# Patient Record
Sex: FEMALE | Race: WHITE | Employment: OTHER | URBAN - METROPOLITAN AREA
[De-identification: names, ages, dates, MRNs, and addresses within clinical notes are randomized per-mention and may not be internally consistent; named-entity substitution may affect disease eponyms.]

---

## 2017-01-04 ENCOUNTER — ASSISTED LIVING VISIT (OUTPATIENT)
Dept: GERIATRICS | Facility: CLINIC | Age: 82
End: 2017-01-04
Payer: COMMERCIAL

## 2017-01-04 VITALS
DIASTOLIC BLOOD PRESSURE: 76 MMHG | WEIGHT: 125.8 LBS | BODY MASS INDEX: 22.29 KG/M2 | HEART RATE: 84 BPM | TEMPERATURE: 97 F | SYSTOLIC BLOOD PRESSURE: 164 MMHG | OXYGEN SATURATION: 97 % | RESPIRATION RATE: 16 BRPM

## 2017-01-04 DIAGNOSIS — S01.01XD LACERATION OF SCALP, SUBSEQUENT ENCOUNTER: Primary | ICD-10-CM

## 2017-01-04 DIAGNOSIS — Z48.02 VISIT FOR SUTURE REMOVAL: ICD-10-CM

## 2017-01-04 DIAGNOSIS — F90.9 HYPERKINETIC: ICD-10-CM

## 2017-01-04 DIAGNOSIS — I10 ESSENTIAL HYPERTENSION WITH GOAL BLOOD PRESSURE LESS THAN 140/90: ICD-10-CM
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
## 2017-01-04 PROCEDURE — 99207 ZZC CDG-CORRECTLY CODED, REVIEWED AND AGREE: CPT | Performed by: NURSE PRACTITIONER

## 2017-01-04 NOTE — PROGRESS NOTES
[facility]    Chief Complaint   Patient presents with     Nursing Home Acute       HPI:    Anya Gaines is a 89 year old  (7/4/1920), who is being seen today for an episodic care visit at [facility]. Today's concern is:  Hyperkinetic  Fall  Visit for suture removal  Anya fell on 12/25/16 in her apartment, suffering a laceration to the scalp. Needs sutures removed today. She complains of weakness and fear of more falls. Will be starting PT this week, and wonders if anything else could be done.        ALLERGIES: Review of patient's allergies indicates no known allergies.  Past Medical, Surgical, Family and Social History reviewed and updated in [facility].    Current Outpatient Prescriptions   Medication Sig Dispense Refill     Acetaminophen (TYLENOL PO) Take 1,000 mg by mouth 3 times daily       AMLODIPINE BESYLATE PO Take 2.5 mg by mouth daily       lisinopril (PRINIVIL,ZESTRIL) 40 MG tablet Take 1 tablet (40 mg) by mouth daily 30 tablet 5     calcium carbonate (OS- MG [place]. CA) 600 MG tablet Take 1 tablet (600 mg) by mouth daily 60 tablet 5     atorvastatin (LIPITOR) 10 MG tablet Take 1 tablet (10 mg) by mouth daily 30 tablet 1     gabapentin (NEURONTIN) 100 MG capsule Take 4 capsules (400 mg) by mouth 3 times daily 90 capsule 5     clopidogrel (PLAVIX) 75 MG tablet Take 1 tablet (75 mg) by mouth every evening 30 tablet 5     multivitamin  with lutein (OCUVITE WITH LTEIN) CAPS Take 1 capsule by mouth daily 30 capsule 5     loperamide (IMODIUM) 2 MG capsule Take 2 mg by mouth every other day       Menthol, Topical Analgesic, (BIOFREEZE) 4 % GEL Externally apply topically 3 times daily as needed        hypromellose-dextran 0.3-0.1% (ARTIFICIAL TEARS) opthalmic solution Place 1 drop into both eyes 3 times daily       loperamide (IMODIUM A-D) 2 MG tablet Take 2 mg by mouth 4 times daily as needed for diarrhea        hydrochlorothiazide (HYDRODIURIL) 25 MG tablet Take 1 tablet  (25 mg) by mouth daily 90 tablet 3     metFORMIN (GLUCOPHAGE-XR) 500 MG 24 hr tablet Take 2,000 mg by mouth daily (with dinner)         Medications reconciled to facility chart and changes were made to reflect current medications as identified as above med list. Below are the changes that were made:   Medications stopped since last EPIC medication reconciliation:   Medications Discontinued During This Encounter   Medication Reason     acetaminophen (TYLENOL) 500 MG tablet        Medications started since last [facility] medication reconciliation:  Orders Placed This Encounter   Medications     Acetaminophen (TYLENOL PO)     Sig: Take 1,000 mg by mouth 3 times daily            REVIEW OF SYSTEMS:  4 point ROS including Respiratory, CV, GI and , other than that noted in the HPI,  is negative    PHYSICAL EXAM:  /76 mmHg  Pulse 84  Temp(Src) 97  F (36.1  C)  Resp 16  Wt 125 lb 12.8 oz (57.063 kg)  SpO2 97%  GENERAL APPEARANCE:  Alert, in no distress  HEAD:  Normal, normocephalic, 3 cm laceration with intact sutures (6) on L scalp  EYE EXAM: normal external eye, conjunctiva, lids, JUAN  NECK EXAM: supple, no JVD  CHEST/RESP:  respiratory effort normal , no respiratory distress  M/S:   extremities normal, gait normal-with rolling walker , normal muscle tone, and range of motion normal  SKIN EXAM: L scalp wound is CDI with well approximated edges, dried scabs, no surrounding erythema  NEUROLOGIC EXAM: Normal gross motor movement, tone and coordination. Hyperkinesis at baseline  Cranial nerves 2-12 are normal tested and grossly at patient's baseline  PSYCH:  Alert and oriented to person-place-time , affect pleasant, judgement appropriate     RECENT LABS:  All labs reviewed, none recent    ASSESSMENT/PLAN:  Laceration of scalp, subsequent encounter  Fall  Visit for suture removal  Scalp lac is healed, 6 sutures removed without incident.     Hyperkinetic  Ongoing. She is worried about repeat falls. She will be starting  therapy to improve strength. Also discussed that she may want to return to neurology to see if there is more that can be done to control hyperkinetic movements as this may prevent further falls. She will discuss with her daughter and may choose to see Dr. Eubanks in [place].     Essential hypertension with goal blood pressure less than 140/90  Noted to have elevated BP while in ED (expected)  BP Readings from Last 6 Encounters:   01/04/17 164/76   12/25/16 123/75   12/12/16 158/72   10/17/16 152/98   09/20/16 132/72   07/07/16 140/68    will continue to monitor.      ORDERS:  1. No new orders    Total time spent with patient visit was 25 min including patient visit   Greater than 50% of total time spent with counseling and coordinating care    Electronically signed by  Carlene Serrano CNP   [facility]  [phone] cell

## 2017-01-11 ENCOUNTER — CARE COORDINATION (OUTPATIENT)
Dept: GERIATRIC MEDICINE | Facility: CLINIC | Age: 82
End: 2017-01-11

## 2017-01-13 ENCOUNTER — CARE COORDINATION (OUTPATIENT)
Dept: GERIATRIC MEDICINE | Facility: CLINIC | Age: 82
End: 2017-01-13

## 2017-01-18 DIAGNOSIS — E78.2 MIXED HYPERLIPIDEMIA: Primary | ICD-10-CM

## 2017-01-18 RX ORDER — ATORVASTATIN CALCIUM 10 MG/1
10 TABLET, FILM COATED ORAL DAILY
Qty: 30 TABLET | Refills: 1 | Status: ON HOLD
Start: 2017-01-18 | End: 2017-02-07

## 2017-01-31 NOTE — PROGRESS NOTES
"1-11-17   CC went to see client due to her change from [employer] to [employer] as of 1-1-17.  This change occurred for client due to the concern about the contract with [employer] and [employer] at the end of last year and daughter made the choice to change [employer] products.      Client was doing well at this time and recovering from her fall and ED visit in December.  Client is very shaky in her movements and stated that she \"just falls\"  She has had two falls in the last 6 months.  CC encouraged her to continue to use AL staff to assist with cares to try and help her prevent the falls she had been having.      Beside falls client has been doing well at this time with no significant changes at this time. CC will reach out to RN at AL to review as well.    Arely Duckworth MA [facility] Care Coordinator   [phone]      1-5-17  CC reached out to client daughter Kimi to discuss the change in product from [employer] to [employer] on 1-1-17. Daughter said that client was doing well at this time with no changes.  CC asked about client recent fall and daughter said she was fine and did not hear any concerns from AL in regards to this.  CC stated that she was going to stop in and see client when she was at the AL and wanted to know if daughter wanted to be present.  Daughter said she did not feel she needed to be there and asked that CC f/u with her as needed.      CC then reached out to RN at AL and said that she would be stopping out to see client on 1-11-17 when she was at the AL and she will f/u with her as needed.   Arely Duckworth MA [facility] Care Coordinator   [phone]          "

## 2017-01-31 NOTE — PROGRESS NOTES
1-13-17  CC then f/u with RN at AL and there was not significant change for client at this time.  RN state that client has had two significant falls in the last 6 months so they will continue to provide support and assistance to client as she will allow.  RN will f/u with CC as needed.      Transitional HRA was completed and CC will updated RS tool to reflect [employer], update county in [facility] with [employer] information and update [facility] and CP as needed.     Arely Duckworth MA [facility] Care Coordinator   [phone]

## 2017-02-03 ENCOUNTER — APPOINTMENT (OUTPATIENT)
Dept: CT IMAGING | Facility: CLINIC | Age: 82
End: 2017-02-03
Attending: EMERGENCY MEDICINE
Payer: COMMERCIAL

## 2017-02-03 ENCOUNTER — APPOINTMENT (OUTPATIENT)
Dept: GENERAL RADIOLOGY | Facility: CLINIC | Age: 82
End: 2017-02-03
Attending: EMERGENCY MEDICINE
Payer: COMMERCIAL

## 2017-02-03 ENCOUNTER — HOSPITAL ENCOUNTER (EMERGENCY)
Facility: CLINIC | Age: 82
Discharge: ANOTHER HOSPITAL | End: 2017-02-03
Attending: EMERGENCY MEDICINE | Admitting: EMERGENCY MEDICINE
Payer: COMMERCIAL

## 2017-02-03 ENCOUNTER — HOSPITAL ENCOUNTER (INPATIENT)
Facility: CLINIC | Age: 82
LOS: 3 days | Discharge: NURSING, REHAB OR LONG-TERM CARE | DRG: 084 | End: 2017-02-07
Attending: EMERGENCY MEDICINE | Admitting: SURGERY
Payer: COMMERCIAL

## 2017-02-03 ENCOUNTER — MEDICAL CORRESPONDENCE (OUTPATIENT)
Dept: EMERGENCY MEDICINE | Facility: CLINIC | Age: 82
End: 2017-02-03

## 2017-02-03 VITALS
RESPIRATION RATE: 9 BRPM | WEIGHT: 125 LBS | OXYGEN SATURATION: 97 % | HEART RATE: 91 BPM | BODY MASS INDEX: 20.83 KG/M2 | HEIGHT: 65 IN | DIASTOLIC BLOOD PRESSURE: 85 MMHG | SYSTOLIC BLOOD PRESSURE: 150 MMHG | TEMPERATURE: 98.1 F

## 2017-02-03 DIAGNOSIS — R25.9 ABNORMAL INVOLUNTARY MOVEMENT: ICD-10-CM
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
## 2017-02-03 DIAGNOSIS — E11.9 TYPE 2 DIABETES MELLITUS WITHOUT COMPLICATION, WITHOUT LONG-TERM CURRENT USE OF INSULIN (H): ICD-10-CM

## 2017-02-03 DIAGNOSIS — H40.9 GLAUCOMA OF BOTH EYES, UNSPECIFIED GLAUCOMA: ICD-10-CM

## 2017-02-03 DIAGNOSIS — I10 ESSENTIAL HYPERTENSION WITH GOAL BLOOD PRESSURE LESS THAN 140/90: ICD-10-CM

## 2017-02-03 DIAGNOSIS — I10 BENIGN ESSENTIAL HYPERTENSION: ICD-10-CM

## 2017-02-03 DIAGNOSIS — M81.0 OSTEOPOROSIS: ICD-10-CM
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
## 2017-02-03 DIAGNOSIS — I62.00 SUBDURAL HEMORRHAGE (H): ICD-10-CM

## 2017-02-03 DIAGNOSIS — S00.03XA CONTUSION OF FACE, SCALP AND NECK, INITIAL ENCOUNTER: ICD-10-CM

## 2017-02-03 DIAGNOSIS — S10.93XA CONTUSION OF FACE, SCALP AND NECK, INITIAL ENCOUNTER: ICD-10-CM

## 2017-02-03 DIAGNOSIS — S00.83XA CONTUSION OF FACE, SCALP AND NECK, INITIAL ENCOUNTER: ICD-10-CM

## 2017-02-03 DIAGNOSIS — E78.2 MIXED HYPERLIPIDEMIA: ICD-10-CM

## 2017-02-03 LAB
ABO + RH BLD: NORMAL
ABO + RH BLD: NORMAL
ANION GAP SERPL CALCULATED.3IONS-SCNC: 12 MMOL/L (ref 3–14)
ANION GAP SERPL CALCULATED.3IONS-SCNC: 8 MMOL/L (ref 3–14)
BASOPHILS # BLD AUTO: 0 10E9/L (ref 0–0.2)
BASOPHILS # BLD AUTO: 0 10E9/L (ref 0–0.2)
BASOPHILS NFR BLD AUTO: 0.2 %
BASOPHILS NFR BLD AUTO: 0.6 %
BLD GP AB SCN SERPL QL: NORMAL
BLD PROD TYP BPU: NORMAL
BLD PROD TYP BPU: NORMAL
BLD UNIT ID BPU: 0
BLOOD BANK CMNT PATIENT-IMP: NORMAL
BLOOD PRODUCT CODE: NORMAL
BPU ID: NORMAL
BUN SERPL-MCNC: 16 MG/DL (ref 7–30)
BUN SERPL-MCNC: 19 MG/DL (ref 7–30)
CALCIUM SERPL-MCNC: 9.1 MG/DL (ref 8.5–10.1)
CALCIUM SERPL-MCNC: 9.6 MG/DL (ref 8.5–10.1)
CHLORIDE SERPL-SCNC: 100 MMOL/L (ref 94–109)
CHLORIDE SERPL-SCNC: 98 MMOL/L (ref 94–109)
CO2 SERPL-SCNC: 25 MMOL/L (ref 20–32)
CO2 SERPL-SCNC: 29 MMOL/L (ref 20–32)
CREAT SERPL-MCNC: 0.43 MG/DL (ref 0.52–1.04)
CREAT SERPL-MCNC: 0.46 MG/DL (ref 0.52–1.04)
DIFFERENTIAL METHOD BLD: ABNORMAL
DIFFERENTIAL METHOD BLD: NORMAL
EOSINOPHIL # BLD AUTO: 0 10E9/L (ref 0–0.7)
EOSINOPHIL # BLD AUTO: 0.2 10E9/L (ref 0–0.7)
EOSINOPHIL NFR BLD AUTO: 0.3 %
EOSINOPHIL NFR BLD AUTO: 2.9 %
ERYTHROCYTE [DISTWIDTH] IN BLOOD BY AUTOMATED COUNT: 12.7 % (ref 10–15)
ERYTHROCYTE [DISTWIDTH] IN BLOOD BY AUTOMATED COUNT: 13.2 % (ref 10–15)
GFR SERPL CREATININE-BSD FRML MDRD: ABNORMAL ML/MIN/1.7M2
GFR SERPL CREATININE-BSD FRML MDRD: ABNORMAL ML/MIN/1.7M2
GLUCOSE SERPL-MCNC: 134 MG/DL (ref 70–99)
GLUCOSE SERPL-MCNC: 220 MG/DL (ref 70–99)
HCT VFR BLD AUTO: 37.3 % (ref 35–47)
HCT VFR BLD AUTO: 39.3 % (ref 35–47)
HGB BLD-MCNC: 12.2 G/DL (ref 11.7–15.7)
HGB BLD-MCNC: 12.7 G/DL (ref 11.7–15.7)
IMM GRANULOCYTES # BLD: 0 10E9/L (ref 0–0.4)
IMM GRANULOCYTES # BLD: 0 10E9/L (ref 0–0.4)
IMM GRANULOCYTES NFR BLD: 0.2 %
IMM GRANULOCYTES NFR BLD: 0.2 %
INR PPP: 0.98 (ref 0.86–1.14)
LYMPHOCYTES # BLD AUTO: 1 10E9/L (ref 0.8–5.3)
LYMPHOCYTES # BLD AUTO: 1.3 10E9/L (ref 0.8–5.3)
LYMPHOCYTES NFR BLD AUTO: 24.4 %
LYMPHOCYTES NFR BLD AUTO: 7.6 %
MCH RBC QN AUTO: 28.5 PG (ref 26.5–33)
MCH RBC QN AUTO: 29 PG (ref 26.5–33)
MCHC RBC AUTO-ENTMCNC: 32.3 G/DL (ref 31.5–36.5)
MCHC RBC AUTO-ENTMCNC: 32.7 G/DL (ref 31.5–36.5)
MCV RBC AUTO: 88 FL (ref 78–100)
MCV RBC AUTO: 89 FL (ref 78–100)
MONOCYTES # BLD AUTO: 0.4 10E9/L (ref 0–1.3)
MONOCYTES # BLD AUTO: 0.5 10E9/L (ref 0–1.3)
MONOCYTES NFR BLD AUTO: 3.7 %
MONOCYTES NFR BLD AUTO: 6.9 %
NEUTROPHILS # BLD AUTO: 12 10E9/L (ref 1.6–8.3)
NEUTROPHILS # BLD AUTO: 3.4 10E9/L (ref 1.6–8.3)
NEUTROPHILS NFR BLD AUTO: 65 %
NEUTROPHILS NFR BLD AUTO: 88 %
NRBC # BLD AUTO: 0 10*3/UL
NRBC BLD AUTO-RTO: 0 /100
NUM BPU REQUESTED: 2
PLATELET # BLD AUTO: 168 10E9/L (ref 150–450)
PLATELET # BLD AUTO: 198 10E9/L (ref 150–450)
POTASSIUM SERPL-SCNC: 2.9 MMOL/L (ref 3.4–5.3)
POTASSIUM SERPL-SCNC: 3 MMOL/L (ref 3.4–5.3)
RADIOLOGIST FLAGS: ABNORMAL
RBC # BLD AUTO: 4.2 10E12/L (ref 3.8–5.2)
RBC # BLD AUTO: 4.45 10E12/L (ref 3.8–5.2)
SODIUM SERPL-SCNC: 135 MMOL/L (ref 133–144)
SODIUM SERPL-SCNC: 137 MMOL/L (ref 133–144)
SPECIMEN EXP DATE BLD: NORMAL
TRANSFUSION STATUS PATIENT QL: NORMAL
TRANSFUSION STATUS PATIENT QL: NORMAL
TROPONIN I SERPL-MCNC: NORMAL UG/L (ref 0–0.04)
WBC # BLD AUTO: 13.7 10E9/L (ref 4–11)
WBC # BLD AUTO: 5.2 10E9/L (ref 4–11)

## 2017-02-03 PROCEDURE — 85610 PROTHROMBIN TIME: CPT | Performed by: EMERGENCY MEDICINE

## 2017-02-03 PROCEDURE — 70450 CT HEAD/BRAIN W/O DYE: CPT

## 2017-02-03 PROCEDURE — 86901 BLOOD TYPING SEROLOGIC RH(D): CPT | Performed by: EMERGENCY MEDICINE

## 2017-02-03 PROCEDURE — 96366 THER/PROPH/DIAG IV INF ADDON: CPT

## 2017-02-03 PROCEDURE — P9037 PLATE PHERES LEUKOREDU IRRAD: HCPCS | Performed by: EMERGENCY MEDICINE

## 2017-02-03 PROCEDURE — 96375 TX/PRO/DX INJ NEW DRUG ADDON: CPT

## 2017-02-03 PROCEDURE — 99291 CRITICAL CARE FIRST HOUR: CPT | Mod: 25 | Performed by: EMERGENCY MEDICINE

## 2017-02-03 PROCEDURE — 99285 EMERGENCY DEPT VISIT HI MDM: CPT | Mod: 25

## 2017-02-03 PROCEDURE — 80048 BASIC METABOLIC PNL TOTAL CA: CPT | Performed by: EMERGENCY MEDICINE

## 2017-02-03 PROCEDURE — 86900 BLOOD TYPING SEROLOGIC ABO: CPT | Performed by: EMERGENCY MEDICINE

## 2017-02-03 PROCEDURE — 25000128 H RX IP 250 OP 636: Performed by: EMERGENCY MEDICINE

## 2017-02-03 PROCEDURE — 99207 ZZC APP CREDIT; MD BILLING SHARED VISIT: CPT | Mod: Z6 | Performed by: EMERGENCY MEDICINE

## 2017-02-03 PROCEDURE — 71020 XR CHEST 2 VW: CPT

## 2017-02-03 PROCEDURE — 25000125 ZZHC RX 250: Performed by: EMERGENCY MEDICINE

## 2017-02-03 PROCEDURE — 51798 US URINE CAPACITY MEASURE: CPT

## 2017-02-03 PROCEDURE — 85025 COMPLETE CBC W/AUTO DIFF WBC: CPT | Performed by: EMERGENCY MEDICINE

## 2017-02-03 PROCEDURE — 96374 THER/PROPH/DIAG INJ IV PUSH: CPT

## 2017-02-03 PROCEDURE — 81001 URINALYSIS AUTO W/SCOPE: CPT | Performed by: EMERGENCY MEDICINE

## 2017-02-03 PROCEDURE — 84484 ASSAY OF TROPONIN QUANT: CPT | Performed by: EMERGENCY MEDICINE

## 2017-02-03 PROCEDURE — 93010 ELECTROCARDIOGRAM REPORT: CPT | Performed by: EMERGENCY MEDICINE

## 2017-02-03 PROCEDURE — 86850 RBC ANTIBODY SCREEN: CPT | Performed by: EMERGENCY MEDICINE

## 2017-02-03 PROCEDURE — 72125 CT NECK SPINE W/O DYE: CPT

## 2017-02-03 PROCEDURE — 96365 THER/PROPH/DIAG IV INF INIT: CPT

## 2017-02-03 PROCEDURE — 96372 THER/PROPH/DIAG INJ SC/IM: CPT

## 2017-02-03 PROCEDURE — 93005 ELECTROCARDIOGRAM TRACING: CPT

## 2017-02-03 RX ORDER — ONDANSETRON 2 MG/ML
4 INJECTION INTRAMUSCULAR; INTRAVENOUS ONCE
Status: COMPLETED | OUTPATIENT
Start: 2017-02-03 | End: 2017-02-03

## 2017-02-03 RX ORDER — HYDROMORPHONE HCL/0.9% NACL/PF 0.2MG/0.2
0.2 SYRINGE (ML) INTRAVENOUS
Status: DISCONTINUED | OUTPATIENT
Start: 2017-02-03 | End: 2017-02-03 | Stop reason: HOSPADM

## 2017-02-03 RX ORDER — LIDOCAINE 40 MG/G
CREAM TOPICAL
Status: DISCONTINUED | OUTPATIENT
Start: 2017-02-03 | End: 2017-02-03 | Stop reason: HOSPADM

## 2017-02-03 RX ADMIN — NICARDIPINE HYDROCHLORIDE 2.5 MG/HR: 25 INJECTION INTRAVENOUS at 20:56

## 2017-02-03 RX ADMIN — ONDANSETRON 4 MG: 2 INJECTION INTRAMUSCULAR; INTRAVENOUS at 21:43

## 2017-02-03 RX ADMIN — HYDROMORPHONE HYDROCHLORIDE 0.2 MG: 1 INJECTION, SOLUTION INTRAMUSCULAR; INTRAVENOUS; SUBCUTANEOUS at 19:10

## 2017-02-03 RX ADMIN — DEXTROSE MONOHYDRATE 0.25 MCG/KG/MIN: 50 INJECTION, SOLUTION INTRAVENOUS at 19:09

## 2017-02-03 ASSESSMENT — ENCOUNTER SYMPTOMS
WEAKNESS: 0
HEADACHES: 1
SHORTNESS OF BREATH: 0

## 2017-02-03 NOTE — IP AVS SNAPSHOT
` `     [address] [facility] BANK: [phone]                 INTERAGENCY TRANSFER FORM - NOTES (H&P, Discharge Summary, Consults, Procedures, Therapies)   2/3/2017                    Hospital Admission Date: 2/3/2017  TAMAR ELLINGTON   : 1920  Sex: Female        Patient PCP Information     Provider PCP Type    TONYA Tran CNP General         History & Physicals      H&P by Tiburcio Espinosa MD at 2/3/2017  9:38 PM     Author:  Tiburcio Espinosa MD Service:  Trauma Author Type:  Resident    Filed:  2/3/2017  9:51 PM Note Time:  2/3/2017  9:38 PM Status:  Attested    :  Tiburcio Espinosa MD (Resident) Cosigner:  Leonel Odell MD at 2017 12:27 PM    Attestation signed by Leonel Odell MD at 2017 12:27 PM        Trauma Staff:    I agree with the assessment and the plan that I formulated for the trauma care.  I agree with the documentation above.    Leonel Odell MD, PhD                        [facility], [place]    History and Physical / Consult note: Trauma Service     Date of Admission:  2/3/2017  Date of Service (when I saw the patient): 2017    Assessment and Plan  Trauma mechanism:   Time/date of injury: Mechanical Fall  Known Injuries:  1. Subdural hematoma  Other diagnoses:   1. Parkinsons Disease  2. TIA on Plavix  3. DM2  4. HTN  5. HLD      Plan:  1. Admit to Trauma, SICU overnight  2. Neurosurg Consult-->Q1hr neuro checks, HCT at 0030, SBPO<140, Plts above 100K, Normonatremia Plts transfused in ED  3. NPO, mIVF, SSI, Replace Lytes  4. Will hold home anti-HTNs given use of Nicardipine drip  5. UA/UC, will repeat labs in AM    Code status: Full    General Cares:  GI Prophylaxis: Bowel Regimen PRN  DVT Prophylaxis: Contraindicated due to SDH  Pulmonary toilet:IS while awake    Primary Care Physician  *Carlene Serrano    Chief Complaint  Fall, head pain    Unable to obtain a history from the patient due to  mental status    History of Present Illness  Anya Gaines is a 89 year old female with history of TIA on plavix, DM2, Parkinsons disease, HTN, and HLD who presents from assisted living faciltiy after mechanical fall. Per report she tripped and fell, striking her head. +LOC. Head CT showed subdural. On arrival patient is alert, but disorientated. Her baseline mental status at this time is unclear. Patient complains only of headache. EKG normal, INR <1, and Na 137 on arrival.     Past Medical History   DM2  UTI  TIA  HTN  HLD    Past Surgical History  Patient denies previous surgery     Prior to Admission Medications  Prior to Admission Medications   Prescriptions Last Dose Informant Patient Reported? Taking?   AMLODIPINE BESYLATE PO  Nursing Home Yes No   Sig: Take 2.5 mg by mouth daily   Acetaminophen (TYLENOL PO)  Nursing Home Yes No   Sig: Take 1,000 mg by mouth 3 times daily   Menthol, Topical Analgesic, (BIOFREEZE) 4 % GEL  Nursing Home Yes No   Sig: Externally apply topically 3 times daily as needed    atorvastatin (LIPITOR) 10 MG tablet  Nursing Home No No   Sig: Take 1 tablet (10 mg) by mouth daily   calcium carbonate (OS- MG [place]. CA) 600 MG tablet  Nursing Home No No   Sig: Take 1 tablet (600 mg) by mouth daily   clopidogrel (PLAVIX) 75 MG tablet  Nursing Home No No   Sig: Take 1 tablet (75 mg) by mouth every evening   gabapentin (NEURONTIN) 100 MG capsule  Nursing Home No No   Sig: Take 4 capsules (400 mg) by mouth 3 times daily   hydrochlorothiazide (HYDRODIURIL) 25 MG tablet  Nursing Home No No   Sig: Take 1 tablet (25 mg) by mouth daily   hypromellose-dextran 0.3-0.1% (ARTIFICIAL TEARS) opthalmic solution  Nursing Home Yes No   Sig: Place 1 drop into both eyes 3 times daily   lisinopril (PRINIVIL,ZESTRIL) 40 MG tablet  Nursing Home No No   Sig: Take 1 tablet (40 mg) by mouth daily   loperamide (IMODIUM A-D) 2 MG tablet  Nursing Home Yes No   Sig: Take 2 mg by mouth 4 times daily as needed for  diarrhea    loperamide (IMODIUM) 2 MG capsule  Nursing Home Yes No   Sig: Take 2 mg by mouth every other day   metFORMIN (GLUCOPHAGE-XR) 500 MG 24 hr tablet  Nursing Home Yes No   Sig: Take 2,000 mg by mouth daily (with dinner)   multivitamin  with lutein (OCUVITE WITH LTEIN) CAPS  Nursing Home No No   Sig: Take 1 capsule by mouth daily      Facility-Administered Medications: None     Allergies  No Known Allergies    Social History  Social History     Social History     Marital Status:      Spouse Name: N/A     Number of Children: N/A     Years of Education: N/A     Occupational History     Not on file.     Social History Main Topics     Smoking status: Never Smoker      Smokeless tobacco: Never Used     Alcohol Use: No     Drug Use: No     Sexual Activity: Not on file     Other Topics Concern     Not on file     Social History Narrative     Family History  Not pertinent to this consult    Review of Systems  CONSTITUTIONAL: No fever, chills, sweats, fatigue   EYES: no visual blurring, no double vision or visual loss  ENT: no decrease in hearing, no tinnitus, no vertigo  RESPIRATORY: no shortness of breath, no cough, no sputum   CARDIOVASCULAR: no palpitations, no chest  pain, no exertional chest pain or pressure  GASTROINTESTINAL: no nausea or vomiting, or abd pain  GENITOURINARY: no dysuria, no frequency or hesitancy, no hematuria  MUSCULOSKELETAL: no weakness, no redness, no swelling, no joint pain,   SKIN: no rashes, +ecchymoses, +abrasions to scalp  NEUROLOGIC: no numbness or tingling of hands, no numbness or tingling  of feet, +LOC, + weakness  PSYCHIATRIC: no sleep disturbances, no anxiety or depression    Physical Exam  Temp: 97.1  F (36.2  C) Temp src: Axillary BP: 160/79 mmHg Pulse: 98 Heart Rate: 93 Resp: 17 SpO2: 96 % O2 Device: None (Room air)    Vital Signs with Ranges  Temp:  [97.1  F (36.2  C)-98.1  F (36.7  C)] 97.1  F (36.2  C)  Pulse:  [91-98] 98  Heart Rate:  [] 93  Resp:  [9-29]  17  BP: (107-210)/() 160/79 mmHg  SpO2:  [83 %-97 %] 96 % 125 lbs 10.6 oz    Primary Survey:  Airway: patient talking  Breathing: symmetric respiratory effort bilaterally  Circulation: central pulses present and peripheral pulses present  Disability: Pupils - 2mm and equal bilaterally  Jason Coma Scale - Total 15/15  Eye Response (E): 4= spontaneous,  3= to verbal/voice, 2=  to pain, 1= No response   Verbal Response (V):  5= Orientated, converses,  4= Confused, converses, 3= Inappropriate words,  2= Incomprehensible sounds,  1=No response   Motor Response (M)  6= Obeys commands, 5= Localizes to pain, 4= Withdrawal to pain, 3=Fexion to pain, 2= Extension to pain, 1= No response    Secondary Survey:  General: alert, not orientated to person or place  Head: Abrasion and hematoma to right occipital scalp  Eyes: pupils 2mm, equal, minimally reactive, EOMI, corneas and conjunctivae clear  Ears: pearly grey bilateral TMs and non-inflamed external ear canals  Nose: nares patent, no drainage, nasal septum non-tender  Mouth/Throat: no exudates or erythema,  no dental tenderness or malocclusions, no tongue lacerations  Neck:  No midline posterior tenderness, full AROM without pain or tenderness   Chest/Pulmonary: normal respiratory rate and rhythm,  bilateral clear breath sounds, no wheezes  Cardiovascular: Regular, no murmur  Abdomen: soft, non-tender  : pelvis stable to lateral compression  Back/Spine: no deformity, no midline tenderness, no sacral tenderness,  no step-offs and no abrasions or contusions  Musculoskel/Extremities: normal extremities, full AROM of major joints without tenderness, edema, erythema, ecchymosis, or abrasions.  Hand: no gross deformities of hands or fingers. Full AROM of hand and fingers in flexion and extension.  strength equal and symmetric.   Neuro:  CN II-XII grossly intact. No focal deficits. Strength 5/5 x 4 extremities.  Sensation intact. Possible pronator drift of left  arm  Psychiatric: affect/mood normal, cooperative, normal judgement/insight and memory intact    Data  Results for orders placed or performed during the hospital encounter of 02/03/17 (from the past 24 hour(s))   CBC with platelets differential   Result Value Ref Range    WBC 13.7 (H) 4.0 - 11.0 10e9/L    RBC Count 4.20 3.8 - 5.2 10e12/L    Hemoglobin 12.2 11.7 - 15.7 g/dL    Hematocrit 37.3 35.0 - 47.0 %    MCV 89 78 - 100 fl    MCH 29.0 26.5 - 33.0 pg    MCHC 32.7 31.5 - 36.5 g/dL    RDW 13.2 10.0 - 15.0 %    Platelet Count 198 150 - 450 10e9/L    Diff Method Automated Method     % Neutrophils 88.0 %    % Lymphocytes 7.6 %    % Monocytes 3.7 %    % Eosinophils 0.3 %    % Basophils 0.2 %    % Immature Granulocytes 0.2 %    Nucleated RBCs 0 0 /100    Absolute Neutrophil 12.0 (H) 1.6 - 8.3 10e9/L    Absolute Lymphocytes 1.0 0.8 - 5.3 10e9/L    Absolute Monocytes 0.5 0.0 - 1.3 10e9/L    Absolute Eosinophils 0.0 0.0 - 0.7 10e9/L    Absolute Basophils 0.0 0.0 - 0.2 10e9/L    Abs Immature Granulocytes 0.0 0 - 0.4 10e9/L    Absolute Nucleated RBC 0.0    Basic metabolic panel   Result Value Ref Range    Sodium 135 133 - 144 mmol/L    Potassium 2.9 (L) 3.4 - 5.3 mmol/L    Chloride 98 94 - 109 mmol/L    Carbon Dioxide 25 20 - 32 mmol/L    Anion Gap 12 3 - 14 mmol/L    Glucose 220 (H) 70 - 99 mg/dL    Urea Nitrogen 16 7 - 30 mg/dL    Creatinine 0.46 (L) 0.52 - 1.04 mg/dL    GFR Estimate >90  Non  GFR Calc   >60 mL/min/1.7m2    GFR Estimate If Black >90   GFR Calc   >60 mL/min/1.7m2    Calcium 9.1 8.5 - 10.1 mg/dL   ABO/Rh type and screen   Result Value Ref Range    ABO O     RH(D)  Pos     Antibody Screen Neg     Test Valid Only At       [facility]    Specimen Expires 02/06/2017    Platelets prepare order unit   Result Value Ref Range    Ordered Component Type PLT Pheresis     Units Ordered 2        Tiburcio Espinosa               Discharge  Summaries     No notes of this type exist for this encounter.         Consult Notes      Consults by Usman Rae APRN CNP at 2/6/2017  3:10 PM     Author:  Usman Rae APRN CNP Service:  Palliative Author Type:  Nurse Practitioner    Filed:  2/7/2017  9:47 AM Note Time:  2/6/2017  3:10 PM Status:  Signed    :  Usman Rae APRN CNP (Nurse Practitioner)       Consult Orders:    1. Palliative Care ADULT: Patient to be seen: Routine within 24 hrs; Call back #: [phone]; trauma in elderly patient; Consultant may enter orders: Yes [[id number]] ordered by Lizeth Layton APRN CNP at 02/04/17 0733                [facility]  Palliative Care Consultation         Anya Gaines MRN# [id number]   Age: 89 year old YOB: 1920   Date of Admission: 2/3/2017    Reason for consult: Goals of care    Patient and family support       Requesting physician/service: Trauma service       Recommendations      Pt was seen and examined. Family present included son and two of her 27 grandchildren. Family goals are appropriately cautious with TCU for functional recovery but accepting that this fall may be the sentinel event that leads to SNF placement rather than A/L environment. Recurrent falls and increased incontinence with subtle cognitive change noted. Family pleased that aphasia from yesterday is improved. She is oriented to self and some family members. Unable to recall where she lives, where she is now or what happened to her. Denies pain. Able to participate in interview and exam.   REC: - continue with dnr/dni status  - SNF placement may be indicated dependent on functional/cognitive recovery.   -  to see  - Family seemed surprized at PD diagnosis- would benefit from further explanation.         POLST : Not completed. Has been designated as dnr/dni     Disease Process/es & Symptoms   1. Subdural hematoma- mechanical fall in A/L facility- hx of recurrent  "falls  Other diagnoses:    1. Parkinsons Disease  2. TIA on Plavix  3. DM2  4. HTN  5. HLD       Support/Coping   (We typically ask each of our patients the following questions:)    How do you make sense of this? N/A  Are you Christian? Spiritual? Not so much? - Jew bill is important to her  What are your concerns, medical and non-medical, that are not being addressed? Help with decision about post TCU placement care needs and safety  Who are your \"go-to\" people when you need support? Large extended family    Plan for psychosocial/spiritual support/follow-up from palliative team: if remans in patient.      Decision-Making & Goals of Care     Discussion/counseling today about prognosis/goals of care/decisions:   With family and trauma service  Patient has a completed health care directive available in the chart (Y)  Health care agent (only if patient has an available, complete HCD):  Physician orders for life-sustaining treatment (POLST) form is not completed  Code Status: Do not resusitate / Do not intubate   Patient has decision-making capacity (N)- may be temporary post CHT    Coordination of Care     Findings & plan of care discussed with: trauma service and family present  Follow-up plan from palliative team: as needed  Thank you for involving us in the patient's care.     Usman CASTANEDA NP  Nurse Practitioner- Lead Advanced Practice Provider  [facility] Palliative Medicine Consult Service   [phone]          Chief Complaint     Wanting EEG leads removed         History of Present Illness     History obtained from: EPIC and pt family report  89 year old with a history of DM II, Parkinson's disease, Hypertension, Hyperlipidemia and history of TIA [on plavix] who presented after a mechanical fall- her walker becoming caught up. She has no recall of the fall. CT head showed a 0.5cm thickness subdural hematoma along the left frontal-temporal region and along the left tentorium. CT C spine shows no " "acute fracture. She was started on a nicardipine ggt for SBP >200. Platelets are 168 and 2 units were transfused in the ER, Na 137, INR is 0.98.  Palliative care consulted as part of trauma evaluation.            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 Unable to share previous hobbies- crocheting, TV and reading due to visual changes   History of substance use/abuse: None            Family History:   No family history on file.  Family history reviewed and updated in EPIC           Allergies:   No Known Allergies           Medications:   I have reviewed this patient's medication profile and medications during this hospitalization             Review of Systems:   The comprehensive review of systems is negative other than noted here and in the HPI.    Palliative Symptom Review (0=no symptom/no concern, 1=mild, 2=moderate, 3=severe):      Pain: 0-1      Fatigue: 0-1      Complete palliative ROS unreliable secondary to altered mental status and baseline confusion with new SDH.      Physical exam: Vitals: /69 mmHg  Pulse 92  Temp(Src) 96.8  F (36  C) (Oral)  Resp 16  Ht 1.499 m (4' 11\")  Wt 55.7 kg (122 lb 12.7 oz)  BMI 24.79 kg/m2  SpO2 93%  BMI= Body mass index is 24.79 kg/(m^2).  Constitutional: Sitting up in bedside chair. EEG leads on scalp Awake, alert, cooperative, no apparent " distress.    HEENT:  EOMI. Normocephalic, occipital ecchymosis. No new drainage  Respiratory: No increased work of breathing, good air exchange, clear to auscultation bilaterally, no crackles or wheezing.   Cardiovascular:  RRR, tachycardic ~100 bpm, S1/S2, Grade 1-2/6 Systolic murmur noted.  GI: Normal bowel sounds, soft, non-distended, non-tender with brief exam  Genitourinary:  Hernandes was draining clear yellow urine. Was removed.    Skin:  Normal skin color, no redness, warmth, no abrasions, and no jaundice. bruising right occipital scalp   Musculoskeletal: There is no redness, warmth, no edema or swelling of the joints.  Pedal pulse palpated  Neurologic: Awake, alert as above.  Strength and sensory is intact. R handed. No focal deficits  Neuropsychiatric: Calm, normal eye contact, alert.            Data Reviewed:     ROUTINE LABS (Last four results)  BMP  Recent Labs  Lab 02/04/17 0442 02/03/17 2051 02/03/17  1730    135 137   POTASSIUM 3.4 2.9* 3.0*   CHLORIDE 100 98 100   KEN 8.6 9.1 9.6   CO2 24 25 29   BUN 18 16 19   CR 0.44* 0.46* 0.43*   * 220* 134*     CBC  Recent Labs  Lab 02/05/17  0925 02/04/17  0442 02/03/17 2051 02/03/17  1730   WBC 10.8 9.1 13.7* 5.2   RBC 4.00 3.90 4.20 4.45   HGB 11.4* 11.2* 12.2 12.7   HCT 36.2 34.4* 37.3 39.3   MCV 91 88 89 88   MCH 28.5 28.7 29.0 28.5   MCHC 31.5 32.6 32.7 32.3   RDW 13.4 13.3 13.2 12.7    200 198 168     INR  Recent Labs  Lab 02/03/17  1730   INR 0.98                Consults by Leschke, John M, MD at 2/3/2017  9:01 PM     Author:  Leschke, John M, MD Service:  Neurosurgery Author Type:  Resident    Filed:  2/4/2017  7:28 PM Note Time:  2/3/2017  9:01 PM Status:  Attested    :  Leschke, John M, MD (Resident)      Related Notes: Original Note by Leschke, John M, MD (Resident) filed at 2/4/2017  9:02 AM    Cosigner:  Jarocho Isidro MD at 2/6/2017 12:36 PM        Attestation signed by Jarocho Isidro MD at 2/6/2017  12:36 PM        Attestation:  I, Jarohco Isidro MD, saw and evaluated Anya Gaines as part of a shared visit.  I have reviewed and discussed with the resident their history, physical and plan.    I personally reviewed the vital signs, medications, labs and imaging .    My key history or physical exam findings: small SDH with little to no mass effect, stable on serial CTs    Key management decisions made by me: continue observation, discussed with family, holding anti platelet agents    Jarocho Isidro MD  2/6/2017                            Neurosurgery Consult Note     CC: headache, subdural hematoma     HPI: The patient is a 89 year old with a history of Parkinson's disease, Hypertension, Hyperlipidemia and history of TIA on plavix who presents after a mechanical fall. CT head shows a 0.5cm thickness subdural hematoma along the left frontal-temporal region and along the left tentorium. CT C spine shows no acute fracture. She was started on a nicardipine ggt for SBP >200. Platelets are 168 and 2 units were transfused in the ER, Na 137, INR is 0.98.     Past Medical/Surgical History  Parkinson's disease     Hypertension  Hyperlipidemia   TIA   Past Surgical History   Procedure Laterality Date     Surgical history of -        Cataract, left eye     Family History  No known family history of neurologic disease.     Social History  Social History   Substance Use Topics     Smoking status: Never Smoker      Smokeless tobacco: Never Used     Alcohol Use: No     Medications  Current Outpatient Prescriptions   Medication Sig Dispense Refill     atorvastatin (LIPITOR) 10 MG tablet Take 1 tablet (10 mg) by mouth daily 30 tablet 1     Acetaminophen (TYLENOL PO) Take 1,000 mg by mouth 3 times daily       AMLODIPINE BESYLATE PO Take 2.5 mg by mouth daily       lisinopril (PRINIVIL,ZESTRIL) 40 MG tablet Take 1 tablet (40 mg) by mouth daily 30 tablet 5     calcium carbonate (OS- MG [place]. [place]) 600 MG  tablet Take 1 tablet (600 mg) by mouth daily 60 tablet 5     gabapentin (NEURONTIN) 100 MG capsule Take 4 capsules (400 mg) by mouth 3 times daily 90 capsule 5     clopidogrel (PLAVIX) 75 MG tablet Take 1 tablet (75 mg) by mouth every evening 30 tablet 5     multivitamin  with lutein (OCUVITE WITH LTEIN) CAPS Take 1 capsule by mouth daily 30 capsule 5     loperamide (IMODIUM) 2 MG capsule Take 2 mg by mouth every other day       Menthol, Topical Analgesic, (BIOFREEZE) 4 % GEL Externally apply topically 3 times daily as needed        hypromellose-dextran 0.3-0.1% (ARTIFICIAL TEARS) opthalmic solution Place 1 drop into both eyes 3 times daily       loperamide (IMODIUM A-D) 2 MG tablet Take 2 mg by mouth 4 times daily as needed for diarrhea        hydrochlorothiazide (HYDRODIURIL) 25 MG tablet Take 1 tablet (25 mg) by mouth daily 90 tablet 3     metFORMIN (GLUCOPHAGE-XR) 500 MG 24 hr tablet Take 2,000 mg by mouth daily (with dinner)       Allergies  No Known Allergies    ROS: 10 point ROS of systems including Constitutional, Eyes, Respiratory, Cardiovascular, Gastroenterology, Genitourinary, Integumentary, Muscularskeletal, Psychiatric were all negative except for pertinent positives noted in my HPI.     Physical Exam:     General:   Comfortable respiratory effort; normal cardiac rhythm.     Mental Status:   awake, alert and oriented to self only.  fluent, communicative, intact comprehension.    Cranial Nerves:  equal and reactive pupils   extraocular movements preserved   no dysarthria.     Motor/Sensory:  5/5 strength throughout upper and lower extremities.  No deficits to pain, soft touch.     Reflexes: symmetric     GCS: 15      The labs and imaging for this patient have been reviewed    Assessment/Plan:  The patient is a 89 year old with a history of Parkinson's disease, Hypertension, Hyperlipidemia and history of TIA on plavix who presents with a 0.5cm thickness subdural hematoma along the left frontal-temporal  "region and along the left tentorium. She is neurologically intact.      Admit to SICU    Repeat HCT at 6 hours    q1 hour neuro checks until repeat HCT, then q2 hours    NPO    Blood pressure <140 - continue nicardipine infusion    Goal normonatremia     Platelets > 100,000    INR < 1.5    Hemoglobin > 8    The patient was seen at 9:20 PM    The following conditions are also present, complicating the patient's current management, as described in the Assessment and Plan:   intracerebral hematoma; brain compression    John (Jack) M. Leschke, M.D.     Our patient was discussed with my chief resident.                   Progress Notes - Physician (Notes from 02/04/17 through 02/07/17)      Progress Notes by Robert Steiner at 2/7/2017  2:36 PM     Author:  Robert Steiner Service:  Palliative Author Type:      Filed:  2/7/2017  2:44 PM Note Time:  2/7/2017  2:36 PM Status:  Signed    :  Robert Steiner ()           PALLIATIVE CONSULT SERVICE  SPIRITUAL ASSESSMENT Progress Note  [facility]     PRIMARY FOCUS:     Goals of care    Support for coping    ILLNESS CIRCUMSTANCES:   Reviewed documentation. Reflective conversation shared with Anya integrating elements of her illness and family narratives.     Context of Serious Illness/Symptom(s) - Discussed the \"fall\" she experienced leading to this admission and that it is not a source of distress as said, \"I don't remember it.\"    Resources for Support - Eight children. Several grand and great grandchildren.    DISTRESS:     Emotional/Spiritual/Existential Distress -   o She wondered about her inabilility to live alone.    Advent Distress - Not discussed.    Social/Cultural/Economic Distress - She talked about her living situation and wondered how it might evolve for her.    SPIRITUAL/Zoroastrian COPING:     Yarsani/Eugenie - Anglican (Lutheran) eugenie is important for coping. She is connected with a local [employer] in her area.    Spiritual " "Practice(s) - [place] shared.    Emotional/Relational/Existential Connections - Life review shared about growing up on a farm as well as raising her own family.    GOALS OF CARE:    Goals of Care - POLST discussed and completed. DNR/DNI selected. POLST signed by Usman Rae NP with the inpatient palliative consult service. Copies submitted to bedside chart as well as HIMS.     Meaning/Sense-Making - Anya talked about being \"at peace\" with her situation.    PLAN: I have no plan for follow-up per the plan for her discharge.    Robert Steiner M.Div., [facility]  Palliative Consult Service   Pager [phone]          Progress Notes by Usman Rae APRN CNP at 2/7/2017  1:37 PM     Author:  Usman Rae APRN CNP Service:  Palliative Author Type:  Nurse Practitioner    Filed:  2/7/2017  1:52 PM Note Time:  2/7/2017  1:37 PM Status:  Signed    :  Usman Rae APRN CNP (Nurse Practitioner)           [facility]   Palliative Care Daily Progress Note          Recommendations, Patient/Family Counseling & Coordination   Primary problem: SDH from mechanical fall in her A/L facility (doi 2/3/17)  Recurrent falls and increased incontinence with subtle cognitive change noted at the time of admission. Family pleased that aphasia from earlier in stay is improved. She is oriented to self and some family members. Unable to recall where she lives, where she is now or what happened to her. Again she denies pain. Able to participate in interview and exam.    REC: - continues with dnr/dni status  - SNF placement appears indicated dependent on functional/cognitive recovery.    -  following here  - 2/6/17 -Family seemed surprized at PD diagnosis- would benefit from further explanation.         POLST : Hope to complete prior to hospital discharge     Thank you for the opportunity to continue to participate in the care of this patient and family.  Please feel free to contact " "on-call palliative provider with any emergent needs.  We can be reached via team pager [phone] (answered 8-4:30 Monday-Friday); after-hours answering service ([phone]); Main Palliative Clinic - PWB 1C: [phone].       Usman CASTANEDA NP  Nurse Practitioner- Lead Advanced Practice Provider  [facility] Palliative Medicine Consult Service   [phone]        Assessment      1) Diagnoses & symptoms:        1. Subdural hematoma- mechanical fall in A/L facility- hx of recurrent falls  Other diagnoses:    1. Parkinsons Disease  2. TIA on Plavix  3. DM2  4. HTN  5. HLD     2)  Psychosocial/Spiritual Needs:   Ongoing: Will follow as needed  New:No        Is new assessment/intervention required by palliative team?:  No         Interval History:   Family not present at the time of my visit. She tells me she has mild intermittent headache not associated with any dizziness, lightheadedness or N&V. No sense of vision change. Chronically [place]. No other pain. Slept well from her recall. Is consistent in not remembering anything about her fall- oriented to self, most of her adult children. Many falls in A/L facility            Review of Systems:   Palliative Symptom Review (0=no symptom/no concern, 1=mild, 2=moderate, 3=severe):      Pain: 0-1-- complete review of sx not reliable secondary to altered mental status from chronic and acute cognitive decline (SDH)             Medications:   I have reviewed this patient's medication profile and medications given in past 24 hours.         Physical exam: Vitals: /65 mmHg  Pulse 91  Temp(Src) 96.2  F (35.7  C) (Oral)  Resp 16  Ht 1.499 m (4' 11\")  Wt 55.7 kg (122 lb 12.7 oz)  BMI 24.79 kg/m2  SpO2 96%  BMI= Body mass index is 24.79 kg/(m^2).   Constitutional: Again she is sitting up in bedside chair. Appears disheveled. Awake, alert, cooperative, no apparent distress.    HEENT:  EOMI. Normocephalic, R occipital ecchymosis. No drainage  Respiratory: No " increased work of breathing, good air exchange, clear to auscultation bilaterally, no crackles or wheezing.   Cardiovascular:  RRR, tachycardic ~90 bpm, S1/S2, Grade 1-2/6 Systolic murmur noted.  GI: Normal bowel sounds, soft, non-distended, non-tender with repeat brief exam  Genitourinary:  Hernandes removed- spontaneous void   Skin:  Normal skin color, no redness, warmth, no abrasions, and no jaundice. bruising right occipital scalp   Musculoskeletal: There is no redness, warmth, no edema or swelling of the joints.  Pedal pulses 2+  Neurologic: Awake, alert as above.  Strength and sensory is intact. R handed. No focal deficits  Neuropsychiatric: Calm, normal eye contact, alert.            Data Reviewed:   ROUTINE LABS (Last four results)  BMP  Recent Labs  Lab 17  1730    135 137   POTASSIUM 3.4 2.9* 3.0*   CHLORIDE 100 98 100   KEN 8.6 9.1 9.6   CO2 24 25 29   BUN 18 16 19   CR 0.44* 0.46* 0.43*   * 220* 134*     CBC  Recent Labs  Lab 17  0925 17  1730   WBC 10.8 9.1 13.7* 5.2   RBC 4.00 3.90 4.20 4.45   HGB 11.4* 11.2* 12.2 12.7   HCT 36.2 34.4* 37.3 39.3   MCV 91 88 89 88   MCH 28.5 28.7 29.0 28.5   MCHC 31.5 32.6 32.7 32.3   RDW 13.4 13.3 13.2 12.7    200 198 168     INR  Recent Labs  Lab 17  1730   INR 0.98                  Progress Notes by Angelika Prieto BSW at 2017 10:53 AM     Author:  Angelika Prieto BSW Service:  (none) Author Type:      Filed:  2017 12:11 PM Note Time:  2017 10:53 AM Status:  Signed    :  Angelika Prieto BSW ()           Social Work: Assessment with Discharge Plan    Patient Name:  Anya Gaines  :  1920  Age:  89 year old  MRN:  [id number]  Risk/Complexity Score:  Filed Complexity Screen Score: 9  Completed assessment with:  Son (Get)    Presenting Information   Reason for Referral:  Assessment and Discharge Planning  Date  "of Intake:  February 7, 2017  Referral Source:  Case Finding  Decision Maker:  Pt  Alternate Decision Maker:  Daughter, Kimi  Health Care Directive:  Son, Get, is unsure of whether or not pt has a HCD.  Living Situation: Pt lives alone in an assistive living apt at \"[facility] in [place].  Pt's apt is on the 3rd floor. The building is accessible by elevator. There are no steps on the outside of the building to enter.  Pt's bathroom is equipped with a walk in shower.   Previous Functional Status:   [facility] provides pt with a lifelines like necklace, 2 meals per day (pt chooses to make her own breakfast), shower assistance, medication administration and weekly assistance with housekeeping and laundry.  Prior to hospitalization, pt was indep with all mobility (pt used a ww, pt has had an estimated 4 falls in the past year), adl's (with the exception of bathing) and with breakfast meal preparation. Pt's children complete her shopping tasks.  Pt's children: Berny Kimi and Get assist pt with financial mgnt.     Patient and family understanding of hospitalization: fall with hematoma  Cultural/Language/Spiritual Considerations: [place]  Adjustment to Illness:  Son is hopeful that pt will return to previous level of independence.    Physical Health  Reason for Admission:    1. Subdural hemorrhage (H)      Services Needed/Recommended:  TCU    Mental Health/Chemical Dependency  Diagnosis:  Not applicable  Support/Services in Place:  N/A  Services Needed/Recommended:  N/A    Support System  Significant relationship at present time:  Adult children:  1.  Get ([place])  2.  Kimi ([place])  3.  Berny ([place])  4.  Gino ([place])  5.  Devon ([place])  6.  Jennifer ([place])  7.  Moira ([place])  8.  Gm ([place])  Family of origin is available for support:  Adult children are available for support  Other support available:  [facility] and [facility]  Gaps in support system:  None " identified  Patient is caregiver to:  None     Provider Information   Primary Care Physician:  Carlene Serrano   [phone]   Clinic:   [facility] [address] / [place] *      :  Arely Duckworth ([phone]), [facility] Care Coordinator.  TAYLOR spoke with Arely and informed her of the discharge plan.    Financial   Income Source:  Social Security  Financial Concerns:  Limited income.  Per Get, he as well as siblings Kimi and Will, provide money to pt.  Insurance:    Payor/Plan Subscriber Name Rel Member # Group #   MEDICA - MEDICA DUAL * TAMAR ELLINGTON  [id number]      [address] [address]       Discharge Plan   Patient and family discharge goal:  Rehab placement at [facility] (pt has been at [facility] in the past) followed by return to home.  Provided education on discharge plan:  YES  Patient agreeable to discharge plan:  YES  A list of Medicare Certified Facilities was provided to the patient and/or family to encourage patient choice. Patient's choices for facility are:  No as [facility] was requested.  TAYLOR phoned [facility] ([phone]) and spoke with Arely in Admissions.  Per Arely, they have openings.  Referred pt and faxed assessment materials.  Arely assessed and approved pt for admit.    Will NH provide Skilled rehabilitation or complex medical:  YES  General information regarding anticipated insurance coverage and possible out of pocket cost was discussed. Patient and patient's family are aware patient may incur the cost of transportation to the facility, pending insurance payment: YES  Barriers to discharge:  None identified    Discharge Recommendations   Anticipated Disposition:  Pt will discharge to [facility] ([phone]) on 2/7/17 at 4pm.  [facility] ([facility] [phone]) will provide w/c transport at 4pm.  Pt has Medical Assistance, number [id number].   Pt, son, 6A nursing and MD have been informed of the discharge arrangements.    TAYLOR completed a PAS referral, reference number [id number].  TAYLOR will fax orders and summary to the receiving facility when they become available.    FATOUMATA Monge  Social Work, 6A  Phone:  [phone]  Pager:  [phone]  2/7/2017             ED Provider Notes by Hermelindo Rivero MD at 2/3/2017  8:44 PM     Author:  Hermelindo Rivero MD Service:  Emergency Medicine Author Type:  Physician    Filed:  2/7/2017  3:02 AM Note Time:  2/3/2017  8:44 PM Status:  Signed    :  Hermelindo Rivero MD (Physician)             History     Chief Complaint   Patient presents with     Fall     HPI  Anya Gaines is a 89 year old female, with history of Parkinsonism, frequent falls, hypertension, and currently on Plavix who presents after a fall. This afternoon the patient suffered a mechanical fall and was subsequently brought to [facility] via EMS. The patient did not remember falling, but complained of a mild headache and left rib pain. However, patient denied chest pain, SOB, weakness, or sensory changes. At the time of initial evaluation at [facility], the patient was afebrile with stable vital signs. Blood pressure was initially elevated at 210/102, which was significantly increased from baseline. Patient was started on a Nipride drip secondary to fall, CNS bleed, and elevated BP. On initial exam at [facility], the patient was noted to be somewhat amnestic, but otherwise oriented. Patient was noted to have a posterior occipital laceration, which did not require suturing. EKG showed no acute abnormality and was unchanged from previous. Blood work was unremarkable with pending Troponins. The patient was subsequently transferred here to [facility] for evaluation by the trauma team. Of note, the patient typically takes Amlodipine, Lisinopril, and hydrochlorothiazide for blood pressure control..       No past medical history on file.    Past Surgical History   Procedure Laterality Date      Surgical history of -        Cataract, left eye       No family history on file.    Social History   Substance Use Topics     Smoking status: Never Smoker      Smokeless tobacco: Never Used     Alcohol Use: No       Current Facility-Administered Medications   Medication     insulin aspart (NovoLOG) inj (RAPID ACTING)     gabapentin (NEURONTIN) solution 400 mg     lisinopril (PRINIVIL/ZESTRIL) tablet 20 mg     senna-docusate (SENOKOT-S;PERICOLACE) 8.6-50 MG per tablet 1-2 tablet     insulin aspart (NovoLOG) inj (RAPID ACTING)     acetaminophen (TYLENOL) tablet 1,000 mg     atorvastatin (LIPITOR) tablet 10 mg     hypromellose-dextran (ARTIFICAL TEARS) ophthalmic solution 1 drop     metFORMIN (GLUCOPHAGE-XR) 24 hr tablet 2,000 mg     naloxone (NARCAN) injection 0.1-0.4 mg     lidocaine 1 % 1 mL     lidocaine (LMX4) kit     sodium chloride (PF) 0.9% PF flush 3 mL     sodium chloride (PF) 0.9% PF flush 3 mL     potassium chloride SA (K-DUR/KLOR-CON M) CR tablet 20-40 mEq     potassium chloride (KLOR-CON) Packet 20-40 mEq     potassium chloride 10 mEq in 100 mL intermittent infusion     potassium chloride 10 mEq in 100 mL intermittent infusion with 10 mg lidocaine     potassium chloride 20 mEq in 50 mL intermittent infusion     magnesium sulfate 2 g in NS intermittent infusion (PharMEDium or FV Cmpd)     magnesium sulfate 4 g in 100 mL sterile water (premade)     ondansetron (ZOFRAN-ODT) ODT tab 4 mg    Or     ondansetron (ZOFRAN) injection 4 mg     polyethylene glycol (MIRALAX/GLYCOLAX) Packet 17 g     glucose 40 % gel 15-30 g    Or     dextrose 50 % injection 25-50 mL    Or     glucagon injection 1 mg     amLODIPine (NORVASC) tablet 2.5 mg     hydrochlorothiazide (HYDRODIURIL) tablet 25 mg     hydrALAZINE (APRESOLINE) injection 10-20 mg     labetalol (NORMODYNE/TRANDATE) injection 10-20 mg      No Known Allergies       I have reviewed the Medications, Allergies, Past Medical and Surgical History, and Social History in  "the Epic system.    Review of Systems   Respiratory: Negative for shortness of breath.    Cardiovascular: Negative for chest pain.   Musculoskeletal:        Positive for left rib pain.   Neurological: Positive for headaches. Negative for syncope and weakness.   All other systems reviewed and are negative.      Physical Exam   BP: (!) 168/105 mmHg  Pulse: 98  Height: 149.9 cm (4' 11\")  Weight: 57 kg (125 lb 10.6 oz)  SpO2: 92 %  Physical Exam   GEN: No acute distress. She is alert and able to discuss. She is oriented only to self.   HEENT: Posterior occipital laceration. Pupils are equal round and reactive to light. Extraocular motions are intact. There is no scleral icterus. There is no facial swelling. The neck nontender and supple .   CV: Regular rate and rhythm without murmurs rubs or gallops. 2+ radial pulses bilaterally.  PULM: Clear to auscultation bilaterally.  ABD: Soft, nontender, nondistended. Normal bowel sounds.   EXT: Full range of motion.  No edema.  NEURO: Cranial nerves II through XII are intact and symmetric. Bilateral upper and lower extremities grossly show full range of motion without any focal deficits. Romberg drift in the LUE.   SKIN: No rashes, ecchymosis, or lacerations  PSYCH: Calm and cooperative, interactive.    ED Course     Procedures        Imaging reviewed from outside hospital          Labs Ordered and Resulted from Time of ED Arrival Up to the Time of Departure from the ED   CBC WITH PLATELETS DIFFERENTIAL - Abnormal; Notable for the following:     WBC 13.7 (*)     Absolute Neutrophil 12.0 (*)     All other components within normal limits   BASIC METABOLIC PANEL - Abnormal; Notable for the following:     Potassium 2.9 (*)     Glucose 220 (*)     Creatinine 0.46 (*)     All other components within normal limits   ROUTINE UA WITH MICROSCOPIC - Abnormal; Notable for the following:     Ketones Urine 10 (*)     Mucous Urine Present (*)     All other components within normal limits "   GLUCOSE BY METER - Abnormal; Notable for the following:     Glucose 261 (*)     All other components within normal limits   GLUCOSE MONITOR NURSING POCT   GLUCOSE MONITOR NURSING POCT   GLUCOSE MONITOR NURSING POCT   GLUCOSE MONITOR NURSING POCT   GLUCOSE MONITOR NURSING POCT   CONTINUE INDWELLING URINARY CATHETER (BUTT)   VITAL SIGNS   JIMMIE COMA SCALE (GCS) ASSESSMENT   ACTIVITY   NONE OF THE ABOVE CORE MEASURE DIAGNOSES   PULSE OXIMETRY NURSING   CMS   INTAKE AND OUTPUT   INCENTIVE SPIROMETRY NURSING   PERIPHERAL IV CATHETER   BLADDER SCAN   APPLY PNEUMATIC COMPRESSION DEVICE (PCD)   PATIENT EDUCATION   ASSESS FOR HYPOGLYCEMIA SYMPTOMS   ABO/RH TYPE AND SCREEN   PLATELETS PREPARE ORDER UNIT   BLOOD COMPONENT       Assessments & Plan (with Medical Decision Making)   Anya Gaines is a 89 year old female status post head injury. She describes sustaining subdural hematoma transferred from [facility].    In the emergency department she is fairly well-appearing  But confused- Baseline? She is alert and conversant and answers questions appropriately. She has a mild left Romberg.    Trauma surgery and general surgery aware of transfer.    Regarding hemodynamics:  -Will DC nipride, start nicardipine and GTT at 2.5, titrate to SVP less than 140 greater than 120.    -Platelet transfusion- patient is on Plavix.    Labs and type and screen repeat sent.    We ll continue to carefully monitor.    Last CT had approximately 6:30 PM, repeated at approx 12am,  There is some mild expansion of the SDH which was discussed with trauma and Ns services.  No immediate indication for intervention but will need to be carefully followed.         This part of the medical record was transcribed by Catrachito Valerio, Medical Scribe, from a dictation done by Hermelindo Rivero MD.       I have reviewed the nursing notes.    I have reviewed the findings, diagnosis, plan and need for follow up with the patient.    Current  Discharge Medication List          Final diagnoses:   Subdural hemorrhage (H)       2/3/2017   [facility], [place], EMERGENCY DEPARTMENT    Catrachito VALLE am serving as a trained medical scribe to document services personally performed by Hermelindo Rivero MD, based on the provider's statements to me.   Josefa VALLE Joseph Igor, MD, was physically present and have reviewed and verified the accuracy of this note documented by Catrachito Valerio.     Hermelindo Rivero MD  02/07/17 0303       Progress Notes by Camilo Velazquez MD at 2/6/2017  7:00 AM     Author:  Camilo Velazquez MD Service:  Neuro ICU Author Type:  Resident    Filed:  2/6/2017  6:44 PM Note Time:  2/6/2017  7:00 AM Status:  Attested    :  Camilo Velazquez MD (Resident) Cosigner:  Ren Buckley MD at 2/6/2017  7:41 PM    Attestation signed by Ren Buckley MD at 2/6/2017  7:41 PM        Attending Attestation:                                                   Date Patient seen: 2/6/2017    Elderly woman who lives in a NH has bifrontal contusion hemorrhages off of plavix at this point.   Will resume plavix in a month.     Plan d/c to TCU in asap.     Total time spent in direct patient care at the bedside was 35. Over 50% of the time was spend in coordination of the care.     Patient was discussed during our bedside rounds.   I reviewed patients labs, Xrays, Scans.   I examined the patient with the team and plan of care, as documented above, was developed on our Bedside rounds.  Patients family was updated.   Collaborating teams were updated.    Precious Buckley MD  Neuro Critical Care  [phone]/6/2017                            [facility], [place]   Neurology Daily Note  Anya Gaines  [id number]  02/06/2017    Subjective:  No acute events o/n. Pt is only mildly aphasic this AM. Anticipate d/c to TCU in near future.     Objective   /86 mmHg  Pulse 85  Temp(Src) 95.2  F (35.1  C) (Axillary)   "Resp 16  Ht 1.499 m (4' 11\")  Wt 55.7 kg (122 lb 12.7 oz)  BMI 24.79 kg/m2  SpO2 98%  General: pt laying comfortably in bed, breathing easily on ra, in NAD   HEENT: no oral ulcers   Chest: cta   Heart: rrr  Abdomen: soft, nt, nd, +BS  Ext: no edema   Skin: no rashes  Neuro:   -MS: alert, oriented to person only, speech fluent though rare paraphasic errors noted as well as mild difficulty with repetition. Able to follow simple but not complex commands.  -CN: vff, perrla, eomi, facial sensation and movement intact b/l, hearing intact to conversation, palate raises symmetrically, shoulder shrug and scm intact, tongue midline  -Motor:   --LUE: 5/5 shoulder abd, arm flex/ext, wrist flex/ext, finger flex/ext/abd/add  --RUE: 5/5 shoulder abd, arm flex/ext, wrist flex/ext, finger flex/ext/abd/add  --LLE: 5/5 hip flexor, leg flex/ext, plantar/dorsiflexion  --RLE: 5/5 hip flexor, leg flex/ext, plantar/dorsiflexion  -Reflexes: normoactive and symmetric at achilles, patella, brachioradialis, and biceps. Toes downgoing b/l   -Coordination: intact to FNF, H2S b/l  -Gait: deferred    Investigations    A1c 7.3    Head CT 2/4/2017  Impression:    1.  Worsening of left frontotemporal lobe and left cerebellum  tentorium subdural hematoma with increased mass effect resulting in  partial effacement of left lateral ventricle and increased  left-to-right subfalcine midline shift.  2. Slight interval worsening of anterior-inferior bifrontal contusion  hemorrhages.    Assessment and Plan   Anya Gaines is a 89 year old year old female h/o recurrent falls, HTN, T2DM, and TIA who presented 2/3/2017 after fall, discovered to have L subdural hemorrhage and bifrontal contusion hemorrhages.    #Traumatic Subdural Hemorrhage:   # bifrontal contusion hemorrhages  -stopped pta plavix (started for TIA while on asa)  -resume plavix in 1 month after Head CT  -goal BP<140  -neurochecks    #HTN   -cont pta amlodipine 2.5, hctz 25, lisinopril " 20    #HLD   -cont pta atorva 10    #T2DM   -pta metformin 2g  -SSI  -gabapentin 400 TID    FEN: replace prn  PPx: mechanical only  Code: DNR/DNI    Dispo: TCU    Patient was seen and discussed with Dr. Marcio Velazquez MD  Neurology G2  [phone]             Progress Notes by Lizeth Layton APRN CNP at 2/6/2017 12:26 PM     Author:  Lizeth Layton APRN CNP Service:  Trauma Author Type:  Nurse Practitioner    Filed:  2/6/2017 12:30 PM Note Time:  2/6/2017 12:26 PM Status:  Attested    :  Lizeth Layton APRN CNP (Nurse Practitioner) Cosigner:  Anyi Munoz MD at 2/6/2017  4:16 PM    Attestation signed by Anyi Munoz MD at 2/6/2017  4:16 PM          Physician Attestation  I, Anyi Munoz, have reviewed and discussed with the advanced practice provider their history, physical and plan for Anya E Eder. I did not participate in a shared visit by interviewing or examining the patient and this should be billed as an advanced practice provider only visit.    Anyi Munoz  Date of Service (when I saw the patient): I did not personally see this patient today.                        Brief Trauma Update:  Patient with isolated intracranial injury and occipital scalp laceration following a fall on 2/3.  No additional traumatic injuries found.   Patient was transferred to the excellent care of the Neurological Team for management of subdural hematoma. Appreciate transfer of care and excellent service of ACC/Stroke team.    Trauma team will sign off at this time.    Please do not hesitate to contact if any further questions or concerns arise. Job code [id number]       Progress Notes by Paige Stallworth RN at 2/6/2017  2:11 PM     Author:  Paige Stallworth RN Service:  Neurology Author Type:  Registered Nurse    Filed:  2/6/2017  2:40 PM Note Time:  2/6/2017  2:11 PM Status:  Addendum    :  Paige Stallworth RN (Registered Nurse)      Related Notes: Original Note by  Paige Stallworth, RN (Registered Nurse) filed at 2/6/2017  2:17 PM         AVSS, except has HTN, within parameters.  Denies pain.  Alert, more confused with 08 neuros than with 1200 neuros.  Currently oriented to self and place only.  Speech is slurred.  [facility], wears device.  Follows most commands.  TORRES.  PIV s/l.  Jarrett regular diet, fair PO.  Help patient into chair, tray must be set-up, feeds self.  Pills crushed in applesauce.  Voiding spont into commode.  Up with SBA1, and gait belt, transfer to commode.  VEEG leads removed this afternoon.  Skin: buttocks redness is blanchable, [facility] nursing saw her, recommended sween creme and freq repositions.  Posterior head area has ecchymosis d/t fall on 2/2.  Pleasant cooperative.  Family visiting most of shift.  Dinner has been ordered for 1800.  Continue with current POC.         Progress Notes by Yvonne Fernando RN at 2/6/2017 11:00 AM     Author:  Yvonne Fernando RN Service:  Trauma Author Type:  Registered Nurse    Filed:  2/6/2017  1:23 PM Note Time:  2/6/2017 11:00 AM Status:  Signed    :  Yvonne Fernando RN (Registered Nurse)           Rounded on pt. No family with her. Visited and did review head injury discharge instructions, but pt not able to remember a few minutes later.       Progress Notes by Leschke, John M, MD at 2/4/2017  8:57 AM     Author:  Leschke, John M, MD Service:  Neurosurgery Author Type:  Resident    Filed:  2/4/2017  7:27 PM Note Time:  2/4/2017  8:57 AM Status:  Attested    :  Leschke, John M, MD (Resident)      Related Notes: Original Note by Leschke, John M, MD (Resident) filed at 2/4/2017  9:05 AM    Cosigner:  Jarocho Isidro MD at 2/6/2017 12:37 PM        Attestation signed by Jarocho Isidro MD at 2/6/2017 12:37 PM        Attestation:  I saw and evaluated the patient and reviewed the imaging findings.  I agree with the assessment and plan as documented in the resident's note.                        [place]  [facility], [place]   Neurosurgery Daily Note          Assessment and Plan:   The patient is a 89 year old with a history of Parkinson's disease, Hypertension, Hyperlipidemia and history of TIA on plavix who presents with a 0.5cm thickness subdural hematoma along the left frontal-temporal region and along the left tentorium. She is neurologically intact.        Clinically stable, repeat HCT is stable    q1 hour neuro checks until repeat HCT, then q2 hours    Continue blood pressure goal <140    No further neurosurgical intervention required; no further imaging necessary unless clinical decline is demonstrated     In this patient demographic traumatic brain injury of this degree can be associated with a significant degree of morbidity and mortality; no surgery would be offered for this patient due to the high risk    Please call for questions/concerns           John (Jack) M. Leschke        Interval History:   No events overnight. More awake this morning.              Review of Systems:   The Review of Systems is otherwise negative beyond that which has been previously stated.          Medications:   I have reviewed this patient's current medications.    Current Facility-Administered Medications   Medication Dose Route Frequency     acetaminophen (TYLENOL) tablet 1,000 mg  1,000 mg Oral TID     atorvastatin  10 mg Oral Daily     gabapentin  400 mg Oral TID     hypromellose-dextran  1 drop Both Eyes TID     metFORMIN  2,000 mg Oral Daily with supper     sodium chloride (PF)  3 mL Intracatheter Q8H     amLODIPine (NORVASC) tablet 2.5 mg  2.5 mg Oral Daily     hydrochlorothiazide  25 mg Oral Daily     insulin aspart  1-10 Units Subcutaneous Q4H     NaCl            Physical Exam:  General:   Comfortable respiratory effort; normal cardiac rhythm.     Mental Status:   awake, alert most of her speech is unintelligible, conversant at times. Oriented to self.   fluent, communicative, intact  comprehension.    Cranial Nerves:  equal and reactive pupils   extraocular movements preserved   no dysarthria.     Motor/Sensory:  5/5 strength throughout upper and lower extremities.  No deficits to pain, soft touch.     Reflexes: symmetric          Data:   The labs and imaging for this patient have been reviewed directly.               Progress Notes by Kera Maldonado at 2/6/2017 12:13 PM     Author:  Kera Maldonado Service:  (none) Author Type:  Technician    Filed:  2/6/2017 12:13 PM Note Time:  2/6/2017 12:13 PM Status:  Signed    :  Kera Maldonado (Technician)           [facility] [address] [place] CPT 68714       Progress Notes by Marifer Contreras, RN at 2/6/2017  9:32 AM     Author:  Marifer Contreras RN Service:  [facility] Nurse Author Type:  Registered Nurse    Filed:  2/6/2017  9:39 AM Note Time:  2/6/2017  9:32 AM Status:  Signed    :  Marifer Contreras RN (Registered Nurse)           Focus: Buttocks  History per MD notes: Anya Gaines is a 89 year old female with HTN, HLD, TIA (on plavix), PD (not on medications) admitted 2/3 s/p fall and found to have a subdural hematoma. NSG consulted and not a surgical candidate. She had new onset aphasia today and HCT showed progression of bleed, therefore transferred to NCC/Stroke team 2/5/2017 for further management.     A: Pt sitting in chair.  Upon standing has bright red erythema on bilateral buttocks.  Blanches easily after approximately 30-40 seconds of standing.  Left buttock reabsorbing serous filled blister measures 0.6 x 1.4 x 0 cm.  Etiology likely friction versus shear/pressure.      I/P: Friction erythema.  Plan of care: Geomatt cushion for pressure reduction while sitting in chair.  Encourage lift and repostition while in chair.  Encourage turn and reposition while in bed.  Sween cream to buttocks as needed.      Will sign off please reconsult if needed.      Face to face time: 15 minutes.           Progress Notes by Antonio Meyer MD at  2017 11:10 PM     Author:  Antonio Meyer MD Service:  Neurology Author Type:  Physician    Filed:  2017  3:58 AM Note Time:  2017 11:10 PM Status:  Addendum    :  Antonio Meyer MD (Physician)      Related Notes: Original Note by Antonio Meyer MD (Physician) filed at 2017  3:53 AM             INITIAL REPORT of Video-EEG Monitoring    DATE OF RECORDIN2017         I reviewed the first 1 hour of video-EEG monitoring for Anya Gaines       The EEG was abnormal during waking due to generalized theta-delta slowing with left hemispheric persistent polymorphic delta slowing of left frontotemporal amplitude predominance, and occasional right frontotemporal delta slowing.    No electrographic seizures and no interictal epileptiform abnormalities occurred.       These findings indicate moderate generalized cerebral dysfunction and much greater dysfunction of the left hemisphere, and exclude a diagnosis of nonconvulsive status epilepticus as the cause of encephalopathy.  Antonio Meyer M.D., [facility] [phone]    Addendum: No significant change in severe left hemispheric slowing during sleep, with no electrographic seizures, as of 3:30 AM on 2017.  Antonio Meyer M.D.            Progress Notes by Shadia Espinosa MD at 2017  4:53 PM     Author:  Shadia Espinosa MD Service:  Neurology Author Type:  Resident    Filed:  2017  6:21 PM Note Time:  2017  4:53 PM Status:  Attested    :  Shadia Espinosa MD (Resident) Cosigner:  Kwadwo Bangura MD at 2017 10:27 PM    Attestation signed by Kwadwo Bangura MD at 2017 10:27 PM        Attestation:  Physician Attestation  I did not see the patient on this date.    Kwadwo Bangura                        Transfer Acceptance Note - Stroke/NCC    Summary:    Anya Gaines is a 89 year old female with HTN, HLD, TIA (on plavix), PD (not on medications) admitted 2/3 s/p fall and found to have  "a subdural hematoma. NSG consulted and not a surgical candidate. She had new onset aphasia today and HCT showed progression of bleed, therefore transferred to NCC/Stroke team 2/5/2017 for further management.     Objective:  /68 mmHg  Pulse 97  Temp(Src) 97.8  F (36.6  C) (Axillary)  Resp 22  Ht 1.499 m (4' 11\")  Wt 55.7 kg (122 lb 12.7 oz)  BMI 24.79 kg/m2  SpO2 93%     Neurologic:  Mental Status: Fully awake, alert, not following commands. Not able to name, repeat, keeps repeating \"I don't know\" to questions.    Cranial Nerves: Visual fields intact to blink. PERRL. EOMI with normal smooth pursuit. Hearing not formally tested but intact to conversation.   Motor: No abnormal movements. Normal tone throughout. Moving all extremities, but formal testing difficult.     HCT: 2/5/2017   1.  Worsening of left frontotemporal lobe and left cerebellum  tentorium subdural hematoma with increased mass effect resulting in  partial effacement of left lateral ventricle and increased  left-to-right subfalcine midline shift.  2. Slight interval worsening of anterior-inferior bifrontal contusion  hemorrhages.    Assessment:  89 year old female with subdural hematoma s/p fall in the setting of plavix use. Worsening aphasia - could be 2/2 bleed and edema or seizure activity. Will get EEG - this was discussed with her daughter Kimi.    Plan:  - transfer to NCC/Stroke service  - hold plavix  - neuro checks  - BP <140, platelets >200, hb>8, INR <1.5, normonatremia  - DC metformin and put patient on ISS  -vEEG    Discussed with stroke fellow, Dr. Tanmay Espinosa   PGY-4           Progress Notes by Hailee Bolanos at 2/5/2017  9:41 PM     Author:  Hailee Bolanos Service:  (none) Author Type:  Technician    Filed:  2/5/2017  9:42 PM Note Time:  2/5/2017  9:41 PM Status:  Signed    :  Hailee Bolanos (Technician)           CPT; 48278 mod. 52  University/IP/Video EEG   [place] MD [place]       Progress Notes by " Lizeth Layton APRN CNP at 2/5/2017 11:45 AM     Author:  Lizeth Layton APRN CNP Service:  Trauma Author Type:  Nurse Practitioner    Filed:  2/5/2017 12:07 PM Note Time:  2/5/2017 11:45 AM Status:  Attested    :  Lizeth Layton APRN CNP (Nurse Practitioner) Cosigner:  Leonel Odell MD at 2/5/2017  3:33 PM    Attestation signed by Leonel Odell MD at 2/5/2017  3:33 PM (Updated)        Trauma Staff:    I agree with the assessment and the plan that I formulated for the trauma care.  I agree with the documentation above.  The patient was seen at 12:45 pm today.  The patient now has expressive aphasia. DNR/DNI.  Will obtain a Neurology consult today.  Palliative Care consultation has also been ordered.       Leonel Odell MD, PhD                        [facility], [place]  Trauma Service Progress Note    Date of Service (when I saw the patient): 02/05/2017     Assessment and Plan    Trauma mechanism:  Fall from standing   Time/date of injury: 2/3/2017 afternoon   Known Injuries:  1. Subdural hematomas  2. Occipital laceration     Other diagnoses:    1. Parkinsons Disease with hyperkinesis    2. Altered mental status   3. TIA on Plavix  4. DM2  5. HTN  6. HLD  7. Frequent falls    8. Macular degeneration     Plan:  1. Tertiary exam attempted 2/4.  Unable to perform due to altered mental status.   2. Neurosurg Consult: subdural hematoma management per neurosurgery.  Repeat head CT 2/4 shows increased hematoma in the left frontal lobe, increased hematoma in left cerebellar tentorium, increased mass effect with partial effacement of left lateral ventricle and increased left to right midline shift.  Patient is confused, exhibiting expressive aphagia. Per family and chart review, patient is oriented at baseline.  Continue to monitor for neuro decline.  Q4hr neuro checks, SBP<140, Plts above 100K, Normonatremia.   Patient on plavix for hx of TIAs> 2 units platelets  "transfused in ED      3. Parkinson's disease: hyperkinesis noted. Not on any anti-Parkinson's medications. Per chart review, patient was to start therapy for strengthening exercises for multiple recent falls>possibly follow up with neurology. Resume PTA gabapentin  4. HTN: Keep SBP<140.  Had been on Nicardipine drip at admission due to BPs 200s/100s; now normotensive off drip.   Resumed PTA amlodipine, HCTZ.  Resume PTA lisinopril dose due to hypertension. Will resume at half PTA dose and titrate up if needed   5. DMII: monitor BG. BG 130s-160s past 24 hours.  SS insulin if consistently elevated >180  6. Occipital laceration: treat locally. Recommend bacitracin BID.     7. Coagulopathy: stop Plavix. Continue to hold in setting of SDH/multiple recent falls.  Received 2u platelets in ED.  Platelets 200 this AM.    8. Hypomagnesemia: replace per protocol   9. PT/OT  10. SW for d/c planning  11. Palliative care consult in setting of advanced age and trauma     General Cares:  GI Prophylaxis: Regular diet   Bowel Regimen: senna; BM 2/5  DVT Prophylaxis: Contraindicated due to SDH  Pulmonary toilet:IS while awake    Code status: Per advanced care planning document scanned in chart (4/4/2016), patient wishes to be DNR/DNI; confirmed with daughter   Disposition: Inpatient pending progress.     Interval History  Course reviewed. Patient transferred to  yesterday afternoon.  Family at bedside on exam. Patient is awake, alert, and exhibiting expressive aphagia. Is able to answer most \"yes/no\" questions. Unclear if patient is experiencing receptive aphagia or confusion as patient follow only some commands/responds appropriately to some yes/no questions.  Moving all extremities, equal strength bilaterally.  Hyperkinesis noted, mostly on left side. No focal deficits.  Per family, patients language/confusion seems to be waxing/waning.  Patient denies pain/headache.  Does put hand up to face to rest head in hand and shade eyes " frequently.  Continue ongoing neuro monitoring.   ROS x 8 negative with exception of those things listed in interval hx    Physical Exam  Temp: 97.9  F (36.6  C) Temp src: Oral BP: 150/65 mmHg   Heart Rate: 102 Resp: 18 SpO2: 91 % O2 Device: None (Room air) Oxygen Delivery: 2 LPM  Filed Vitals:    02/03/17 2041 02/04/17 0300   Weight: 57 kg (125 lb 10.6 oz) 55.7 kg (122 lb 12.7 oz)     Vital Signs with Ranges  Temp:  [97.2  F (36.2  C)-100.4  F (38  C)] 97.9  F (36.6  C)  Heart Rate:  [] 102  Resp:  [14-51] 18  BP: (111-153)/(46-90) 150/65 mmHg  SpO2:  [91 %-96 %] 91 %  I/O last 3 completed shifts:  In: 2206.25 [P.O.:520; I.V.:1686.25]  Out: 1540 [Urine:1540]      Jason Coma Scale - Total 13/15 (V3)    Constitutional: Awake, alert, cooperative, no apparent distress.   Eyes: Lids and lashes normal, pupils equal 1+, round and reactive to light, extra ocular muscles intact, sclera clear, conjunctiva normal.  HENT: Normocephalic, occipital laceration with associated hematoma. No new drainage  Respiratory: No increased work of breathing, good air exchange, clear to auscultation bilaterally, no crackles or wheezing.  Cardiovascular:  regular rate and rhythm, normal S1 and S2, no S3 or S4. Systolic murmur noted.  GI: Normal bowel sounds, soft, non-distended, non-tender, no guarding  Genitourinary:  [place]andes draining clear yellow urine. To be removed today.   Skin:  Normal skin color, no redness, warmth, no abrasions, and no jaundice. Hematoma and laceration on right occipital scalp   Musculoskeletal: There is no redness, warmth, or swelling of the joints.  Pedal pulse palpated  Neurologic: Awake, alert, expressive aphagia noted.  Inappropriate words/somewhat slurred speach.  Cranial nerves II-XII are grossly intact.  Strength and sensory is intact.  No focal deficits  Neuropsychiatric: Calm, normal eye contact, alert.  Appears frustrated by expressive aphagia. Unclear if patient is experiencing receptive aphagia.      Medications       insulin aspart  1-10 Units Subcutaneous TID w/meals     gabapentin  400 mg Oral TID     acetaminophen (TYLENOL) tablet 1,000 mg  1,000 mg Oral TID     atorvastatin  10 mg Oral Daily     hypromellose-dextran  1 drop Both Eyes TID     metFORMIN  2,000 mg Oral Daily with supper     sodium chloride (PF)  3 mL Intracatheter Q8H     amLODIPine (NORVASC) tablet 2.5 mg  2.5 mg Oral Daily     hydrochlorothiazide  25 mg Oral Daily       Data  No results found for this or any previous visit (from the past 24 hour(s)).    TONYA Doss CNP  To contact the trauma service use job code pager [phone],   Numeric texts or alpha text through [facility]       Progress Notes by Lizeth Layton APRN CNP at 2/4/2017 10:53 AM     Author:  Lizeth Layton APRN CNP Service:  Trauma Author Type:  Nurse Practitioner    Filed:  2/4/2017 12:08 PM Note Time:  2/4/2017 10:53 AM Status:  Attested    :  Lizeth Layton APRN CNP (Nurse Practitioner) Cosigner:  Leonel Odell MD at 2/4/2017  2:58 PM    Attestation signed by Leonel Odell MD at 2/4/2017  2:58 PM (Updated)        Trauma Staff:    I agree with the assessment and the plan that I formulated for the trauma care.  I agree with the documentation above.  The patient was seen at 12:30 pm today.  The patient follows commands and is up to chair and surrounded by her family members today.  The family was updated at the bedside today.  DNR/DNI status was confirmed with the family at the bedside today.       Leonel Odell MD, PhD                        [facility], [place]    Trauma Service Tertiary Survey     Date of Service: 02/04/2017    Trauma mechanism:  Fall from standing   Time/date of injury: 2/3/2017 afternoon   Known Injuries:  1. Subdural hematomas  2. Occipital laceration     Other diagnoses:    1. Parkinsons Disease with hyperkinesis   2. Altered mental status   3. TIA on  Plavix  4. DM2  5. HTN  6. HLD  7. Frequent falls   8. Macular degeneration     Plan:  1. Tertiary exam attempted 2/4.  Unable to perform due to altered mental status.   2. Neurosurg Consult: subdural hematoma management per neurosurgery.  Repeat head CT at 0030 shows increased hematoma in the left frontal lobe, increased hematoma in left cerebellar tentorium, increased mass effect with partial effacement of left lateral ventricle and increased left to right midline shift.  Patient is markedly confused. Per family and chart review, patient is oriented at baseline.  -->Q1hr neuro checks, SBP<140, Plts above 100K, Normonatremia.   Patient on plavix for hx of TIAs> 2 units platelets transfused in ED    3. Parkinson's disease: hyperkinesis noted. Not on any anti-Parkinson's medications. Per chart review, patient was to start therapy for strengthening exercises for multiple recent falls>possibly follow up with neurology. Resume PTA gabapentin   4. HTN: Keep SBP<140.  Had been on Nicardipine drip at admission due to BPs 200s/100s; now normotensive off drip.   Resumed PTA amlodipine, HCTZ.  PTA lisinopril not resumed at admission.  Will continue to hold due to normotension.   5. DMII: monitor BG.  Recommend hold PTA metformin   6. Occipital laceration: treat locally. Recommend bacitracin BID.    7. Coagulopathy: stop Plavix. Continue to hold in setting of SDH.  Received 2u platelets in ED.  Platelets 200 this AM.   8. PT/OT  9. SW for d/c planning  10. Palliative care consult in setting of advanced age and trauma     Code status: Full    General Cares:  GI Prophylaxis: Bowel Regimen PRN  DVT Prophylaxis: Contraindicated due to SDH  Pulmonary toilet:IS while awake    Code status: Per advanced care planning document scanned in chart (4/4/2016), patient wishes to be DNR/DNI       Disposition: pending progress. Ok to transfer to floor from trauma standpoint.  6A.     ETOH: Unable to assess due to altered mental status/dementia  "    SUBJECTIVE: Unable to perform due to altered mental status   Review of Systems   Unable to perform ROS: Dementia       OBJECTIVE:  Blood pressure 93/61, pulse 97, temperature 98.1  F (36.7  C), temperature source Oral, resp. rate 20, height 1.499 m (4' 11\"), weight 55.7 kg (122 lb 12.7 oz), SpO2 94 %.  Physical Exam   Constitutional: She appears well-developed and well-nourished. No distress.   HENT:   Head: Normocephalic.   Right Ear: External ear normal.   Left Ear: External ear normal.   Nose: Nose normal.   Mouth/Throat: Oropharynx is clear and moist. No oropharyngeal exudate.   Large occipital hematoma with associated laceration   Eyes: Conjunctivae and EOM are normal. Pupils are equal, round, and reactive to light. Right eye exhibits no discharge. Left eye exhibits no discharge. No scleral icterus.   Neck: Normal range of motion. Neck supple. No JVD present. No tracheal deviation present. No thyromegaly present.   Cardiovascular: Normal rate, regular rhythm and intact distal pulses.  Exam reveals no gallop and no friction rub.    Murmur heard.  Pulmonary/Chest: Effort normal. No respiratory distress. She has no wheezes. She has no rales. She exhibits no tenderness.   Diminished throughout    Abdominal: Soft. Bowel sounds are normal. She exhibits no distension. There is no tenderness. There is no rebound and no guarding.   Musculoskeletal: Normal range of motion. She exhibits no edema or tenderness.   Moving all extremities.  No disability or pain noted    Lymphadenopathy:     She has no cervical adenopathy.   Neurological: She is alert. No cranial nerve deficit. She exhibits normal muscle tone. Coordination abnormal.   Oriented to self only    Skin: Skin is warm and dry. No rash noted. She is not diaphoretic. There is erythema. No pallor.   Psychiatric: She has a normal mood and affect. Her behavior is normal.   Significantly confused.  Oriented to self only        ROUTINE LABS: (Last four " results)  CMP  Recent Labs  Lab 02/04/17  0442 02/03/17 2051 02/03/17  1730    135 137   POTASSIUM 3.4 2.9* 3.0*   CHLORIDE 100 98 100   CO2 24 25 29   ANIONGAP 13 12 8   * 220* 134*   BUN 18 16 19   CR 0.44* 0.46* 0.43*   GFRESTIMATED >90Non  GFR Calc >90Non  GFR Calc >90Non  GFR Calc   GFRESTBLACK >90African American GFR Calc >90African American GFR Calc >90African American GFR Calc   KEN 8.6 9.1 9.6   MAG 1.6  --   --    PHOS 2.9  --   --      CBC  Recent Labs  Lab 02/04/17 0442 02/03/17 2051 02/03/17  1730   WBC 9.1 13.7* 5.2   RBC 3.90 4.20 4.45   HGB 11.2* 12.2 12.7   HCT 34.4* 37.3 39.3   MCV 88 89 88   MCH 28.7 29.0 28.5   MCHC 32.6 32.7 32.3   RDW 13.3 13.2 12.7    198 168     INR  Recent Labs  Lab 02/03/17  1730   INR 0.98     Arterial Blood GasNo lab results found in last 7 days.    RADIOLOGY:  Recent Results (from the past 24 hour(s))   CT Cervical Spine w/o Contrast    Narrative    CT CERVICAL SPINE W/O CONTRAST 2/3/2017 6:05 PM     HISTORY:  fall, neck pain     TECHNIQUE:  Axial images of the cervical spine were obtained without  intravenous contrast. Coronal and sagittal reformations were  performed.  Radiation dose for this scan was reduced using automated  exposure control, adjustment of the mA and/or kV according to patient  size, or iterative reconstruction technique.    COMPARISON: None.    FINDINGS: There are 7  cervical type vertebrae used for the purpose of  this dictation. The alignment of the cervical spine is normal.   No  fractures are identified.    C1-C2:  Calcified pannus is seen posterior to the odontoid. This can  be due to calcium pyrophosphate deposition disease.    C2-C3:  Normal disc, facet joints, spinal canal and neural foramina.     C3-C4:  Loss of disc space height. Diffuse bulge with associated  posterior osteophytes. Central canal normal. Degenerative change in  the facet joints.    C4-C5::  Degenerative  change in the facet joints.      C5-C6:  Loss of disc space height. Degenerative change in the facet  joints. Diffuse bulge with associated posterior osteophytes. Central  canal still normal.     C6-C7:  Loss of disc space height. Mild annular bulge. Central canal  and neural foramen are normal.    C7-T1:  Normal disc, facet joints, spinal canal and neural foramina.      Impression    IMPRESSION:   1. No fractures are identified.  2. Multilevel degenerative change.    ISABELLA MARIA MD   CT Head w/o Contrast   Result Value    Radiologist flags Left subdural (AA)    Narrative    CT HEAD W/O CONTRAST   2/3/2017 6:05 PM     HISTORY: Right occipital contusion/patient on Plavix/ LOC.    TECHNIQUE: Axial images of the head without IV contrast material.  Radiation dose for this scan was reduced using automated exposure  control, adjustment of the mA and/or kV according to patient size, or  iterative reconstruction technique.    COMPARISON: None.    FINDINGS:  There is generalized atrophy of the brain.  Areas of low  attenuation are present in the white matter of the cerebral  hemispheres that are consistent with small vessel ischemic disease in  this age patient. There is no evidence of intracranial hemorrhage,  mass, acute infarct or anomaly. The visualized portions of the sinuses  and mastoids appear normal. There is a small acute appearing subdural  in the left temporal region extending into the left frontal region  measuring about 0.6 cm in transverse dimension. There is no shift of  midline structures. There is also a small subdural along the left  tentorium measuring about 0.5 cm in thickness. Soft tissue swelling is  seen over the right parietal region. No skull fractures identified..      Impression    IMPRESSION:   1. Left temporal-frontal subdural hematoma. There is also a subdural  hematoma along the left tentorium. These measure about 5-6 mm in  thickness. No significant mass effect or shift associated with  the  small subdurals.  2. Atrophy of the brain.  White matter changes consistent with small  vessel ischemic disease.     [Critical Result: Left subdural]    Finding was identified on 2/3/2017 6:07 PM.     Dr. Vegas was contacted by me on 2/3/2017 6:09 PM and verbalized  understanding of the critical result.     ISABELLA MARIA MD   XR Chest 2 Views    Narrative    CHEST TWO VIEWS 2/3/2017 6:13 PM     HISTORY: Fall with left rib pain    COMPARISON: 3/25/2006    FINDINGS: Stable dense nodule of the left base. No pneumothorax. No  definite displaced rib fracture. Paratracheal density again noted at  the right apex, was present in 2006, presumably this is a vascular  shadow. There are no acute infiltrates. The cardiac silhouette is not  enlarged. Pulmonary vasculature is unremarkable.      Impression    IMPRESSION: No acute disease.    CONNER DOTSON MD   CT Head w/o Contrast   Result Value    Radiologist flags Worsening of intracranial hemorrhage (AA)    Narrative    CT HEAD W/O CONTRAST 2/4/2017 12:54 AM    History:  Interval SDH, fall on Plavix      Comparison: 2/20/2017    Technique: Axial thin section CT images of the head were obtained from  the base of the skull to the vertex without intravenous contrast and  reviewed in brain, subdural, and bone windows.     Findings:   There has been interval increased subdural hematoma overlying the left  frontotemporal lobe as well as left cerebellum tentorium. It measures  9 mm in maximum thickness over the left frontal lobe, previously  measured 4 mm. Also measures 13 mm along the left cerebellar  tentorium, previously measured 5 mm. There has been interval increased  mass effect with partial effacement of left lateral ventricle as well  as left-to-right subfalcine midline shift of 7 mm, 4 mm previously. No  downward transtentorial herniation. Also slight interval worsening of  anterior-inferior bifrontal contusion and hemorrhages. Gray to white  matter differentiation is  preserved. Stable mildly prominent  ventricular system configuration.    Posterior right parietal scalp hematoma. The bony calvaria and the  bones of the skull base appear normal. The visualized portions of  paranasal sinuses and mastoid air cells are clear.      Impression    Impression:   1.  Worsening of left frontotemporal lobe and left cerebellum  tentorium subdural hematoma with increased mass effect resulting in  partial effacement of left lateral ventricle and increased  left-to-right subfalcine midline shift.  2. Slight interval worsening of anterior-inferior bifrontal contusion  hemorrhages.    [Critical Result: Worsening of intracranial hemorrhage    Finding was identified on 2/4/2017 12:58 AM.     Dr Espinosa was contacted by Dr. Jones at 2/4/2017 1:07 AM and  verbalized understanding of the critical finding.     I have personally reviewed the examination and initial interpretation  and I agree with the findings.    MD Lizeth SILVER, APRN CNP         Progress Notes by Jackelyn Flores PT at 2/4/2017 11:45 AM     Author:  Jackelyn Flores PT Service:  (none) Author Type:  Physical Therapist    Filed:  2/4/2017 11:49 AM Note Time:  2/4/2017 11:45 AM Status:  Addendum    :  Jackelyn Flores PT (Physical Therapist)      Related Notes: Original Note by Jackelyn Flores PT (Physical Therapist) filed at 2/4/2017 11:48 AM          02/04/17 1024   Quick Adds   Type of Visit Initial PT Evaluation   Living Environment   Lives With alone;other (see comments)  ([facility])   Living Arrangements apartment   Home Accessibility no concerns   Self-Care   Dominant Hand right   Current Activity Tolerance fair   Functional Level Prior   Ambulation 1-->assistive equipment  (ambulates with 4WW)   Transferring 1-->assistive equipment   Toileting 0-->independent   Bathing 2-->assistive person  (SBA for bathing per daughter Kimi)   Dressing 0-->independent   Eating 0-->independent  "  Communication 0-->understands/communicates without difficulty   Swallowing 0-->swallows foods/liquids without difficulty   Cognition 0 - no cognition issues reported  (at baseline mild STM deficits, but no major deficits )   Fall history within last six months yes  (RN at [facility] indicated pt had two significant falls in 6 mos)   Number of times patient has fallen within last six months 2  (per H&P, with most recent fall, pt \"tripped over feet\")   Which of the above functional risks had a recent onset or change? ambulation;fall history   Prior Functional Level Comment No family present. RN notes indicate pt had significant shakiness as a likely factor contributing to her falls. Per daughter uses 4WW at baseline, has involuntary movement on L UE and L LE from her prior TIA. In [facility] pt dressed, ambulated independent. Pt bathed with supervision. Pt was independent with toileting. Pt likely used grab bars in bathroom as needed per daughter Kimi pt can normally walk to the dining room and take the elevator down a hallway using her 4WW.    General Information   Onset of Illness/Injury or Date of Surgery - Date 02/03/17   Referring Physician Tiburcio Espinosa MDNone   Patient/Family Goals Statement To feel better.    Pertinent History of Current Problem (include personal factors and/or comorbidities that impact the POC) The patient is a 89 year old with a history of Parkinson's disease, Hypertension, Hyperlipidemia and history of TIA on plavix who presents after a mechanical fall. CT head shows a 0.5cm thickness subdural hematoma along the left frontal-temporal region and along the left tentorium. CT C spine shows no acute fracture. Medical notes indicate concerns of pt being hyperkinetic. Daughter indicates pt has not in fact been formally diagnosed with Parkinson's to daughter's awareness.    Precautions/Limitations fall precautions  (HOB > 30 deg)   General Observations pt unable to follow commands to track " my finger movement with her eyes (smooth pursuit) but this appeared to be more a deficit in command following   General Info Comments activity: up with assist   Cognitive Status Examination   Orientation (able to state name, with cues provides birthdate)   Level of Consciousness alert   Follows Commands and Answers Questions 50% of the time  (to 75%)   Personal Safety and Judgment impaired;at risk behaviors demonstrated;impulsive   Memory impaired   Cognitive Comment reduced memory at this time, not oriented to situation or place   Pain Assessment   Patient Currently in Pain No  (but c/o dizziness)   Posture    Posture Comments reduced upright posture in sitting and standing   Range of Motion (ROM)   ROM Comment no deficits noted for UE or LE AROM   Strength   Strength Comments B independent SLRs, full quad strength in standing. No formal MMT per pt's reduced command following, but moves extremities through full ROM   Bed Mobility   Bed Mobility Comments Pt impulsive. Pt required MYKE supine>sit, Pt dizzy with positional changes (supine>sit, sit>stand), BP was not orthostatic.    Transfer Skills   Transfer Comments MYKE x 2 required for safe transfer sit<>stand of pt and line management. Pt took ~3-4 steps additionally with MYKE.   Gait   Gait Comments Able to step from bedside to chair x 3-4 steps with MYKE. Dizziness with positional changes did not permit further gait advancement   Balance   Balance Comments CGA mainly in sitting once positioned with feet flat; reduced stability noted when attempting to step/weight shift. In static standing only requires CGA, but requires MYKE once moving into dynamic balance   Coordination   Coordination Comments reduced command following, challenged to perform finger/nose testing task but able to touch my hand with both R and L hands when positioned in front, at her sides   General Therapy Interventions   Planned Therapy Interventions bed mobility training;gait  "training;neuromuscular re-education;ROM;strengthening;stretching;transfer training;progressive activity/exercise;home program guidelines   Clinical Impression   Criteria for Skilled Therapeutic Intervention yes, treatment indicated   PT Diagnosis reduced safety and coordination   Influenced by the following impairments reduced activity tolerance, dizziness with positional changes, reduced safety, reduced balance   Functional limitations due to impairments below baseline bed mobility, transfers, gait   Clinical Presentation Evolving/Changing   Clinical Presentation Rationale history, exam, person factor criteria   Clinical Decision Making (Complexity) Moderate complexity   Therapy Frequency` other (see comments)  (6x/wk)   Predicted Duration of Therapy Intervention (days/wks) 1 week   Anticipated Discharge Disposition Transitional Care Facility   Risk & Benefits of therapy have been explained Yes   Patient, Family & other staff in agreement with plan of care Yes   [facility] [employer] [facility] TM \"6 Clicks\"   2016, Trustees of [facility], under license to [employer].  All rights reserved.   6 Clicks Short Forms Basic Mobility Inpatient Short Form   [facility] AM-PAC  \"6 Clicks\" V.2 Basic Mobility Inpatient Short Form   1. Turning from your back to your side while in a flat bed without using bedrails? 3 - A Little   2. Moving from lying on your back to sitting on the side of a flat bed without using bedrails? 3 - A Little   3. Moving to and from a bed to a chair (including a wheelchair)? 3 - A Little   4. Standing up from a chair using your arms (e.g., wheelchair, or bedside chair)? 3 - A Little   5. To walk in hospital room? 3 - A Little   6. Climbing 3-5 steps with a railing? 2 - A Lot   Basic Mobility Raw Score (Score out of 24.Lower scores equate to lower levels of function) 17   Total Evaluation Time   Total Evaluation Time (Minutes) 12          Progress Notes by Leschke, John M, MD at 2/4/2017  1:19 " AM     Author:  Leschke, John M, MD Service:  Neurosurgery Author Type:  Resident    Filed:  2017  1:21 AM Note Time:  2017  1:19 AM Status:  Signed    :  Leschke, John M, MD (Resident)           Repeat HCT reviewed. There is not some effacement of the left lateral ventricle with interval progression of the tentorial subdural. Fortunately she is able to accommodate this volume change and her basal cisterns remain patent. No change in plan from a neurosurgical standpoint.                 Procedure Notes      Procedures signed by Kellee Peralta MD at 2017 11:55 AM      Author:  Sarbjit Blanton MD Service:  (none) Author Type:  Resident    Filed:  2017 11:55 AM Note Time:  2017 11:34 AM Status:  Addendum    :  Kellee Peralta MD (Physician)      Related Notes: Original Note by Kellee Peralta MD (Physician) filed at 2017 11:50 AM     Procedure Orders:    1. EEG [[id number]] ordered by Lucrecia Grider MD at 17 1334                EEG#:  -1 (Day 1 of Video EEG Monitoring)     DATE OF RECORDIN2017      DURATION OF RECORDIN hours and 11 minutes.      DAY #1 OF VIDEO-EEG MONITORING.        PATIENT SUMMARY:  Anya Gaines is a 89-year-old female who is being evaluated for seizures after a fall with subsequent subdural hematoma.  On this day of recording, patient was reported to be taking gabapentin.      TECHNICAL SUMMARY:  This continuous EEG monitoring procedure was performed with 23 scalp electrodes in the 10-20 system of placement, and additional scalp, precordial and other surface electrodes were used for electrical referencing and artifact detection. The technologist performed additional digital data analyses after recording was completed, and the interpreting physician reviewed these additional analyses and the raw data to localize and measure epileptiform and nonepileptiform cerebral potentials, as reported below. Video  monitoring was utilized and periodically reviewed by EEG technologist and physician for electroclinical correlation.       BACKGROUND EEG ACTIVITIES:  During maximal wakefulness, there was 9 Hz posterior dominant rhythm that attenuates with eye opening, seen clearly over the right hemisphere, but somewhat attenuated on the left.  The interictal EEG shows a diffuse intermittent theta-delta slowing with more persistent polymorphic delta slowing in the left hemisphere, maximal over the left frontotemporal region.  There is also an asymmetry with higher amplitude over the left hemisphere, maximal over the left frontotemporal region.  During stage II sleep, well-formed symmetric sleep spindles and vertex sharp transients are observed.       There were rare spike and sharp waves seen over the left posterior temporal region, with phase reversal at T5.      ICTAL ACTIVITIES:  There were no electrographic seizures and no paroxysmal behavioral events recorded on this day.      SUMMARY OF DAY #1 OF VIDEO EEG MONITORING:  The interictal recording is abnormal due to presence of diffuse, intermittent theta-delta slowing during waking with more persistent polymorphic delta slowing over the left hemisphere with left frontotemporal maximum.  There were rare interictal sharp discharges in the left frontotemporal region.  No electrographic seizures or paroxysmal behavioral events were recorded on this day of recording.  Findings are consistent with moderate generalized electrographic encephalopathy, which is etiologically nonspecific.  It also shows focal cerebral dysfunction in the left hemisphere, and an area of irritability over the left temporal region.  No electrographic or clinical seizures were recorded.  Clinical correlation is recommended.        I personally reviewed the EEG and agree with the reported findings.   NITESH HAN MD       As dictated by YASIR WADE MD            D: 02/06/2017 11:34   T:  "2017 13:11   MT: DEIDRA      Name:     TAMAR ELLINGTON   MRN:      [id number]        Account:        [id number]   :      1920           Procedure Date: 2017      Document: [id number]                  Progress Notes - Therapies (Notes from 17 through 17)      Progress Notes by Jackelyn Flores PT at 2017 11:45 AM     Author:  Jackelyn Flores PT Service:  (none) Author Type:  Physical Therapist    Filed:  2017 11:49 AM Note Time:  2017 11:45 AM Status:  Addendum    :  Jackelyn Flores PT (Physical Therapist)      Related Notes: Original Note by Jackelyn Flores PT (Physical Therapist) filed at 2017 11:48 AM          17 1024   Quick Adds   Type of Visit Initial PT Evaluation   Living Environment   Lives With alone;other (see comments)  ([facility])   Living Arrangements apartment   Home Accessibility no concerns   Self-Care   Dominant Hand right   Current Activity Tolerance fair   Functional Level Prior   Ambulation 1-->assistive equipment  (ambulates with 4WW)   Transferring 1-->assistive equipment   Toileting 0-->independent   Bathing 2-->assistive person  (SBA for bathing per daughter Kimi)   Dressing 0-->independent   Eating 0-->independent   Communication 0-->understands/communicates without difficulty   Swallowing 0-->swallows foods/liquids without difficulty   Cognition 0 - no cognition issues reported  (at baseline mild STM deficits, but no major deficits )   Fall history within last six months yes  (RN at [facility] indicated pt had two significant falls in 6 mos)   Number of times patient has fallen within last six months 2  (per H&P, with most recent fall, pt \"tripped over feet\")   Which of the above functional risks had a recent onset or change? ambulation;fall history   Prior Functional Level Comment No family present. RN notes indicate pt had significant shakiness as a likely factor contributing to her falls. Per daughter uses 4WW at " baseline, has involuntary movement on L UE and L LE from her prior TIA. [facility] dressed, ambulated independent. Pt bathed with supervision. Pt was independent with toileting. Pt likely used grab bars in bathroom as needed per daughter Kimi pt can normally walk to the dining room and take the elevator down a hallway using her 4WW.    General Information   Onset of Illness/Injury or Date of Surgery - Date 02/03/17   Referring Physician Tiburcio Espinosa MDNone   Patient/Family Goals Statement To feel better.    Pertinent History of Current Problem (include personal factors and/or comorbidities that impact the POC) The patient is a 89 year old with a history of Parkinson's disease, Hypertension, Hyperlipidemia and history of TIA on plavix who presents after a mechanical fall. CT head shows a 0.5cm thickness subdural hematoma along the left frontal-temporal region and along the left tentorium. CT C spine shows no acute fracture. Medical notes indicate concerns of pt being hyperkinetic. Daughter indicates pt has not in fact been formally diagnosed with Parkinson's to daughter's awareness.    Precautions/Limitations fall precautions  (HOB > 30 deg)   General Observations pt unable to follow commands to track my finger movement with her eyes (smooth pursuit) but this appeared to be more a deficit in command following   General Info Comments activity: up with assist   Cognitive Status Examination   Orientation (able to state name, with cues provides birthdate)   Level of Consciousness alert   Follows Commands and Answers Questions 50% of the time  (to 75%)   Personal Safety and Judgment impaired;at risk behaviors demonstrated;impulsive   Memory impaired   Cognitive Comment reduced memory at this time, not oriented to situation or place   Pain Assessment   Patient Currently in Pain No  (but c/o dizziness)   Posture    Posture Comments reduced upright posture in sitting and standing   Range of Motion (ROM)    ROM Comment no deficits noted for UE or LE AROM   Strength   Strength Comments B independent SLRs, full quad strength in standing. No formal MMT per pt's reduced command following, but moves extremities through full ROM   Bed Mobility   Bed Mobility Comments Pt impulsive. Pt required MYKE supine>sit, Pt dizzy with positional changes (supine>sit, sit>stand), BP was not orthostatic.    Transfer Skills   Transfer Comments MYKE x 2 required for safe transfer sit<>stand of pt and line management. Pt took ~3-4 steps additionally with MYKE.   Gait   Gait Comments Able to step from bedside to chair x 3-4 steps with MYKE. Dizziness with positional changes did not permit further gait advancement   Balance   Balance Comments CGA mainly in sitting once positioned with feet flat; reduced stability noted when attempting to step/weight shift. In static standing only requires CGA, but requires MYKE once moving into dynamic balance   Coordination   Coordination Comments reduced command following, challenged to perform finger/nose testing task but able to touch my hand with both R and L hands when positioned in front, at her sides   General Therapy Interventions   Planned Therapy Interventions bed mobility training;gait training;neuromuscular re-education;ROM;strengthening;stretching;transfer training;progressive activity/exercise;home program guidelines   Clinical Impression   Criteria for Skilled Therapeutic Intervention yes, treatment indicated   PT Diagnosis reduced safety and coordination   Influenced by the following impairments reduced activity tolerance, dizziness with positional changes, reduced safety, reduced balance   Functional limitations due to impairments below baseline bed mobility, transfers, gait   Clinical Presentation Evolving/Changing   Clinical Presentation Rationale history, exam, person factor criteria   Clinical Decision Making (Complexity) Moderate complexity   Therapy Frequency` other (see  "comments)  (6x/wk)   Predicted Duration of Therapy Intervention (days/wks) 1 week   Anticipated Discharge Disposition Transitional Care Facility   Risk & Benefits of therapy have been explained Yes   Patient, Family & other staff in agreement with plan of care Yes   [facility] TM \"6 Clicks\"   2016, Trustees of [facility], under license to [employer].  All rights reserved.   6 Clicks Short Forms Basic Mobility Inpatient Short Form   [facility] AM-PAC  \"6 Clicks\" V.2 Basic Mobility Inpatient Short Form   1. Turning from your back to your side while in a flat bed without using bedrails? 3 - A Little   2. Moving from lying on your back to sitting on the side of a flat bed without using bedrails? 3 - A Little   3. Moving to and from a bed to a chair (including a wheelchair)? 3 - A Little   4. Standing up from a chair using your arms (e.g., wheelchair, or bedside chair)? 3 - A Little   5. To walk in hospital room? 3 - A Little   6. Climbing 3-5 steps with a railing? 2 - A Lot   Basic Mobility Raw Score (Score out of 24.Lower scores equate to lower levels of function) 17   Total Evaluation Time   Total Evaluation Time (Minutes) 12          "

## 2017-02-03 NOTE — ED NOTES
Pt from assisted living facility - is onj Plavix - c/o fall with loss of consciousness and no recall - patient had fall one month ago as well. Pt is alert and oriented - has involuntary sporatic movements which is baseline for her.

## 2017-02-03 NOTE — ED NOTES
fell hit head is on blood thinners    tripped on own feet per patient   Patient has Parkinsons   denies dizzines

## 2017-02-03 NOTE — IP AVS SNAPSHOT
"          [address] [facility]: [phone]                                              INTERAGENCY TRANSFER FORM - LAB / IMAGING / EKG / EMG RESULTS   2/3/2017                    Hospital Admission Date: 2/3/2017  TAMAR ELLINGTON   : 1920  Sex: Female        Attending Provider: Leonel Odell MD     Allergies:  No Known Allergies    Infection:  None   Service:  TRAUMA    Ht:  1.499 m (4' 11\")   Wt:  55.7 kg (122 lb 12.7 oz)   Admission Wt:  57 kg (125 lb 10.6 oz)    BMI:  24.79 kg/m 2   BSA:  1.52 m 2            Patient PCP Information     Provider PCP Type    TONYA Tran CNP General         Lab Results - 3 Days (17 - 17)      Glucose by meter [[id number]] (Abnormal)  Resulted: 17, Result status: Final result    Ordering provider: Leonel Odell MD  17 Resulting lab: POINT OF CARE TEST, GLUCOSE    Specimen Information    Type Source Collected On     17          Components       Value Reference Range Flag Lab   Glucose 222 70 - 99 mg/dL H 170            Glucose by meter [[id number]] (Abnormal)  Resulted: 17, Result status: Final result    Ordering provider: Leonel Odell MD  17 183 Resulting lab: POINT OF CARE TEST, GLUCOSE    Specimen Information    Type Source Collected On     17 183          Components       Value Reference Range Flag Lab   Glucose 137 70 - 99 mg/dL H 170            Glucose by meter [[id number]] (Abnormal)  Resulted: 17 171, Result status: Final result    Ordering provider: Leonel Odell MD  17 1700 Resulting lab: POINT OF CARE TEST, GLUCOSE    Specimen Information    Type Source Collected On     17 1700          Components       Value Reference Range Flag Lab   Glucose 140 70 - 99 mg/dL H 170            Magnesium [[id number]] (Abnormal)  Resulted: 17 1701, Result status: Final result    Ordering provider: Leonel Odell MD  17 1417 Resulting lab: [facility]    Specimen Information    Type Source Collected On   Blood  02/05/17 1613          Components       Value Reference Range Flag Lab   Magnesium 2.8 1.6 - 2.3 mg/dL H 51            Glucose by meter [[id number]] (Abnormal)  Resulted: 02/05/17 1336, Result status: Final result    Ordering provider: Leonel Odell MD  02/05/17 1326 Resulting lab: POINT OF CARE TEST, GLUCOSE    Specimen Information    Type Source Collected On     02/05/17 1326          Components       Value Reference Range Flag Lab   Glucose 152 70 - 99 mg/dL H 170            Glucose by meter [[id number]] (Abnormal)  Resulted: 02/05/17 1220, Result status: Final result    Ordering provider: Leonel Odell MD  02/05/17 1205 Resulting lab: POINT OF CARE TEST, GLUCOSE    Specimen Information    Type Source Collected On     02/05/17 1205          Components       Value Reference Range Flag Lab   Glucose 162 70 - 99 mg/dL H 170            Urine Culture Aerobic Bacterial [[id number]]  Resulted: 02/05/17 1015, Result status: Final result    Ordering provider: Leonel Odell MD  02/04/17 1138 Resulting lab: INFECTIOUS DISEASE DIAGNOSTIC LABORATORY    Specimen Information    Type Source Collected On     02/04/17 1138          Components       Value Reference Range Flag Lab   Specimen Description Catheterized Urine   51   Special Requests Specimen received in preservative   75   Culture Micro No growth   225   Micro Report Status FINAL 02/05/2017   225            Magnesium [[id number]] (Abnormal)  Resulted: 02/05/17 1003, Result status: Final result    Ordering provider: Leonel Odell MD  02/04/17 2200 Resulting lab: [facility]    Specimen Information    Type Source Collected On   Blood  02/05/17 0925          Components       Value Reference Range Flag Lab   Magnesium 1.5 1.6 - 2.3 mg/dL L 51            CBC with platelets [[id number]] (Abnormal)  Resulted: 02/05/17 0954, Result  status: Final result    Ordering provider: Lizeth Layton APRN CNP  02/05/17 0822 Resulting lab: [facility]    Specimen Information    Type Source Collected On   Blood  02/05/17 0925          Components       Value Reference Range Flag Lab   WBC 10.8 4.0 - 11.0 10e9/L  51   RBC Count 4.00 3.8 - 5.2 10e12/L  51   Hemoglobin 11.4 11.7 - 15.7 g/dL L 51   Hematocrit 36.2 35.0 - 47.0 %  51   MCV 91 78 - 100 fl  51   MCH 28.5 26.5 - 33.0 pg  51   MCHC 31.5 31.5 - 36.5 g/dL  51   RDW 13.4 10.0 - 15.0 %  51   Platelet Count 205 150 - 450 10e9/L  51            Glucose by meter [[id number]] (Abnormal)  Resulted: 02/05/17 0820, Result status: Final result    Ordering provider: Leonel Odell MD  02/05/17 0804 Resulting lab: POINT OF CARE TEST, GLUCOSE    Specimen Information    Type Source Collected On     02/05/17 0804          Components       Value Reference Range Flag Lab   Glucose 163 70 - 99 mg/dL H 170            Glucose by meter [[id number]] (Abnormal)  Resulted: 02/05/17 0435, Result status: Final result    Ordering provider: Leonel Odell MD  02/05/17 0429 Resulting lab: POINT OF CARE TEST, GLUCOSE    Specimen Information    Type Source Collected On     02/05/17 0429          Components       Value Reference Range Flag Lab   Glucose 144 70 - 99 mg/dL H 170            Glucose by meter [[id number]] (Abnormal)  Resulted: 02/05/17 0201, Result status: Final result    Ordering provider: Leonel Odell MD  02/05/17 0154 Resulting lab: POINT OF CARE TEST, GLUCOSE    Specimen Information    Type Source Collected On     02/05/17 0154          Components       Value Reference Range Flag Lab   Glucose 145 70 - 99 mg/dL H 170            Glucose by meter [[id number]] (Abnormal)  Resulted: 02/04/17 2006, Result status: Final result    Ordering provider: Leonel Odell MD  02/04/17 1957 Resulting lab: POINT OF CARE TEST, GLUCOSE    Specimen Information    Type Source Collected  On     02/04/17 1957          Components       Value Reference Range Flag Lab   Glucose 165 70 - 99 mg/dL H 170            Glucose by meter [[id number]] (Abnormal)  Resulted: 02/04/17 1940, Result status: Final result    Ordering provider: Leonel Odell MD  02/04/17 1928 Resulting lab: POINT OF CARE TEST, GLUCOSE    Specimen Information    Type Source Collected On     02/04/17 1928          Components       Value Reference Range Flag Lab   Glucose 137 70 - 99 mg/dL H 170            Glucose by meter [[id number]] (Abnormal)  Resulted: 02/04/17 1630, Result status: Final result    Ordering provider: Leonel Odell MD  02/04/17 1624 Resulting lab: POINT OF CARE TEST, GLUCOSE    Specimen Information    Type Source Collected On     02/04/17 1624          Components       Value Reference Range Flag Lab   Glucose 157 70 - 99 mg/dL H 170            UA with Microscopic reflex to Culture [[id number]] (Abnormal)  Resulted: 02/04/17 1223, Result status: Final result    Ordering provider: Lizeth Layton APRN CNP  02/04/17 0733 Resulting lab: [facility]    Specimen Information    Type Source Collected On   Urine Urine catheter 02/04/17 1138          Components       Value Reference Range Flag Lab   Color Urine Light Yellow   51   Appearance Urine Clear   51   Glucose Urine Negative NEG mg/dL  51   Bilirubin Urine Negative NEG   51   Ketones Urine Negative NEG mg/dL  51   Specific Gravity Urine 1.014 1.003 - 1.035   51   Blood Urine Negative NEG   51   pH Urine 5.5 5.0 - 7.0 pH  51   Protein Albumin Urine Negative NEG mg/dL  51   Urobilinogen mg/dL Normal 0.0 - 2.0 mg/dL  51   Nitrite Urine Negative NEG   51   Leukocyte Esterase Urine Moderate NEG  A 51   Source Catheterized Urine   51   WBC Urine 3 0 - 2 /HPF H 51   RBC Urine 1 0 - 2 /HPF  51   Transitional Epi <1 0 - 1 /HPF  51   Mucous Urine Present NEG /LPF A 51            Glucose by meter [[id number]] (Abnormal)  Resulted: 02/04/17  1145, Result status: Final result    Ordering provider: Leonel Odell MD  02/04/17 1139 Resulting lab: POINT OF CARE TEST, GLUCOSE    Specimen Information    Type Source Collected On     02/04/17 1139          Components       Value Reference Range Flag Lab   Glucose 116 70 - 99 mg/dL H 170            Glucose by meter [[id number]] (Abnormal)  Resulted: 02/04/17 0910, Result status: Final result    Ordering provider: Leonel Odell MD  02/04/17 0905 Resulting lab: POINT OF CARE TEST, GLUCOSE    Specimen Information    Type Source Collected On     02/04/17 0905          Components       Value Reference Range Flag Lab   Glucose 178 70 - 99 mg/dL H 170            Methicillin Resistant Staph Aureus PCR [[id number]]  Resulted: 02/04/17 0614, Result status: Final result    Ordering provider: Leonel Odell MD  02/04/17 0300 Resulting lab: [facility]    Specimen Information    Type Source Collected On   Nasal Swab  02/04/17 0300          Components       Value Reference Range Flag Lab   Specimen Description Nares   51   S Aur Meth Resis PCR -- NEG   75   Result:         Negative  MRSA Negative: SA Positive  MRSA target DNA not detected, presumed negative for MRSA colonization or the   number of bacteria present may be below the limit of detection.  Staphylococcus aureus target DNA detected, presumed positive for SA   colonization.  A positive test does not necessarily indicate the presence of viable organisms.   It is, however, presumptive for the presence of SA.  This result does not   preclude MRSA nasal colonization.  FDA approved assay performed using [employer] GeneXpert(R) real-time PCR.              Hemoglobin A1c [[id number]] (Abnormal)  Resulted: 02/04/17 0553, Result status: Final result    Ordering provider: Leonel Odell MD  02/04/17 0310 Resulting lab: [facility]    Specimen Information    Type Source Collected On   Blood  02/04/17  0442          Components       Value Reference Range Flag Lab   Hemoglobin A1C 7.3 4.3 - 6.0 % H 51            Basic metabolic panel [[id number]] (Abnormal)  Resulted: 02/04/17 0531, Result status: Final result    Ordering provider: Leonel Odell MD  02/04/17 0310 Resulting lab: [facility]    Specimen Information    Type Source Collected On   Blood  02/04/17 0442          Components       Value Reference Range Flag Lab   Sodium 137 133 - 144 mmol/L  51   Potassium 3.4 3.4 - 5.3 mmol/L  51   Chloride 100 94 - 109 mmol/L  51   Carbon Dioxide 24 20 - 32 mmol/L  51   Anion Gap 13 3 - 14 mmol/L  51   Glucose 252 70 - 99 mg/dL H 51   Urea Nitrogen 18 7 - 30 mg/dL  51   Creatinine 0.44 0.52 - 1.04 mg/dL L 51   GFR Estimate -- >60 mL/min/1.7m2  51   Result:         >90  Non  GFR Calc     GFR Estimate If Black -- >60 mL/min/1.7m2  51   Result:         >90   GFR Calc     Calcium 8.6 8.5 - 10.1 mg/dL  51   Result:              Magnesium [[id number]]  Resulted: 02/04/17 0531, Result status: Final result    Ordering provider: Leonel Odell MD  02/04/17 0310 Resulting lab: [facility]    Specimen Information    Type Source Collected On   Blood  02/04/17 0442          Components       Value Reference Range Flag Lab   Magnesium 1.6 1.6 - 2.3 mg/dL  51            Phosphorus [[id number]]  Resulted: 02/04/17 0531, Result status: Final result    Ordering provider: Leonel Odell MD  02/04/17 0310 Resulting lab: [facility]    Specimen Information    Type Source Collected On   Blood  02/04/17 0442          Components       Value Reference Range Flag Lab   Phosphorus 2.9 2.5 - 4.5 mg/dL  51            CBC with platelets [[id number]] (Abnormal)  Resulted: 02/04/17 0511, Result status: Final result    Ordering provider: Leonel Odell MD  02/04/17 0310 Resulting lab: [facility]    Specimen Information    Type Source Collected On   Blood  02/04/17 0442          Components       Value Reference Range Flag Lab   WBC 9.1 4.0 - 11.0 10e9/L  51   RBC Count 3.90 3.8 - 5.2 10e12/L  51   Hemoglobin 11.2 11.7 - 15.7 g/dL L 51   Hematocrit 34.4 35.0 - 47.0 % L 51   MCV 88 78 - 100 fl  51   MCH 28.7 26.5 - 33.0 pg  51   MCHC 32.6 31.5 - 36.5 g/dL  51   RDW 13.3 10.0 - 15.0 %  51   Platelet Count 200 150 - 450 10e9/L  51            Glucose by meter [[id number]] (Abnormal)  Resulted: 02/04/17 0410, Result status: Final result    Ordering provider: Leonel Odell MD  02/04/17 0404 Resulting lab: POINT OF CARE TEST, GLUCOSE    Specimen Information    Type Source Collected On     02/04/17 0404          Components       Value Reference Range Flag Lab   Glucose 259 70 - 99 mg/dL H 170            Testing Performed By     Lab - Abbreviation Name Director Address Valid Date Range    51 - Unknown [facility] Unknown [address] [facility] [address] 12/31/14 1010 - Present    75 - Unknown [facility] Unknown [address] [facility] [address] 01/15/15 1019 - Present    170 - Unknown POINT OF CARE TEST, GLUCOSE Unknown Unknown 10/31/11 1114 - Present    225 - Unknown [facility] Unknown [address] [facility] [address] 12/19/14 0954 - Present            Unresulted Labs     None         Imaging Results - 3 Days (02/04/17 - 02/04/17)      CT Head w/o Contrast [[id number]] (Abnormal)  Resulted: 02/04/17 0857, Result status: Final result    Ordering provider: Tiburcio Espinosa MD  02/04/17 0003 Resulted by: William Cordova MD Dahi, Farid, MD    Performed: 02/04/17 0036 - 02/04/17 0054 Resulting lab: RADIOLOGY RESULTS    Narrative:       CT HEAD W/O CONTRAST 2/4/2017 12:54 AM    History:  Interval SDH, fall on Plavix      Comparison: 2/20/2017    Technique: Axial thin section  CT images of the head were obtained from  the base of the skull to the vertex without intravenous contrast and  reviewed in brain, subdural, and bone windows.     Findings:   There has been interval increased subdural hematoma overlying the left  frontotemporal lobe as well as left cerebellum tentorium. It measures  9 mm in maximum thickness over the left frontal lobe, previously  measured 4 mm. Also measures 13 mm along the left cerebellar  tentorium, previously measured 5 mm. There has been interval increased  mass effect with partial effacement of left lateral ventricle as well  as left-to-right subfalcine midline shift of 7 mm, 4 mm previously. No  downward transtentorial herniation. Also slight interval worsening of  anterior-inferior bifrontal contusion and hemorrhages. Gray to white  matter differentiation is preserved. Stable mildly prominent  ventricular system configuration.    Posterior right parietal scalp hematoma. The bony calvaria and the  bones of the skull base appear normal. The visualized portions of  paranasal sinuses and mastoid air cells are clear.      Impression:       Impression:   1.  Worsening of left frontotemporal lobe and left cerebellum  tentorium subdural hematoma with increased mass effect resulting in  partial effacement of left lateral ventricle and increased  left-to-right subfalcine midline shift.  2. Slight interval worsening of anterior-inferior bifrontal contusion  hemorrhages.    [Critical Result: Worsening of intracranial hemorrhage    Finding was identified on 2/4/2017 12:58 AM.     Dr Espinosa was contacted by Dr. Jones at 2/4/2017 1:07 AM and  verbalized understanding of the critical finding.     I have personally reviewed the examination and initial interpretation  and I agree with the findings.    ANIYA LARKIN MD    Specimen Information    Type Source Collected On                Components       Value Reference Range Flag Lab   Radiologist flags Worsening of  intracranial hemorrhage  AA             Testing Performed By     Lab - Abbreviation Name Director Address Valid Date Range    104 - Rad Rslts RADIOLOGY RESULTS Unknown Unknown 02/16/05 1553 - Present            Encounter-Level Documents:     There are no encounter-level documents.      Order-Level Documents:     There are no order-level documents.

## 2017-02-03 NOTE — IP AVS SNAPSHOT
` ` Patient Information     Patient Name Sex Anya Roberts ([id number]) Female 1920       Room Bed    [facility] 62-01      Patient Demographics     Address Phone E-mail Address    Care of Kimi Dong  [address] [facility] [address] [phone] (Home) [email or web address]      Patient Ethnicity & Race     Ethnic Group Patient Race    American White      Emergency Contact(s)     Name Relation Home Work Mobile    KIMI DONG Daughter [phone]    ALEXANDER KRAFT Daughter [phone]        Documents on File        Status Date Received Description       Documents for the Patient    Consent Form  06     Privacy Notice - [place]  06     Insurance Card  06 BLUE PLUS    Insurance Card Received 16 MEDICARE CARD    Face Sheet  06     Other  07     Affiliate Privacy placeholder   phase3    Consent for Services - Hospital/Clinic       Consent for EHR Access       External Medication Information Consent       Patient ID Scan Refused 10/17/16     [facility] Specified Other       Insurance Card Received 14 BLUEPLUS SECURE BLUE    Consent for Services - Hospital/Clinic Received 14     Privacy Notice - [place] Received 14     Consent for EHR Access Received 14     HIM RAMYA Authorization  02/12/15     Consent for Services/Privacy Notice - Hospital/Clinic Received 16     Physical Therapy Certification Sent 16 eval only    Business/Insurance/Care Coordination/Health Form - Patient   [facility] Patient Enrollment Form    Consent for Services - Geriatrics Received 16     Insurance Card Received 17 BCBS & MHCP (2016)    Business/Insurance/Care Coordination/Health Form - Patient   [facility] Patient Health History Form    Advance Directives and Living Will Received 16 Resuscitation Guidelines 2016       Documents for the Encounter    CMS IM for Patient Signature Received 17        Admission Information     Attending Provider Admitting Provider Admission Type Admission Date/Time    Leonel Odell MD Harmon, James Vail, MD Emergency 02/03/17 2044    Discharge Date Hospital Service Auth/Cert Status Service Area     Trauma Incomplete [facility]    Unit Room/Bed Admission Status    UU U6A 6213/6213-01 Admission (Confirmed)            Admission     Complaint    Subdural hematoma caused by concussion, with loss of consciousness of any duration with death due to brain injury prior to regaining consciousness, sequela      Hospital Account     Name Acct ID Class Status Primary Coverage    Anya Gaines [id number] Inpatient Open MEDICA - [employer] [place]            Guarantor Account (for Hospital Account #[id number])     Name Relation to Pt Service Area Active? Acct Type    Anya Gaines  FCS Yes Personal/Family    Address Phone          [facility] of [place]  [address] [place] [address] [phone](H)              Coverage Information (for Hospital Account #[id number])     F/O Payor/Plan Precert #    MEDICA/[employer] [place]     Subscriber Subscriber #    Anya Gaines [id number]    Address Phone    [address] [address]  [place] [address] [phone]

## 2017-02-03 NOTE — IP AVS SNAPSHOT
[address] [address]    Phone:  [phone]                                       After Visit Summary   2/3/2017    Anya Gaines    MRN: [id number]           After Visit Summary Signature Page     I have received my discharge instructions, and my questions have been answered. I have discussed any challenges I see with this plan with the nurse or doctor.    ..........................................................................................................................................  Patient/Patient Representative Signature      ..........................................................................................................................................  Patient Representative Print Name and Relationship to Patient    ..................................................               ................................................  Date                                            Time    ..........................................................................................................................................  Reviewed by Signature/Title    ...................................................              ..............................................  Date                                                            Time

## 2017-02-03 NOTE — IP AVS SNAPSHOT
` `     [address] [facility] BANK: [phone]            Medication Administration Report for Anya Gaines as of 02/07/17 1550   Legend:    Given Hold Not Given Due Canceled Entry Other Actions    Time Time (Time) Time  Time-Action       Inactive    Active    Linked        Medications 02/01/17 02/02/17 02/03/17 02/04/17 02/05/17 02/06/17 02/07/17    acetaminophen (TYLENOL) tablet 1,000 mg  Dose: 1,000 mg Freq: 3 TIMES DAILY Route: PO  Start: 02/04/17 0003   Admin Instructions: Maximum acetaminophen dose from all sources = 75 mg/kg/day not to exceed 4 gram        0103 (1,000 mg)-Given       0851 (1,000 mg)-Given       1419 (1,000 mg)-Given       1933 (1,000 mg)-Given        0848 (1,000 mg)-Given       1347 (1,000 mg)-Given       2057 (1,000 mg)-Given        0847 (1,000 mg)-Given       1332 (1,000 mg)-Given       2116 (1,000 mg)-Given        0909 (1,000 mg)-Given       1348 (1,000 mg)-Given       [ ] 2000           amLODIPine (NORVASC) tablet 2.5 mg  Dose: 2.5 mg Freq: DAILY Route: PO  Start: 02/04/17 0800       0851 (2.5 mg)-Given        0849 (2.5 mg)-Given        0848 (2.5 mg)-Given        0909 (2.5 mg)-Given           atorvastatin (LIPITOR) tablet 10 mg  Dose: 10 mg Freq: DAILY Route: PO  Start: 02/04/17 0800       0851 (10 mg)-Given        0848 (10 mg)-Given        0848 (10 mg)-Given        0909 (10 mg)-Given           gabapentin (NEURONTIN) solution 400 mg  Dose: 400 mg Freq: 3 TIMES DAILY Route: PO  Start: 02/05/17 1400        1347 (400 mg)-Given       2055 (400 mg)-Given        0847 (400 mg)-Given       1333 (400 mg)-Given       2116 (400 mg)-Given        0912 (400 mg)-Given       1348 (400 mg)-Given       [ ] 2000           glucose 40 % gel 15-30 g  Dose: 15-30 g Freq: EVERY 15 MIN PRN Route: PO  PRN Reason: low blood sugar  Start: 02/04/17 0003   Admin Instructions: Give 15 g for BG 51 to 69 mg/dL IF patient is conscious and able to swallow. Give 30 g for BG less than or equal to 50 mg/dL IF patient is  conscious and able to swallow. Do NOT give glucose gel via enteral tube.  IF patient has enteral tube: give apple juice 120 mL (4 oz or 15 g of CHO) via enteral tube for BG 51 to 69 mg/dL.  Give apple juice 240 mL (8 oz or 30 g of CHO) via enteral tube for BG less than or equal to 50 mg/dL.              Or  dextrose 50 % injection 25-50 mL  Dose: 25-50 mL Freq: EVERY 15 MIN PRN Route: IV  PRN Reason: low blood sugar  Start: 02/04/17 0003   Admin Instructions: Use if have IV access, BG less than 70 mg/dL and meet dose criteria below:  Dose if conscious and alert (or disorientated) and NPO = 25 mL  Dose if unconscious / not alert = 50 mL              Or  glucagon injection 1 mg  Dose: 1 mg Freq: EVERY 15 MIN PRN Route: SC  PRN Reason: low blood sugar  PRN Comment: May repeat x 1 only  Start: 02/04/17 0003   Admin Instructions: May give SQ or IM. IF BG less than or equal to 50 mg/dL and no IV access.  ONLY use glucagon IF patient has NO IV access AND is UNABLE to swallow.               hydrALAZINE (APRESOLINE) injection 10-20 mg  Dose: 10-20 mg Freq: EVERY 4 HOURS PRN Route: IV  PRN Reason: high blood pressure  PRN Comment: Goal SBP<140, use if HR <100  Start: 02/04/17 0507       1441 (10 mg)-Given              hydrochlorothiazide (HYDRODIURIL) tablet 25 mg  Dose: 25 mg Freq: DAILY Route: PO  Start: 02/04/17 0800       0851 (25 mg)-Given        0848 (25 mg)-Given        0849 (25 mg)-Given        0909 (25 mg)-Given           hypromellose-dextran (ARTIFICAL TEARS) ophthalmic solution 1 drop  Dose: 1 drop Freq: 3 TIMES DAILY Route: Both Eyes  Start: 02/04/17 0003       0901 (1 drop)-Given       1420 (1 drop)-Given       1933 (1 drop)-Given        0849 (1 drop)-Given       1348 (1 drop)-Given       2055 (1 drop)-Given        0848 (1 drop)-Given       1333 (1 drop)-Given       2116 (1 drop)-Given        0912 (1 drop)-Given       1348 (1 drop)-Given       [ ] 2000           insulin aspart (NovoLOG) inj (RAPID ACTING)  Dose:  1-7 Units Freq: AT BEDTIME Route: SC  Start: 02/05/17 2200   Admin Instructions: HIGH INSULIN RESISTANCE DOSING    Do Not give Bedtime Correction Insulin if BG less than 200.   For  - 224 give 1 units.   For  - 249 give 2 units.   For  - 274 give 3 units.   For  - 299 give 4 units.   For  - 324 give 5 units.   For  - 349 give 6 units.   For BG greater than or equal to 350 give 7 units.   Notify provider if glucose greater than or equal to 350 mg/dL after administration of correction dose.  If given at mealtime, must be administered 5 min before meal or immediately after.         2130 (1 Units)-Given        (2247)-Not Given [C]        [ ] 2200           insulin aspart (NovoLOG) inj (RAPID ACTING)  Dose: 1-10 Units Freq: 3 TIMES DAILY WITH MEALS Route: SC  Start: 02/05/17 0800   Admin Instructions: Correction Scale - MEDIUM INSULIN RESISTANCE DOSING     Do Not give Correction Insulin if BG less than 140.  For  - 189 give 3 unit.  For  - 239 give 4 units.  For  - 289 give 5 units.  For  - 339 give 6 units.  For  - 399 give 8 units.  For BG greater than or equal to 400 give 10 units.  Check blood glucose Q4H and administer based on blood glucose.  Notify provider if glucose greater than or equal to 350 mg/dL after administration of correction dose.  If given at mealtime, must be administered 5 min before meal or immediately after.         0853 (3 Units)-Given       1329 (3 Units)-Given       (1833)-Not Given        (0904)-Not Given [C]       1251 (4 Units)-Given       1851 (3 Units)-Given        1000 (4 Units)-Given       1257 (6 Units)-Given       [ ] 1800           labetalol (NORMODYNE/TRANDATE) injection 10-20 mg  Dose: 10-20 mg Freq: EVERY 4 HOURS PRN Route: IV  PRN Reason: high blood pressure  PRN Comment: Goal SBP<140, use if HR >100  Start: 02/04/17 0508              lidocaine (LMX4) kit  Freq: EVERY 1 HOUR PRN Route: Top  PRN Reason: mild  "pain  PRN Comment: with VAD insertion or accessing implanted port,  Start: 02/04/17 0003   Admin Instructions: Do NOT give if patient has a history of allergy to any local anesthetic or any \"emir\" product.   Apply 30 min prior to VAD insertion or port access.  MAX Dose:  2.5 gm (  of 5 gm tube)               lidocaine 1 % 1 mL  Dose: 1 mL Freq: EVERY 1 HOUR PRN Route: OTHER  PRN Comment: mild pain with VAD insertion or accessing implanted port,  Start: 02/04/17 0003   Admin Instructions: Do NOT give if patient has a history of allergy to any local anesthetic or any \"emir\" product. MAX dose 1 mL subcutaneous OR intradermal in divided doses.               lisinopril (PRINIVIL/ZESTRIL) tablet 20 mg  Dose: 20 mg Freq: DAILY Route: PO  Start: 02/05/17 1230        1229 (20 mg)-Given        0848 (20 mg)-Given        0909 (20 mg)-Given           magnesium sulfate 2 g in NS intermittent infusion ([employer]ar[employer] or [facility]mpd)  Dose: 2 g Freq: DAILY PRN Route: IV  PRN Reason: magnesium supplementation  Start: 02/04/17 0003   Admin Instructions: For Serum Mg++ 1.6 - 2 mg/dL  Give 2 g and recheck magnesium level next AM.        0539 (2 g)-New Bag              magnesium sulfate 4 g in 100 mL sterile water (premade)  Dose: 4 g Freq: EVERY 4 HOURS PRN Route: IV  PRN Reason: magnesium supplementation  Last Dose: 4 g (02/05/17 1207)  Start: 02/04/17 0003   Admin Instructions: For serum Mg++ less than 1.6 mg/dL  Give 4 g and recheck magnesium level 2 hours after dose, and next AM.         1207 (4 g)-New Bag             metFORMIN (GLUCOPHAGE-XR) 24 hr tablet 2,000 mg  Dose: 2,000 mg Freq: DAILY WITH SUPPER Route: PO  Indications of Use: TYPE 2 DIABETES MELLITUS  Start: 02/04/17 0003   Admin Instructions: DO NOT CRUSH. If the patient receives intravenous, iodinated contrast, hold metformin for 24 hours before AND 48 hours after procedure. Contact pharmacy if no hold order is written.        1659 (2,000 mg)-Given        1718-Hold [C]        " (3679)-Not Given [C]        [ ] 1700           naloxone (NARCAN) injection 0.1-0.4 mg  Dose: 0.1-0.4 mg Freq: EVERY 2 MIN PRN Route: IV  PRN Reason: opioid reversal  Start: 02/04/17 0003   Admin Instructions: For respiratory rate LESS than or EQUAL to 8.  Partial reversal dose:  0.1 mg titrated q 2 minutes for Analgesia Side Effects Monitoring Sedation Level of 3 (frequently drowsy, arousable, drifts to sleep during conversation).Full reversal dose:  0.4 mg bolus for Analgesia Side Effects Monitoring Sedation Level of 4 (somnolent, minimal or no response to stimulation).               ondansetron (ZOFRAN-ODT) ODT tab 4 mg  Dose: 4 mg Freq: EVERY 6 HOURS PRN Route: PO  PRN Reason: nausea  Start: 02/04/17 0003   Admin Instructions: This is Step 1 of nausea and vomiting management.  If nausea not resolved in 15 minutes, go to Step 2 prochlorperazine (COMPAZINE). Do not push through foil backing. Peel back foil and gently remove. Place on tongue immediately. Administration with liquid unnecessary              Or  ondansetron (ZOFRAN) injection 4 mg  Dose: 4 mg Freq: EVERY 6 HOURS PRN Route: IV  PRN Reasons: nausea,vomiting  Start: 02/04/17 0003   Admin Instructions: This is Step 1 of nausea and vomiting management.  If nausea not resolved in 15 minutes, go to Step 2 prochlorperazine (COMPAZINE).               polyethylene glycol (MIRALAX/GLYCOLAX) Packet 17 g  Dose: 17 g Freq: DAILY PRN Route: PO  PRN Reason: constipation  Indications of Use: CONSTIPATION  Start: 02/04/17 0003   Admin Instructions: Give in 8 ounces of water, juice, or soda.  Hold for loose stools.  1 Packet = 17 grams. Mixed prescribed dose in 8 ounces of water. Follow with 8 oz. of water.               potassium chloride (KLOR-CON) Packet 20-40 mEq  Dose: 20-40 mEq Freq: EVERY 2 HOURS PRN Route: ORAL OR FEED  PRN Reason: potassium supplementation  Start: 02/04/17 0003   Admin Instructions: Use if unable to tolerate tablets.    If Serum K+ 3.4-4.0, dose  = 20 mEq x1. Recheck K+ level the next AM.  If Serum K+ 3.0-3.3, dose = 60 mEq po total dose (40 mEq x 1 followed in 2 hours by 20 mEq X1). Recheck K+ level 4 hours after dose and the next AM.  If Serum K+ 2.5-2.9, dose = 80 mEq po total dose (40 mEq Q2H x2). Recheck K+ level 4 hours after dose and the next AM.  If Serum K+ less than 2.5, See IV order.  Dissolve packet contents in 4-8 ounces of cold water or juice.        0851 (20 mEq)-Given              potassium chloride 10 mEq in 100 mL intermittent infusion  Dose: 10 mEq Freq: EVERY 1 HOUR PRN Route: IV  PRN Reason: potassium supplementation  Start: 02/04/17 0003   Admin Instructions: Infuse via PERIPHERAL LINE or CENTRAL LINE. Use for central line replacement if patient weight less than 65 kg, if patient is on TPN with high potassium content or if unit does not stock 20 mEq bags.  If Serum K+ 3.4-4.0, dose = 10 mEq/hr x2 doses. Recheck K+ level the next AM.  If Serum K+ 3.0-3.3, dose = 10 mEq/hr x4 doses (40 mEq IV total dose). Recheck K+ level 2 hours after dose and the next AM.  If Serum K+ less than 3.0, dose = 10 mEq/hr x6 doses (60 mEq IV total dose). Recheck K+ level 2 hours after dose and the next AM.               potassium chloride 10 mEq in 100 mL intermittent infusion with 10 mg lidocaine  Dose: 10 mEq Freq: EVERY 1 HOUR PRN Route: IV  PRN Reason: potassium supplementation  Start: 02/04/17 0003   Admin Instructions: Infuse via PERIPHERAL LINE. Use potassium with lidocaine for pain with peripheral administration.  If Serum K+ 3.4-4.0, dose = 10 mEq/hr x2 doses. Recheck K+ level the next AM.  If Serum K+ 3.0-3.3, dose = 10 mEq/hr x4 doses (40 mEq IV total dose). Recheck K+ level 2 hours after dose and the next AM.  If Serum K+ less than 3.0, dose = 10 mEq/hr x6 doses (60 mEq IV total dose). Recheck K+ level 2 hours after dose and the next AM.               potassium chloride 20 mEq in 50 mL intermittent infusion  Dose: 20 mEq Freq: EVERY 1 HOUR PRN  Route: IV  PRN Reason: potassium supplementation  Start: 02/04/17 0003   Admin Instructions: Infuse via CENTRAL LINE Only.  May need EKG if less than 65 kg or on TPN - Max rate is 0.3 mEq/kg/hr for patients not on EKG monitoring.    If Serum K+ 3.4-4.0, dose = 20 mEq/hr x1 doses. Recheck K+ level the next AM.  If Serum K+ 3.0-3.3, dose = 20 mEq/hr x2 doses (40 mEq IV total dose).  Recheck K+ level 2 hours after dose and the next AM.  If Serum K+ less than 3.0, dose = 20 mEq/hr x3 doses (60 mEq IV total dose). Recheck K+ level 2 hours after dose and the next AM.               potassium chloride SA (K-DUR/KLOR-CON M) CR tablet 20-40 mEq  Dose: 20-40 mEq Freq: EVERY 2 HOURS PRN Route: PO  PRN Reason: potassium supplementation  Start: 02/04/17 0003   Admin Instructions: Use if able to take PO.   If Serum K+ 3.4-4.0, dose = 20 mEq x1. Recheck K+ level the next AM.  If Serum K+ 3.0-3.3, dose = 60 mEq po total dose (40 mEq x1 followed in 2 hours by 20 mEq x1). Recheck K+ level 4 hours after dose and the next AM.  If Serum K+ 2.5-2.9, dose = 80 mEq po total dose (40 mEq Q2H x2). Recheck K+ level 4 hours after dose and the next AM.  If Serum K+ less than 2.5, See IV order.  DO NOT CRUSH               senna-docusate (SENOKOT-S;PERICOLACE) 8.6-50 MG per tablet 1-2 tablet  Dose: 1-2 tablet Freq: 2 TIMES DAILY Route: PO  Start: 02/05/17 2000        (2057)-Not Given [C]        (0849)-Not Given [C]       (2108)-Not Given        (0913)-Not Given       [ ] 2000           sodium chloride (PF) 0.9% PF flush 3 mL  Dose: 3 mL Freq: EVERY 8 HOURS Route: IK  Start: 02/04/17 0003   Admin Instructions: And Q1H PRN, to lock peripheral IV dormant line.        (0154)-Not Given       0905 (3 mL)-Given       1630 (3 mL)-Given        (0135)-Not Given       (0736)-Not Given       1626 (3 mL)-Given        0105 (3 mL)-Given       0850 (3 mL)-Given       1851 (3 mL)-Given        0015 (3 mL)-Given       0913 (3 mL)-Given       [ ] 1600            sodium chloride (PF) 0.9% PF flush 3 mL  Dose: 3 mL Freq: EVERY 1 HOUR PRN Route: IK  PRN Reasons: line flush,post meds or blood draw  Start: 02/04/17 0003   Admin Instructions: for peripheral IV flush post IV meds              Discontinued Medications  Medications 02/01/17 02/02/17 02/03/17 02/04/17 02/05/17 02/06/17 02/07/17         Dose: 400 mg Freq: 3 TIMES DAILY Route: PO  Start: 02/04/17 0003   End: 02/05/17 1109       0901 (400 mg)-Given       1419 (400 mg)-Given       1933 (400 mg)-Given        0848-Hold [C]       1109-Med Discontinued           Dose: 15-30 g Freq: EVERY 15 MIN PRN Route: PO  PRN Reason: low blood sugar  Start: 02/05/17 1721   End: 02/05/17 1726   Admin Instructions: Give 15 g for BG 51 to 69 mg/dL IF patient is conscious and able to swallow. Give 30 g for BG less than or equal to 50 mg/dL IF patient is conscious and able to swallow. Do NOT give glucose gel via enteral tube.  IF patient has enteral tube: give apple juice 120 mL (4 oz or 15 g of CHO) via enteral tube for BG 51 to 69 mg/dL.  Give apple juice 240 mL (8 oz or 30 g of CHO) via enteral tube for BG less than or equal to 50 mg/dL.         1726-Med Discontinued        Or    Dose: 25-50 mL Freq: EVERY 15 MIN PRN Route: IV  PRN Reason: low blood sugar  Start: 02/05/17 1721   End: 02/05/17 1726   Admin Instructions: Use if have IV access, BG less than 70 mg/dL and meet dose criteria below:  Dose if conscious and alert (or disorientated) and NPO = 25 mL  Dose if unconscious / not alert = 50 mL         1726-Med Discontinued        Or    Dose: 1 mg Freq: EVERY 15 MIN PRN Route: SC  PRN Reason: low blood sugar  PRN Comment: May repeat x 1 only  Start: 02/05/17 1721   End: 02/05/17 1726   Admin Instructions: May give SQ or IM. IF BG less than or equal to 50 mg/dL and no IV access.  ONLY use glucagon IF patient has NO IV access AND is UNABLE to swallow.         1726-Med Discontinued           Dose: 1-10 Units Freq: 3 TIMES DAILY BEFORE MEALS  Route: SC  Start: 02/05/17 1730   End: 02/06/17 1212   Admin Instructions: Correction Scale - HIGH INSULIN RESISTANCE DOSING     Do Not give Correction Insulin if Pre-Meal BG less than 140.   For Pre-Meal  - 164 give 1 unit.   For Pre-Meal  - 189 give 2 units.   For Pre-Meal  - 214 give 3 units.   For Pre-Meal  - 239 give 4 units.   For Pre-Meal  - 264 give 5 units.   For Pre-Meal  - 289 give 6 units.   For Pre-Meal  - 314 give 7 units.   For Pre-Meal  - 339 give 8 units.   For Pre-Meal  - 364 give 9 units.   For Pre-Meal BG greater than or equal to 365 give 10 units  To be given with prandial insulin, and based on pre-meal blood glucose.   Notify provider if glucose greater than or equal to 350 mg/dL after administration of correction dose.  If given at mealtime, must be administered 5 min before meal or immediately after.                 0903 (2 Units)-Given       1212-Med Discontinued  (1242)-Not Given [C]              Dose: 1-10 Units Freq: EVERY 4 HOURS Route: SC  Start: 02/04/17 0815   End: 02/05/17 0753   Admin Instructions: Correction Scale - MEDIUM INSULIN RESISTANCE DOSING     Do Not give Correction Insulin if BG less than 140.  For  - 189 give 3 unit.  For  - 239 give 4 units.  For  - 289 give 5 units.  For  - 339 give 6 units.  For  - 399 give 8 units.  For BG greater than or equal to 400 give 10 units.  Check blood glucose Q4H and administer based on blood glucose.  Notify provider if glucose greater than or equal to 350 mg/dL after administration of correction dose.  If given at mealtime, must be administered 5 min before meal or immediately after.        0906 (3 Units)-Given [C]       (1317)-Not Given       1658 (3 Units)-Given       (1932)-Not Given [C]        (0154)-Not Given [C]       (0429)-Not Given [C]       0753-Med Discontinued           Rate: 75 mL/hr Freq: CONTINUOUS Route: IV  Last Dose: Stopped (02/05/17  0846)  Start: 02/04/17 1515   End: 02/05/17 0821   Admin Instructions: Stop when PO intake >400cc daily        1630 ( )-New Bag        0427 ( )-New Bag       0821-Med Discontinued  0846-Stopped          Medications 02/01/17 02/02/17 02/03/17 02/04/17 02/05/17 02/06/17 02/07/17

## 2017-02-03 NOTE — ED PROVIDER NOTES
History     Chief Complaint   Patient presents with     Loss of Consciousness     fell hit head is on blood thinners    tripped on own feet per patient   Patient has Parkinsons   denies dizzines   88 pulse   95% Sat     HPI  Ms. Anya Gaines is a 89 year old female with history of falls, type 2 diabetes, stoke (on Plavix), and UTI who presents to the ED today for evaluation of loss of consciousness. The patient presents from [facility] after mechanical fall from standing height hitting her head on the ground resulting in loss of consciousness. The patient does not recall what lead to fall. She has visible hematoma to the right temporal region at the site of impact. She endorses left sided intercostal pain but denies impact to this area of her body during fall. She denies chest pain or shortness of breath before fall, stating it was an otherwise uneventful day prior to fall. The patient is on Plavix, her INR today was 0.98.    I have reviewed the Medications, Allergies, Past Medical and Surgical History, and Social History in the Epic system.  No past medical history on file.  Patient Active Problem List   Diagnosis     Fall at home     Type 2 diabetes mellitus without complication, without long-term current use of insulin (H)     UTI (urinary tract infection)     Hyperkinetic     Age-related osteoporosis without current pathological fracture     History of TIA (transient ischemic attack) and stroke     Advance care planning     Macular degeneration (senile) of [facility]     Dizziness     Essential hypertension with goal blood pressure less than 140/90     Current Facility-Administered Medications   Medication     lidocaine 1 % 1 mL     lidocaine (LMX4) kit     sodium chloride (PF) 0.9% PF flush 3 mL     sodium chloride (PF) 0.9% PF flush 3 mL     nitroprusside (NIPRIDE) 50 mg in D5W 125 mL infusion (conc: 0.4mg/mL)     HYDROmorphone (DILAUDID) injection 0.2 mg     Current Outpatient  "Prescriptions   Medication     atorvastatin (LIPITOR) 10 MG tablet     Acetaminophen (TYLENOL PO)     AMLODIPINE BESYLATE PO     lisinopril (PRINIVIL,ZESTRIL) 40 MG tablet     calcium carbonate (OS- MG [place]. CA) 600 MG tablet     gabapentin (NEURONTIN) 100 MG capsule     clopidogrel (PLAVIX) 75 MG tablet     multivitamin  with lutein (OCUVITE WITH LTEIN) CAPS     loperamide (IMODIUM) 2 MG capsule     hydrochlorothiazide (HYDRODIURIL) 25 MG tablet     Menthol, Topical Analgesic, (BIOFREEZE) 4 % GEL     hypromellose-dextran 0.3-0.1% (ARTIFICIAL TEARS) opthalmic solution     loperamide (IMODIUM A-D) 2 MG tablet     metFORMIN (GLUCOPHAGE-XR) 500 MG 24 hr tablet      No Known Allergies  Social History     Social History     Marital Status:      Spouse Name: N/A     Number of Children: N/A     Years of Education: N/A     Occupational History     Not on file.     Social History Main Topics     Smoking status: Never Smoker      Smokeless tobacco: Never Used     Alcohol Use: No     Drug Use: No     Sexual Activity: Not on file     Other Topics Concern     Not on file     Social History Narrative     No family history on file.     Review of Systems  All other systems reviewed and are negative.    Physical Exam   BP: (!) 210/102 mmHg  Pulse: 91  Temp: 98.1  F (36.7  C)  Resp: 16  Height: 165.1 cm (5' 5\")  Weight: 56.7 kg (125 lb)  SpO2: 95 %  Physical Exam alert cooperative female in mild distress.  She is amnestic to the event.  HEENT reveals a large hematoma in the right posterior parietal/occipital region.  There is an abrasion on the top of this but no suturable lesion.  She is pupils are equal reactive and accommodation.  Extraocular motions are intact.  She has no hemotympanum.  There is no villavicencio signs.  Her hearing was removed and replaced.  No epistasis or rhinorrhea.  No raccoon's eyes.  Orally there is no lesions and there is no malocclusion.  Neck is nontender.  Lungs reveal basilar crackles but no " other adventitious sounds.  Cardiac regular rate without murmur.  Abdomen is soft and benign.  Extremities are atraumatic.  Neurologically cranial nerves II through XII are intact except smell which was not checked.  She has choreiform movement due to her parkinsonism.  Cannot appreciate focal motor weakness.  No acute sensory changes are noted.  [place] Coma Scale was 15.    ED Course   Procedures             EKG Interpretation:      Interpreted by Epifanio Salmon  Time reviewed: 18:34  Symptoms at time of EKG: Fall with left rib pain   Rhythm: normal sinus   Rate: Normal  Axis: Normal  Ectopy: none  Conduction: normal  ST Segments/ T Waves: No ST-T wave changes  Q Waves: none  Comparison to prior: Unchanged from 10/17/16    Clinical Impression: normal EKG                Critical Care time:  none               Labs Ordered and Resulted from Time of ED Arrival Up to the Time of Departure from the ED   BASIC METABOLIC PANEL - Abnormal; Notable for the following:     Potassium 3.0 (*)     Glucose 134 (*)     Creatinine 0.43 (*)     All other components within normal limits   CBC WITH PLATELETS DIFFERENTIAL   INR   TROPONIN I   PERIPHERAL IV CATHETER     Results for orders placed or performed during the hospital encounter of 02/03/17 (from the past 24 hour(s))   CBC with platelets differential   Result Value Ref Range    WBC 5.2 4.0 - 11.0 10e9/L    RBC Count 4.45 3.8 - 5.2 10e12/L    Hemoglobin 12.7 11.7 - 15.7 g/dL    Hematocrit 39.3 35.0 - 47.0 %    MCV 88 78 - 100 fl    MCH 28.5 26.5 - 33.0 pg    MCHC 32.3 31.5 - 36.5 g/dL    RDW 12.7 10.0 - 15.0 %    Platelet Count 168 150 - 450 10e9/L    Diff Method Automated Method     % Neutrophils 65.0 %    % Lymphocytes 24.4 %    % Monocytes 6.9 %    % Eosinophils 2.9 %    % Basophils 0.6 %    % Immature Granulocytes 0.2 %    Absolute Neutrophil 3.4 1.6 - 8.3 10e9/L    Absolute Lymphocytes 1.3 0.8 - 5.3 10e9/L    Absolute Monocytes 0.4 0.0 - 1.3 10e9/L    Absolute Eosinophils  0.2 0.0 - 0.7 10e9/L    Absolute Basophils 0.0 0.0 - 0.2 10e9/L    Abs Immature Granulocytes 0.0 0 - 0.4 10e9/L   Basic metabolic panel   Result Value Ref Range    Sodium 137 133 - 144 mmol/L    Potassium 3.0 (L) 3.4 - 5.3 mmol/L    Chloride 100 94 - 109 mmol/L    Carbon Dioxide 29 20 - 32 mmol/L    Anion Gap 8 3 - 14 mmol/L    Glucose 134 (H) 70 - 99 mg/dL    Urea Nitrogen 19 7 - 30 mg/dL    Creatinine 0.43 (L) 0.52 - 1.04 mg/dL    GFR Estimate >90  Non  GFR Calc   >60 mL/min/1.7m2    GFR Estimate If Black >90   GFR Calc   >60 mL/min/1.7m2    Calcium 9.6 8.5 - 10.1 mg/dL   INR   Result Value Ref Range    INR 0.98 0.86 - 1.14   CT Cervical Spine w/o Contrast    Narrative    CT CERVICAL SPINE W/O CONTRAST 2/3/2017 6:05 PM     HISTORY:  fall, neck pain     TECHNIQUE:  Axial images of the cervical spine were obtained without  intravenous contrast. Coronal and sagittal reformations were  performed.  Radiation dose for this scan was reduced using automated  exposure control, adjustment of the mA and/or kV according to patient  size, or iterative reconstruction technique.    COMPARISON: None.    FINDINGS: There are 7  cervical type vertebrae used for the purpose of  this dictation. The alignment of the cervical spine is normal.   No  fractures are identified.    C1-C2:  Calcified pannus is seen posterior to the odontoid. This can  be due to calcium pyrophosphate deposition disease.    C2-C3:  Normal disc, facet joints, spinal canal and neural foramina.     C3-C4:  Loss of disc space height. Diffuse bulge with associated  posterior osteophytes. Central canal normal. Degenerative change in  the facet joints.    C4-C5::  Degenerative change in the facet joints.      C5-C6:  Loss of disc space height. Degenerative change in the facet  joints. Diffuse bulge with associated posterior osteophytes. Central  canal still normal.     C6-C7:  Loss of disc space height. Mild annular bulge. Central  canal  and neural foramen are normal.    C7-T1:  Normal disc, facet joints, spinal canal and neural foramina.      Impression    IMPRESSION:   1. No fractures are identified.  2. Multilevel degenerative change.   CT Head w/o Contrast   Result Value Ref Range    Radiologist flags Left subdural (AA)     Narrative    CT HEAD W/O CONTRAST   2/3/2017 6:05 PM     HISTORY: Right occipital contusion/patient on Plavix/ LOC.    TECHNIQUE: Axial images of the head without IV contrast material.  Radiation dose for this scan was reduced using automated exposure  control, adjustment of the mA and/or kV according to patient size, or  iterative reconstruction technique.    COMPARISON: None.    FINDINGS:  There is generalized atrophy of the brain.  Areas of low  attenuation are present in the white matter of the cerebral  hemispheres that are consistent with small vessel ischemic disease in  this age patient. There is no evidence of intracranial hemorrhage,  mass, acute infarct or anomaly. The visualized portions of the sinuses  and mastoids appear normal. There is a small acute appearing subdural  in the left temporal region extending into the left frontal region  measuring about 0.6 cm in transverse dimension. There is no shift of  midline structures. There is also a small subdural along the left  tentorium measuring about 0.5 cm in thickness. Soft tissue swelling is  seen over the right parietal region. No skull fractures identified..      Impression    IMPRESSION:   1. Left temporal-frontal subdural hematoma. There is also a subdural  hematoma along the left tentorium. These measure about 5-6 mm in  thickness. No significant mass effect or shift associated with the  small subdurals.  2. Atrophy of the brain.  White matter changes consistent with small  vessel ischemic disease.     [Critical Result: Left subdural]    Finding was identified on 2/3/2017 6:07 PM.     Dr. Vegas was contacted by me on 2/3/2017 6:09 PM and  verbalized  understanding of the critical result.    XR Chest 2 Views    Narrative    CHEST TWO VIEWS 2/3/2017 6:13 PM     HISTORY: Fall with left rib pain    COMPARISON: 3/25/2006    FINDINGS: Stable dense nodule of the left base. No pneumothorax. No  definite displaced rib fracture. Paratracheal density again noted at  the right apex, was present in 2006, presumably this is a vascular  shadow. There are no acute infiltrates. The cardiac silhouette is not  enlarged. Pulmonary vasculature is unremarkable.      Impression    IMPRESSION: No acute disease.       Medications   lidocaine 1 % 1 mL (not administered)   lidocaine (LMX4) kit (not administered)   sodium chloride (PF) 0.9% PF flush 3 mL (not administered)   sodium chloride (PF) 0.9% PF flush 3 mL (not administered)   nitroprusside (NIPRIDE) 50 mg in D5W 125 mL infusion (conc: 0.4mg/mL) (not administered)   HYDROmorphone (DILAUDID) injection 0.2 mg (not administered)       5:28 PM Patient Assessed.   At 6:30 PM discuss results patient's CT showing subdurals described above.  Cervical CT showed no acute abnormality.  Chest x-ray shows no acute fracture.  Family is present and aware of the subdurals.  Patient is on Plavix.  Upon returning from CT there is no change in her [place] Coma Scale.  At 7 PM discussed the patient with Dr. Manzo at [facility].  Patient  transfer for trauma team activation and neurosurgical consultation.  Patient is hypertensive from her baseline.  We'll start a Nipride drip which can be titrated .  Patient did complain of worsening headache so she was given a low dose of 0.2 Dilaudid.  Assessments & Plan (with Medical Decision Making)   Patient is a 89 yo female presents after a fall.  Patient does have parkinsonism and has had multiple falls previously.  She does not recall the event but paramedics initially stated that she tripped and fell.  Does not appear there was a syncopal episode.  Here the patient denied chest pain or  shortness of breath.  She complained of mild headache and left rib pain.  No focal motor weakness or sensory changes.  Exam the patient is afebrile and vital signs are stable.  Blood pressure initially was elevated at 210/102 which is markedly elevated from her baseline on previous checks.  She is on amlodipine, lisinopril, and hydrochlorothiazide for blood pressure control.  With her fall and CNS bleed and elevated blood pressure we will control her with nipride drip.  On exam the patient was alert and cooperative.  She was somewhat amnestic to the event but otherwise oriented.  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  Neurologically however she is nonfocal.  She does have a large contusion on the right occipital/parietal region that does not require suturing.  EKG shows no acute abnormality unchanged from previous.  Troponin is pending.  Patient's blood work was unremarkable with hemoglobin 12.7, platelets at 168, and INR of 0.98.  Patient be transferred to [facility].  She'll be seen in the emergency room by the trauma team.  I spoke with Dr. Manzo in the ER.  30 minutes of critical care time including initial assessment, evaluations, discussion of treatment with pharmacist, hospital staff, and family.    I have reviewed the nursing notes.    I have reviewed the findings, diagnosis, plan and need for follow up with the patient.    New Prescriptions    No medications on file       Final diagnoses:   Fall, initial encounter   Contusion of face, scalp and neck, initial encounter   Subdural hemorrhage (H)     This document serves as a record of the services and decisions personally performed and made by Epifanio Salmon MD. It was created on HIS/HER behalf by Fco Bucio, a trained medical scribe. The creation of this document is based the provider's statements to the medical scribe.  Fco Bucio 5:28 PM 2/3/2017    Provider:   The information in this document, created by the  medical scribe for me, accurately reflects the services I personally performed and the decisions made by me. I have reviewed and approved this document for accuracy prior to leaving the patient care area.  Epifanio Salmon MD 5:28 PM 2/3/2017    2/3/2017   [facility] EMERGENCY DEPARTMENT      Epifanio Salmon MD  02/03/17 [id number]

## 2017-02-03 NOTE — IP AVS SNAPSHOT
"` `           [facility] [address] [facility]: [phone]                                              INTERAGENCY TRANSFER FORM - NURSING   2/3/2017                    Hospital Admission Date: 2/3/2017  TAMAR ELLINGTON   : 1920  Sex: Female        Attending Provider: Leonel Odell MD     Allergies:  No Known Allergies    Infection:  None   Service:  TRAUMA    Ht:  1.499 m (4' 11\")   Wt:  55.7 kg (122 lb 12.7 oz)   Admission Wt:  57 kg (125 lb 10.6 oz)    BMI:  24.79 kg/m 2   BSA:  1.52 m 2            Patient PCP Information     Provider PCP Type    TONYA Tran CNP General      Current Code Status     Date Active Code Status Order ID Comments User Context       2017 11:29 AM DNR/DNI [id number]  Lizeth Layton APRN CNP Inpatient       Code Status History     Date Active Date Inactive Code Status Order ID Comments User Context    2017 12:03 AM 2017 11:29 AM Full Code [id number]  Tiburcio Espinosa MD ED    3/2/2016  9:34 PM 3/5/2016  4:39 PM Full Code [id number]  Usman Mosley MD Inpatient      Advance Directives        Does patient have a scanned Advance Directive/ACP document in EPIC?           Yes        Hospital Problems as of 2017              Priority Class Noted POA    Subdural hematoma caused by concussion, with loss of consciousness of any duration with death due to brain injury prior to regaining consciousness, sequela Medium  2017 Yes      Non-Hospital Problems as of 2017              Priority Class Noted    Fall at home Medium  3/2/2016    Type 2 diabetes mellitus without complication, without long-term current use of insulin (H) Medium  3/7/2016    UTI (urinary tract infection) Medium  3/7/2016    Hyperkinetic Medium  3/7/2016    Age-related osteoporosis without current pathological fracture Medium  3/7/2016    History of TIA (transient ischemic attack) and stroke Medium  3/7/2016    Advance care planning Medium  3/7/2016    Macular degeneration " (senile) of retina Medium  3/7/2016    Health Care Home   5/4/2016    Dizziness Medium  12/14/2016    Essential hypertension with goal blood pressure less than 140/90 Medium  12/14/2016    Seizures (H) Medium  2/5/2017      Immunizations     Name Date      Influenza (High Dose) 3 valent vaccine 10/12/15     Influenza (High Dose) 3 valent vaccine 10/01/14     Influenza (IIV3) 11/25/13     Influenza (IIV3) 11/05/12     Influenza (IIV3) 10/20/11     Influenza (IIV3) 01/26/10     Pneumococcal (PCV 13) 10/12/15     Pneumococcal 23 valent 12/22/07     TDAP (BOOSTRIX AGES 10-64) 11/09/16          END      ASSESSMENT     Discharge Profile Flowsheet     EXPECTED DISCHARGE     Patient's communication style  spoken language (English or Bilingual) 02/03/17 2044    Expected Discharge Date  02/07/17 02/07/17 0510   SKIN      DISCHARGE NEEDS ASSESSMENT     Inspection  Full 02/07/17 1148    Equipment Currently Used at Home  -- (4WW) 02/04/17 1147   Skin WDL  ex 02/07/17 1148    # of Referrals Placed by CTS  Post Acute Facilities 03/03/16 1142   Skin Color/Characteristics  bruised (ecchymotic);redness blanchable 02/07/17 1148    GASTROINTESTINAL (ADULT,PEDIATRIC,OB)     Skin Temperature  warm 02/07/17 0413    GI WDL  ex;stool;GI symptoms 02/07/17 1148   Skin Integrity  bruise(s) 02/07/17 1148    Abdominal Appearance  contour symmetrical 02/04/17 1315   Additional Documentation  Wound (LDA) 02/04/17 0342    All Quadrants Bowel Sounds  audible and normoactive 02/07/17 1148   SAFETY      All Quadrants Palpation  soft/nontender 02/07/17 0413   Safety WDL  WDL 02/07/17 1148    Last Bowel Movement  02/07/17 02/07/17 1148   Safety Factors  bed in low position;wheels locked;call light in reach;upper side rails raised x 2;ID band on 02/07/17 1148    GI Signs/Symptoms  fecal incontinence (at times; urgency ) 02/07/17 1148   Safety Equipment  oxygen flowmeter;manual resusciator with appropriate mask;suction regulator;suction equipment 02/04/17  "1315    COMMUNICATION ASSESSMENT                        Assessment WDL (Within Defined Limits) Definitions           Safety WDL     Effective: 09/28/15    Row Information: <b>WDL Definition:</b> Bed in low position, wheels locked; call light in reach; upper side rails up x 2; ID band on<br> <font color=\"gray\"><i>Item=AS safety wdl>>List=AS safety wdl>>Version=F14</i></font>      Skin WDL     Effective: 09/28/15    Row Information: <b>WDL Definition:</b> Warm; dry; intact; elastic; without discoloration; pressure points without redness<br> <font color=\"gray\"><i>Item=AS skin wdl>>List=AS skin wdl>>Version=F14</i></font>      Vitals     Vital Signs Flowsheet     VITAL SIGNS     Height  1.499 m (4' 11\") 02/03/17 2042    Temp  97.2  F (36.2  C) 02/07/17 1525   Height Method  Estimated 02/03/17 2042    Temp src  Oral 02/07/17 1525   Weight  55.7 kg (122 lb 12.7 oz) 02/04/17 0302    Resp  16 02/07/17 1525   BSA (Calculated - sq m)  1.54 02/03/17 2042    Pulse  88 02/07/17 1525   BMI (Calculated)  25.43 02/03/17 2042    Heart Rate  87 02/06/17 0425   EKG MONITORING      Pulse/Heart Rate Source  Monitor 02/07/17 1220   Cardiac Regularity  Regular 02/03/17 2051    BP  101/50 mmHg 02/07/17 1525   Cardiac Rhythm  NSR 02/03/17 2051    BP Location  Left arm 02/07/17 1525   JIMMIE COMA SCALE      OXYGEN THERAPY     Best Eye Response  4-->(E4) spontaneous 02/07/17 1223    SpO2  95 % 02/07/17 1525   Best Motor Response  6-->(M6) obeys commands 02/07/17 1223    O2 Device  None (Room air) 02/07/17 1525   Best Verbal Response  5-->(V5) oriented 02/07/17 1223    Oxygen Delivery  2 LPM 02/05/17 0424   Jimmie Coma Scale Score  15 02/07/17 1223    PAIN/COMFORT     POSITIONING      Patient Currently in Pain  yes 02/07/17 1149   Body Position  independently positioning 02/07/17 1148    Preferred Pain Scale  CAPA (Clinically Aligned Pain Assessment) ([facility], [facility] and [facility] Adults Only) 02/07/17 1149   Head of Bed (HOB)  HOB at 20-30 " degrees 02/07/17 1148    Pain Location  Head 02/07/17 1149   Positioning/Transfer Devices  pillows 02/07/17 0413    Pain Orientation  Posterior 02/07/17 1149   DAILY CARE      Pain Descriptors  Aching 02/07/17 1149   Activity Type  activity adjusted per tolerance;ambulated to bathroom;ambulated in room;ambulated in jarvis;activity minimized;up in chair 02/07/17 1148    Pain Intervention(s)  Medication (See eMAR) 02/07/17 1149   Activity Level of Assistance  assistance, 1 person 02/07/17 1148    CLINICALLY ALIGNED PAIN ASSESSMENT (CAPA) ([facility], [facility] AND [facility] ADULTS ONLY)     ECG      Comfort  negligible pain 02/06/17 1000   ECG Rhythm  Sinus rhythm;Sinus tachycardia 02/04/17 1300    Change in Pain  about the same 02/04/17 0302   Ectopy  None 02/04/17 1300    Pain Control  partially effective 02/04/17 0302   Lead Monitored  Lead II;V 1 02/04/17 1300    Functioning  can do most things, but pain gets in the way of some 02/04/17 0302   Equipment  electrodes changed 02/04/17 0930    Sleep  awake with occasional pain 02/04/17 0302   POINT OF CARE TESTING      ANALGESIA SIDE EFFECTS MONITORING     Puncture Site  fingertip 02/04/17 0409    Side Effects Monitoring: Respiratory Quality  R 02/07/17 1149   Bedside Glucose (mg/dl )   258 mg/dl 02/04/17 0409    Side Effects Monitoring: Respiratory Depth  N 02/07/17 1149   DRUG CALCULATION WEIGHT      Side Effects Monitoring: Sedation Level  1 02/07/17 1149   Drug Calculation Weight  55.7 kg (122 lb 12.7 oz) 02/04/17 0302    HEIGHT AND WEIGHT                   Patient Lines/Drains/Airways Status    Active LINES/DRAINS/AIRWAYS     Name: Placement date: Placement time: Site: Days: Last dressing change:    Peripheral IV 02/03/17 Left Lower forearm 02/03/17    Lower forearm  4     Peripheral IV 02/04/17 Left 02/04/17      3     Wound 02/04/17 Right Head Laceration bruised and dried blood on R side of head 02/04/17  0400  Head  3     Wound 02/04/17 Bilateral Coccyx Suspected  pressure ulcer red blachable skin on coccyx 02/04/17  0400  Coccyx  3             Patient Lines/Drains/Airways Status    Active PICC/CVC     **None**            Intake/Output Detail Report     Date Intake         Output Net    Shift P.O. I.V. IV Piggyback Platelets Blood Components Total Urine Total       Celia 02/06/17 0700 - 02/06/17 1459 250 -- -- -- -- 250 -- -- 250    Noc 02/06/17 1500 - 02/06/17 2359 240 -- -- -- -- 240 800 800 -560    Day 02/07/17 0000 - 02/07/17 0659 -- -- -- -- -- -- -- -- 0    Celia 02/07/17 0700 - 02/07/17 1459 400 -- -- -- -- 400 -- -- 400    Noc 02/07/17 1500 - 02/07/17 2359 -- -- -- -- -- -- -- -- 0      Last Void/BM       Most Recent Value    Urine Occurrence 1 at 02/07/2017 1000    Stool Occurrence 1 at 02/07/2017 1000      Case Management/Discharge Planning     Case Management/Discharge Planning Flowsheet     REFERRAL INFORMATION     Expected Discharge Date  02/07/17 02/07/17 0510    Admission Type  inpatient 02/06/17 1501   FINAL RESOURCES      Arrived From  home 03/03/16 1142   Equipment Currently Used at Home  -- (4WW) 02/04/17 1147    # of Referrals Placed by CTS  Post Acute Facilities 03/03/16 1142   MH/BH CAREGIVER      Primary Care Clinic Name  -- ([facility]) 03/03/16 1142   Filed Complexity Screen Score  9 02/06/17 1501    Primary Care MD Name  -- (Susan) 03/03/16 1142   ABUSE RISK SCREEN      LIVING ENVIRONMENT     QUESTION TO PATIENT:  Has a member of your family or a partner(now or in the past) intimidated, hurt, manipulated, or controlled you in any way?  no 02/03/17 2044    Lives With  alone (nursing home) 02/05/17 0911   QUESTION TO PATIENT: Do you feel safe going back to the place where you are living?  yes 02/03/17 2044    Living Arrangements  apartment 02/05/17 0911   OBSERVATION: Is there reason to believe there has been maltreatment of a vulnerable adult (ie. Physical/Sexual/Emotional abuse, self neglect, lack of adequate food, shelter, medical care, or financial exploitation)?   no 02/03/17 2044    COPING/STRESS     (R) MENTAL HEALTH SUICIDE RISK      Major Change/Loss/Stressor  none 02/04/17 0314   Are you depressed or being treated for depression?  No 02/04/17 0314    EXPECTED DISCHARGE

## 2017-02-03 NOTE — LETTER
"Transition Communication Hand-off for Care Transitions to Next Level of Care Provider    Name: Anya Gaines  MRN #: [id number]  Primary Care Provider: Carlene Serrano     Primary Clinic: [facility] [address] [address]     Reason for Hospitalization:  Subdural hemorrhage (H) [I62.00]  Admit Date/Time: 2/3/2017  8:44 PM  Discharge Date:   17  Payor Source: Payor: MEDICA / Plan: MEDICA DUAL SOLUTIONS [employer] / Product Type: HMO /              Reason for Communication Hand-off Referral:     Social Work: Assessment with Discharge Plan    Patient Name:  Anya Gaiens  :  1920  Age:  89 year old  MRN:  [id number]  Risk/Complexity Score:  Filed Complexity Screen Score: 9  Completed assessment with:  Son (Get)    Presenting Information   Reason for Referral:  Assessment and Discharge Planning  Date of Intake:  2017  Referral Source:  Case Finding  Decision Maker:  Pt  Alternate Decision Maker:  Daughter, Kimi  Health Care Directive:  Son, Get, is unsure of whether or not pt has a HCD.  Living Situation: Pt lives alone in an assistive living apt at \"[facility] in [place].  Pt's apt is on the 3rd floor. The building is accessible by elevator. There are no steps on the outside of the building to enter.  Pt's bathroom is equipped with a walk in shower.    Previous Functional Status:   [facility] provides pt with a lifelines like necklace, 2 meals per day (pt chooses to make her own breakfast), shower assistance, medication administration and weekly assistance with housekeeping and laundry.  Prior to hospitalization, pt was indep with all mobility (pt used a ww, pt has had an estimated 4 falls in the past year), adl's (with the exception of bathing) and with breakfast meal preparation. Pt's children complete her shopping tasks.  Pt's children: Will Kimi and Get assist pt with financial mgnt.      Patient and family understanding of hospitalization: fall " with hematoma  Cultural/Language/Spiritual Considerations: [place]  Adjustment to Illness:  Son is hopeful that pt will return to previous level of independence.    Physical Health  Reason for Admission:    1.  Subdural hemorrhage (H)       Services Needed/Recommended:  TCU    Mental Health/Chemical Dependency  Diagnosis:  Not applicable  Support/Services in Place:  N/A  Services Needed/Recommended:  N/A    Support System  Significant relationship at present time:  Adult children:  1.  Get ([place])  2.  Kimi ([place])  3.  Berny ([place])  4.  Gino ([place])  5.  Devon ([place])  6.  Jennifer ([place])  7.  Moira ([place])  8.  Gm ([place])  Family of origin is available for support:  Adult children are available for support  Other support available:  Patricia OCAMPO and [facility] CC  Gaps in support system:  None identified  Patient is caregiver to:  None     Provider Information    Primary Care Physician:  Carlene Serrano   [phone]   Clinic:   [facility] [address] / [place] [facility]       :  Arely Duckworth ([phone]), [facility] Care Coordinator.   spoke with Arely and informed her of the discharge plan.    Financial   Income Source:  Social Security  Financial Concerns:  Limited income.  Per Get, he as well as siblings Kimi and Berny, provide money to pt.  Insurance:    Payor/Plan  Subscriber Name  Rel  Member #  Group #    MEDICA - MEDICA DUAL *  TAMAR ELLINGTON    [id number]  [id number]        [id number]        Discharge Plan   Patient and family discharge goal:  Rehab placement at [facility] (pt has been at [facility] in the past) followed by return to home.  Provided education on discharge plan:  YES  Patient agreeable to discharge plan:  YES  A list of Medicare Certified Facilities was provided to the patient and/or family to encourage patient choice. Patient's choices for facility are:  No as [facility] was  requested.  TAYLOR phoned [facility] ([phone]) and spoke with Arely in Admissions.  Per Arely, they have openings.  Referred pt and faxed assessment materials.  Arely assessed and approved pt for admit.     Will NH provide Skilled rehabilitation or complex medical:  YES  General information regarding anticipated insurance coverage and possible out of pocket cost was discussed. Patient and patient's family are aware patient may incur the cost of transportation to the facility, pending insurance payment: YES  Barriers to discharge:  None identified    Discharge Recommendations   Anticipated Disposition:  Pt will discharge to [facility] ([phone]) on 2/7/17 at 4pm.  [facility] ([facility] [phone]) will provide w/c transport at 4pm.  Pt has Medical Assistance, number [id number].   Pt, son, 6A nursing and MD have been informed of the discharge arrangements.   TAYLOR completed a PAS referral, reference number [id number].  TAYLOR will fax orders and summary to the receiving facility when they become available.    FATOUMATA Monge  Social Work, 6A  Phone:  [phone]  Pager:  [phone]  2/7/2017

## 2017-02-03 NOTE — IP AVS SNAPSHOT
MRN:[id number]                      After Visit Summary   2/3/2017    Anya Gaines    MRN: [id number]           Thank you!     Thank you for choosing [facility] for your care. Our goal is always to provide you with excellent care. Hearing back from our patients is one way we can continue to improve our services. Please take a few minutes to complete the written survey that you may receive in the mail after you visit with us. Thank you!        Patient Information     Date Of Birth          7/4/1920        About your hospital stay     You were admitted on:  February 4, 2017 You last received care in the:  [address] [facility]    You were discharged on:  February 7, 2017        Reason for your hospital stay       You fell which caused a bleed around the surface of your brain.                  Who to Call     For medical emergencies, please call 911.  For non-urgent questions about your medical care, please call your primary care provider or clinic, [phone]          Attending Provider     Provider    Hermelindo Rivero MD Harmon, James Vail, MD       Primary Care Provider Office Phone #    Carlene SerranoTONYA [phone]        [facility] [address]  [facility] [address]        After Care Instructions     Activity - Up with assistive device           Advance Diet as Tolerated       Follow this diet upon discharge: Orders Placed This Encounter  Regular Diet Adult            Fall precautions           General info for SNF       Length of Stay Estimate: Long Term Care  Condition at Discharge: Improving  Level of care:skilled   Rehabilitation Potential: Good  Admission H&P remains valid and up-to-date: Yes  Recent Chemotherapy: N/A  Use Nursing Home Standing Orders: Yes            Glucose monitor nursing POCT       Before meals and at bedtime            Mantoux instructions       Give two-step Mantoux (PPD) Per Facility Policy Yes                  Follow-up Appointments      Follow Up and recommended labs and tests       Follow up with primary care provider, Carlene Serrano, within 7 days for hospital follow- up.  The following labs/tests are recommended: Head CT without contrast in 1 month (~3/6).  Stop plavix until then.                  Additional Services     MED THERAPY MANAGE REFERRAL       Patient has diagnosis of Diabetes, Heart Failure, COPD, or AMI and is on more than 5 medications            NEUROLOGY ADULT REFERRAL       Please schedule follow-up with Dr. Katerine King in 1 month.    Reason for Referral: Consult    Please be aware that coverage of these services is subject to the terms and limitations of your health insurance plan.  Call member services at your health plan with any benefit or coverage questions.      Please bring the following with you to your appointment:    (1) Any X-Rays, CTs or MRIs which have been performed.  Contact the facility where they were done to arrange for  prior to your scheduled appointment.    (2) List of current medications  (3) This referral request   (4) Any documents/labs given to you for this referral            Occupational Therapy Adult Consult       Evaluate and treat as clinically indicated.    Reason:  Subdural Hemorrhage            Physical Therapy Adult Consult       Evaluate and treat as clinically indicated.    Reason:  Subdural Hemorrhage            Speech Language Path Adult Consult       Evaluate and treat as clinically indicated.    Reason:  Intermittent aphasia 2/2 subdural hemorrhage                  Pending Results     No orders found from 2/2/2017 to 2/4/2017.            Statement of Approval     Ordered          02/07/17 1506  I have reviewed and agree with all the recommendations and orders detailed in this document.   EFFECTIVE NOW     Approved and electronically signed by:  Leonel Odell MD             Admission Information        Provider Department Dept Phone    2/3/2017 eLonel Odell MD [facility]  "[phone]      Your Vitals Were     Blood Pressure Pulse Temperature    101/50 mmHg 88 97.2  F (36.2  C) (Oral)    Respirations Height Weight    16 1.499 m (4' 11\") 55.7 kg (122 lb 12.7 oz)    BMI (Body Mass Index) Pulse Oximetry       24.79 kg/m2 95%       MyChart Information     [employer] lets you send messages to your doctor, view your test results, renew your prescriptions, schedule appointments and more. To sign up, go to [email or web address] . Click on \"Log in\" on the left side of the screen, which will take you to the Welcome page. Then click on \"Sign up Now\" on the right side of the page.     You will be asked to enter the access code listed below, as well as some personal information. Please follow the directions to create your username and password.     Your access code is: [email or web address]  Expires: 2017  5:17 PM     Your access code will  in 90 days. If you need help or a new code, please call your [facility] clinic or [phone].        Care EveryWhere ID     This is your Care EveryWhere ID. This could be used by other organizations to access your [facility] medical records  [id number]           Review of your medicines      CONTINUE these medicines which may have CHANGED, or have new prescriptions. If we are uncertain of the size of tablets/capsules you have at home, strength may be listed as something that might have changed.        Dose / Directions    acetaminophen 325 MG tablet   Commonly known as:  TYLENOL   This may have changed:    - medication strength  - how much to take  - when to take this  - reasons to take this   Used for:  Subdural hemorrhage (H), Fall at home, initial encounter        Dose:  1000 mg   Take 3 tablets (975 mg) by mouth every 6 hours as needed   Quantity:  100 tablet   Refills:  0       IMODIUM A-D 2 MG tablet   Indication:  Diarrhea   This may have changed:  Another medication with the same name was removed. Continue taking this medication, and follow the " directions you see here.   Generic drug:  loperamide        Dose:  2 mg   Take 2 mg by mouth 4 times daily as needed for diarrhea   Refills:  0       Menthol (Topical Analgesic) 4 % Gel   Commonly known as:  BIOFREEZE   This may have changed:  additional instructions   Used for:  Subdural hemorrhage (H)        Externally apply topically 3 times daily as needed For muscle aches   Refills:  0         CONTINUE these medicines which have NOT CHANGED        Dose / Directions    amLODIPine 2.5 MG tablet   Commonly known as:  NORVASC   Used for:  Benign essential hypertension        Dose:  2.5 mg   Take 1 tablet (2.5 mg) by mouth daily   Quantity:  30 tablet   Refills:  0       atorvastatin 10 MG tablet   Commonly known as:  LIPITOR   Used for:  Mixed hyperlipidemia        Dose:  10 mg   Take 1 tablet (10 mg) by mouth daily   Quantity:  30 tablet   Refills:  1       calcium carbonate 600 MG tablet   Commonly known as:  OS- mg [place]. Ca   Indication:  Osteoporosis   Used for:  Osteoporosis        Dose:  1 tablet   Take 1 tablet (600 mg) by mouth daily   Quantity:  60 tablet   Refills:  5       gabapentin 100 MG capsule   Commonly known as:  NEURONTIN   Used for:  Abnormal involuntary movement        Dose:  400 mg   Take 4 capsules (400 mg) by mouth 3 times daily   Quantity:  90 capsule   Refills:  5       hydrochlorothiazide 25 MG tablet   Commonly known as:  HYDRODIURIL   Used for:  Benign essential hypertension        Dose:  25 mg   Take 1 tablet (25 mg) by mouth daily   Quantity:  90 tablet   Refills:  3       hypromellose-dextran 0.3-0.1% opthalmic solution   Used for:  Subdural hemorrhage (H)        Dose:  1 drop   Place 1 drop into both eyes 3 times daily   Quantity:  15 mL   Refills:  0       lisinopril 40 MG tablet   Commonly known as:  PRINIVIL/ZESTRIL   Used for:  Essential hypertension with goal blood pressure less than 140/90        Dose:  40 mg   Take 1 tablet (40 mg) by mouth daily   Quantity:  30 tablet    Refills:  5       metFORMIN 500 MG 24 hr tablet   Commonly known as:  GLUCOPHAGE-XR   Indication:  Type 2 Diabetes   Used for:  Type 2 diabetes mellitus without complication, without long-term current use of insulin (H)        Dose:  2000 mg   Take 4 tablets (2,000 mg) by mouth daily (with dinner)   Quantity:  30 tablet   Refills:  0       multivitamin  with lutein Caps per capsule   Used for:  Glaucoma of both eyes, unspecified glaucoma        Dose:  1 capsule   Take 1 capsule by mouth daily   Quantity:  30 capsule   Refills:  5         STOP taking     clopidogrel 75 MG tablet   Commonly known as:  PLAVIX                Where to get your medicines      Some of these will need a paper prescription and others can be bought over the counter. Ask your nurse if you have questions.     You don't need a prescription for these medications    - acetaminophen 325 MG tablet  - amLODIPine 2.5 MG tablet  - atorvastatin 10 MG tablet  - calcium carbonate 600 MG tablet  - gabapentin 100 MG capsule  - hydrochlorothiazide 25 MG tablet  - hypromellose-dextran 0.3-0.1% opthalmic solution  - lisinopril 40 MG tablet  - Menthol (Topical Analgesic) 4 % Gel  - metFORMIN 500 MG 24 hr tablet  - multivitamin  with lutein Caps per capsule             Protect others around you: Learn how to safely use, store and throw away your medicines at www.disposemymeds.org.             Medication List: This is a list of all your medications and when to take them. Check marks below indicate your daily home schedule. Keep this list as a reference.      Medications           Morning Afternoon Evening Bedtime As Needed    acetaminophen 325 MG tablet   Commonly known as:  TYLENOL   Take 3 tablets (975 mg) by mouth every 6 hours as needed   Last time this was given:  1,000 mg on 2/7/2017  1:48 PM                                amLODIPine 2.5 MG tablet   Commonly known as:  NORVASC   Take 1 tablet (2.5 mg) by mouth daily   Last time this was given:  2.5 mg on  2/7/2017  9:09 AM                                atorvastatin 10 MG tablet   Commonly known as:  LIPITOR   Take 1 tablet (10 mg) by mouth daily   Last time this was given:  10 mg on 2/7/2017  9:09 AM                                calcium carbonate 600 MG tablet   Commonly known as:  OS- mg [place]. Ca   Take 1 tablet (600 mg) by mouth daily                                gabapentin 100 MG capsule   Commonly known as:  NEURONTIN   Take 4 capsules (400 mg) by mouth 3 times daily   Last time this was given:  400 mg on 2/4/2017  7:33 PM                                hydrochlorothiazide 25 MG tablet   Commonly known as:  HYDRODIURIL   Take 1 tablet (25 mg) by mouth daily   Last time this was given:  25 mg on 2/7/2017  9:09 AM                                hypromellose-dextran 0.3-0.1% opthalmic solution   Place 1 drop into both eyes 3 times daily   Last time this was given:  1 drop on 2/7/2017  1:48 PM                                IMODIUM A-D 2 MG tablet   Take 2 mg by mouth 4 times daily as needed for diarrhea   Generic drug:  loperamide                                lisinopril 40 MG tablet   Commonly known as:  PRINIVIL/ZESTRIL   Take 1 tablet (40 mg) by mouth daily   Last time this was given:  20 mg on 2/7/2017  9:09 AM                                Menthol (Topical Analgesic) 4 % Gel   Commonly known as:  BIOFREEZE   Externally apply topically 3 times daily as needed For muscle aches                                metFORMIN 500 MG 24 hr tablet   Commonly known as:  GLUCOPHAGE-XR   Take 4 tablets (2,000 mg) by mouth daily (with dinner)   Last time this was given:  2,000 mg on 2/4/2017  4:59 PM                                multivitamin  with lutein Caps per capsule   Take 1 capsule by mouth daily

## 2017-02-04 ENCOUNTER — APPOINTMENT (OUTPATIENT)
Dept: PHYSICAL THERAPY | Facility: CLINIC | Age: 82
DRG: 084 | End: 2017-02-04
Attending: SURGERY
Payer: COMMERCIAL

## 2017-02-04 ENCOUNTER — APPOINTMENT (OUTPATIENT)
Dept: CT IMAGING | Facility: CLINIC | Age: 82
DRG: 084 | End: 2017-02-04
Attending: SURGERY
Payer: COMMERCIAL

## 2017-02-04 LAB
ALBUMIN UR-MCNC: NEGATIVE MG/DL
ALBUMIN UR-MCNC: NEGATIVE MG/DL
ANION GAP SERPL CALCULATED.3IONS-SCNC: 13 MMOL/L (ref 3–14)
APPEARANCE UR: ABNORMAL
APPEARANCE UR: CLEAR
BILIRUB UR QL STRIP: NEGATIVE
BILIRUB UR QL STRIP: NEGATIVE
BLD PROD TYP BPU: NORMAL
BLD UNIT ID BPU: 0
BLOOD PRODUCT CODE: NORMAL
BPU ID: NORMAL
BUN SERPL-MCNC: 18 MG/DL (ref 7–30)
CALCIUM SERPL-MCNC: 8.6 MG/DL (ref 8.5–10.1)
CHLORIDE SERPL-SCNC: 100 MMOL/L (ref 94–109)
CO2 SERPL-SCNC: 24 MMOL/L (ref 20–32)
COLOR UR AUTO: ABNORMAL
COLOR UR AUTO: ABNORMAL
CREAT SERPL-MCNC: 0.44 MG/DL (ref 0.52–1.04)
ERYTHROCYTE [DISTWIDTH] IN BLOOD BY AUTOMATED COUNT: 13.3 % (ref 10–15)
GFR SERPL CREATININE-BSD FRML MDRD: ABNORMAL ML/MIN/1.7M2
GLUCOSE BLDC GLUCOMTR-MCNC: 116 MG/DL (ref 70–99)
GLUCOSE BLDC GLUCOMTR-MCNC: 137 MG/DL (ref 70–99)
GLUCOSE BLDC GLUCOMTR-MCNC: 157 MG/DL (ref 70–99)
GLUCOSE BLDC GLUCOMTR-MCNC: 165 MG/DL (ref 70–99)
GLUCOSE BLDC GLUCOMTR-MCNC: 178 MG/DL (ref 70–99)
GLUCOSE BLDC GLUCOMTR-MCNC: 259 MG/DL (ref 70–99)
GLUCOSE BLDC GLUCOMTR-MCNC: 261 MG/DL (ref 70–99)
GLUCOSE SERPL-MCNC: 252 MG/DL (ref 70–99)
GLUCOSE UR STRIP-MCNC: NEGATIVE MG/DL
GLUCOSE UR STRIP-MCNC: NEGATIVE MG/DL
HBA1C MFR BLD: 7.3 % (ref 4.3–6)
HCT VFR BLD AUTO: 34.4 % (ref 35–47)
HGB BLD-MCNC: 11.2 G/DL (ref 11.7–15.7)
HGB UR QL STRIP: NEGATIVE
HGB UR QL STRIP: NEGATIVE
KETONES UR STRIP-MCNC: 10 MG/DL
KETONES UR STRIP-MCNC: NEGATIVE MG/DL
LEUKOCYTE ESTERASE UR QL STRIP: ABNORMAL
LEUKOCYTE ESTERASE UR QL STRIP: NEGATIVE
MAGNESIUM SERPL-MCNC: 1.6 MG/DL (ref 1.6–2.3)
MCH RBC QN AUTO: 28.7 PG (ref 26.5–33)
MCHC RBC AUTO-ENTMCNC: 32.6 G/DL (ref 31.5–36.5)
MCV RBC AUTO: 88 FL (ref 78–100)
MRSA DNA SPEC QL NAA+PROBE: NORMAL
MUCOUS THREADS #/AREA URNS LPF: PRESENT /LPF
MUCOUS THREADS #/AREA URNS LPF: PRESENT /LPF
NITRATE UR QL: NEGATIVE
NITRATE UR QL: NEGATIVE
PH UR STRIP: 5.5 PH (ref 5–7)
PH UR STRIP: 7 PH (ref 5–7)
PHOSPHATE SERPL-MCNC: 2.9 MG/DL (ref 2.5–4.5)
PLATELET # BLD AUTO: 200 10E9/L (ref 150–450)
POTASSIUM SERPL-SCNC: 3.4 MMOL/L (ref 3.4–5.3)
RADIOLOGIST FLAGS: ABNORMAL
RBC # BLD AUTO: 3.9 10E12/L (ref 3.8–5.2)
RBC #/AREA URNS AUTO: 1 /HPF (ref 0–2)
RBC #/AREA URNS AUTO: 1 /HPF (ref 0–2)
SODIUM SERPL-SCNC: 137 MMOL/L (ref 133–144)
SP GR UR STRIP: 1.01 (ref 1–1.03)
SP GR UR STRIP: 1.01 (ref 1–1.03)
SPECIMEN SOURCE: NORMAL
TRANS CELLS #/AREA URNS HPF: <1 /HPF (ref 0–1)
TRANSFUSION STATUS PATIENT QL: NORMAL
TRANSFUSION STATUS PATIENT QL: NORMAL
URN SPEC COLLECT METH UR: ABNORMAL
URN SPEC COLLECT METH UR: ABNORMAL
UROBILINOGEN UR STRIP-MCNC: NORMAL MG/DL (ref 0–2)
UROBILINOGEN UR STRIP-MCNC: NORMAL MG/DL (ref 0–2)
WBC # BLD AUTO: 9.1 10E9/L (ref 4–11)
WBC #/AREA URNS AUTO: 3 /HPF (ref 0–2)
WBC #/AREA URNS AUTO: <1 /HPF (ref 0–2)

## 2017-02-04 PROCEDURE — 25800025 ZZH RX 258

## 2017-02-04 PROCEDURE — 87640 STAPH A DNA AMP PROBE: CPT | Performed by: SURGERY

## 2017-02-04 PROCEDURE — 25000128 H RX IP 250 OP 636: Performed by: SURGERY

## 2017-02-04 PROCEDURE — 70450 CT HEAD/BRAIN W/O DYE: CPT

## 2017-02-04 PROCEDURE — 87086 URINE CULTURE/COLONY COUNT: CPT | Performed by: SURGERY

## 2017-02-04 PROCEDURE — 81001 URINALYSIS AUTO W/SCOPE: CPT | Performed by: NURSE PRACTITIONER

## 2017-02-04 PROCEDURE — 00000146 ZZHCL STATISTIC GLUCOSE BY METER IP

## 2017-02-04 PROCEDURE — 97530 THERAPEUTIC ACTIVITIES: CPT | Mod: GP | Performed by: PHYSICAL THERAPIST

## 2017-02-04 PROCEDURE — 25800025 ZZH RX 258: Performed by: NURSE PRACTITIONER

## 2017-02-04 PROCEDURE — 25000132 ZZH RX MED GY IP 250 OP 250 PS 637: Performed by: SURGERY

## 2017-02-04 PROCEDURE — 97162 PT EVAL MOD COMPLEX 30 MIN: CPT | Mod: GP | Performed by: PHYSICAL THERAPIST

## 2017-02-04 PROCEDURE — 83735 ASSAY OF MAGNESIUM: CPT | Performed by: SURGERY

## 2017-02-04 PROCEDURE — 40000193 ZZH STATISTIC PT WARD VISIT: Performed by: PHYSICAL THERAPIST

## 2017-02-04 PROCEDURE — 25000131 ZZH RX MED GY IP 250 OP 636 PS 637: Performed by: SURGERY

## 2017-02-04 PROCEDURE — 12000008 ZZH R&B INTERMEDIATE UMMC

## 2017-02-04 PROCEDURE — 25000125 ZZHC RX 250: Performed by: SURGERY

## 2017-02-04 PROCEDURE — 85027 COMPLETE CBC AUTOMATED: CPT | Performed by: SURGERY

## 2017-02-04 PROCEDURE — 36415 COLL VENOUS BLD VENIPUNCTURE: CPT | Performed by: SURGERY

## 2017-02-04 PROCEDURE — 25800025 ZZH RX 258: Performed by: SURGERY

## 2017-02-04 PROCEDURE — 84100 ASSAY OF PHOSPHORUS: CPT | Performed by: SURGERY

## 2017-02-04 PROCEDURE — 83036 HEMOGLOBIN GLYCOSYLATED A1C: CPT | Performed by: SURGERY

## 2017-02-04 PROCEDURE — 97110 THERAPEUTIC EXERCISES: CPT | Mod: GP | Performed by: PHYSICAL THERAPIST

## 2017-02-04 PROCEDURE — 93005 ELECTROCARDIOGRAM TRACING: CPT

## 2017-02-04 PROCEDURE — 87641 MR-STAPH DNA AMP PROBE: CPT | Performed by: SURGERY

## 2017-02-04 PROCEDURE — 80048 BASIC METABOLIC PNL TOTAL CA: CPT | Performed by: SURGERY

## 2017-02-04 RX ORDER — LABETALOL HYDROCHLORIDE 5 MG/ML
10-20 INJECTION, SOLUTION INTRAVENOUS EVERY 4 HOURS PRN
Status: DISCONTINUED | OUTPATIENT
Start: 2017-02-04 | End: 2017-02-07 | Stop reason: HOSPADM

## 2017-02-04 RX ORDER — SODIUM CHLORIDE, SODIUM LACTATE, POTASSIUM CHLORIDE, CALCIUM CHLORIDE 600; 310; 30; 20 MG/100ML; MG/100ML; MG/100ML; MG/100ML
INJECTION, SOLUTION INTRAVENOUS CONTINUOUS
Status: DISCONTINUED | OUTPATIENT
Start: 2017-02-04 | End: 2017-02-04

## 2017-02-04 RX ORDER — AMLODIPINE BESYLATE 2.5 MG/1
2.5 TABLET ORAL DAILY
Status: DISCONTINUED | OUTPATIENT
Start: 2017-02-04 | End: 2017-02-07 | Stop reason: HOSPADM

## 2017-02-04 RX ORDER — SODIUM CHLORIDE 9 MG/ML
INJECTION, SOLUTION INTRAVENOUS
Status: DISCONTINUED
Start: 2017-02-04 | End: 2017-02-04 | Stop reason: HOSPADM

## 2017-02-04 RX ORDER — NICOTINE POLACRILEX 4 MG
15-30 LOZENGE BUCCAL
Status: DISCONTINUED | OUTPATIENT
Start: 2017-02-04 | End: 2017-02-07 | Stop reason: HOSPADM

## 2017-02-04 RX ORDER — ACETAMINOPHEN 500 MG
1000 TABLET ORAL 3 TIMES DAILY
Status: DISCONTINUED | OUTPATIENT
Start: 2017-02-04 | End: 2017-02-07 | Stop reason: HOSPADM

## 2017-02-04 RX ORDER — POTASSIUM CHLORIDE 29.8 MG/ML
20 INJECTION INTRAVENOUS
Status: DISCONTINUED | OUTPATIENT
Start: 2017-02-04 | End: 2017-02-07 | Stop reason: HOSPADM

## 2017-02-04 RX ORDER — POTASSIUM CHLORIDE 1.5 G/1.58G
20-40 POWDER, FOR SOLUTION ORAL
Status: DISCONTINUED | OUTPATIENT
Start: 2017-02-04 | End: 2017-02-07 | Stop reason: HOSPADM

## 2017-02-04 RX ORDER — NALOXONE HYDROCHLORIDE 0.4 MG/ML
.1-.4 INJECTION, SOLUTION INTRAMUSCULAR; INTRAVENOUS; SUBCUTANEOUS
Status: DISCONTINUED | OUTPATIENT
Start: 2017-02-04 | End: 2017-02-07 | Stop reason: HOSPADM

## 2017-02-04 RX ORDER — POTASSIUM CHLORIDE 1500 MG/1
20-40 TABLET, EXTENDED RELEASE ORAL
Status: DISCONTINUED | OUTPATIENT
Start: 2017-02-04 | End: 2017-02-07 | Stop reason: HOSPADM

## 2017-02-04 RX ORDER — DEXTROSE MONOHYDRATE 25 G/50ML
25-50 INJECTION, SOLUTION INTRAVENOUS
Status: DISCONTINUED | OUTPATIENT
Start: 2017-02-04 | End: 2017-02-07 | Stop reason: HOSPADM

## 2017-02-04 RX ORDER — DEXTROSE MONOHYDRATE, SODIUM CHLORIDE, AND POTASSIUM CHLORIDE 50; 1.49; 4.5 G/1000ML; G/1000ML; G/1000ML
INJECTION, SOLUTION INTRAVENOUS CONTINUOUS
Status: DISCONTINUED | OUTPATIENT
Start: 2017-02-04 | End: 2017-02-04

## 2017-02-04 RX ORDER — POTASSIUM CHLORIDE 7.45 MG/ML
10 INJECTION INTRAVENOUS
Status: DISCONTINUED | OUTPATIENT
Start: 2017-02-04 | End: 2017-02-07 | Stop reason: HOSPADM

## 2017-02-04 RX ORDER — HYDRALAZINE HYDROCHLORIDE 20 MG/ML
10-20 INJECTION INTRAMUSCULAR; INTRAVENOUS EVERY 4 HOURS PRN
Status: DISCONTINUED | OUTPATIENT
Start: 2017-02-04 | End: 2017-02-07 | Stop reason: HOSPADM

## 2017-02-04 RX ORDER — SODIUM CHLORIDE, SODIUM LACTATE, POTASSIUM CHLORIDE, CALCIUM CHLORIDE 600; 310; 30; 20 MG/100ML; MG/100ML; MG/100ML; MG/100ML
INJECTION, SOLUTION INTRAVENOUS
Status: COMPLETED
Start: 2017-02-04 | End: 2017-02-04

## 2017-02-04 RX ORDER — ATORVASTATIN CALCIUM 10 MG/1
10 TABLET, FILM COATED ORAL DAILY
Status: DISCONTINUED | OUTPATIENT
Start: 2017-02-04 | End: 2017-02-07 | Stop reason: HOSPADM

## 2017-02-04 RX ORDER — GABAPENTIN 400 MG/1
400 CAPSULE ORAL 3 TIMES DAILY
Status: DISCONTINUED | OUTPATIENT
Start: 2017-02-04 | End: 2017-02-05

## 2017-02-04 RX ORDER — HYDROCHLOROTHIAZIDE 25 MG/1
25 TABLET ORAL DAILY
Status: DISCONTINUED | OUTPATIENT
Start: 2017-02-04 | End: 2017-02-07 | Stop reason: HOSPADM

## 2017-02-04 RX ORDER — SODIUM CHLORIDE, SODIUM LACTATE, POTASSIUM CHLORIDE, CALCIUM CHLORIDE 600; 310; 30; 20 MG/100ML; MG/100ML; MG/100ML; MG/100ML
INJECTION, SOLUTION INTRAVENOUS CONTINUOUS
Status: DISCONTINUED | OUTPATIENT
Start: 2017-02-04 | End: 2017-02-05

## 2017-02-04 RX ORDER — MAGNESIUM SULFATE HEPTAHYDRATE 40 MG/ML
4 INJECTION, SOLUTION INTRAVENOUS EVERY 4 HOURS PRN
Status: DISCONTINUED | OUTPATIENT
Start: 2017-02-04 | End: 2017-02-07 | Stop reason: HOSPADM

## 2017-02-04 RX ORDER — POLYETHYLENE GLYCOL 3350 17 G/17G
17 POWDER, FOR SOLUTION ORAL DAILY PRN
Status: DISCONTINUED | OUTPATIENT
Start: 2017-02-04 | End: 2017-02-07 | Stop reason: HOSPADM

## 2017-02-04 RX ORDER — ONDANSETRON 4 MG/1
4 TABLET, ORALLY DISINTEGRATING ORAL EVERY 6 HOURS PRN
Status: DISCONTINUED | OUTPATIENT
Start: 2017-02-04 | End: 2017-02-07 | Stop reason: HOSPADM

## 2017-02-04 RX ORDER — ONDANSETRON 2 MG/ML
4 INJECTION INTRAMUSCULAR; INTRAVENOUS EVERY 6 HOURS PRN
Status: DISCONTINUED | OUTPATIENT
Start: 2017-02-04 | End: 2017-02-07 | Stop reason: HOSPADM

## 2017-02-04 RX ORDER — LIDOCAINE 40 MG/G
CREAM TOPICAL
Status: DISCONTINUED | OUTPATIENT
Start: 2017-02-04 | End: 2017-02-07 | Stop reason: HOSPADM

## 2017-02-04 RX ADMIN — AMLODIPINE BESYLATE 2.5 MG: 2.5 TABLET ORAL at 08:51

## 2017-02-04 RX ADMIN — DEXTRAN 70 AND HYPROMELLOSE 2910 1 DROP: 1; 3 SOLUTION/ DROPS OPHTHALMIC at 14:20

## 2017-02-04 RX ADMIN — DEXTRAN 70 AND HYPROMELLOSE 2910 1 DROP: 1; 3 SOLUTION/ DROPS OPHTHALMIC at 09:01

## 2017-02-04 RX ADMIN — ACETAMINOPHEN 1000 MG: 500 TABLET, FILM COATED ORAL at 14:19

## 2017-02-04 RX ADMIN — ACETAMINOPHEN 1000 MG: 500 TABLET, FILM COATED ORAL at 08:51

## 2017-02-04 RX ADMIN — ACETAMINOPHEN 1000 MG: 500 TABLET, FILM COATED ORAL at 19:33

## 2017-02-04 RX ADMIN — DEXTRAN 70 AND HYPROMELLOSE 2910 1 DROP: 1; 3 SOLUTION/ DROPS OPHTHALMIC at 19:33

## 2017-02-04 RX ADMIN — SODIUM CHLORIDE, POTASSIUM CHLORIDE, SODIUM LACTATE AND CALCIUM CHLORIDE 1000 ML: 600; 310; 30; 20 INJECTION, SOLUTION INTRAVENOUS at 05:21

## 2017-02-04 RX ADMIN — SODIUM CHLORIDE, POTASSIUM CHLORIDE, SODIUM LACTATE AND CALCIUM CHLORIDE: 600; 310; 30; 20 INJECTION, SOLUTION INTRAVENOUS at 16:30

## 2017-02-04 RX ADMIN — GABAPENTIN 400 MG: 400 CAPSULE ORAL at 19:33

## 2017-02-04 RX ADMIN — ACETAMINOPHEN 1000 MG: 500 TABLET, FILM COATED ORAL at 01:03

## 2017-02-04 RX ADMIN — INSULIN ASPART 3 UNITS: 100 INJECTION, SOLUTION INTRAVENOUS; SUBCUTANEOUS at 01:04

## 2017-02-04 RX ADMIN — Medication 2 G: at 05:39

## 2017-02-04 RX ADMIN — METFORMIN HYDROCHLORIDE 2000 MG: 750 TABLET, EXTENDED RELEASE ORAL at 16:59

## 2017-02-04 RX ADMIN — POTASSIUM CHLORIDE 20 MEQ: 1.5 POWDER, FOR SOLUTION ORAL at 08:51

## 2017-02-04 RX ADMIN — NICARDIPINE HYDROCHLORIDE 2.5 MG/HR: 2.5 INJECTION INTRAVENOUS at 01:28

## 2017-02-04 RX ADMIN — GABAPENTIN 400 MG: 400 CAPSULE ORAL at 09:01

## 2017-02-04 RX ADMIN — ATORVASTATIN CALCIUM 10 MG: 10 TABLET, FILM COATED ORAL at 08:51

## 2017-02-04 RX ADMIN — HYDROCHLOROTHIAZIDE 25 MG: 25 TABLET ORAL at 08:51

## 2017-02-04 RX ADMIN — GABAPENTIN 400 MG: 400 CAPSULE ORAL at 14:19

## 2017-02-04 RX ADMIN — SODIUM CHLORIDE, SODIUM LACTATE, POTASSIUM CHLORIDE, CALCIUM CHLORIDE 1000 ML: 600; 310; 30; 20 INJECTION, SOLUTION INTRAVENOUS at 05:21

## 2017-02-04 RX ADMIN — INSULIN ASPART 3 UNITS: 100 INJECTION, SOLUTION INTRAVENOUS; SUBCUTANEOUS at 04:05

## 2017-02-04 RX ADMIN — HYDRALAZINE HYDROCHLORIDE 10 MG: 20 INJECTION INTRAMUSCULAR; INTRAVENOUS at 14:41

## 2017-02-04 RX ADMIN — POTASSIUM CHLORIDE, DEXTROSE MONOHYDRATE AND SODIUM CHLORIDE: 150; 5; 450 INJECTION, SOLUTION INTRAVENOUS at 00:54

## 2017-02-04 ASSESSMENT — VISUAL ACUITY
OU: BASELINE

## 2017-02-04 ASSESSMENT — ACTIVITIES OF DAILY LIVING (ADL)
SWALLOWING: 0-->SWALLOWS FOODS/LIQUIDS WITHOUT DIFFICULTY
AMBULATION: 1-->ASSISTIVE EQUIPMENT
NUMBER_OF_TIMES_PATIENT_HAS_FALLEN_WITHIN_LAST_SIX_MONTHS: 2
TOILETING: 1-->ASSISTIVE EQUIPMENT
RETIRED_COMMUNICATION: 0-->UNDERSTANDS/COMMUNICATES WITHOUT DIFFICULTY
DRESS: 0-->INDEPENDENT
TRANSFERRING: 1-->ASSISTIVE EQUIPMENT
BATHING: 1-->ASSISTIVE EQUIPMENT
RETIRED_EATING: 0-->INDEPENDENT
COGNITION: 0 - NO COGNITION ISSUES REPORTED
FALL_HISTORY_WITHIN_LAST_SIX_MONTHS: YES

## 2017-02-04 NOTE — ED PROVIDER NOTES
History     Chief Complaint   Patient presents with     Fall     HPI  Anya Gaines is a 89 year old female, with history of Parkinsonism, frequent falls, hypertension, and currently on Plavix who presents after a fall. This afternoon the patient suffered a mechanical fall and was subsequently brought to [facility] via EMS. The patient did not remember falling, but complained of a mild headache and left rib pain. However, patient denied chest pain, SOB, weakness, or sensory changes. At the time of initial evaluation at [facility], the patient was afebrile with stable vital signs. Blood pressure was initially elevated at 210/102, which was significantly increased from baseline. Patient was started on a Nipride drip secondary to fall, CNS bleed, and elevated BP. On initial exam at [facility], the patient was noted to be somewhat amnestic, but otherwise oriented. Patient was noted to have a posterior occipital laceration, which did not require suturing. EKG showed no acute abnormality and was unchanged from previous. Blood work was unremarkable with pending Troponins. The patient was subsequently transferred here to [facility] for evaluation by the trauma team. Of note, the patient typically takes Amlodipine, Lisinopril, and hydrochlorothiazide for blood pressure control..       No past medical history on file.    Past Surgical History   Procedure Laterality Date     Surgical history of -        Cataract, left eye       No family history on file.    Social History   Substance Use Topics     Smoking status: Never Smoker      Smokeless tobacco: Never Used     Alcohol Use: No       Current Facility-Administered Medications   Medication     insulin aspart (NovoLOG) inj (RAPID ACTING)     gabapentin (NEURONTIN) solution 400 mg     lisinopril (PRINIVIL/ZESTRIL) tablet 20 mg     senna-docusate (SENOKOT-S;PERICOLACE) 8.6-50 MG per tablet 1-2 tablet     insulin aspart (NovoLOG) inj (RAPID ACTING)      "acetaminophen (TYLENOL) tablet 1,000 mg     atorvastatin (LIPITOR) tablet 10 mg     hypromellose-dextran (ARTIFICAL TEARS) ophthalmic solution 1 drop     metFORMIN (GLUCOPHAGE-XR) 24 hr tablet 2,000 mg     naloxone (NARCAN) injection 0.1-0.4 mg     lidocaine 1 % 1 mL     lidocaine (LMX4) kit     sodium chloride (PF) 0.9% PF flush 3 mL     sodium chloride (PF) 0.9% PF flush 3 mL     potassium chloride SA (K-DUR/KLOR-CON M) CR tablet 20-40 mEq     potassium chloride (KLOR-CON) Packet 20-40 mEq     potassium chloride 10 mEq in 100 mL intermittent infusion     potassium chloride 10 mEq in 100 mL intermittent infusion with 10 mg lidocaine     potassium chloride 20 mEq in 50 mL intermittent infusion     magnesium sulfate 2 g in NS intermittent infusion (PharMEDium or FV Cmpd)     magnesium sulfate 4 g in 100 mL sterile water (premade)     ondansetron (ZOFRAN-ODT) ODT tab 4 mg    Or     ondansetron (ZOFRAN) injection 4 mg     polyethylene glycol (MIRALAX/GLYCOLAX) Packet 17 g     glucose 40 % gel 15-30 g    Or     dextrose 50 % injection 25-50 mL    Or     glucagon injection 1 mg     amLODIPine (NORVASC) tablet 2.5 mg     hydrochlorothiazide (HYDRODIURIL) tablet 25 mg     hydrALAZINE (APRESOLINE) injection 10-20 mg     labetalol (NORMODYNE/TRANDATE) injection 10-20 mg      No Known Allergies       I have reviewed the Medications, Allergies, Past Medical and Surgical History, and Social History in the Epic system.    Review of Systems   Respiratory: Negative for shortness of breath.    Cardiovascular: Negative for chest pain.   Musculoskeletal:        Positive for left rib pain.   Neurological: Positive for headaches. Negative for syncope and weakness.   All other systems reviewed and are negative.      Physical Exam   BP: (!) 168/105 mmHg  Pulse: 98  Height: 149.9 cm (4' 11\")  Weight: 57 kg (125 lb 10.6 oz)  SpO2: 92 %  Physical Exam   GEN: No acute distress. She is alert and able to discuss. She is oriented only to self. "   HEENT: Posterior occipital laceration. Pupils are equal round and reactive to light. Extraocular motions are intact. There is no scleral icterus. There is no facial swelling. The neck nontender and supple .   CV: Regular rate and rhythm without murmurs rubs or gallops. 2+ radial pulses bilaterally.  PULM: Clear to auscultation bilaterally.  ABD: Soft, nontender, nondistended. Normal bowel sounds.   EXT: Full range of motion.  No edema.  NEURO: Cranial nerves II through XII are intact and symmetric. Bilateral upper and lower extremities grossly show full range of motion without any focal deficits. Romberg drift in the LUE.   SKIN: No rashes, ecchymosis, or lacerations  PSYCH: Calm and cooperative, interactive.    ED Course     Procedures        Imaging reviewed from outside hospital          Labs Ordered and Resulted from Time of ED Arrival Up to the Time of Departure from the ED   CBC WITH PLATELETS DIFFERENTIAL - Abnormal; Notable for the following:     WBC 13.7 (*)     Absolute Neutrophil 12.0 (*)     All other components within normal limits   BASIC METABOLIC PANEL - Abnormal; Notable for the following:     Potassium 2.9 (*)     Glucose 220 (*)     Creatinine 0.46 (*)     All other components within normal limits   ROUTINE UA WITH MICROSCOPIC - Abnormal; Notable for the following:     Ketones Urine 10 (*)     Mucous Urine Present (*)     All other components within normal limits   GLUCOSE BY METER - Abnormal; Notable for the following:     Glucose 261 (*)     All other components within normal limits   GLUCOSE MONITOR NURSING POCT   GLUCOSE MONITOR NURSING POCT   GLUCOSE MONITOR NURSING POCT   GLUCOSE MONITOR NURSING POCT   GLUCOSE MONITOR NURSING POCT   CONTINUE INDWELLING URINARY CATHETER (BUTT)   VITAL SIGNS   JIMMIE COMA SCALE (GCS) ASSESSMENT   ACTIVITY   NONE OF THE ABOVE CORE MEASURE DIAGNOSES   PULSE OXIMETRY NURSING   CMS   INTAKE AND OUTPUT   INCENTIVE SPIROMETRY NURSING   PERIPHERAL IV CATHETER    BLADDER SCAN   APPLY PNEUMATIC COMPRESSION DEVICE (PCD)   PATIENT EDUCATION   ASSESS FOR HYPOGLYCEMIA SYMPTOMS   ABO/RH TYPE AND SCREEN   PLATELETS PREPARE ORDER UNIT   BLOOD COMPONENT       Assessments & Plan (with Medical Decision Making)   Anya Gaines is a 89 year old female status post head injury. She describes sustaining subdural hematoma transferred from [facility].    In the emergency department she is fairly well-appearing  But confused- Baseline? She is alert and conversant and answers questions appropriately. She has a mild left Romberg.    Trauma surgery and general surgery aware of transfer.    Regarding hemodynamics:  -Will DC nipride, start nicardipine and GTT at 2.5, titrate to SVP less than 140 greater than 120.    -Platelet transfusion- patient is on Plavix.    Labs and type and screen repeat sent.    We ll continue to carefully monitor.    Last CT had approximately 6:30 PM, repeated at approx 12am,  There is some mild expansion of the SDH which was discussed with trauma and Ns services.  No immediate indication for intervention but will need to be carefully followed.         This part of the medical record was transcribed by Catrachito Valerio Medical Scribe, from a dictation done by Hermelindo Rivero MD.       I have reviewed the nursing notes.    I have reviewed the findings, diagnosis, plan and need for follow up with the patient.    Current Discharge Medication List          Final diagnoses:   Subdural hemorrhage (H)       2/3/2017   [facility], EMERGENCY DEPARTMENT    Catrachito VALLE, am serving as a trained medical scribe to document services personally performed by Hermelindo Rivero MD, based on the provider's statements to me.   Josefa VALLE Joseph Igor, MD, was physically present and have reviewed and verified the accuracy of this note documented by Catrachito Valerio.     Hermelindo Rivero MD  02/07/17 3786

## 2017-02-04 NOTE — PROGRESS NOTES
[facility], [place]    Trauma Service Tertiary Survey     Date of Service: 02/04/2017    Trauma mechanism:  Fall from standing   Time/date of injury: 2/3/2017 afternoon   Known Injuries:  1. Subdural hematomas  2. Occipital laceration     Other diagnoses:    1. Parkinsons Disease with hyperkinesis   2. Altered mental status   3. TIA on Plavix  4. DM2  5. HTN  6. HLD  7. Frequent falls   8. Macular degeneration     Plan:  1. Tertiary exam attempted 2/4.  Unable to perform due to altered mental status.   2. Neurosurg Consult: subdural hematoma management per neurosurgery.  Repeat head CT at 0030 shows increased hematoma in the left frontal lobe, increased hematoma in left cerebellar tentorium, increased mass effect with partial effacement of left lateral ventricle and increased left to right midline shift.  Patient is markedly confused. Per family and chart review, patient is oriented at baseline.  -->Q1hr neuro checks, SBP<140, Plts above 100K, Normonatremia.   Patient on plavix for hx of TIAs> 2 units platelets transfused in ED    3. Parkinson's disease: hyperkinesis noted. Not on any anti-Parkinson's medications. Per chart review, patient was to start therapy for strengthening exercises for multiple recent falls>possibly follow up with neurology. Resume PTA gabapentin   4. HTN: Keep SBP<140.  Had been on Nicardipine drip at admission due to BPs 200s/100s; now normotensive off drip.   Resumed PTA amlodipine, HCTZ.  PTA lisinopril not resumed at admission.  Will continue to hold due to normotension.   5. DMII: monitor BG.  Recommend hold PTA metformin   6. Occipital laceration: treat locally. Recommend bacitracin BID.    7. Coagulopathy: stop Plavix. Continue to hold in setting of SDH.  Received 2u platelets in ED.  Platelets 200 this AM.   8. PT/OT  9. SW for d/c planning  10. Palliative care consult in setting of advanced age and trauma     Code status: Full    General Cares:  GI  "Prophylaxis: Bowel Regimen PRN  DVT Prophylaxis: Contraindicated due to SDH  Pulmonary toilet:IS while awake    Code status: Per advanced care planning document scanned in chart (4/4/2016), patient wishes to be DNR/DNI       Disposition: pending progress. Ok to transfer to floor from trauma standpoint.  6A.     ETOH: Unable to assess due to altered mental status/dementia     SUBJECTIVE: Unable to perform due to altered mental status   Review of Systems   Unable to perform ROS: Dementia       OBJECTIVE:  Blood pressure 93/61, pulse 97, temperature 98.1  F (36.7  C), temperature source Oral, resp. rate 20, height 1.499 m (4' 11\"), weight 55.7 kg (122 lb 12.7 oz), SpO2 94 %.  Physical Exam   Constitutional: She appears well-developed and well-nourished. No distress.   HENT:   Head: Normocephalic.   Right Ear: External ear normal.   Left Ear: External ear normal.   Nose: Nose normal.   Mouth/Throat: Oropharynx is clear and moist. No oropharyngeal exudate.   Large occipital hematoma with associated laceration   Eyes: Conjunctivae and EOM are normal. Pupils are equal, round, and reactive to light. Right eye exhibits no discharge. Left eye exhibits no discharge. No scleral icterus.   Neck: Normal range of motion. Neck supple. No JVD present. No tracheal deviation present. No thyromegaly present.   Cardiovascular: Normal rate, regular rhythm and intact distal pulses.  Exam reveals no gallop and no friction rub.    Murmur heard.  Pulmonary/Chest: Effort normal. No respiratory distress. She has no wheezes. She has no rales. She exhibits no tenderness.   Diminished throughout    Abdominal: Soft. Bowel sounds are normal. She exhibits no distension. There is no tenderness. There is no rebound and no guarding.   Musculoskeletal: Normal range of motion. She exhibits no edema or tenderness.   Moving all extremities.  No disability or pain noted    Lymphadenopathy:     She has no cervical adenopathy.   Neurological: She is alert. No " cranial nerve deficit. She exhibits normal muscle tone. Coordination abnormal.   Oriented to self only    Skin: Skin is warm and dry. No rash noted. She is not diaphoretic. There is erythema. No pallor.   Psychiatric: She has a normal mood and affect. Her behavior is normal.   Significantly confused.  Oriented to self only        ROUTINE LABS: (Last four results)  CMP  Recent Labs  Lab 02/04/17 0442 02/03/17 2051 02/03/17  1730    135 137   POTASSIUM 3.4 2.9* 3.0*   CHLORIDE 100 98 100   CO2 24 25 29   ANIONGAP 13 12 8   * 220* 134*   BUN 18 16 19   CR 0.44* 0.46* 0.43*   GFRESTIMATED >90Non  GFR Calc >90Non  GFR Calc >90Non  GFR Calc   GFRESTBLACK >90African American GFR Calc >90African American GFR Calc >90African American GFR Calc   KEN 8.6 9.1 9.6   MAG 1.6  --   --    PHOS 2.9  --   --      CBC  Recent Labs  Lab 02/04/17 0442 02/03/17 2051 02/03/17  1730   WBC 9.1 13.7* 5.2   RBC 3.90 4.20 4.45   HGB 11.2* 12.2 12.7   HCT 34.4* 37.3 39.3   MCV 88 89 88   MCH 28.7 29.0 28.5   MCHC 32.6 32.7 32.3   RDW 13.3 13.2 12.7    198 168     INR  Recent Labs  Lab 02/03/17  1730   INR 0.98     Arterial Blood GasNo lab results found in last 7 days.    RADIOLOGY:  Recent Results (from the past 24 hour(s))   CT Cervical Spine w/o Contrast    Narrative    CT CERVICAL SPINE W/O CONTRAST 2/3/2017 6:05 PM     HISTORY:  fall, neck pain     TECHNIQUE:  Axial images of the cervical spine were obtained without  intravenous contrast. Coronal and sagittal reformations were  performed.  Radiation dose for this scan was reduced using automated  exposure control, adjustment of the mA and/or kV according to patient  size, or iterative reconstruction technique.    COMPARISON: None.    FINDINGS: There are 7  cervical type vertebrae used for the purpose of  this dictation. The alignment of the cervical spine is normal.   No  fractures are identified.    C1-C2:  Calcified  pannus is seen posterior to the odontoid. This can  be due to calcium pyrophosphate deposition disease.    C2-C3:  Normal disc, facet joints, spinal canal and neural foramina.     C3-C4:  Loss of disc space height. Diffuse bulge with associated  posterior osteophytes. Central canal normal. Degenerative change in  the facet joints.    C4-C5::  Degenerative change in the facet joints.      C5-C6:  Loss of disc space height. Degenerative change in the facet  joints. Diffuse bulge with associated posterior osteophytes. Central  canal still normal.     C6-C7:  Loss of disc space height. Mild annular bulge. Central canal  and neural foramen are normal.    C7-T1:  Normal disc, facet joints, spinal canal and neural foramina.      Impression    IMPRESSION:   1. No fractures are identified.  2. Multilevel degenerative change.    ISABELLA MARIA MD   CT Head w/o Contrast   Result Value    Radiologist flags Left subdural (AA)    Narrative    CT HEAD W/O CONTRAST   2/3/2017 6:05 PM     HISTORY: Right occipital contusion/patient on Plavix/ LOC.    TECHNIQUE: Axial images of the head without IV contrast material.  Radiation dose for this scan was reduced using automated exposure  control, adjustment of the mA and/or kV according to patient size, or  iterative reconstruction technique.    COMPARISON: None.    FINDINGS:  There is generalized atrophy of the brain.  Areas of low  attenuation are present in the white matter of the cerebral  hemispheres that are consistent with small vessel ischemic disease in  this age patient. There is no evidence of intracranial hemorrhage,  mass, acute infarct or anomaly. The visualized portions of the sinuses  and mastoids appear normal. There is a small acute appearing subdural  in the left temporal region extending into the left frontal region  measuring about 0.6 cm in transverse dimension. There is no shift of  midline structures. There is also a small subdural along the left  tentorium measuring  about 0.5 cm in thickness. Soft tissue swelling is  seen over the right parietal region. No skull fractures identified..      Impression    IMPRESSION:   1. Left temporal-frontal subdural hematoma. There is also a subdural  hematoma along the left tentorium. These measure about 5-6 mm in  thickness. No significant mass effect or shift associated with the  small subdurals.  2. Atrophy of the brain.  White matter changes consistent with small  vessel ischemic disease.     [Critical Result: Left subdural]    Finding was identified on 2/3/2017 6:07 PM.     Dr. Vegas was contacted by me on 2/3/2017 6:09 PM and verbalized  understanding of the critical result.     ISABELLA MARIA MD   XR Chest 2 Views    Narrative    CHEST TWO VIEWS 2/3/2017 6:13 PM     HISTORY: Fall with left rib pain    COMPARISON: 3/25/2006    FINDINGS: Stable dense nodule of the left base. No pneumothorax. No  definite displaced rib fracture. Paratracheal density again noted at  the right apex, was present in 2006, presumably this is a vascular  shadow. There are no acute infiltrates. The cardiac silhouette is not  enlarged. Pulmonary vasculature is unremarkable.      Impression    IMPRESSION: No acute disease.    CONNER DOTSON MD   CT Head w/o Contrast   Result Value    Radiologist flags Worsening of intracranial hemorrhage (AA)    Narrative    CT HEAD W/O CONTRAST 2/4/2017 12:54 AM    History:  Interval SDH, fall on Plavix      Comparison: 2/20/2017    Technique: Axial thin section CT images of the head were obtained from  the base of the skull to the vertex without intravenous contrast and  reviewed in brain, subdural, and bone windows.     Findings:   There has been interval increased subdural hematoma overlying the left  frontotemporal lobe as well as left cerebellum tentorium. It measures  9 mm in maximum thickness over the left frontal lobe, previously  measured 4 mm. Also measures 13 mm along the left cerebellar  tentorium, previously measured 5  mm. There has been interval increased  mass effect with partial effacement of left lateral ventricle as well  as left-to-right subfalcine midline shift of 7 mm, 4 mm previously. No  downward transtentorial herniation. Also slight interval worsening of  anterior-inferior bifrontal contusion and hemorrhages. Gray to white  matter differentiation is preserved. Stable mildly prominent  ventricular system configuration.    Posterior right parietal scalp hematoma. The bony calvaria and the  bones of the skull base appear normal. The visualized portions of  paranasal sinuses and mastoid air cells are clear.      Impression    Impression:   1.  Worsening of left frontotemporal lobe and left cerebellum  tentorium subdural hematoma with increased mass effect resulting in  partial effacement of left lateral ventricle and increased  left-to-right subfalcine midline shift.  2. Slight interval worsening of anterior-inferior bifrontal contusion  hemorrhages.    [Critical Result: Worsening of intracranial hemorrhage    Finding was identified on 2/4/2017 12:58 AM.     Dr Espinosa was contacted by Dr. Jones at 2/4/2017 1:07 AM and  verbalized understanding of the critical finding.     I have personally reviewed the examination and initial interpretation  and I agree with the findings.    MD Lizeth SILVER, TONYA CNP

## 2017-02-04 NOTE — PLAN OF CARE
Problem: Goal Outcome Summary  Goal: Goal Outcome Summary  PT 4A: Eval complete, treatment provided to pt. Pt was not oriented to place or situation, but was able to state her name and with cues stated her birthdate. Pt presents without major weakness, but with impaired coordination, safety and balance. Pt dizzy with positional changes (supine>sit, sit>stand), BP was not orthostatic. MYKE x 2 required for safe transfer of pt and line management. Pt took ~3-4 steps additionally with MYKE. Pt challenged to follow verbal commands for LE movements but with demonstration was able to replicate desired movements sucessfully. O2 sats 91-94% on 1L via NC.     Per discussion with pt's daughter, it appears her cognition today is far below baseline more so than her physical strength. Daughter was also not aware of pt having Parkinson's diagnosis.     Given fall history, current deficits and safety concerns, recommend TCU to provide rehab to improve functional status. In the long term, further consideration of pt's needs and safety recommended as pt may benefit from higher level of care/support in her living environment.

## 2017-02-04 NOTE — PROGRESS NOTES
Repeat HCT reviewed. There is not some effacement of the left lateral ventricle with interval progression of the tentorial subdural. Fortunately she is able to accommodate this volume change and her basal cisterns remain patent. No change in plan from a neurosurgical standpoint.

## 2017-02-04 NOTE — PROGRESS NOTES
" 02/04/17 1024   Quick Adds   Type of Visit Initial PT Evaluation   Living Environment   Lives With alone;other (see comments)  ([facility])   Living Arrangements apartment   Home Accessibility no concerns   Self-Care   Dominant Hand right   Current Activity Tolerance fair   Functional Level Prior   Ambulation 1-->assistive equipment  (ambulates with 4WW)   Transferring 1-->assistive equipment   Toileting 0-->independent   Bathing 2-->assistive person  (SBA for bathing per daughter Kimi)   Dressing 0-->independent   Eating 0-->independent   Communication 0-->understands/communicates without difficulty   Swallowing 0-->swallows foods/liquids without difficulty   Cognition 0 - no cognition issues reported  (at baseline mild STM deficits, but no major deficits )   Fall history within last six months yes  (RN at [facility] indicated pt had two significant falls in 6 mos)   Number of times patient has fallen within last six months 2  (per H&P, with most recent fall, pt \"tripped over feet\")   Which of the above functional risks had a recent onset or change? ambulation;fall history   Prior Functional Level Comment No family present. RN notes indicate pt had significant shakiness as a likely factor contributing to her falls. Per daughter uses 4WW at baseline, has involuntary movement on L UE and L LE from her prior TIA. In [facility] pt dressed, ambulated independent. Pt bathed with supervision. Pt was independent with toileting. Pt likely used grab bars in bathroom as needed per daughter Kimi pt can normally walk to the dining room and take the elevator down a hallway using her 4WW.    General Information   Onset of Illness/Injury or Date of Surgery - Date 02/03/17   Referring Physician Tiburcio Espinosa MDNone   Patient/Family Goals Statement To feel better.    Pertinent History of Current Problem (include personal factors and/or comorbidities that impact the POC) The patient is a 89 year old with a history of Parkinson's " disease, Hypertension, Hyperlipidemia and history of TIA on plavix who presents after a mechanical fall. CT head shows a 0.5cm thickness subdural hematoma along the left frontal-temporal region and along the left tentorium. CT C spine shows no acute fracture. Medical notes indicate concerns of pt being hyperkinetic. Daughter indicates pt has not in fact been formally diagnosed with Parkinson's to daughter's awareness.    Precautions/Limitations fall precautions  (HOB > 30 deg)   General Observations pt unable to follow commands to track my finger movement with her eyes (smooth pursuit) but this appeared to be more a deficit in command following   General Info Comments activity: up with assist   Cognitive Status Examination   Orientation (able to state name, with cues provides birthdate)   Level of Consciousness alert   Follows Commands and Answers Questions 50% of the time  (to 75%)   Personal Safety and Judgment impaired;at risk behaviors demonstrated;impulsive   Memory impaired   Cognitive Comment reduced memory at this time, not oriented to situation or place   Pain Assessment   Patient Currently in Pain No  (but c/o dizziness)   Posture    Posture Comments reduced upright posture in sitting and standing   Range of Motion (ROM)   ROM Comment no deficits noted for UE or LE AROM   Strength   Strength Comments B independent SLRs, full quad strength in standing. No formal MMT per pt's reduced command following, but moves extremities through full ROM   Bed Mobility   Bed Mobility Comments Pt impulsive. Pt required MYKE supine>sit, Pt dizzy with positional changes (supine>sit, sit>stand), BP was not orthostatic.    Transfer Skills   Transfer Comments MYKE x 2 required for safe transfer sit<>stand of pt and line management. Pt took ~3-4 steps additionally with MYKE.   Gait   Gait Comments Able to step from bedside to chair x 3-4 steps with MYKE. Dizziness with positional changes did not permit further gait advancement  "  Balance   Balance Comments CGA mainly in sitting once positioned with feet flat; reduced stability noted when attempting to step/weight shift. In static standing only requires CGA, but requires MYKE once moving into dynamic balance   Coordination   Coordination Comments reduced command following, challenged to perform finger/nose testing task but able to touch my hand with both R and L hands when positioned in front, at her sides   General Therapy Interventions   Planned Therapy Interventions bed mobility training;gait training;neuromuscular re-education;ROM;strengthening;stretching;transfer training;progressive activity/exercise;home program guidelines   Clinical Impression   Criteria for Skilled Therapeutic Intervention yes, treatment indicated   PT Diagnosis reduced safety and coordination   Influenced by the following impairments reduced activity tolerance, dizziness with positional changes, reduced safety, reduced balance   Functional limitations due to impairments below baseline bed mobility, transfers, gait   Clinical Presentation Evolving/Changing   Clinical Presentation Rationale history, exam, person factor criteria   Clinical Decision Making (Complexity) Moderate complexity   Therapy Frequency` other (see comments)  (6x/wk)   Predicted Duration of Therapy Intervention (days/wks) 1 week   Anticipated Discharge Disposition Transitional Care Facility   Risk & Benefits of therapy have been explained Yes   Patient, Family & other staff in agreement with plan of care Yes   [facility] [employer] \"6 Clicks\"   2016, Trustees of [facility], under license to [employer].  All rights reserved.   6 Clicks Short Forms Basic Mobility Inpatient Short Form   [facility] Time To [employer]  \"6 Clicks\" V.2 Basic Mobility Inpatient Short Form   1. Turning from your back to your side while in a flat bed without using bedrails? 3 - A Little   2. Moving from lying on your back to sitting on the side of a flat bed " without using bedrails? 3 - A Little   3. Moving to and from a bed to a chair (including a wheelchair)? 3 - A Little   4. Standing up from a chair using your arms (e.g., wheelchair, or bedside chair)? 3 - A Little   5. To walk in hospital room? 3 - A Little   6. Climbing 3-5 steps with a railing? 2 - A Lot   Basic Mobility Raw Score (Score out of 24.Lower scores equate to lower levels of function) 17   Total Evaluation Time   Total Evaluation Time (Minutes) 12

## 2017-02-04 NOTE — PLAN OF CARE
"Problem: Individualization  Goal: Patient Individualization  Outcome: No Change                                                                                                              Progress Note    D/A: Pt was not oriented but was able to follow commands when asked, medications were given per MAR with nicardipine drip stopped with D5 .45 with KCl 20 stopped and LR started at 75mL/hr, pt comes to the floor with a mayorga in place and clear yellow urine in the bag, pt noted to have reddened area on coccyx and head lac on the R side per ED RN Meghan nothing was to be done with it, HOB >30 at all times, SBP less than 140, bed alarm on, q1hr neuro checks done  Blood pressure 109/63, pulse 97, temperature 98.6  F (37  C), temperature source Oral, resp. rate 18, height 1.499 m (4' 11\"), weight 55.7 kg (122 lb 12.7 oz), SpO2 96 %.  R: Pt resting between cares  P: Continue with POC        "

## 2017-02-04 NOTE — PLAN OF CARE
Problem: Goal Outcome Summary  Goal: Goal Outcome Summary  Outcome: No Change  No acute change in neuro status over shift, except waxing/waning confusion. Disorientedx4. Continues to follow some basic commands. No one-sided deficits noted. Afebrile, stable BPs during shift except one episode of , treated with hydralizine, effective. Sats low-90s on RA. Passed swallow screen. Hernandes in place, adequate urine output.     Plan: transfer to . Report given to receiving RN and bedside mutual neuro assessment completed with handoff.

## 2017-02-04 NOTE — CONSULTS
Neurosurgery Consult Note     CC: headache, subdural hematoma     HPI: The patient is a 89 year old with a history of Parkinson's disease, Hypertension, Hyperlipidemia and history of TIA on plavix who presents after a mechanical fall. CT head shows a 0.5cm thickness subdural hematoma along the left frontal-temporal region and along the left tentorium. CT C spine shows no acute fracture. She was started on a nicardipine ggt for SBP >200. Platelets are 168 and 2 units were transfused in the ER, Na 137, INR is 0.98.     Past Medical/Surgical History  Parkinson's disease     Hypertension  Hyperlipidemia   TIA   Past Surgical History   Procedure Laterality Date     Surgical history of -        Cataract, left eye     Family History  No known family history of neurologic disease.     Social History  Social History   Substance Use Topics     Smoking status: Never Smoker      Smokeless tobacco: Never Used     Alcohol Use: No     Medications  Current Outpatient Prescriptions   Medication Sig Dispense Refill     atorvastatin (LIPITOR) 10 MG tablet Take 1 tablet (10 mg) by mouth daily 30 tablet 1     Acetaminophen (TYLENOL PO) Take 1,000 mg by mouth 3 times daily       AMLODIPINE BESYLATE PO Take 2.5 mg by mouth daily       lisinopril (PRINIVIL,ZESTRIL) 40 MG tablet Take 1 tablet (40 mg) by mouth daily 30 tablet 5     calcium carbonate (OS- MG [place]. [place]) 600 MG tablet Take 1 tablet (600 mg) by mouth daily 60 tablet 5     gabapentin (NEURONTIN) 100 MG capsule Take 4 capsules (400 mg) by mouth 3 times daily 90 capsule 5     clopidogrel (PLAVIX) 75 MG tablet Take 1 tablet (75 mg) by mouth every evening 30 tablet 5     multivitamin  with lutein (OCUVITE WITH LTEIN) CAPS Take 1 capsule by mouth daily 30 capsule 5     loperamide (IMODIUM) 2 MG capsule Take 2 mg by mouth every other day       Menthol, Topical Analgesic, (BIOFREEZE) 4 % GEL Externally apply topically 3 times daily as needed        hypromellose-dextran 0.3-0.1%  (ARTIFICIAL TEARS) opthalmic solution Place 1 drop into both eyes 3 times daily       loperamide (IMODIUM A-D) 2 MG tablet Take 2 mg by mouth 4 times daily as needed for diarrhea        hydrochlorothiazide (HYDRODIURIL) 25 MG tablet Take 1 tablet (25 mg) by mouth daily 90 tablet 3     metFORMIN (GLUCOPHAGE-XR) 500 MG 24 hr tablet Take 2,000 mg by mouth daily (with dinner)       Allergies  No Known Allergies    ROS: 10 point ROS of systems including Constitutional, Eyes, Respiratory, Cardiovascular, Gastroenterology, Genitourinary, Integumentary, Muscularskeletal, Psychiatric were all negative except for pertinent positives noted in my HPI.     Physical Exam:     General:   Comfortable respiratory effort; normal cardiac rhythm.     Mental Status:   awake, alert and oriented to self only.  fluent, communicative, intact comprehension.    Cranial Nerves:  equal and reactive pupils   extraocular movements preserved   no dysarthria.     Motor/Sensory:  5/5 strength throughout upper and lower extremities.  No deficits to pain, soft touch.     Reflexes: symmetric     GCS: 15      The labs and imaging for this patient have been reviewed    Assessment/Plan:  The patient is a 89 year old with a history of Parkinson's disease, Hypertension, Hyperlipidemia and history of TIA on plavix who presents with a 0.5cm thickness subdural hematoma along the left frontal-temporal region and along the left tentorium. She is neurologically intact.      Admit to SICU    Repeat HCT at 6 hours    q1 hour neuro checks until repeat HCT, then q2 hours    NPO    Blood pressure <140 - continue nicardipine infusion    Goal normonatremia     Platelets > 100,000    INR < 1.5    Hemoglobin > 8    The patient was seen at 9:20 PM    The following conditions are also present, complicating the patient's current management, as described in the Assessment and Plan:   intracerebral hematoma; brain compression    John (Jack) M. Leschke, M.D.     Our patient was  discussed with my chief resident.

## 2017-02-04 NOTE — H&P
[facility], [place]    History and Physical / Consult note: Trauma Service     Date of Admission:  2/3/2017  Date of Service (when I saw the patient): 02/03/2017    Assessment and Plan  Trauma mechanism:   Time/date of injury: Mechanical Fall  Known Injuries:  1. Subdural hematoma  Other diagnoses:   1. Parkinsons Disease  2. TIA on Plavix  3. DM2  4. HTN  5. HLD      Plan:  1. Admit to Trauma, SICU overnight  2. Neurosurg Consult-->Q1hr neuro checks, HCT at 0030, SBPO<140, Plts above 100K, Normonatremia Plts transfused in ED  3. NPO, mIVF, SSI, Replace Lytes  4. Will hold home anti-HTNs given use of Nicardipine drip  5. UA/UC, will repeat labs in AM    Code status: Full    General Cares:  GI Prophylaxis: Bowel Regimen PRN  DVT Prophylaxis: Contraindicated due to SDH  Pulmonary toilet:IS while awake    Primary Care Physician  *Carlene Serrano    Chief Complaint  Fall, head pain    Unable to obtain a history from the patient due to mental status    History of Present Illness  Anya Gaines is a 89 year old female with history of TIA on plavix, DM2, Parkinsons disease, HTN, and HLD who presents from assisted living faciltiy after mechanical fall. Per report she tripped and fell, striking her head. +LOC. Head CT showed subdural. On arrival patient is alert, but disorientated. Her baseline mental status at this time is unclear. Patient complains only of headache. EKG normal, INR <1, and Na 137 on arrival.     Past Medical History   DM2  UTI  TIA  HTN  HLD    Past Surgical History  Patient denies previous surgery     Prior to Admission Medications  Prior to Admission Medications   Prescriptions Last Dose Informant Patient Reported? Taking?   AMLODIPINE BESYLATE PO  Nursing Home Yes No   Sig: Take 2.5 mg by mouth daily   Acetaminophen (TYLENOL PO)  Nursing Home Yes No   Sig: Take 1,000 mg by mouth 3 times daily   Menthol, Topical Analgesic, (BIOFREEZE) 4 % GEL  Nursing Home Yes No   Sig:  Externally apply topically 3 times daily as needed    atorvastatin (LIPITOR) 10 MG tablet  Nursing Home No No   Sig: Take 1 tablet (10 mg) by mouth daily   calcium carbonate (OS- MG [place]. [place]) 600 MG tablet  Nursing Home No No   Sig: Take 1 tablet (600 mg) by mouth daily   clopidogrel (PLAVIX) 75 MG tablet  Nursing Home No No   Sig: Take 1 tablet (75 mg) by mouth every evening   gabapentin (NEURONTIN) 100 MG capsule  Nursing Home No No   Sig: Take 4 capsules (400 mg) by mouth 3 times daily   hydrochlorothiazide (HYDRODIURIL) 25 MG tablet  Nursing Home No No   Sig: Take 1 tablet (25 mg) by mouth daily   hypromellose-dextran 0.3-0.1% (ARTIFICIAL TEARS) opthalmic solution  Nursing Home Yes No   Sig: Place 1 drop into both eyes 3 times daily   lisinopril (PRINIVIL,ZESTRIL) 40 MG tablet  Nursing Home No No   Sig: Take 1 tablet (40 mg) by mouth daily   loperamide (IMODIUM A-D) 2 MG tablet  Nursing Home Yes No   Sig: Take 2 mg by mouth 4 times daily as needed for diarrhea    loperamide (IMODIUM) 2 MG capsule  Nursing Home Yes No   Sig: Take 2 mg by mouth every other day   metFORMIN (GLUCOPHAGE-XR) 500 MG 24 hr tablet  Nursing Home Yes No   Sig: Take 2,000 mg by mouth daily (with dinner)   multivitamin  with lutein (OCUVITE WITH LTEIN) CAPS  Nursing Home No No   Sig: Take 1 capsule by mouth daily      Facility-Administered Medications: None     Allergies  No Known Allergies    Social History  Social History     Social History     Marital Status:      Spouse Name: N/A     Number of Children: N/A     Years of Education: N/A     Occupational History     Not on file.     Social History Main Topics     Smoking status: Never Smoker      Smokeless tobacco: Never Used     Alcohol Use: No     Drug Use: No     Sexual Activity: Not on file     Other Topics Concern     Not on file     Social History Narrative     Family History  Not pertinent to this consult    Review of Systems  CONSTITUTIONAL: No fever, chills, sweats,  fatigue   EYES: no visual blurring, no double vision or visual loss  ENT: no decrease in hearing, no tinnitus, no vertigo  RESPIRATORY: no shortness of breath, no cough, no sputum   CARDIOVASCULAR: no palpitations, no chest  pain, no exertional chest pain or pressure  GASTROINTESTINAL: no nausea or vomiting, or abd pain  GENITOURINARY: no dysuria, no frequency or hesitancy, no hematuria  MUSCULOSKELETAL: no weakness, no redness, no swelling, no joint pain,   SKIN: no rashes, +ecchymoses, +abrasions to scalp  NEUROLOGIC: no numbness or tingling of hands, no numbness or tingling  of feet, +LOC, + weakness  PSYCHIATRIC: no sleep disturbances, no anxiety or depression    Physical Exam  Temp: 97.1  F (36.2  C) Temp src: Axillary BP: 160/79 mmHg Pulse: 98 Heart Rate: 93 Resp: 17 SpO2: 96 % O2 Device: None (Room air)    Vital Signs with Ranges  Temp:  [97.1  F (36.2  C)-98.1  F (36.7  C)] 97.1  F (36.2  C)  Pulse:  [91-98] 98  Heart Rate:  [] 93  Resp:  [9-29] 17  BP: (107-210)/() 160/79 mmHg  SpO2:  [83 %-97 %] 96 % 125 lbs 10.6 oz    Primary Survey:  Airway: patient talking  Breathing: symmetric respiratory effort bilaterally  Circulation: central pulses present and peripheral pulses present  Disability: Pupils - 2mm and equal bilaterally  Solomon Coma Scale - Total 15/15  Eye Response (E): 4= spontaneous,  3= to verbal/voice, 2=  to pain, 1= No response   Verbal Response (V):  5= Orientated, converses,  4= Confused, converses, 3= Inappropriate words,  2= Incomprehensible sounds,  1=No response   Motor Response (M)  6= Obeys commands, 5= Localizes to pain, 4= Withdrawal to pain, 3=Fexion to pain, 2= Extension to pain, 1= No response    Secondary Survey:  General: alert, not orientated to person or place  Head: Abrasion and hematoma to right occipital scalp  Eyes: pupils 2mm, equal, minimally reactive, EOMI, corneas and conjunctivae clear  Ears: pearly grey bilateral TMs and non-inflamed external ear  canals  Nose: nares patent, no drainage, nasal septum non-tender  Mouth/Throat: no exudates or erythema,  no dental tenderness or malocclusions, no tongue lacerations  Neck:  No midline posterior tenderness, full AROM without pain or tenderness   Chest/Pulmonary: normal respiratory rate and rhythm,  bilateral clear breath sounds, no wheezes  Cardiovascular: Regular, no murmur  Abdomen: soft, non-tender  : pelvis stable to lateral compression  Back/Spine: no deformity, no midline tenderness, no sacral tenderness,  no step-offs and no abrasions or contusions  Musculoskel/Extremities: normal extremities, full AROM of major joints without tenderness, edema, erythema, ecchymosis, or abrasions.  Hand: no gross deformities of hands or fingers. Full AROM of hand and fingers in flexion and extension.  strength equal and symmetric.   Neuro:  CN II-XII grossly intact. No focal deficits. Strength 5/5 x 4 extremities.  Sensation intact. Possible pronator drift of left arm  Psychiatric: affect/mood normal, cooperative, normal judgement/insight and memory intact    Data  Results for orders placed or performed during the hospital encounter of 02/03/17 (from the past 24 hour(s))   CBC with platelets differential   Result Value Ref Range    WBC 13.7 (H) 4.0 - 11.0 10e9/L    RBC Count 4.20 3.8 - 5.2 10e12/L    Hemoglobin 12.2 11.7 - 15.7 g/dL    Hematocrit 37.3 35.0 - 47.0 %    MCV 89 78 - 100 fl    MCH 29.0 26.5 - 33.0 pg    MCHC 32.7 31.5 - 36.5 g/dL    RDW 13.2 10.0 - 15.0 %    Platelet Count 198 150 - 450 10e9/L    Diff Method Automated Method     % Neutrophils 88.0 %    % Lymphocytes 7.6 %    % Monocytes 3.7 %    % Eosinophils 0.3 %    % Basophils 0.2 %    % Immature Granulocytes 0.2 %    Nucleated RBCs 0 0 /100    Absolute Neutrophil 12.0 (H) 1.6 - 8.3 10e9/L    Absolute Lymphocytes 1.0 0.8 - 5.3 10e9/L    Absolute Monocytes 0.5 0.0 - 1.3 10e9/L    Absolute Eosinophils 0.0 0.0 - 0.7 10e9/L    Absolute Basophils 0.0 0.0 -  0.2 10e9/L    Abs Immature Granulocytes 0.0 0 - 0.4 10e9/L    Absolute Nucleated RBC 0.0    Basic metabolic panel   Result Value Ref Range    Sodium 135 133 - 144 mmol/L    Potassium 2.9 (L) 3.4 - 5.3 mmol/L    Chloride 98 94 - 109 mmol/L    Carbon Dioxide 25 20 - 32 mmol/L    Anion Gap 12 3 - 14 mmol/L    Glucose 220 (H) 70 - 99 mg/dL    Urea Nitrogen 16 7 - 30 mg/dL    Creatinine 0.46 (L) 0.52 - 1.04 mg/dL    GFR Estimate >90  Non  GFR Calc   >60 mL/min/1.7m2    GFR Estimate If Black >90   GFR Calc   >60 mL/min/1.7m2    Calcium 9.1 8.5 - 10.1 mg/dL   ABO/Rh type and screen   Result Value Ref Range    ABO O     RH(D)  Pos     Antibody Screen Neg     Test Valid Only At       [facility]    Specimen Expires 02/06/2017    Platelets prepare order unit   Result Value Ref Range    Ordered Component Type PLT Pheresis     Units Ordered 2        Tiburcio Espinosa

## 2017-02-04 NOTE — ED NOTES
Bed: IN06  Expected date: 2/3/17  Expected time: 7:54 PM  Means of arrival:   Comments:  Anya Gaines  Medic Rig:  CC: fall, SDH X2  Age: 89  Sex: F  Name/MR:  Call taken by: Jovany De La Cruz, otherwise neuro intact. Hypertensive, starting Nipride gtt  Other notes:

## 2017-02-04 NOTE — PROGRESS NOTES
[facility], [place]   Neurosurgery Daily Note          Assessment and Plan:   The patient is a 89 year old with a history of Parkinson's disease, Hypertension, Hyperlipidemia and history of TIA on plavix who presents with a 0.5cm thickness subdural hematoma along the left frontal-temporal region and along the left tentorium. She is neurologically intact.        Clinically stable, repeat HCT is stable    q1 hour neuro checks until repeat HCT, then q2 hours    Continue blood pressure goal <140    No further neurosurgical intervention required; no further imaging necessary unless clinical decline is demonstrated     In this patient demographic traumatic brain injury of this degree can be associated with a significant degree of morbidity and mortality; no surgery would be offered for this patient due to the high risk    Please call for questions/concerns           John (Jack) M. Leschke        Interval History:   No events overnight. More awake this morning.              Review of Systems:   The Review of Systems is otherwise negative beyond that which has been previously stated.          Medications:   I have reviewed this patient's current medications.    Current Facility-Administered Medications   Medication Dose Route Frequency     acetaminophen (TYLENOL) tablet 1,000 mg  1,000 mg Oral TID     atorvastatin  10 mg Oral Daily     gabapentin  400 mg Oral TID     hypromellose-dextran  1 drop Both Eyes TID     metFORMIN  2,000 mg Oral Daily with supper     sodium chloride (PF)  3 mL Intracatheter Q8H     amLODIPine (NORVASC) tablet 2.5 mg  2.5 mg Oral Daily     hydrochlorothiazide  25 mg Oral Daily     insulin aspart  1-10 Units Subcutaneous Q4H     NaCl            Physical Exam:  General:   Comfortable respiratory effort; normal cardiac rhythm.     Mental Status:   awake, alert most of her speech is unintelligible, conversant at times. Oriented to self.   fluent, communicative, intact  comprehension.    Cranial Nerves:  equal and reactive pupils   extraocular movements preserved   no dysarthria.     Motor/Sensory:  5/5 strength throughout upper and lower extremities.  No deficits to pain, soft touch.     Reflexes: symmetric          Data:   The labs and imaging for this patient have been reviewed directly.

## 2017-02-05 ENCOUNTER — ALLIED HEALTH/NURSE VISIT (OUTPATIENT)
Dept: NEUROLOGY | Facility: CLINIC | Age: 82
DRG: 084 | End: 2017-02-05
Attending: PSYCHIATRY & NEUROLOGY
Payer: COMMERCIAL

## 2017-02-05 DIAGNOSIS — R56.9 SEIZURES (H): Primary | ICD-10-CM

## 2017-02-05 LAB
BACTERIA SPEC CULT: NO GROWTH
ERYTHROCYTE [DISTWIDTH] IN BLOOD BY AUTOMATED COUNT: 13.4 % (ref 10–15)
GLUCOSE BLDC GLUCOMTR-MCNC: 137 MG/DL (ref 70–99)
GLUCOSE BLDC GLUCOMTR-MCNC: 140 MG/DL (ref 70–99)
GLUCOSE BLDC GLUCOMTR-MCNC: 144 MG/DL (ref 70–99)
GLUCOSE BLDC GLUCOMTR-MCNC: 145 MG/DL (ref 70–99)
GLUCOSE BLDC GLUCOMTR-MCNC: 152 MG/DL (ref 70–99)
GLUCOSE BLDC GLUCOMTR-MCNC: 162 MG/DL (ref 70–99)
GLUCOSE BLDC GLUCOMTR-MCNC: 163 MG/DL (ref 70–99)
GLUCOSE BLDC GLUCOMTR-MCNC: 222 MG/DL (ref 70–99)
HCT VFR BLD AUTO: 36.2 % (ref 35–47)
HGB BLD-MCNC: 11.4 G/DL (ref 11.7–15.7)
Lab: NORMAL
MAGNESIUM SERPL-MCNC: 1.5 MG/DL (ref 1.6–2.3)
MAGNESIUM SERPL-MCNC: 2.8 MG/DL (ref 1.6–2.3)
MCH RBC QN AUTO: 28.5 PG (ref 26.5–33)
MCHC RBC AUTO-ENTMCNC: 31.5 G/DL (ref 31.5–36.5)
MCV RBC AUTO: 91 FL (ref 78–100)
MICRO REPORT STATUS: NORMAL
PLATELET # BLD AUTO: 205 10E9/L (ref 150–450)
RBC # BLD AUTO: 4 10E12/L (ref 3.8–5.2)
SPECIMEN SOURCE: NORMAL
WBC # BLD AUTO: 10.8 10E9/L (ref 4–11)

## 2017-02-05 PROCEDURE — 85027 COMPLETE CBC AUTOMATED: CPT | Performed by: NURSE PRACTITIONER

## 2017-02-05 PROCEDURE — 00000146 ZZHCL STATISTIC GLUCOSE BY METER IP

## 2017-02-05 PROCEDURE — 12000008 ZZH R&B INTERMEDIATE UMMC

## 2017-02-05 PROCEDURE — 25000132 ZZH RX MED GY IP 250 OP 250 PS 637: Performed by: SURGERY

## 2017-02-05 PROCEDURE — 36415 COLL VENOUS BLD VENIPUNCTURE: CPT | Performed by: SURGERY

## 2017-02-05 PROCEDURE — 25000128 H RX IP 250 OP 636: Performed by: SURGERY

## 2017-02-05 PROCEDURE — 83735 ASSAY OF MAGNESIUM: CPT | Performed by: SURGERY

## 2017-02-05 PROCEDURE — 25800025 ZZH RX 258: Performed by: NURSE PRACTITIONER

## 2017-02-05 PROCEDURE — 25000132 ZZH RX MED GY IP 250 OP 250 PS 637: Performed by: NURSE PRACTITIONER

## 2017-02-05 PROCEDURE — 36415 COLL VENOUS BLD VENIPUNCTURE: CPT | Performed by: NURSE PRACTITIONER

## 2017-02-05 PROCEDURE — 97535 SELF CARE MNGMENT TRAINING: CPT | Mod: GO | Performed by: OCCUPATIONAL THERAPIST

## 2017-02-05 PROCEDURE — 83735 ASSAY OF MAGNESIUM: CPT | Performed by: NURSE PRACTITIONER

## 2017-02-05 PROCEDURE — 97165 OT EVAL LOW COMPLEX 30 MIN: CPT | Mod: GO | Performed by: OCCUPATIONAL THERAPIST

## 2017-02-05 PROCEDURE — 95951 ZZHC EEG VIDEO < 12 HR: CPT | Mod: 52,ZF

## 2017-02-05 PROCEDURE — 40000133 ZZH STATISTIC OT WARD VISIT: Performed by: OCCUPATIONAL THERAPIST

## 2017-02-05 RX ORDER — AMOXICILLIN 250 MG
1-2 CAPSULE ORAL 2 TIMES DAILY
Status: DISCONTINUED | OUTPATIENT
Start: 2017-02-05 | End: 2017-02-07 | Stop reason: HOSPADM

## 2017-02-05 RX ORDER — NICOTINE POLACRILEX 4 MG
15-30 LOZENGE BUCCAL
Status: DISCONTINUED | OUTPATIENT
Start: 2017-02-05 | End: 2017-02-05

## 2017-02-05 RX ORDER — LISINOPRIL 20 MG/1
20 TABLET ORAL DAILY
Status: DISCONTINUED | OUTPATIENT
Start: 2017-02-05 | End: 2017-02-07 | Stop reason: HOSPADM

## 2017-02-05 RX ORDER — GABAPENTIN 250 MG/5ML
400 SOLUTION ORAL 3 TIMES DAILY
Status: DISCONTINUED | OUTPATIENT
Start: 2017-02-05 | End: 2017-02-07 | Stop reason: HOSPADM

## 2017-02-05 RX ORDER — DEXTROSE MONOHYDRATE 25 G/50ML
25-50 INJECTION, SOLUTION INTRAVENOUS
Status: DISCONTINUED | OUTPATIENT
Start: 2017-02-05 | End: 2017-02-05

## 2017-02-05 RX ADMIN — DEXTRAN 70 AND HYPROMELLOSE 2910 1 DROP: 1; 3 SOLUTION/ DROPS OPHTHALMIC at 08:49

## 2017-02-05 RX ADMIN — DEXTRAN 70 AND HYPROMELLOSE 2910 1 DROP: 1; 3 SOLUTION/ DROPS OPHTHALMIC at 13:48

## 2017-02-05 RX ADMIN — MAGNESIUM SULFATE IN WATER 4 G: 40 INJECTION, SOLUTION INTRAVENOUS at 12:07

## 2017-02-05 RX ADMIN — AMLODIPINE BESYLATE 2.5 MG: 2.5 TABLET ORAL at 08:49

## 2017-02-05 RX ADMIN — DEXTRAN 70 AND HYPROMELLOSE 2910 1 DROP: 1; 3 SOLUTION/ DROPS OPHTHALMIC at 20:55

## 2017-02-05 RX ADMIN — ACETAMINOPHEN 1000 MG: 500 TABLET, FILM COATED ORAL at 13:47

## 2017-02-05 RX ADMIN — LISINOPRIL 20 MG: 20 TABLET ORAL at 12:29

## 2017-02-05 RX ADMIN — ACETAMINOPHEN 1000 MG: 500 TABLET, FILM COATED ORAL at 20:57

## 2017-02-05 RX ADMIN — HYDROCHLOROTHIAZIDE 25 MG: 25 TABLET ORAL at 08:48

## 2017-02-05 RX ADMIN — GABAPENTIN 400 MG: 250 SOLUTION ORAL at 20:55

## 2017-02-05 RX ADMIN — SODIUM CHLORIDE, POTASSIUM CHLORIDE, SODIUM LACTATE AND CALCIUM CHLORIDE: 600; 310; 30; 20 INJECTION, SOLUTION INTRAVENOUS at 04:27

## 2017-02-05 RX ADMIN — GABAPENTIN 400 MG: 250 SOLUTION ORAL at 13:47

## 2017-02-05 RX ADMIN — ATORVASTATIN CALCIUM 10 MG: 10 TABLET, FILM COATED ORAL at 08:48

## 2017-02-05 RX ADMIN — ACETAMINOPHEN 1000 MG: 500 TABLET, FILM COATED ORAL at 08:48

## 2017-02-05 NOTE — PROGRESS NOTES
"Transfer Acceptance Note - Stroke/NCC    Summary:    Anya Gaines is a 89 year old female with HTN, HLD, TIA (on plavix), PD (not on medications) admitted 2/3 s/p fall and found to have a subdural hematoma. NSG consulted and not a surgical candidate. She had new onset aphasia today and HCT showed progression of bleed, therefore transferred to NCC/Stroke team 2/5/2017 for further management.     Objective:  /68 mmHg  Pulse 97  Temp(Src) 97.8  F (36.6  C) (Axillary)  Resp 22  Ht 1.499 m (4' 11\")  Wt 55.7 kg (122 lb 12.7 oz)  BMI 24.79 kg/m2  SpO2 93%     Neurologic:  Mental Status: Fully awake, alert, not following commands. Not able to name, repeat, keeps repeating \"I don't know\" to questions.    Cranial Nerves: Visual fields intact to blink. PERRL. EOMI with normal smooth pursuit. Hearing not formally tested but intact to conversation.   Motor: No abnormal movements. Normal tone throughout. Moving all extremities, but formal testing difficult.     HCT: 2/5/2017   1.  Worsening of left frontotemporal lobe and left cerebellum  tentorium subdural hematoma with increased mass effect resulting in  partial effacement of left lateral ventricle and increased  left-to-right subfalcine midline shift.  2. Slight interval worsening of anterior-inferior bifrontal contusion  hemorrhages.    Assessment:  89 year old female with subdural hematoma s/p fall in the setting of plavix use. Worsening aphasia - could be 2/2 bleed and edema or seizure activity. Will get EEG - this was discussed with her daughter Kimi.    Plan:  - transfer to NCC/Stroke service  - hold plavix  - neuro checks  - BP <140, platelets >200, hb>8, INR <1.5, normonatremia  - DC metformin and put patient on ISS  -vEEG    Discussed with stroke fellow, Dr. Tanmay Espinosa   PGY-4      "

## 2017-02-05 NOTE — PLAN OF CARE
Problem: Goal Outcome Summary  Goal: Goal Outcome Summary  OT 6A: OT orders received, evaluation completed and POC initiated. Pt has garbled, unintelligible speech and is impulsive. Pt required constant verbal and tactile cues for all simple ADL tasks. Recommend pt discharge to TCU to address safety, sequencing of ADL tasks, functional transfers, and cognition.

## 2017-02-05 NOTE — PLAN OF CARE
Problem: Goal Outcome Summary  Goal: Goal Outcome Summary  Outcome: No Change  Pt arrived from  around 1530 after falling on 2/3 and sustaining a SDH. VSS upon arrival. Denies pain. Oriented only to self. Speech is garbled and often illogical. Able to follow most commands. TORRES. Large bruise/erythema to posterior head. Coccyx is reddened, with blanchable erythema. Per 4A, charted as suspected pressure injury; WOC consult placed. IVF infusing at 75 ml/hr. Fair PO intake on reg diet. Hernandes in place with good UOP. +BS; no BM. Up in chair for 30 mins, with assist x2 and gait belt. Pt needs lots of cueing with activity. Bed/chair alarm on for safety. Will continue to monitor.

## 2017-02-05 NOTE — PLAN OF CARE
Problem: Goal Outcome Summary  Goal: Goal Outcome Summary  Outcome: No Change  [id number]. Alert & disorientedx4. Garbled, illogical speech. T-max 100.4, scheduled tylenol given. AOVSS. Pt was placed on 2L oxygen via nasal cannula while sleeping. No c/o pain and no respiratory distress noted. Hernandes patent with adequate UOP. IVMF infusing at 75 mL/hr through L PIV. Blanchable redness noted on coccyx. Pt was sleeping much of shift. Bed alarm on for safety. Will continue to monitor and follow POC.

## 2017-02-05 NOTE — PROGRESS NOTES
[facility], [place]  Trauma Service Progress Note    Date of Service (when I saw the patient): 02/05/2017     Assessment and Plan    Trauma mechanism:  Fall from standing   Time/date of injury: 2/3/2017 afternoon   Known Injuries:  1. Subdural hematomas  2. Occipital laceration     Other diagnoses:    1. Parkinsons Disease with hyperkinesis    2. Altered mental status   3. TIA on Plavix  4. DM2  5. HTN  6. HLD  7. Frequent falls    8. Macular degeneration     Plan:  1. Tertiary exam attempted 2/4.  Unable to perform due to altered mental status.   2. Neurosurg Consult: subdural hematoma management per neurosurgery.  Repeat head CT 2/4 shows increased hematoma in the left frontal lobe, increased hematoma in left cerebellar tentorium, increased mass effect with partial effacement of left lateral ventricle and increased left to right midline shift.  Patient is confused, exhibiting expressive aphagia. Per family and chart review, patient is oriented at baseline.  Continue to monitor for neuro decline.  Q4hr neuro checks, SBP<140, Plts above 100K, Normonatremia.   Patient on plavix for hx of TIAs> 2 units platelets transfused in ED      3. Parkinson's disease: hyperkinesis noted. Not on any anti-Parkinson's medications. Per chart review, patient was to start therapy for strengthening exercises for multiple recent falls>possibly follow up with neurology. Resume PTA gabapentin  4. HTN: Keep SBP<140.  Had been on Nicardipine drip at admission due to BPs 200s/100s; now normotensive off drip.   Resumed PTA amlodipine, HCTZ.  Resume PTA lisinopril dose due to hypertension. Will resume at half PTA dose and titrate up if needed   5. DMII: monitor BG. BG 130s-160s past 24 hours.  SS insulin if consistently elevated >180  6. Occipital laceration: treat locally. Recommend bacitracin BID.     7. Coagulopathy: stop Plavix. Continue to hold in setting of SDH/multiple recent falls.  Received 2u platelets in  "ED.  Platelets 200 this AM.    8. Hypomagnesemia: replace per protocol   9. PT/OT  10. SW for d/c planning  11. Palliative care consult in setting of advanced age and trauma     General Cares:  GI Prophylaxis: Regular diet   Bowel Regimen: senna; BM 2/5  DVT Prophylaxis: Contraindicated due to SDH  Pulmonary toilet:IS while awake    Code status: Per advanced care planning document scanned in chart (4/4/2016), patient wishes to be DNR/DNI; confirmed with daughter   Disposition: Inpatient pending progress.     Interval History  Course reviewed. Patient transferred to  yesterday afternoon.  Family at bedside on exam. Patient is awake, alert, and exhibiting expressive aphagia. Is able to answer most \"yes/no\" questions. Unclear if patient is experiencing receptive aphagia or confusion as patient follow only some commands/responds appropriately to some yes/no questions.  Moving all extremities, equal strength bilaterally.  Hyperkinesis noted, mostly on left side. No focal deficits.  Per family, patients language/confusion seems to be waxing/waning.  Patient denies pain/headache.  Does put hand up to face to rest head in hand and shade eyes frequently.  Continue ongoing neuro monitoring.   ROS x 8 negative with exception of those things listed in interval hx    Physical Exam  Temp: 97.9  F (36.6  C) Temp src: Oral BP: 150/65 mmHg   Heart Rate: 102 Resp: 18 SpO2: 91 % O2 Device: None (Room air) Oxygen Delivery: 2 LPM  Filed Vitals:    02/03/17 2041 02/04/17 0300   Weight: 57 kg (125 lb 10.6 oz) 55.7 kg (122 lb 12.7 oz)     Vital Signs with Ranges  Temp:  [97.2  F (36.2  C)-100.4  F (38  C)] 97.9  F (36.6  C)  Heart Rate:  [] 102  Resp:  [14-51] 18  BP: (111-153)/(46-90) 150/65 mmHg  SpO2:  [91 %-96 %] 91 %  I/O last 3 completed shifts:  In: 2206.25 [P.O.:520; I.V.:1686.25]  Out: 1540 [Urine:1540]      [place] Coma Scale - Total 13/15 (V3)    Constitutional: Awake, alert, cooperative, no apparent distress.   Eyes: " Lids and lashes normal, pupils equal 1+, round and reactive to light, extra ocular muscles intact, sclera clear, conjunctiva normal.  HENT: Normocephalic, occipital laceration with associated hematoma. No new drainage  Respiratory: No increased work of breathing, good air exchange, clear to auscultation bilaterally, no crackles or wheezing.  Cardiovascular:  regular rate and rhythm, normal S1 and S2, no S3 or S4. Systolic murmur noted.  GI: Normal bowel sounds, soft, non-distended, non-tender, no guarding  Genitourinary:  Hernandes draining clear yellow urine. To be removed today.   Skin:  Normal skin color, no redness, warmth, no abrasions, and no jaundice. Hematoma and laceration on right occipital scalp   Musculoskeletal: There is no redness, warmth, or swelling of the joints.  Pedal pulse palpated  Neurologic: Awake, alert, expressive aphagia noted.  Inappropriate words/somewhat slurred speach.  Cranial nerves II-XII are grossly intact.  Strength and sensory is intact.  No focal deficits  Neuropsychiatric: Calm, normal eye contact, alert.  Appears frustrated by expressive aphagia. Unclear if patient is experiencing receptive aphagia.     Medications       insulin aspart  1-10 Units Subcutaneous TID w/meals     gabapentin  400 mg Oral TID     acetaminophen (TYLENOL) tablet 1,000 mg  1,000 mg Oral TID     atorvastatin  10 mg Oral Daily     hypromellose-dextran  1 drop Both Eyes TID     metFORMIN  2,000 mg Oral Daily with supper     sodium chloride (PF)  3 mL Intracatheter Q8H     amLODIPine (NORVASC) tablet 2.5 mg  2.5 mg Oral Daily     hydrochlorothiazide  25 mg Oral Daily       Data  No results found for this or any previous visit (from the past 24 hour(s)).    TONYA Doss CNP  To contact the trauma service use job code pager [phone],   Numeric texts or alpha text through [facility]

## 2017-02-05 NOTE — PLAN OF CARE
Problem: Goal Outcome Summary  Goal: Goal Outcome Summary  Outcome: No Change  Pt s/p fall on 2/3 and sustaining a SDH. VSS; on 2L NC overnight to keep sats >92%. Pt has been oriented to self, stated name and birthday overnight for writer. Garbled speech that is illogical. Able to follow most commands. TORRES. Denies pain. Large bruise/erythema to posterior head. Coccyx is reddened, non-blanchable. WOC consult has been placed. PIV infusing at 75 ml/hr. Hernandes patent with adequate UOP. On regular diet. Up with assist of 2 and GB,  needs lots of cueing with activity. Bed/chair alarm on for safety. Pt independently turing self in bed, also repositioned with pillows. Continue to monitor and follow POC.

## 2017-02-06 ENCOUNTER — ALLIED HEALTH/NURSE VISIT (OUTPATIENT)
Dept: NEUROLOGY | Facility: CLINIC | Age: 82
DRG: 084 | End: 2017-02-06
Attending: PSYCHIATRY & NEUROLOGY
Payer: COMMERCIAL

## 2017-02-06 ENCOUNTER — APPOINTMENT (OUTPATIENT)
Dept: OCCUPATIONAL THERAPY | Facility: CLINIC | Age: 82
DRG: 084 | End: 2017-02-06
Attending: PSYCHIATRY & NEUROLOGY
Payer: COMMERCIAL

## 2017-02-06 LAB
GLUCOSE BLDC GLUCOMTR-MCNC: 166 MG/DL (ref 70–99)
GLUCOSE BLDC GLUCOMTR-MCNC: 171 MG/DL (ref 70–99)
GLUCOSE BLDC GLUCOMTR-MCNC: 177 MG/DL (ref 70–99)
GLUCOSE BLDC GLUCOMTR-MCNC: 196 MG/DL (ref 70–99)
GLUCOSE BLDC GLUCOMTR-MCNC: 238 MG/DL (ref 70–99)

## 2017-02-06 PROCEDURE — 95951 ZZHC EEG VIDEO EACH 24 HR: CPT | Mod: ZF

## 2017-02-06 PROCEDURE — 12000008 ZZH R&B INTERMEDIATE UMMC

## 2017-02-06 PROCEDURE — 00000146 ZZHCL STATISTIC GLUCOSE BY METER IP

## 2017-02-06 PROCEDURE — 40000133 ZZH STATISTIC OT WARD VISIT: Performed by: OCCUPATIONAL THERAPIST

## 2017-02-06 PROCEDURE — 25000132 ZZH RX MED GY IP 250 OP 250 PS 637: Performed by: NURSE PRACTITIONER

## 2017-02-06 PROCEDURE — 25000132 ZZH RX MED GY IP 250 OP 250 PS 637: Performed by: SURGERY

## 2017-02-06 PROCEDURE — 99211 OFF/OP EST MAY X REQ PHY/QHP: CPT

## 2017-02-06 PROCEDURE — 99222 1ST HOSP IP/OBS MODERATE 55: CPT | Performed by: NURSE PRACTITIONER

## 2017-02-06 PROCEDURE — 99207 ZZC CDG-CORRECTLY CODED, REVIEWED AND AGREE: CPT | Performed by: NURSE PRACTITIONER

## 2017-02-06 PROCEDURE — 40000115 ZZH STATISTIC NURSE TLC VISIT: Performed by: NURSE PRACTITIONER

## 2017-02-06 PROCEDURE — 97535 SELF CARE MNGMENT TRAINING: CPT | Mod: GO | Performed by: OCCUPATIONAL THERAPIST

## 2017-02-06 RX ADMIN — DEXTRAN 70 AND HYPROMELLOSE 2910 1 DROP: 1; 3 SOLUTION/ DROPS OPHTHALMIC at 08:48

## 2017-02-06 RX ADMIN — AMLODIPINE BESYLATE 2.5 MG: 2.5 TABLET ORAL at 08:48

## 2017-02-06 RX ADMIN — DEXTRAN 70 AND HYPROMELLOSE 2910 1 DROP: 1; 3 SOLUTION/ DROPS OPHTHALMIC at 13:33

## 2017-02-06 RX ADMIN — GABAPENTIN 400 MG: 250 SOLUTION ORAL at 13:33

## 2017-02-06 RX ADMIN — LISINOPRIL 20 MG: 20 TABLET ORAL at 08:48

## 2017-02-06 RX ADMIN — GABAPENTIN 400 MG: 250 SOLUTION ORAL at 08:47

## 2017-02-06 RX ADMIN — ACETAMINOPHEN 1000 MG: 500 TABLET, FILM COATED ORAL at 08:47

## 2017-02-06 RX ADMIN — DEXTRAN 70 AND HYPROMELLOSE 2910 1 DROP: 1; 3 SOLUTION/ DROPS OPHTHALMIC at 21:16

## 2017-02-06 RX ADMIN — ACETAMINOPHEN 1000 MG: 500 TABLET, FILM COATED ORAL at 21:16

## 2017-02-06 RX ADMIN — ACETAMINOPHEN 1000 MG: 500 TABLET, FILM COATED ORAL at 13:32

## 2017-02-06 RX ADMIN — ATORVASTATIN CALCIUM 10 MG: 10 TABLET, FILM COATED ORAL at 08:48

## 2017-02-06 RX ADMIN — HYDROCHLOROTHIAZIDE 25 MG: 25 TABLET ORAL at 08:49

## 2017-02-06 RX ADMIN — GABAPENTIN 400 MG: 250 SOLUTION ORAL at 21:16

## 2017-02-06 NOTE — PLAN OF CARE
Problem: Goal Outcome Summary  Goal: Goal Outcome Summary  Outcome: No Change  VSS, except HTN, which resolved after morning BP medications and restart of Lisinopril. Denies pain. Pt lethargic and disoriented x4. Intermittent clear words, but most of speech is illogical, garbled and slurred. Follows majority of commands. TORRES. Buttock is reddened and not blanchable; WOC consult placed. Area of erythema and ecchymosis on posterior head 2/2 fall. PIV SL. Poor PO intake on reg diet. Pt chews all pills, regardless if they are in applesauce. Please consider crushing future medications. Large loose BM x2 today; please hold bowel meds. Hernandes removed at 1200 and pt voided 300 mls at 1815. Please bladder scan after next void. Mg 1.5 and was replaced with redraw of 2.3. Up with assist x1 and gait belt. Up in chair x1 for breakfast. Bed/chair alarm on for safety. Will continue to monitor.

## 2017-02-06 NOTE — PROGRESS NOTES
Brief Trauma Update:  Patient with isolated intracranial injury and occipital scalp laceration following a fall on 2/3.  No additional traumatic injuries found.   Patient was transferred to the excellent care of the Neurological Team for management of subdural hematoma. Appreciate transfer of care and excellent service of ACC/Stroke team.    Trauma team will sign off at this time.    Please do not hesitate to contact if any further questions or concerns arise. Job code [id number]

## 2017-02-06 NOTE — DISCHARGE SUMMARY
[facility], [place]    Neurology Discharge Summary    Date of Admission: 2/3/2017  Date of Discharge: 2/7/2017    Disposition: Discharged to short-term care facility  Primary Care Physician: Carlene Serrano      Admission Diagnosis:   Subdural hematoma     Discharge Diagnosis:   Subdural hematoma    Problem Leading to Hospitalization (from HPI):   Ms. Anya Gaines is a 89 year old female with history of falls, type 2 diabetes, stoke (on aspirin, now on Plavix), and UTI who presents to the ED today for evaluation of loss of consciousness. The patient [facility] from [facility] after mechanical fall from standing height hitting her head on the ground resulting in loss of consciousness. The patient does not recall what lead to fall. She has visible hematoma to the right temporal region at the site of impact. She endorses left sided intercostal pain but denies impact to this area of her body during fall. She denies chest pain or shortness of breath before fall, stating it was an otherwise uneventful day prior to fall. The patient is on Plavix, her INR was 0.98.    Please see H&P dated 2/3/2017 for further details about presentation.    Brief Hospital Course:   Patient presented after her fall with LOC.  On presentation her GCS was 15/15.  Found to have a subdural hematoma on non contrast head CT.  Plavix was stopped.  Neurosurgery was consulted and the patient was not a candidate for surgical intervention. Patient was then admitted to the neuro ICU team.  Repeat head CT scan was obtained due to onset of aphasia, which revealed slight progression of bleed.  vEEG ruled out seizures.  The patient's aphasia mostly resolved by discharge, though she continued to be confused (only oriented to self). She will remain off plavix for 1 month, then have a follow-up head CT, then if stable resume plavix for stroke prevention. PT was consulted, recommended discharge to TCU, which was arranged by [facility]  work.      Work-up as stated below under Pertinent Investigations.     Rehab evaluation: PT.     Smoking Cessation: patient is not a smoker    BP Long-term Goal: 140/90 or less    Antithrombotic/Anticoagulant Agent: We took her off of plavix due to traumatic subdural hemorrhage, will restart in 1 month after head CT.      Statins: atorvastatin 10 mg       Hgb A1C Goal: < 7.0 (was between 7.3 and 7.9 here)    Complications: None.     Other problems addressed during this hospitalization:  Diabetes type II:  -Blood glucose monitoring was performed during her stay.  Was on ISS and metformin.    -Gabapentin 400 TID due to related neuropathic pain.      HTN:  Continued pta amlodipine 2.5 mg, HCTZ 25 mg and lisinopril 20 mg    PERTINENT INVESTIGATIONS    Labs  Lipid Panel:   Recent Labs   Lab Test 08/08/16   CHOL  125   HDL  58   LDL  48   TRIG  94     A1C: A1C      7.3   2/4/2017  INR:   Recent Labs  Lab 02/03/17  1730   INR 0.98      Imaging:  CT Cervical Spine w/o contrast:  1. No fractures are identified.  2. Multilevel degenerative change    CT Head w/o contrast:  1. Left temporal-frontal subdural hematoma. There is also a subdural hematoma along the left tentorium. These measure about 5-6 mm in thickness. No significant mass effect or shift associated with the small subdurals.  2. Atrophy of the brain.  White matter changes consistent with small vessel ischemic disease.       Repeat CT head w/o contrast:  1.  Worsening of left frontotemporal lobe and left cerebellum tentorium subdural hematoma with increased mass effect resulting in partial effacement of left lateral ventricle and increased left-to-right subfalcine midline shift.  2. Slight interval worsening of anterior-inferior bifrontal contusion hemorrhages.    XR chest 2 views:  No acute disease.    Video EEG:       I reviewed the first 1 hour of video-EEG monitoring for Anya Gaines       The EEG was abnormal during waking due to generalized theta-delta  slowing with left hemispheric persistent polymorphic delta slowing of left frontotemporal amplitude predominance, and occasional right frontotemporal delta slowing.      No electrographic seizures and no interictal epileptiform abnormalities occurred.       These findings indicate moderate generalized cerebral dysfunction and much greater dysfunction of the left hemisphere, and exclude a diagnosis of nonconvulsive status epilepticus as the cause of encephalopathy.    PHYSICAL EXAMINATION  Vital Signs:  B/P: 105/66, T: 97.5, P: 101, R: 16    GEN: Patient sleeping in bed in no acute distress.  Asked to Transition to her chair for interview.   HEENT: Bruise noted to the back of the head.  Dentures in place.    Cardio: Mildly tachycardic.  RRR.  Pulmonary: No respiratory distress  Abdomen: Soft  Extremities: No edema   Skin: Intact, warm, dry.     Neurologic:   Mental status: alert to self only,  follows commands, speech clear and fluent, intact naming and repetition  CN:  visual fields intact, PERRL, EOMI with normal smooth pursuit, facial sensation intact and symmetric, facial movements symmetric, hearing not formally tested but intact to conversation, palate elevation symmetric and uvula midline, no dysarthria, shoulder shrug strong bilaterally, tongue protrusion midline  Motor:  No cogwheeling rigidity, no clear diminution in finger or toe tapping, normal tone throughout, normal muscle bulk, no pronator drift, able to move all limbs spontaneously, strength 5/5 throughout upper and lower extremities  Reflexes:  2+ and symmetric throughout, no clonus, toes downgoing  Sensory:  Intact to light touch in the upper and lower extermities.    Coordination:  FNF intact, H2S intact      NIHSS: 0    Medications    Current Discharge Medication List      CONTINUE these medications which have NOT CHANGED    Details   atorvastatin (LIPITOR) 10 MG tablet Take 1 tablet (10 mg) by mouth daily  Qty: 30 tablet, Refills: 1    Associated  Diagnoses: Mixed hyperlipidemia      Acetaminophen (TYLENOL PO) Take 1,000 mg by mouth 3 times daily      AMLODIPINE BESYLATE PO Take 2.5 mg by mouth daily      lisinopril (PRINIVIL,ZESTRIL) 40 MG tablet Take 1 tablet (40 mg) by mouth daily  Qty: 30 tablet, Refills: 5    Associated Diagnoses: Essential hypertension with goal blood pressure less than 140/90      calcium carbonate (OS- MG [place]. [place]) 600 MG tablet Take 1 tablet (600 mg) by mouth daily  Qty: 60 tablet, Refills: 5    Associated Diagnoses: Osteoporosis      gabapentin (NEURONTIN) 100 MG capsule Take 4 capsules (400 mg) by mouth 3 times daily  Qty: 90 capsule, Refills: 5    Associated Diagnoses: Abnormal involuntary movement      multivitamin  with lutein (OCUVITE WITH LTEIN) CAPS Take 1 capsule by mouth daily  Qty: 30 capsule, Refills: 5    Associated Diagnoses: Glaucoma of both eyes, unspecified glaucoma      Menthol, Topical Analgesic, (BIOFREEZE) 4 % GEL Externally apply topically 3 times daily as needed       hypromellose-dextran 0.3-0.1% (ARTIFICIAL TEARS) opthalmic solution Place 1 drop into both eyes 3 times daily      loperamide (IMODIUM A-D) 2 MG tablet Take 2 mg by mouth 4 times daily as needed for diarrhea       hydrochlorothiazide (HYDRODIURIL) 25 MG tablet Take 1 tablet (25 mg) by mouth daily  Qty: 90 tablet, Refills: 3    Associated Diagnoses: Benign essential hypertension      metFORMIN (GLUCOPHAGE-XR) 500 MG 24 hr tablet Take 2,000 mg by mouth daily (with dinner)         STOP taking these medications       clopidogrel (PLAVIX) 75 MG tablet Comments:   Reason for Stopping:         loperamide (IMODIUM) 2 MG capsule Comments:   Reason for Stopping:               Additional recommendations and follow up:      NEUROLOGY ADULT REFERRAL     General info for SNF   Length of Stay Estimate: Long Term Care  Condition at Discharge: Improving  Level of care:skilled   Rehabilitation Potential: Good  Admission H&P remains valid and up-to-date:  Yes  Recent Chemotherapy: N/A  Use Nursing Home Standing Orders: Yes     Mantoux instructions   Give two-step Mantoux (PPD) Per Facility Policy Yes     Reason for your hospital stay   You fell which caused a bleed around the surface of your brain.     Glucose monitor nursing POCT   Before meals and at bedtime     Activity - Up with assistive device     Follow Up and recommended labs and tests   Follow up with primary care provider, Carlene Serrano, within 7 days for hospital follow- up.  The following labs/tests are recommended: Head CT without contrast in 1 month (~3/6).  Stop plavix until then.     Physical Therapy Adult Consult   Evaluate and treat as clinically indicated.    Reason:  Subdural Hemorrhage     Occupational Therapy Adult Consult   Evaluate and treat as clinically indicated.    Reason:  Subdural Hemorrhage     Speech Language Path Adult Consult   Evaluate and treat as clinically indicated.    Reason:  Intermittent aphasia 2/2 subdural hemorrhage     Fall precautions     Advance Diet as Tolerated   Follow this diet upon discharge: Orders Placed This Encounter  Regular Diet Adult         Patient was seen and discussed with the Attending, Dr. Buckley.    Jean Godfrey MS3  Camilo Velazquez, PGY2  Pager [phone]

## 2017-02-06 NOTE — PLAN OF CARE
Problem: Goal Outcome Summary  Goal: Goal Outcome Summary  PT 6A: Cancel - At time of attempt, pt with other provider. At time of second attempt, pt just returned back to bed. Will re-schedule as appropriate.

## 2017-02-06 NOTE — PROGRESS NOTES
Focus: Buttocks  History per MD notes: Anya Gaines is a 89 year old female with HTN, HLD, TIA (on plavix), PD (not on medications) admitted 2/3 s/p fall and found to have a subdural hematoma. NSG consulted and not a surgical candidate. She had new onset aphasia today and HCT showed progression of bleed, therefore transferred to NCC/Stroke team 2/5/2017 for further management.     A: Pt sitting in chair.  Upon standing has bright red erythema on bilateral buttocks.  Blanches easily after approximately 30-40 seconds of standing.  Left buttock reabsorbing serous filled blister measures 0.6 x 1.4 x 0 cm.  Etiology likely friction versus shear/pressure.      I/P: Friction erythema.  Plan of care: Geomatt cushion for pressure reduction while sitting in chair.  Encourage lift and repostition while in chair.  Encourage turn and reposition while in bed.  Sween cream to buttocks as needed.      Will sign off please reconsult if needed.      Face to face time: 15 minutes.

## 2017-02-06 NOTE — PROGRESS NOTES
INITIAL REPORT of Video-EEG Monitoring    DATE OF RECORDIN2017         I reviewed the first 1 hour of video-EEG monitoring for Anya Gaines       The EEG was abnormal during waking due to generalized theta-delta slowing with left hemispheric persistent polymorphic delta slowing of left frontotemporal amplitude predominance, and occasional right frontotemporal delta slowing.    No electrographic seizures and no interictal epileptiform abnormalities occurred.       These findings indicate moderate generalized cerebral dysfunction and much greater dysfunction of the left hemisphere, and exclude a diagnosis of nonconvulsive status epilepticus as the cause of encephalopathy.  Antonio Meyer M.D., [facility] [phone]    Addendum: No significant change in severe left hemispheric slowing during sleep, with no electrographic seizures, as of 3:30 AM on 2017.  Antonio Meyer M.D.

## 2017-02-06 NOTE — PROGRESS NOTES
Rounded on pt. No family with her. Visited and did review head injury discharge instructions, but pt not able to remember a few minutes later.

## 2017-02-06 NOTE — PLAN OF CARE
Problem: Goal Outcome Summary  Goal: Goal Outcome Summary  OT/6A: Pt alert, oriented to self and place. Pt improved participation/cognition from previous therapy session requiring less assist/cues for completion of ADLs. Pt performing bed mobility with CGA, bed>chair with min A. Pt completing grooming tasks while seated with SBA following set up.     REC: TCU to maximize safety and IND with ADLs.

## 2017-02-06 NOTE — CONSULTS
"[facility]  Palliative Care Consultation         Anya Gaines MRN# [id number]   Age: 89 year old YOB: 1920   Date of Admission: 2/3/2017    Reason for consult: Goals of care    Patient and family support       Requesting physician/service: Trauma service       Recommendations      Pt was seen and examined. Family present included son and two of her 27 grandchildren. Family goals are appropriately cautious with TCU for functional recovery but accepting that this fall may be the sentinel event that leads to SNF placement rather than A/L environment. Recurrent falls and increased incontinence with subtle cognitive change noted. Family pleased that aphasia from yesterday is improved. She is oriented to self and some family members. Unable to recall where she lives, where she is now or what happened to her. Denies pain. Able to participate in interview and exam.   REC: - continue with dnr/dni status  - SNF placement may be indicated dependent on functional/cognitive recovery.   -  to see  - Family seemed surprized at PD diagnosis- would benefit from further explanation.         POLST : Not completed. Has been designated as dnr/dni     Disease Process/es & Symptoms   1. Subdural hematoma- mechanical fall in A/L facility- hx of recurrent falls  Other diagnoses:    1. Parkinsons Disease  2. TIA on Plavix  3. DM2  4. HTN  5. HLD       Support/Coping   (We typically ask each of our patients the following questions:)    How do you make sense of this? N/A  Are you Anabaptist? Spiritual? Not so much? - Mandaen bill is important to her  What are your concerns, medical and non-medical, that are not being addressed? Help with decision about post TCU placement care needs and safety  Who are your \"go-to\" people when you need support? Large extended family    Plan for psychosocial/spiritual support/follow-up from palliative team: if remans in patient.      Decision-Making & Goals " of Care     Discussion/counseling today about prognosis/goals of care/decisions:   With family and trauma service  Patient has a completed health care directive available in the chart (Y)  Health care agent (only if patient has an available, complete HCD):  Physician orders for life-sustaining treatment (POLST) form is not completed  Code Status: Do not resusitate / Do not intubate   Patient has decision-making capacity (N)- may be temporary post CHT    Coordination of Care     Findings & plan of care discussed with: trauma service and family present  Follow-up plan from palliative team: as needed  Thank you for involving us in the patient's care.     Usman CASTANEDA NP  Nurse Practitioner- Lead Advanced Practice Provider  [facility] Palliative Medicine Consult Service   [phone]          Chief Complaint     Wanting EEG leads removed         History of Present Illness     History obtained from: EPIC and pt family report  89 year old with a history of DM II, Parkinson's disease, Hypertension, Hyperlipidemia and history of TIA [on plavix] who presented after a mechanical fall- her walker becoming caught up. She has no recall of the fall. CT head showed a 0.5cm thickness subdural hematoma along the left frontal-temporal region and along the left tentorium. CT C spine shows no acute fracture. She was started on a nicardipine ggt for SBP >200. Platelets are 168 and 2 units were transfused in the ER, Na 137, INR is 0.98.  Palliative care consulted as part of trauma evaluation.            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 Unable to share previous hobbies- crocheting, TV and reading due to visual changes   History of substance use/abuse: None            Family History:   No family history on file.  Family history reviewed and updated in EPIC           Allergies:   No Known Allergies           Medications:   I have reviewed this patient's medication profile and medications during this hospitalization             Review of Systems:   The comprehensive review of systems is negative other than noted here and in the HPI.    Palliative Symptom Review (0=no symptom/no concern, 1=mild, 2=moderate, 3=severe):      Pain: 0-1      Fatigue: 0-1      Complete palliative ROS unreliable secondary to altered mental status and baseline confusion with new SDH.      Physical exam: Vitals: /69 mmHg  Pulse 92  Temp(Src) 96.8  F (36  C) (Oral)  Resp 16  Ht 1.499 m (4' 11\")  Wt 55.7 kg (122 lb 12.7 oz)  BMI 24.79 kg/m2  SpO2 93%  BMI= Body mass index is 24.79 kg/(m^2).  Constitutional: Sitting up in bedside chair. EEG leads on scalp Awake, alert, cooperative, no apparent distress.    HEENT:  EOMI. Normocephalic, occipital ecchymosis. No new drainage  Respiratory: No increased work of breathing, good air exchange, clear to auscultation bilaterally, no crackles or wheezing.   Cardiovascular:  RRR, tachycardic ~100 bpm, S1/S2, Grade 1-2/6 Systolic murmur noted.  GI: Normal bowel sounds, soft, non-distended, non-tender with brief exam  Genitourinary:  Hernandes was draining clear yellow urine. Was removed.    Skin:  Normal skin color, no redness, warmth, no abrasions, and no jaundice. bruising right occipital scalp   Musculoskeletal: There is no redness, warmth, no edema or swelling of the joints.  Pedal pulse " palpated  Neurologic: Awake, alert as above.  Strength and sensory is intact. R handed. No focal deficits  Neuropsychiatric: Calm, normal eye contact, alert.            Data Reviewed:     ROUTINE LABS (Last four results)  BMP  Recent Labs  Lab 02/04/17 0442 02/03/17 2051 02/03/17  1730    135 137   POTASSIUM 3.4 2.9* 3.0*   CHLORIDE 100 98 100   KEN 8.6 9.1 9.6   CO2 24 25 29   BUN 18 16 19   CR 0.44* 0.46* 0.43*   * 220* 134*     CBC  Recent Labs  Lab 02/05/17  0925 02/04/17 0442 02/03/17 2051 02/03/17  1730   WBC 10.8 9.1 13.7* 5.2   RBC 4.00 3.90 4.20 4.45   HGB 11.4* 11.2* 12.2 12.7   HCT 36.2 34.4* 37.3 39.3   MCV 91 88 89 88   MCH 28.5 28.7 29.0 28.5   MCHC 31.5 32.6 32.7 32.3   RDW 13.4 13.3 13.2 12.7    200 198 168     INR  Recent Labs  Lab 02/03/17  1730   INR 0.98

## 2017-02-06 NOTE — PROCEDURES
EEG#:  -1 (Day 1 of Video EEG Monitoring)     DATE OF RECORDIN2017      DURATION OF RECORDIN hours and 11 minutes.      DAY #1 OF VIDEO-EEG MONITORING.        PATIENT SUMMARY:  Anya Gaines is a 89-year-old female who is being evaluated for seizures after a fall with subsequent subdural hematoma.  On this day of recording, patient was reported to be taking gabapentin.      TECHNICAL SUMMARY:  This continuous EEG monitoring procedure was performed with 23 scalp electrodes in the 10-20 system of placement, and additional scalp, precordial and other surface electrodes were used for electrical referencing and artifact detection. The technologist performed additional digital data analyses after recording was completed, and the interpreting physician reviewed these additional analyses and the raw data to localize and measure epileptiform and nonepileptiform cerebral potentials, as reported below. Video monitoring was utilized and periodically reviewed by EEG technologist and physician for electroclinical correlation.       BACKGROUND EEG ACTIVITIES:  During maximal wakefulness, there was 9 Hz posterior dominant rhythm that attenuates with eye opening, seen clearly over the right hemisphere, but somewhat attenuated on the left.  The interictal EEG shows a diffuse intermittent theta-delta slowing with more persistent polymorphic delta slowing in the left hemisphere, maximal over the left frontotemporal region.  There is also an asymmetry with higher amplitude over the left hemisphere, maximal over the left frontotemporal region.  During stage II sleep, well-formed symmetric sleep spindles and vertex sharp transients are observed.       There were rare spike and sharp waves seen over the left posterior temporal region, with phase reversal at T5.      ICTAL ACTIVITIES:  There were no electrographic seizures and no paroxysmal behavioral events recorded on this day.      SUMMARY OF DAY #1 OF VIDEO EEG  MONITORING:  The interictal recording is abnormal due to presence of diffuse, intermittent theta-delta slowing during waking with more persistent polymorphic delta slowing over the left hemisphere with left frontotemporal maximum.  There were rare interictal sharp discharges in the left frontotemporal region.  No electrographic seizures or paroxysmal behavioral events were recorded on this day of recording.  Findings are consistent with moderate generalized electrographic encephalopathy, which is etiologically nonspecific.  It also shows focal cerebral dysfunction in the left hemisphere, and an area of irritability over the left temporal region.  No electrographic or clinical seizures were recorded.  Clinical correlation is recommended.        I personally reviewed the EEG and agree with the reported findings.   NITESH HAN MD       As dictated by YASIR WADE MD            D: 2017 11:34   T: 2017 13:11   MT: DEIDRA      Name:     TAMAR ELLINGTON   MRN:      [id number]        Account:        [id number]   :      1920           Procedure Date: 2017      Document: [id number]

## 2017-02-06 NOTE — PLAN OF CARE
Problem: Individualization  Goal: 2. Patient Specific Individualization  Outcome: No Change  BP runs high, but on permissive measures, other vitals stable. Disoriented x4, having expressive aphasia. Unable to follow commands, denies pain. Buttock is reddened and not blanchable.  Some  ecchymosis on her posterior head 2/2 fall. PIV SL, She is on EEG monitoring for SZ and no event was noticed. Up to bedside commode with assist of one person. Takes pills with apple sauce, Continue with POC.

## 2017-02-06 NOTE — PROGRESS NOTES
AVSS, except has HTN, within parameters.  Denies pain.  Alert, more confused with 08 neuros than with 1200 neuros.  Currently oriented to self and place only.  Speech is slurred.  [place], wears device.  Follows most commands.  TORRES.  PIV s/l.  Jarrett regular diet, fair PO.  Help patient into chair, tray must be set-up, feeds self.  Pills crushed in applesauce.  Voiding spont into commode.  Up with SBA1, and gait belt, transfer to commode.  VEEG leads removed this afternoon.  Skin: buttocks redness is blanchable, [facility] nursing saw her, recommended sween creme and freq repositions.  Posterior head area has ecchymosis d/t fall on 2/2.  Pleasant cooperative.  Family visiting most of shift.  Dinner has been ordered for 1800.  Continue with current POC.

## 2017-02-07 ENCOUNTER — CARE COORDINATION (OUTPATIENT)
Dept: GERIATRIC MEDICINE | Facility: CLINIC | Age: 82
End: 2017-02-07

## 2017-02-07 VITALS
HEART RATE: 88 BPM | WEIGHT: 122.8 LBS | HEIGHT: 59 IN | TEMPERATURE: 97.2 F | BODY MASS INDEX: 24.76 KG/M2 | OXYGEN SATURATION: 95 % | SYSTOLIC BLOOD PRESSURE: 101 MMHG | DIASTOLIC BLOOD PRESSURE: 50 MMHG | RESPIRATION RATE: 16 BRPM

## 2017-02-07 LAB
GLUCOSE BLDC GLUCOMTR-MCNC: 156 MG/DL (ref 70–99)
GLUCOSE BLDC GLUCOMTR-MCNC: 186 MG/DL (ref 70–99)
GLUCOSE BLDC GLUCOMTR-MCNC: 223 MG/DL (ref 70–99)
GLUCOSE BLDC GLUCOMTR-MCNC: 309 MG/DL (ref 70–99)
INTERPRETATION ECG - MUSE: NORMAL

## 2017-02-07 PROCEDURE — 99232 SBSQ HOSP IP/OBS MODERATE 35: CPT | Performed by: NURSE PRACTITIONER

## 2017-02-07 PROCEDURE — 99207 ZZC CDG-CORRECTLY CODED, REVIEWED AND AGREE: CPT | Performed by: NURSE PRACTITIONER

## 2017-02-07 PROCEDURE — 00000146 ZZHCL STATISTIC GLUCOSE BY METER IP

## 2017-02-07 PROCEDURE — 25000132 ZZH RX MED GY IP 250 OP 250 PS 637: Performed by: NURSE PRACTITIONER

## 2017-02-07 PROCEDURE — 25000132 ZZH RX MED GY IP 250 OP 250 PS 637: Performed by: SURGERY

## 2017-02-07 RX ORDER — ACETAMINOPHEN 325 MG/1
1000 TABLET ORAL EVERY 6 HOURS PRN
Qty: 100 TABLET | DISCHARGE
Start: 2017-02-07 | End: 2017-03-17

## 2017-02-07 RX ORDER — AMLODIPINE BESYLATE 2.5 MG/1
2.5 TABLET ORAL DAILY
Qty: 30 TABLET | DISCHARGE
Start: 2017-02-07 | End: 2017-03-17

## 2017-02-07 RX ORDER — DIPHENHYDRAMINE HCL 25 MG
1 CAPSULE ORAL 3 TIMES DAILY
Qty: 15 ML | Refills: 0 | DISCHARGE
Start: 2017-02-07 | End: 2017-06-05

## 2017-02-07 RX ORDER — HYDROCHLOROTHIAZIDE 25 MG/1
25 TABLET ORAL DAILY
Qty: 90 TABLET | Refills: 3 | DISCHARGE
Start: 2017-02-07 | End: 2017-05-23

## 2017-02-07 RX ORDER — ATORVASTATIN CALCIUM 10 MG/1
10 TABLET, FILM COATED ORAL DAILY
Qty: 30 TABLET | Refills: 1 | DISCHARGE
Start: 2017-02-07 | End: 2017-04-30

## 2017-02-07 RX ORDER — LISINOPRIL 40 MG/1
40 TABLET ORAL DAILY
Qty: 30 TABLET | Refills: 5 | DISCHARGE
Start: 2017-02-07 | End: 2018-08-16

## 2017-02-07 RX ORDER — PHENOL 1.4 %
1 AEROSOL, SPRAY (ML) MUCOUS MEMBRANE DAILY
Qty: 60 TABLET | Refills: 5 | DISCHARGE
Start: 2017-02-07 | End: 2017-05-23

## 2017-02-07 RX ORDER — VIT C/E/ZN/COPPR/LUTEIN/ZEAXAN 60 MG-6 MG
1 CAPSULE ORAL DAILY
Qty: 30 CAPSULE | Refills: 5 | DISCHARGE
Start: 2017-02-07 | End: 2018-08-16

## 2017-02-07 RX ORDER — METFORMIN HCL 500 MG
2000 TABLET, EXTENDED RELEASE 24 HR ORAL
Qty: 30 TABLET | DISCHARGE
Start: 2017-02-07 | End: 2018-08-16

## 2017-02-07 RX ORDER — GABAPENTIN 100 MG/1
400 CAPSULE ORAL 3 TIMES DAILY
Qty: 90 CAPSULE | Refills: 5 | DISCHARGE
Start: 2017-02-07 | End: 2017-02-17

## 2017-02-07 RX ADMIN — GABAPENTIN 400 MG: 250 SOLUTION ORAL at 09:12

## 2017-02-07 RX ADMIN — DEXTRAN 70 AND HYPROMELLOSE 2910 1 DROP: 1; 3 SOLUTION/ DROPS OPHTHALMIC at 09:12

## 2017-02-07 RX ADMIN — ACETAMINOPHEN 1000 MG: 500 TABLET, FILM COATED ORAL at 09:09

## 2017-02-07 RX ADMIN — GABAPENTIN 400 MG: 250 SOLUTION ORAL at 13:48

## 2017-02-07 RX ADMIN — ATORVASTATIN CALCIUM 10 MG: 10 TABLET, FILM COATED ORAL at 09:09

## 2017-02-07 RX ADMIN — ACETAMINOPHEN 1000 MG: 500 TABLET, FILM COATED ORAL at 13:48

## 2017-02-07 RX ADMIN — AMLODIPINE BESYLATE 2.5 MG: 2.5 TABLET ORAL at 09:09

## 2017-02-07 RX ADMIN — LISINOPRIL 20 MG: 20 TABLET ORAL at 09:09

## 2017-02-07 RX ADMIN — HYDROCHLOROTHIAZIDE 25 MG: 25 TABLET ORAL at 09:09

## 2017-02-07 RX ADMIN — DEXTRAN 70 AND HYPROMELLOSE 2910 1 DROP: 1; 3 SOLUTION/ DROPS OPHTHALMIC at 13:48

## 2017-02-07 NOTE — PLAN OF CARE
"Problem: Goal Outcome Summary  Goal: Goal Outcome Summary  /67 mmHg  Pulse 78  Temp(Src) 97.5  F (36.4  C) (Axillary)  Resp 16  Ht 1.499 m (4' 11\")  Wt 55.7 kg (122 lb 12.7 oz)  BMI 24.79 kg/m2  SpO2 96%   VSS, alert and oriented to person and month, not oriented to place, situation, or date; occasionally confused. Neuros difficult to assess at first check, not following commands, speech aphasia; at 0400 she had very little aphasia, was more alert, followed commands appropriately.  PIV SL. [place]; wears hearing aids. On regular diet; tolerating well, no BM this shift. Up with assist x 1 and walker. Skin on buttocks is red, blanchable. Turned and repositioned q2hr. Pt often gets out of bed without calling to try and get on commode; bed alarm on. Voiding spontaneously.  Continue to monitor and follow POC.       "

## 2017-02-07 NOTE — PROGRESS NOTES
"Social Work: Assessment with Discharge Plan    Patient Name:  Anya Gaines  :  1920  Age:  89 year old  MRN:  [id number]  Risk/Complexity Score:  Filed Complexity Screen Score: 9  Completed assessment with:  Son (Get)    Presenting Information   Reason for Referral:  Assessment and Discharge Planning  Date of Intake:  2017  Referral Source:  Case Finding  Decision Maker:  Pt  Alternate Decision Maker:  Daughter, Kimi  Health Care Directive:  Son, Get, is unsure of whether or not pt has a HCD.  Living Situation: Pt lives alone in an assistive living apt at \"[facility] in [place].  Pt's apt is on the 3rd floor. The building is accessible by elevator. There are no steps on the outside of the building to enter.  Pt's bathroom is equipped with a walk in shower.   Previous Functional Status:   \"[facility] provides pt with a lifelines like necklace, 2 meals per day (pt chooses to make her own breakfast), shower assistance, medication administration and weekly assistance with housekeeping and laundry.  Prior to hospitalization, pt was indep with all mobility (pt used a ww, pt has had an estimated 4 falls in the past year), adl's (with the exception of bathing) and with breakfast meal preparation. Pt's children complete her shopping tasks.  Pt's children: Will Kimi and Get assist pt with financial mgnt.     Patient and family understanding of hospitalization: fall with hematoma  Cultural/Language/Spiritual Considerations: Faith  Adjustment to Illness:  Son is hopeful that pt will return to previous level of independence.    Physical Health  Reason for Admission:    1. Subdural hemorrhage (H)      Services Needed/Recommended:  TCU    Mental Health/Chemical Dependency  Diagnosis:  Not applicable  Support/Services in Place:  N/A  Services Needed/Recommended:  N/A    Support System  Significant relationship at present time:  Adult children:  1.  Get ([place])  2.  Kimi ([place])  " 3.  Will ([place])  4.  Gino ([place])  5.  Devon ([place])  6.  Jennifer ([place])  7.  Moira ([place])  8.  Gm ([place])  Family of origin is available for support:  Adult children are available for support  Other support available:  Patricia OCAMPO and [facility] CC  Gaps in support system:  None identified  Patient is caregiver to:  None     Provider Information   Primary Care Physician:  Carlene Serrano   [phone]   Clinic:   [facility] [address] / [place] *      :  Arely Duckworth ([phone]), [facility] Care Coordinator.  TAYLOR spoke with Arely and informed her of the discharge plan.    Financial   Income Source:  Social Security  Financial Concerns:  Limited income.  Per Get, he as well as siblings Kimi and Berny, provide money to pt.  Insurance:    Payor/Plan Subscriber Name Rel Member # Group #   MEDICA - MEDICA DUAL * TAMAR ELLINGTON  [id number]      [address] [address]       Discharge Plan   Patient and family discharge goal:  Rehab placement at [facility] (pt has been at [facility] in the past) followed by return to home.  Provided education on discharge plan:  YES  Patient agreeable to discharge plan:  YES  A list of Medicare Certified Facilities was provided to the patient and/or family to encourage patient choice. Patient's choices for facility are:  No as [facility] was requested.  TAYLOR phoned [facility] ([phone]) and spoke with Arely in Admissions.  Per Arely, they have openings.  Referred pt and faxed assessment materials.  Arely assessed and approved pt for admit.    Will NH provide Skilled rehabilitation or complex medical:  YES  General information regarding anticipated insurance coverage and possible out of pocket cost was discussed. Patient and patient's family are aware patient may incur the cost of transportation to the facility, pending insurance payment: YES  Barriers to discharge:  None  identified    Discharge Recommendations   Anticipated Disposition:  Pt will discharge to [facility] ([phone]) on 2/7/17 at 4pm.  [facility] ([facility] [phone]) will provide w/c transport at 4pm.  Pt has Medical Assistance, number [phone].   Pt, son, 6A nursing and MD have been informed of the discharge arrangements.   SW completed a PAS referral, reference number [id number].  SW will fax orders and summary to the receiving facility when they become available.    FATOUMATA Monge  Social Work, 6A  Phone:  [phone]  Pager:  [phone]  2/7/2017

## 2017-02-07 NOTE — PROGRESS NOTES
"[facility], [place]   Neurology Daily Note  Anya Gaines  [id number]  02/06/2017    Subjective:  No acute events o/n. Pt is only mildly aphasic this AM. Anticipate d/c to TCU in near future.     Objective   /86 mmHg  Pulse 85  Temp(Src) 95.2  F (35.1  C) (Axillary)  Resp 16  Ht 1.499 m (4' 11\")  Wt 55.7 kg (122 lb 12.7 oz)  BMI 24.79 kg/m2  SpO2 98%  General: pt laying comfortably in bed, breathing easily on ra, in NAD   HEENT: no oral ulcers   Chest: cta   Heart: rrr  Abdomen: soft, nt, nd, +BS  Ext: no edema   Skin: no rashes  Neuro:   -MS: alert, oriented to person only, speech fluent though rare paraphasic errors noted as well as mild difficulty with repetition. Able to follow simple but not complex commands.  -CN: vff, perrla, eomi, facial sensation and movement intact b/l, hearing intact to conversation, palate raises symmetrically, shoulder shrug and scm intact, tongue midline  -Motor:   --LUE: 5/5 shoulder abd, arm flex/ext, wrist flex/ext, finger flex/ext/abd/add  --RUE: 5/5 shoulder abd, arm flex/ext, wrist flex/ext, finger flex/ext/abd/add  --LLE: 5/5 hip flexor, leg flex/ext, plantar/dorsiflexion  --RLE: 5/5 hip flexor, leg flex/ext, plantar/dorsiflexion  -Reflexes: normoactive and symmetric at achilles, patella, brachioradialis, and biceps. Toes downgoing b/l   -Coordination: intact to FNF, H2S b/l  -Gait: deferred    Investigations    A1c 7.3    Head CT 2/4/2017  Impression:    1.  Worsening of left frontotemporal lobe and left cerebellum  tentorium subdural hematoma with increased mass effect resulting in  partial effacement of left lateral ventricle and increased  left-to-right subfalcine midline shift.  2. Slight interval worsening of anterior-inferior bifrontal contusion  hemorrhages.    Assessment and Plan   Anya Gaines is a 89 year old year old female h/o recurrent falls, HTN, T2DM, and TIA who presented 2/3/2017 after fall, discovered to have L " subdural hemorrhage and bifrontal contusion hemorrhages.    #Traumatic Subdural Hemorrhage:   # bifrontal contusion hemorrhages  -stopped pta plavix (started for TIA while on asa)  -resume plavix in 1 month after Head CT  -goal BP<140  -neurochecks    #HTN   -cont pta amlodipine 2.5, hctz 25, lisinopril 20    #HLD   -cont pta atorva 10    #T2DM   -pta metformin 2g  -SSI  -gabapentin 400 TID    FEN: replace prn  PPx: mechanical only  Code: DNR/DNI    Dispo: TCU    Patient was seen and discussed with Dr. Marcio Velazquez MD  Neurology G2  [phone]

## 2017-02-07 NOTE — PROCEDURES
EEG#:  -2      DAY #2 of VIDEO EEG MONITORING      DATE OF RECORDIN2017      DURATION OF RECORDING:  Twelve hours and 38 minutes.      CLINICAL HISTORY:  Anya Gaines is a 89-year-old female who had a fall and had subsequent subdural hematoma.  On this day of recording, the patient reportedly receiving gabapentin.     TECHNICAL SUMMARY: This continuous video- EEG monitoring procedure was performed with 23 scalp electrodes in 10-20 electrode system of placement, and additional scalp, precordial and other surface electrodes used for electrical referencing and artifact detection.  Video monitoring was utilized and periodically reviewed by EEG technologists and the physician for electroclinical correlations.   EEG ACTIVITIES:  During maximal wakefulness there is symmetric, well-modulated 9 Hz posterior dominant rhythm that attenuates with eye opening.  There is diffuse intermittent theta slowing in wakefulness.  There is additional occasional delta slowing in the left hemisphere, maximal in the left central parietal area.  No interictal epileptiform discharges are observed today.  Drowsiness is manifested by drop out of the posterior dominant rhythm and diffuse theta activity.  Sleep stage II was recorded and it showed symmetric sleep spindles and vertex sharp transients.      ICTAL RECORDING:  There were no electrographic features and no paroxysmal behavioral events were recorded during this video EEG monitoring:        SUMMARY OF DAY #2 of VIDEO EEG MONITORING:  This EEG is abnormal due to the presence of diffuse intermittent theta slowing during wakefulness with an additional occasional delta slowing in the left hemisphere, maximal left centroparietal region.  Slowing in the left hemisphere is much improved from the day before in that it is much less frequent.  No interictal epileptiform discharges were present.  No electrographic seizures and no paroxysmal behavioral events were recorded today.   Findings are consistent with mild difuse encephalopathy and focal structural abnormality in the left central parietal region.  Clinical correlation is recommended.         I personally reviewed the video EEG and agree with the reported findings.    NITESH HAN MD       As dictated by YASIR WADE MD            D: 2017 13:10   T: 2017 13:40   MT: LS      Name:     TAMAR ELLINGTON   MRN:      [id number]        Account:        [id number]   :      1920           Procedure Date: 2017      Document: [id number]

## 2017-02-07 NOTE — PROGRESS NOTES
[facility], [place]   Palliative Care Daily Progress Note          Recommendations, Patient/Family Counseling & Coordination   Primary problem: SDH from mechanical fall in her A/L facility (doi 2/3/17)  Recurrent falls and increased incontinence with subtle cognitive change noted at the time of admission. Family pleased that aphasia from earlier in stay is improved. She is oriented to self and some family members. Unable to recall where she lives, where she is now or what happened to her. Again she denies pain. Able to participate in interview and exam.    REC: - continues with dnr/dni status  - SNF placement appears indicated dependent on functional/cognitive recovery.    -  following here  - 2/6/17 -Family seemed surprized at PD diagnosis- would benefit from further explanation.         POLST : Hope to complete prior to hospital discharge     Thank you for the opportunity to continue to participate in the care of this patient and family.  Please feel free to contact on-call palliative provider with any emergent needs.  We can be reached via team pager [phone] (answered 8-4:30 Monday-Friday); after-hours answering service ([phone]); Main Palliative Clinic - PWB 1C: [phone].       Usman CASTANEDA NP  Nurse Practitioner- Lead Advanced Practice Provider  [facility] Palliative Medicine Consult Service   [phone]        Assessment      1) Diagnoses & symptoms:        1. Subdural hematoma- mechanical fall in A/L facility- hx of recurrent falls  Other diagnoses:    1. Parkinsons Disease  2. TIA on Plavix  3. DM2  4. HTN  5. HLD     2)  Psychosocial/Spiritual Needs:   Ongoing: Will follow as needed  New:No        Is new assessment/intervention required by palliative team?:  No         Interval History:   Family not present at the time of my visit. She tells me she has mild intermittent headache not associated with any dizziness, lightheadedness or N&V. No sense of  "vision change. Chronically [place]. No other pain. Slept well from her recall. Is consistent in not remembering anything about her fall- oriented to self, most of her adult children. Many falls in A/L facility            Review of Systems:   Palliative Symptom Review (0=no symptom/no concern, 1=mild, 2=moderate, 3=severe):      Pain: 0-1-- complete review of sx not reliable secondary to altered mental status from chronic and acute cognitive decline (SDH)             Medications:   I have reviewed this patient's medication profile and medications given in past 24 hours.         Physical exam: Vitals: /65 mmHg  Pulse 91  Temp(Src) 96.2  F (35.7  C) (Oral)  Resp 16  Ht 1.499 m (4' 11\")  Wt 55.7 kg (122 lb 12.7 oz)  BMI 24.79 kg/m2  SpO2 96%  BMI= Body mass index is 24.79 kg/(m^2).   Constitutional: Again she is sitting up in bedside chair. Appears disheveled. Awake, alert, cooperative, no apparent distress.    HEENT:  EOMI. Normocephalic, R occipital ecchymosis. No drainage  Respiratory: No increased work of breathing, good air exchange, clear to auscultation bilaterally, no crackles or wheezing.   Cardiovascular:  RRR, tachycardic ~90 bpm, S1/S2, Grade 1-2/6 Systolic murmur noted.  GI: Normal bowel sounds, soft, non-distended, non-tender with repeat brief exam  Genitourinary:  Hernandes removed- spontaneous void   Skin:  Normal skin color, no redness, warmth, no abrasions, and no jaundice. bruising right occipital scalp   Musculoskeletal: There is no redness, warmth, no edema or swelling of the joints.  Pedal pulses 2+  Neurologic: Awake, alert as above.  Strength and sensory is intact. R handed. No focal deficits  Neuropsychiatric: Calm, normal eye contact, alert.            Data Reviewed:   ROUTINE LABS (Last four results)  BMP  Recent Labs  Lab 02/04/17  0442 02/03/17  2051 02/03/17  1730    135 137   POTASSIUM 3.4 2.9* 3.0*   CHLORIDE 100 98 100   KEN 8.6 9.1 9.6   CO2 24 25 29   BUN 18 16 19   CR 0.44* " 0.46* 0.43*   * 220* 134*     CBC  Recent Labs  Lab 02/05/17  0925 02/04/17  0442 02/03/17 2051 02/03/17  1730   WBC 10.8 9.1 13.7* 5.2   RBC 4.00 3.90 4.20 4.45   HGB 11.4* 11.2* 12.2 12.7   HCT 36.2 34.4* 37.3 39.3   MCV 91 88 89 88   MCH 28.5 28.7 29.0 28.5   MCHC 31.5 32.6 32.7 32.3   RDW 13.4 13.3 13.2 12.7    200 198 168     INR  Recent Labs  Lab 02/03/17  1730   INR 0.98

## 2017-02-07 NOTE — PLAN OF CARE
Problem: Goal Outcome Summary  Goal: Goal Outcome Summary  Outcome: No Change  Pt is alert and orientated to person and place only. VSS Occasionally confused. Neuros difficulty to assess, pt follows majority of commands, also has slurred speech. [place] and wears hearing aids. PIV SL. On regular diet and tolerating well. Up with A1 and walker. Skin on buttocks is red and blanchable. Turned and repo q2hr. Pt often gets out of bed without calling to try and get on commode. Voiding spontaneously, had large loose BM this shift. Will continue to monitor and follow POC.

## 2017-02-07 NOTE — PLAN OF CARE
Problem: Goal Outcome Summary  Goal: Goal Outcome Summary  Physical Therapy Discharge Summary    Reason for therapy discharge:    Discharged to transitional care facility.    Progress towards therapy goal(s). See goals on Care Plan in [facility] electronic health record for goal details.  Goals not met.  Barriers to achieving goals:   limited tolerance for therapy and discharge from facility.    Therapy recommendation(s):    Continued therapy is recommended.  Rationale/Recommendations:  Continue to progress functional gait and mobility .

## 2017-02-07 NOTE — PLAN OF CARE
Problem: Goal Outcome Summary  Goal: Goal Outcome Summary  OT/6A:  CX- Pt with other provider at time of attempt, likely discharging later this pm, unable to check back.

## 2017-02-07 NOTE — PLAN OF CARE
Problem: Goal Outcome Summary  Goal: Goal Outcome Summary  Outcome: Adequate for Discharge Date Met:  02/07/17  VSS. Pt c/o HA, which has been controlled with Tylenol x2. A&O x3, slightly off on date and stated she was at the [facility]. Strengths 4/5. Follows all commands. PIV D/C'd. Good PO intake on reg diet. Voiding spontaneously, with urgency and occasional incontinence. Large loose BM x1. Buttocks remains reddened, but blanchable. PIV D/C'd. Up with assist x1, gait belt and walker. Ambulated halls x1 and up in chair most of day. Report called and given to [facility] at 1700. Pt transported to rehab facility around 1645, via w/c, with [facility] as .

## 2017-02-07 NOTE — PROGRESS NOTES
2-7-17  CC received notice that client was in the [place] ED and then transferred to [facility] due to significant fall that she had at the AL. CC f/u with AL RN Angy.  She stated that client was in the dining room and her walker got tangled up with someone else and she fell.  Per RN it was a significant fall.  She was unsure at this time of any updated on client.     CC then reached out to KATHLEEN Carney planner at [facility] to see what the plan was for client at this time.  She stated that client was doing better at this time and plan was for her to be DC today to [facility]. Client was alert and oriented at this time and was in support of this.  Angelika stated she would make arrangments for transport with [facility] and f/u with CC as needed.     Angelika then did call CC back and wanted to check on ID number because when she set up a ride she was told that ID was not valid.  ANGEL then reached out to Дмитрий at [facility] and confirmed number and this time it did go thru.  He will f/u with CC if needed.     ANGEL then reached out to Ema VAZQUEZ at [facility] and informed her that client was with [facility] and that she will be involved in any DC plan as needed for client. She will call CC with updated.  DC to TCU was today.     Arely Duckworth MA [facility] Care Coordinator   [phone]

## 2017-02-07 NOTE — PROGRESS NOTES
"PALLIATIVE CONSULT SERVICE  SPIRITUAL ASSESSMENT Progress Note  [facility] 6A    PRIMARY FOCUS:     Goals of care    Support for coping    ILLNESS CIRCUMSTANCES:   Reviewed documentation. Reflective conversation shared with Anya integrating elements of her illness and family narratives.     Context of Serious Illness/Symptom(s) - Discussed the \"fall\" she experienced leading to this admission and that it is not a source of distress as said, \"I don't remember it.\"    Resources for Support - Eight children. Several grand and great grandchildren.    DISTRESS:     Emotional/Spiritual/Existential Distress -   o She wondered about her inabilility to live alone.    [place] Distress - Not discussed.    Social/Cultural/Economic Distress - She talked about her living situation and wondered how it might evolve for her.    SPIRITUAL/Scientologist COPING:     Faith/Eugenie - Orthodox (Hoahaoism) eugenie is important for coping. She is connected with a local [employer]ism in her area.    Spiritual Practice(s) - [place] shared.    Emotional/Relational/Existential Connections - Life review shared about growing up on a farm as well as raising her own family.    GOALS OF CARE:    Goals of Care - POLST discussed and completed. DNR/DNI selected. POLST signed by Usman Rae NP with the inpatient palliative consult service. Copies submitted to bedside chart as well as HIMS.     Meaning/Sense-Making - Anya talked about being \"at peace\" with her situation.    PLAN: I have no plan for follow-up per the plan for her discharge.    Robert Steiner M.Div., [facility]  Palliative Consult Service   Pager [phone]     "

## 2017-02-08 ENCOUNTER — NURSING HOME VISIT (OUTPATIENT)
Dept: GERIATRICS | Facility: CLINIC | Age: 82
End: 2017-02-08
Payer: COMMERCIAL

## 2017-02-08 ENCOUNTER — CARE COORDINATION (OUTPATIENT)
Dept: CARDIOLOGY | Facility: CLINIC | Age: 82
End: 2017-02-08

## 2017-02-08 VITALS
WEIGHT: 125 LBS | DIASTOLIC BLOOD PRESSURE: 78 MMHG | BODY MASS INDEX: 25.23 KG/M2 | OXYGEN SATURATION: 96 % | TEMPERATURE: 98.1 F | RESPIRATION RATE: 18 BRPM | HEART RATE: 82 BPM | SYSTOLIC BLOOD PRESSURE: 142 MMHG

## 2017-02-08 DIAGNOSIS — S06.5XAA SUBDURAL HEMATOMA (H): Primary | ICD-10-CM

## 2017-02-08 DIAGNOSIS — I63.89 CEREBRAL INFARCTION DUE TO OTHER MECHANISM (H): ICD-10-CM

## 2017-02-08 DIAGNOSIS — S01.01XD LACERATION OF SCALP, SUBSEQUENT ENCOUNTER: ICD-10-CM

## 2017-02-08 DIAGNOSIS — R29.6 RECURRENT FALLS: ICD-10-CM

## 2017-02-08 DIAGNOSIS — E11.9 TYPE 2 DIABETES MELLITUS WITHOUT COMPLICATION, WITHOUT LONG-TERM CURRENT USE OF INSULIN (H): ICD-10-CM

## 2017-02-08 DIAGNOSIS — G62.9 NEUROPATHY: ICD-10-CM

## 2017-02-08 PROCEDURE — 99310 SBSQ NF CARE HIGH MDM 45: CPT | Performed by: NURSE PRACTITIONER

## 2017-02-08 NOTE — PROGRESS NOTES
"[facility]  \"Hello, my name is Madeline Berger , and I am calling from the [facility].  I want to check in and see how you are doing, after leaving the hospital.  You may also receive a call from your Care Coordinator (care team), but I want to make sure you don t have any urgent needs.  I have a couple questions to review with you:     Post-Discharge Outreach                                                    Anya Gaines is a 89 year old female     Follow-up Appointments      Follow Up and recommended labs and tests        Follow up with primary care provider, Carlene Serrano, within 7 days for hospital follow- up.  The following labs/tests are recommended: Head CT without contrast in 1 month (~3/6).  Stop plavix until then.                   Care Team:    Patient Care Team       Relationship Specialty Notifications Start End    Carlene Serrano APRN CNP PCP - General Nurse Practitioner - Gerontology All results, Admissions 5/16/16     Phone: [phone]          [facility] [address] [place] [address]    Arely Duckworth   Admissions 5/4/16     Phone: [phone] Fax: [phone]         [email or web address] [address] [facility] [address]    Deanna Zambrano Other (see comments)  Admissions 5/4/16     Comment:  [facility] Administrative Support    Yoan Camp MD MD Family Practice  10/1/16     Phone: [phone] Fax: [phone]          [facility] [address] [place] [address]    Carley Lee Nursing Assistant  All results, Admissions 1/25/17     Comment:  [facility] Services     Denisa Younger    Admissions 2/6/17     Comment:  [employer] Administrative Support            Transition of Care Review                                                      Patient was called three times and no answer so post 24 hr DC follow up calls will be closed out               Plan                                            "           Thanks for your time.  Your Care Coordinator may follow-up within the next couple days.  In the meantime if you have questions, concerns or problems call your care team.        Madeline Berger

## 2017-02-08 NOTE — PROGRESS NOTES
"[facility]  PRIMARY CARE PROVIDER AND CLINIC:  Carlene Serrano  [facility] [address] / [place] *  Chief Complaint   Patient presents with     Hospital F/U     Nursing Home Acute       HPI:    Anya Gaines is a 89 year old  (7/4/1920),admitted to the [facility] from [facility].  Hospital stay 2/3/17 through 2/7/17.  Admitted to this facility for  rehab, medical management and nursing care. Current issues are:    Fall  Subdural hematoma (H)  Laceration of scalp, subsequent encounter  Problem Leading to Hospitalization (from HPI):   PER DISCHARGE SUMMARY:  Ms. Anya Gaines is a 89 year old female with history of falls, type 2 diabetes, stoke (on aspirin, now on Plavix), and UTI who presents to the ED today for evaluation of loss of consciousness. The patient presents from [facility] after mechanical fall from standing height hitting her head on the ground resulting in loss of consciousness. The patient does not recall what lead to fall. She has visible hematoma to the right temporal region at the site of impact. She endorses left sided intercostal pain but denies impact to this area of her body during fall. She denies chest pain or shortness of breath before fall, stating it was an otherwise uneventful day prior to fall. The patient is on Plavix, her INR was 0.98.    Patient on chronic Plavix admitted to hospital s/p fall with loss of consciousness that resolved. Hx of recurring falls some with injury or fracture requiring frequent hospital/TCU stay.  Previously lived at home independently now in [facility] r/t recurring falls. On admit to hospital, patient initially experienced aphasia, acute confusion and lethargy.  Now at admit to TCU, patient appears to be at baseline and states to feel \"much better\" overall.  Patient plan is to participate with  physical therapy/ occupational therapy with goal of improved strength and gait.  EEG " studies done with no indication of seizures.  Neurosurgery consult determined no need for surgical intervention.   CVA  On Plavix but medication is currently on hold until f/u CT scan done in one month.    Type 2 diabetes mellitus without complication, without long-term current use of insulin (H)  On Metformin.  Will check BS qid x 72 hours.  Good po intake at baseline.    Neuropathy (H)  Managed with neurontin     CODE STATUS/ADVANCE DIRECTIVES DISCUSSION:   DNR / DNI  Patient's living condition: lives in an assisted living facility    ALLERGIES:Review of patient's allergies indicates no known allergies.  PAST MEDICAL HISTORY:  has no past medical history on file.  PAST SURGICAL HISTORY:  has past surgical history that includes surgical history of - .  FAMILY HISTORY: family history is not on file.  SOCIAL HISTORY:  reports that she has never smoked. She has never used smokeless tobacco. She reports that she does not drink alcohol or use illicit drugs.    Post Discharge Medication Reconciliation Status: discharge medications reconciled and changed, per note/orders (see AVS).  Current Outpatient Prescriptions   Medication Sig Dispense Refill     acetaminophen (TYLENOL) 325 MG tablet Take 3 tablets (975 mg) by mouth every 6 hours as needed 100 tablet      gabapentin (NEURONTIN) 100 MG capsule Take 4 capsules (400 mg) by mouth 3 times daily 90 capsule 5     metFORMIN (GLUCOPHAGE-XR) 500 MG 24 hr tablet Take 4 tablets (2,000 mg) by mouth daily (with dinner) 30 tablet      atorvastatin (LIPITOR) 10 MG tablet Take 1 tablet (10 mg) by mouth daily 30 tablet 1     lisinopril (PRINIVIL/ZESTRIL) 40 MG tablet Take 1 tablet (40 mg) by mouth daily 30 tablet 5     amLODIPine (NORVASC) 2.5 MG tablet Take 1 tablet (2.5 mg) by mouth daily 30 tablet      Menthol, Topical Analgesic, (BIOFREEZE) 4 % GEL Externally apply topically 3 times daily as needed For muscle aches       hydrochlorothiazide (HYDRODIURIL) 25 MG tablet Take 1 tablet  (25 mg) by mouth daily 90 tablet 3     calcium carbonate (OS- MG [place]. [place]) 600 MG tablet Take 1 tablet (600 mg) by mouth daily 60 tablet 5     multivitamin  with lutein (OCUVITE WITH LTEIN) CAPS per capsule Take 1 capsule by mouth daily 30 capsule 5     hypromellose-dextran 0.3-0.1% (ARTIFICIAL TEARS) opthalmic solution Place 1 drop into both eyes 3 times daily 15 mL 0     loperamide (IMODIUM A-D) 2 MG tablet Take 2 mg by mouth 4 times daily as needed for diarrhea        ROS:  10 point ROS of systems including Constitutional, Eyes, Respiratory, Cardiovascular, Gastroenterology, Genitourinary, Integumentary, Muscularskeletal, Psychiatric were all negative except for pertinent positives noted in my HPI.    Exam:  /78 mmHg  Pulse 82  Temp(Src) 98.1  F (36.7  C)  Resp 18  Wt 125 lb (56.7 kg)  SpO2 96%  GENERAL APPEARANCE:  Alert, in no distress  ENT:  normal hearing acuity, oral mucous membranes moist  EYES:  EOM normal, conjunctiva and lids normal  RESP:  lungs clear to auscultation , no respiratory distress, diminished breath sounds bases  CV:  regular rate and rhythm, no murmur, rub, or gallop  ABDOMEN:  bowel sounds normal, soft, non-tender  M/S:   Gait and station abnormal up in chair at present  TORRES  SKIN:  fragile pale w/d  healing laceration on scalp  PSYCH:  oriented to self, insight and judgement impaired, memory impaired , affect and mood normal    Lab/Diagnostic data:     Results for orders placed or performed during the hospital encounter of 02/03/17   CT Head w/o Contrast   Result Value Ref Range    Radiologist flags Worsening of intracranial hemorrhage (AA)     Narrative    CT HEAD W/O CONTRAST 2/4/2017 12:54 AM    History:  Interval SDH, fall on Plavix      Comparison: 2/20/2017    Technique: Axial thin section CT images of the head were obtained from  the base of the skull to the vertex without intravenous contrast and  reviewed in brain, subdural, and bone windows.     Findings:    There has been interval increased subdural hematoma overlying the left  frontotemporal lobe as well as left cerebellum tentorium. It measures  9 mm in maximum thickness over the left frontal lobe, previously  measured 4 mm. Also measures 13 mm along the left cerebellar  tentorium, previously measured 5 mm. There has been interval increased  mass effect with partial effacement of left lateral ventricle as well  as left-to-right subfalcine midline shift of 7 mm, 4 mm previously. No  downward transtentorial herniation. Also slight interval worsening of  anterior-inferior bifrontal contusion and hemorrhages. Gray to white  matter differentiation is preserved. Stable mildly prominent  ventricular system configuration.    Posterior right parietal scalp hematoma. The bony calvaria and the  bones of the skull base appear normal. The visualized portions of  paranasal sinuses and mastoid air cells are clear.      Impression    Impression:   1.  Worsening of left frontotemporal lobe and left cerebellum  tentorium subdural hematoma with increased mass effect resulting in  partial effacement of left lateral ventricle and increased  left-to-right subfalcine midline shift.  2. Slight interval worsening of anterior-inferior bifrontal contusion  hemorrhages.    [Critical Result: Worsening of intracranial hemorrhage    Finding was identified on 2/4/2017 12:58 AM.     Dr Espinosa was contacted by Dr. Jones at 2/4/2017 1:07 AM and  verbalized understanding of the critical finding.     I have personally reviewed the examination and initial interpretation  and I agree with the findings.    ANIYA LARKIN MD   CBC with platelets differential   Result Value Ref Range    WBC 13.7 (H) 4.0 - 11.0 10e9/L    RBC Count 4.20 3.8 - 5.2 10e12/L    Hemoglobin 12.2 11.7 - 15.7 g/dL    Hematocrit 37.3 35.0 - 47.0 %    MCV 89 78 - 100 fl    MCH 29.0 26.5 - 33.0 pg    MCHC 32.7 31.5 - 36.5 g/dL    RDW 13.2 10.0 - 15.0 %    Platelet Count 198 150 - 450  10e9/L    Diff Method Automated Method     % Neutrophils 88.0 %    % Lymphocytes 7.6 %    % Monocytes 3.7 %    % Eosinophils 0.3 %    % Basophils 0.2 %    % Immature Granulocytes 0.2 %    Nucleated RBCs 0 0 /100    Absolute Neutrophil 12.0 (H) 1.6 - 8.3 10e9/L    Absolute Lymphocytes 1.0 0.8 - 5.3 10e9/L    Absolute Monocytes 0.5 0.0 - 1.3 10e9/L    Absolute Eosinophils 0.0 0.0 - 0.7 10e9/L    Absolute Basophils 0.0 0.0 - 0.2 10e9/L    Abs Immature Granulocytes 0.0 0 - 0.4 10e9/L    Absolute Nucleated RBC 0.0    Basic metabolic panel   Result Value Ref Range    Sodium 135 133 - 144 mmol/L    Potassium 2.9 (L) 3.4 - 5.3 mmol/L    Chloride 98 94 - 109 mmol/L    Carbon Dioxide 25 20 - 32 mmol/L    Anion Gap 12 3 - 14 mmol/L    Glucose 220 (H) 70 - 99 mg/dL    Urea Nitrogen 16 7 - 30 mg/dL    Creatinine 0.46 (L) 0.52 - 1.04 mg/dL    GFR Estimate >90  Non  GFR Calc   >60 mL/min/1.7m2    GFR Estimate If Black >90   GFR Calc   >60 mL/min/1.7m2    Calcium 9.1 8.5 - 10.1 mg/dL   UA with Microscopic   Result Value Ref Range    Color Urine Straw     Appearance Urine Slightly Cloudy     Glucose Urine Negative NEG mg/dL    Bilirubin Urine Negative NEG    Ketones Urine 10 (A) NEG mg/dL    Specific Gravity Urine 1.008 1.003 - 1.035    Blood Urine Negative NEG    pH Urine 7.0 5.0 - 7.0 pH    Protein Albumin Urine Negative NEG mg/dL    Urobilinogen mg/dL Normal 0.0 - 2.0 mg/dL    Nitrite Urine Negative NEG    Leukocyte Esterase Urine Negative NEG    Source Catheterized Urine     WBC Urine <1 0 - 2 /HPF    RBC Urine 1 0 - 2 /HPF    Mucous Urine Present (A) NEG /LPF   Glucose by meter   Result Value Ref Range    Glucose 261 (H) 70 - 99 mg/dL   Basic metabolic panel   Result Value Ref Range    Sodium 137 133 - 144 mmol/L    Potassium 3.4 3.4 - 5.3 mmol/L    Chloride 100 94 - 109 mmol/L    Carbon Dioxide 24 20 - 32 mmol/L    Anion Gap 13 3 - 14 mmol/L    Glucose 252 (H) 70 - 99 mg/dL    Urea Nitrogen 18 7  - 30 mg/dL    Creatinine 0.44 (L) 0.52 - 1.04 mg/dL    GFR Estimate >90  Non  GFR Calc   >60 mL/min/1.7m2    GFR Estimate If Black >90   GFR Calc   >60 mL/min/1.7m2    Calcium 8.6 8.5 - 10.1 mg/dL   CBC with platelets   Result Value Ref Range    WBC 9.1 4.0 - 11.0 10e9/L    RBC Count 3.90 3.8 - 5.2 10e12/L    Hemoglobin 11.2 (L) 11.7 - 15.7 g/dL    Hematocrit 34.4 (L) 35.0 - 47.0 %    MCV 88 78 - 100 fl    MCH 28.7 26.5 - 33.0 pg    MCHC 32.6 31.5 - 36.5 g/dL    RDW 13.3 10.0 - 15.0 %    Platelet Count 200 150 - 450 10e9/L   Hemoglobin A1c   Result Value Ref Range    Hemoglobin A1C 7.3 (H) 4.3 - 6.0 %   Magnesium   Result Value Ref Range    Magnesium 1.6 1.6 - 2.3 mg/dL   Phosphorus   Result Value Ref Range    Phosphorus 2.9 2.5 - 4.5 mg/dL   Glucose by meter   Result Value Ref Range    Glucose 259 (H) 70 - 99 mg/dL   UA with Microscopic reflex to Culture   Result Value Ref Range    Color Urine Light Yellow     Appearance Urine Clear     Glucose Urine Negative NEG mg/dL    Bilirubin Urine Negative NEG    Ketones Urine Negative NEG mg/dL    Specific Gravity Urine 1.014 1.003 - 1.035    Blood Urine Negative NEG    pH Urine 5.5 5.0 - 7.0 pH    Protein Albumin Urine Negative NEG mg/dL    Urobilinogen mg/dL Normal 0.0 - 2.0 mg/dL    Nitrite Urine Negative NEG    Leukocyte Esterase Urine Moderate (A) NEG    Source Catheterized Urine     WBC Urine 3 (H) 0 - 2 /HPF    RBC Urine 1 0 - 2 /HPF    Transitional Epi <1 0 - 1 /HPF    Mucous Urine Present (A) NEG /LPF   Glucose by meter   Result Value Ref Range    Glucose 178 (H) 70 - 99 mg/dL   Glucose by meter   Result Value Ref Range    Glucose 116 (H) 70 - 99 mg/dL   Glucose by meter   Result Value Ref Range    Glucose 157 (H) 70 - 99 mg/dL   Glucose by meter   Result Value Ref Range    Glucose 137 (H) 70 - 99 mg/dL   Glucose by meter   Result Value Ref Range    Glucose 165 (H) 70 - 99 mg/dL   Magnesium   Result Value Ref Range    Magnesium 1.5  (L) 1.6 - 2.3 mg/dL   Glucose by meter   Result Value Ref Range    Glucose 145 (H) 70 - 99 mg/dL   Glucose by meter   Result Value Ref Range    Glucose 144 (H) 70 - 99 mg/dL   Glucose by meter   Result Value Ref Range    Glucose 163 (H) 70 - 99 mg/dL   CBC with platelets   Result Value Ref Range    WBC 10.8 4.0 - 11.0 10e9/L    RBC Count 4.00 3.8 - 5.2 10e12/L    Hemoglobin 11.4 (L) 11.7 - 15.7 g/dL    Hematocrit 36.2 35.0 - 47.0 %    MCV 91 78 - 100 fl    MCH 28.5 26.5 - 33.0 pg    MCHC 31.5 31.5 - 36.5 g/dL    RDW 13.4 10.0 - 15.0 %    Platelet Count 205 150 - 450 10e9/L   Glucose by meter   Result Value Ref Range    Glucose 162 (H) 70 - 99 mg/dL   Glucose by meter   Result Value Ref Range    Glucose 152 (H) 70 - 99 mg/dL   Magnesium   Result Value Ref Range    Magnesium 2.8 (H) 1.6 - 2.3 mg/dL   Glucose by meter   Result Value Ref Range    Glucose 140 (H) 70 - 99 mg/dL   Glucose by meter   Result Value Ref Range    Glucose 137 (H) 70 - 99 mg/dL   Glucose by meter   Result Value Ref Range    Glucose 222 (H) 70 - 99 mg/dL   EKG 12-lead, tracing only   Result Value Ref Range    Interpretation ECG Click View Image link to view waveform and result    Palliative Care ADULT: Patient to be seen: Routine within 24 hrs; Call back #: [phone]; trauma in elderly patient; Consultant may enter orders: Yes    Narrative    Usman Rae APRN CNP     2/7/2017  9:47 AM  [facility]  Palliative Care Consultation         Anya Gaines MRN# [id number]   Age: 89 year old YOB: 1920   Date of Admission: 2/3/2017    Reason for consult: Goals of care    Patient and family support       Requesting physician/service: Trauma service       Recommendations      Pt was seen and examined. Family present included son and two of   her 27 grandchildren. Family goals are appropriately cautious   with TCU for functional recovery but accepting that this fall may   be the sentinel event that leads to SNF  "placement rather than A/L   environment. Recurrent falls and increased incontinence with   subtle cognitive change noted. Family pleased that aphasia from   yesterday is improved. She is oriented to self and some family   members. Unable to recall where she lives, where she is now or   what happened to her. Denies pain. Able to participate in   interview and exam.   REC: - continue with dnr/dni status  - SNF placement may be indicated dependent on   functional/cognitive recovery.   -  to see  - Family seemed surprized at PD diagnosis- would benefit from   further explanation.         POLST : Not completed. Has been designated as dnr/dni     Disease Process/es & Symptoms   1. Subdural hematoma- mechanical fall in A/L facility- hx of   recurrent falls  Other diagnoses:    1. Parkinsons Disease  2. TIA on Plavix  3. DM2  4. HTN  5. HLD       Support/Coping   (We typically ask each of our patients the following questions:)    How do you make sense of this? N/A  Are you Anglican? Spiritual? Not so much? - Hindu bill is   important to her  What are your concerns, medical and non-medical, that are not   being addressed? Help with decision about post TCU placement care   needs and safety  Who are your \"go-to\" people when you need support? Large extended   family    Plan for psychosocial/spiritual support/follow-up from palliative   team: if remans in patient.      Decision-Making & Goals of Care     Discussion/counseling today about prognosis/goals of   care/decisions:   With family and trauma service  Patient has a completed health care directive available in the   chart (Y)  Health care agent (only if patient has an available, complete   HCD):  Physician orders for life-sustaining treatment (POLST) form is   not completed  Code Status: Do not resusitate / Do not intubate   Patient has decision-making capacity (N)- may be temporary post   CHT    Coordination of Care     Findings & plan of care discussed with: " trauma service and family   present  Follow-up plan from palliative team: as needed  Thank you for involving us in the patient's care.     Usman CASTANEDA NP  Nurse Practitioner- Lead Advanced Practice Provider  [facility] Palliative Medicine Consult Service   [phone]          Chief Complaint     Wanting EEG leads removed         History of Present Illness     History obtained from: [facility] and pt family report  89 year old with a history of DM II, Parkinson's disease,   Hypertension, Hyperlipidemia and history of TIA [on plavix] who   presented after a mechanical fall- her walker becoming caught up.   She has no recall of the fall. CT head showed a 0.5cm thickness   subdural hematoma along the left frontal-temporal region and   along the left tentorium. CT C spine shows no acute fracture. She   was started on a nicardipine ggt for SBP >200. Platelets are 168   and 2 units were transfused in the ER, Na 137, INR is 0.98.  Palliative care consulted as part of trauma evaluation.            Past Medical History:   No past medical history on file.- see HPI and problem list           Past Surgical History:   Past Surgical History   Procedure Laterality Date     Surgical history of -        Cataract, left eye               Social/Spiritual History:     Living situation: 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 Unable to share   previous hobbies- crocheting, TV and reading due to visual   changes   History of substance use/abuse: None            Family History:   No family history on file.  Family history reviewed and updated   in EPIC           Allergies:   No Known  "Allergies           Medications:   I have reviewed this patient's medication profile and medications   during this hospitalization             Review of Systems:   The comprehensive review of systems is negative other than noted   here and in the HPI.    Palliative Symptom Review (0=no symptom/no concern, 1=mild,   2=moderate, 3=severe):      Pain: 0-1      Fatigue: 0-1      Complete palliative ROS unreliable secondary to altered   mental status and baseline confusion with new SDH.      Physical exam: Vitals: /69 mmHg  Pulse 92  Temp(Src) 96.8    F (36  C) (Oral)  Resp 16  Ht 1.499 m (4' 11\")  Wt 55.7 kg   (122 lb 12.7 oz)  BMI 24.79 kg/m2  SpO2 93%  BMI= Body mass index is 24.79 kg/(m^2).  Constitutional: Sitting up in bedside chair. EEG leads on scalp   Awake, alert, cooperative, no apparent distress.    HEENT:  EOMI. Normocephalic, occipital ecchymosis. No new   drainage  Respiratory: No increased work of breathing, good air exchange,   clear to auscultation bilaterally, no crackles or wheezing.   Cardiovascular:  RRR, tachycardic ~100 bpm, S1/S2, Grade 1-2/6   Systolic murmur noted.  GI: Normal bowel sounds, soft, non-distended, non-tender with   brief exam  Genitourinary:  Hernandes was draining clear yellow urine. Was   removed.    Skin:  Normal skin color, no redness, warmth, no abrasions, and   no jaundice. bruising right occipital scalp   Musculoskeletal: There is no redness, warmth, no edema or   swelling of the joints.  Pedal pulse palpated  Neurologic: Awake, alert as above.  Strength and sensory is   intact. R handed. No focal deficits  Neuropsychiatric: Calm, normal eye contact, alert.            Data Reviewed:     ROUTINE LABS (Last four results)  BMP  Recent Labs  Lab 02/04/17  0442 02/03/17  2051 02/03/17  1730    135 137   POTASSIUM 3.4 2.9* 3.0*   CHLORIDE 100 98 100   KEN 8.6 9.1 9.6   CO2 24 25 29   BUN 18 16 19   CR 0.44* 0.46* 0.43*   * 220* 134*     CBC  Recent Labs  Lab " 02/05/17  0925 02/04/17  0442 02/03/17  2051 02/03/17  1730   WBC 10.8 9.1 13.7* 5.2   RBC 4.00 3.90 4.20 4.45   HGB 11.4* 11.2* 12.2 12.7   HCT 36.2 34.4* 37.3 39.3   MCV 91 88 89 88   MCH 28.5 28.7 29.0 28.5   MCHC 31.5 32.6 32.7 32.3   RDW 13.4 13.3 13.2 12.7    200 198 168     INR  Recent Labs  Lab 02/03/17  1730   INR 0.98            ABO/Rh type and screen   Result Value Ref Range    ABO O     RH(D)  Pos     Antibody Screen Neg     Test Valid Only At       [facility]    Specimen Expires 02/06/2017    Platelets prepare order unit   Result Value Ref Range    Ordered Component Type PLT Pheresis     Units Ordered 2    Blood component   Result Value Ref Range    Unit Number [id number]     Blood Component Type PlateletPheresis,LeukoRed Irrad (Part 2)     Division Number 00     Status of Unit Released to care unit     Blood Product Code [id number]     Unit Status ISS    Blood component   Result Value Ref Range    Unit Number [id number]     Blood Component Type PlateletPheresis,LeukoRed Irrad (Part 2)     Division Number 00     Status of Unit No longer available 02/04/2017 2334     Blood Product Code [id number]     Unit Status RET    Methicillin Resistant Staph Aureus PCR   Result Value Ref Range    Specimen Description Nares     S Aur Meth Resis PCR  NEG     Negative  MRSA Negative: SA Positive  MRSA target DNA not detected, presumed negative for MRSA colonization or the   number of bacteria present may be below the limit of detection.  Staphylococcus aureus target DNA detected, presumed positive for SA   colonization.  A positive test does not necessarily indicate the presence of viable organisms.   It is, however, presumptive for the presence of SA.  This result does not   preclude MRSA nasal colonization.  FDA approved assay performed using [employer] GeneXpert(R) real-time PCR.     Urine Culture Aerobic Bacterial   Result Value Ref Range    Specimen Description  Catheterized Urine     Special Requests Specimen received in preservative     Culture Micro No growth     Micro Report Status FINAL 02/05/2017          ASSESSMENT/PLAN:  Encounter Diagnoses   Name Primary?     Subdural hematoma (H) Yes     Type 2 diabetes mellitus without complication, without long-term current use of insulin (H)      Neuropathy (H)      Cerebral infarction due to other mechanism (H)      Fall      Laceration of scalp, subsequent encounter      Orders:  The current medical regimen is effective;  continue present plan and medications.      Information reviewed:  Medications, vital signs, orders, nursing notes, problem list, hospital information. Total time spent with patient visit was 35 min including patient visit and review of past records. Greater than 50% of total time spent with counseling and coordinating care.    Electronically signed by:  TONYA Morrissey CNP

## 2017-02-13 ENCOUNTER — NURSING HOME VISIT (OUTPATIENT)
Dept: GERIATRICS | Facility: CLINIC | Age: 82
End: 2017-02-13
Payer: COMMERCIAL

## 2017-02-13 VITALS
RESPIRATION RATE: 18 BRPM | OXYGEN SATURATION: 96 % | WEIGHT: 125 LBS | TEMPERATURE: 97.7 F | BODY MASS INDEX: 25.25 KG/M2 | HEART RATE: 78 BPM | SYSTOLIC BLOOD PRESSURE: 142 MMHG | DIASTOLIC BLOOD PRESSURE: 62 MMHG

## 2017-02-13 DIAGNOSIS — F90.9 HYPERKINETIC: ICD-10-CM

## 2017-02-13 DIAGNOSIS — E11.9 TYPE 2 DIABETES MELLITUS WITHOUT COMPLICATION, WITHOUT LONG-TERM CURRENT USE OF INSULIN (H): ICD-10-CM

## 2017-02-13 PROCEDURE — 99207 ZZC CDG-DX INCORRECT: CPT | Performed by: NURSE PRACTITIONER

## 2017-02-13 PROCEDURE — 99309 SBSQ NF CARE MODERATE MDM 30: CPT | Performed by: NURSE PRACTITIONER

## 2017-02-13 NOTE — PATIENT INSTRUCTIONS
Orders:  1.  Discontinue prn tylenol.  2.  Tylenol 325 mg tabs -give 3 tabs po tid for pain.  3.  Discontinue QID blood sugars.  4.  Blood sugars q am for DM2.  5.  Do Not waken for vital signs or assessment.

## 2017-02-13 NOTE — PROGRESS NOTES
[facility]    Chief Complaint   Patient presents with     Nursing Home Acute       HPI:    Anya Gaines is a 89 year old  (7/4/1920), who is being seen today for an episodic care visit at [facility]. Today's concern is:  Subdural hematoma caused by concussion, with loss of consciousness of any duration with death due to brain injury prior to regaining consciousness, sequela  Continues to complain of headache, reports that tylenol does help. Also reports that she is very tired, otherwise feeling well and would like to return to her home.     Type 2 diabetes mellitus without complication, without long-term current use of insulin (H)  On metformin, goal <8% due to advanced age and goals of care. Currently getting blood sugars checked QID. Feeling well.   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 Of note, while sleeping, she appears to have no kinetic movements.        ALLERGIES: Review of patient's allergies indicates no known allergies.  Past Medical, Surgical, Family and Social History reviewed and updated in [facility].    Current Outpatient Prescriptions   Medication Sig Dispense Refill     acetaminophen (TYLENOL) 325 MG tablet Take 3 tablets (975 mg) by mouth every 6 hours as needed 100 tablet      gabapentin (NEURONTIN) 100 MG capsule Take 4 capsules (400 mg) by mouth 3 times daily 90 capsule 5     metFORMIN (GLUCOPHAGE-XR) 500 MG 24 hr tablet Take 4 tablets (2,000 mg) by mouth daily (with dinner) 30 tablet      atorvastatin (LIPITOR) 10 MG tablet Take 1 tablet (10 mg) by mouth daily 30 tablet 1     lisinopril (PRINIVIL/ZESTRIL) 40 MG tablet Take 1 tablet (40 mg) by mouth daily 30 tablet 5     amLODIPine (NORVASC) 2.5 MG tablet Take 1 tablet (2.5 mg) by  mouth daily 30 tablet      Menthol, Topical Analgesic, (BIOFREEZE) 4 % GEL Externally apply topically 3 times daily as needed For muscle aches       hydrochlorothiazide (HYDRODIURIL) 25 MG tablet Take 1 tablet (25 mg) by mouth daily 90 tablet 3     calcium carbonate (OS- MG [place]. [place]) 600 MG tablet Take 1 tablet (600 mg) by mouth daily 60 tablet 5     multivitamin  with lutein (OCUVITE WITH LTEIN) CAPS per capsule Take 1 capsule by mouth daily 30 capsule 5     hypromellose-dextran 0.3-0.1% (ARTIFICIAL TEARS) opthalmic solution Place 1 drop into both eyes 3 times daily 15 mL 0     loperamide (IMODIUM A-D) 2 MG tablet Take 2 mg by mouth 4 times daily as needed for diarrhea          Medications reconciled to facility chart and changes were made to reflect current medications as identified as above med list. Below are the changes that were made:   Medications stopped since last EPIC medication reconciliation:   There are no discontinued medications.    Medications started since last [facility] medication reconciliation:  No orders of the defined types were placed in this encounter.      REVIEW OF SYSTEMS:  Anya Gaines complains of mild to moderate pain, located in head, no chest pain or pressure, no shortness of breath. Sleep is good, appetite good. Anya Gaines does not complain of bowel changes.  S/he does not complain of bladder changes.  All other systems reviewed and are negative unless otherwise noted in HPI.     PHYSICAL EXAM:  /62  Pulse 78  Temp 97.7  F (36.5  C)  Resp 18  Wt 125 lb (56.7 kg)  SpO2 96%  BMI 25.25 kg/m2  GENERAL APPEARANCE:  Alert, in no acute distress  HEAD:  Normal, normocephalic, resolving bruise on top L side of head with 1.5 cm scabbed area and dark purple hematoma, no pain with palpation but spongy in nature  EYE EXAM: normal external eye, conjunctiva, lids, JUAN  NECK EXAM: supple, no JVD  CHEST/RESP:  respiratory effort normal, lung sounds CTA , no respiratory distress  CV:   Rate reg, rhythm reg, no murmur, no peripheral edema   M/S:   extremities normal, normal muscle tone, and range of motion normal   SKIN EXAM: CDI, no other open areas  NEUROLOGIC EXAM: Normal gross motor movement, tone and coordination. No tremor.  Cranial nerves 2-12 are normal tested and grossly at patient's baseline  PSYCH:  Alert and oriented to person-place-time , affect pleasant , judgement appropriate     RECENT LABS:  Reviewed in facility records     ASSESSMENT/PLAN:  Subdural hematoma caused by concussion, with loss of consciousness of any duration with death due to brain injury prior to regaining consciousness, sequela  Resolving hematoma, confusion seems to have resolved as well-she is alert and oriented. Still has headache, but will schedule tylenol. The current medical regimen is effective;  continue present plan and medications, continue therapy, progressing well. She is reportedly ambulating independently with rolling walker .     Type 2 diabetes mellitus without complication, without long-term current use of insulin (H)  Blood sugars trending high but likely not of concern. Will decrease frequency of blood sugar checks in preparation for discharge    Hyperkinetic  Stable, no new symptoms. Monitor.       ORDERS:  1.  Discontinue prn tylenol.  2.  Tylenol 325 mg tabs -give 3 tabs po tid for pain.  3.  Discontinue QID blood sugars.  4.  Blood sugars q am for DM2.  5.  Do Not waken for vital signs or assessment.      Total time spent with patient visit was 25 min including patient visit and review of past records   Greater than 50% of total time spent with counseling and coordinating care    Electronically signed by  Carlene Serrano CNP   [facility]  [phone] cell

## 2017-02-16 ENCOUNTER — CARE COORDINATION (OUTPATIENT)
Dept: GERIATRIC MEDICINE | Facility: CLINIC | Age: 82
End: 2017-02-16

## 2017-02-17 ENCOUNTER — APPOINTMENT (OUTPATIENT)
Dept: CT IMAGING | Facility: CLINIC | Age: 82
End: 2017-02-17
Attending: EMERGENCY MEDICINE
Payer: COMMERCIAL

## 2017-02-17 ENCOUNTER — HOSPITAL ENCOUNTER (EMERGENCY)
Facility: CLINIC | Age: 82
Discharge: NURSING, REHAB OR LONG-TERM CARE | End: 2017-02-17
Attending: EMERGENCY MEDICINE | Admitting: EMERGENCY MEDICINE
Payer: COMMERCIAL

## 2017-02-17 ENCOUNTER — MEDICAL CORRESPONDENCE (OUTPATIENT)
Dept: HEALTH INFORMATION MANAGEMENT | Facility: CLINIC | Age: 82
End: 2017-02-17

## 2017-02-17 VITALS
TEMPERATURE: 97.6 F | BODY MASS INDEX: 24.84 KG/M2 | RESPIRATION RATE: 20 BRPM | DIASTOLIC BLOOD PRESSURE: 91 MMHG | OXYGEN SATURATION: 91 % | HEART RATE: 82 BPM | SYSTOLIC BLOOD PRESSURE: 169 MMHG | WEIGHT: 123 LBS

## 2017-02-17 DIAGNOSIS — S06.5XAA SUBDURAL HEMATOMA (H): ICD-10-CM

## 2017-02-17 DIAGNOSIS — G45.9 TRANSIENT CEREBRAL ISCHEMIA, UNSPECIFIED TYPE: ICD-10-CM

## 2017-02-17 LAB — GLUCOSE BLDC GLUCOMTR-MCNC: 184 MG/DL (ref 70–99)

## 2017-02-17 PROCEDURE — 99284 EMERGENCY DEPT VISIT MOD MDM: CPT | Performed by: EMERGENCY MEDICINE

## 2017-02-17 PROCEDURE — 70450 CT HEAD/BRAIN W/O DYE: CPT

## 2017-02-17 PROCEDURE — 00000146 ZZHCL STATISTIC GLUCOSE BY METER IP

## 2017-02-17 PROCEDURE — 99284 EMERGENCY DEPT VISIT MOD MDM: CPT | Mod: 25

## 2017-02-17 NOTE — ED AVS SNAPSHOT
[facility] Emergency Department    [address] [address]    Phone:  [phone]    Fax:  [phone]                                       Anya Gaines   MRN: [id number]    Department:  [facility] Emergency Department   Date of Visit:  2/17/2017           Patient Information     Date Of Birth          7/4/1920        Your diagnoses for this visit were:     Subdural hematoma (H)     Transient cerebral ischemia, unspecified type-expressive aphasia        You were seen by Jarocho Barajas DO.        Discharge Instructions       Continue with current restorative care plan at the transitional care unit.  Suspect Anya experienced a transient ischemic attack.          24 Hour Appointment Hotline       To make an appointment at any [facility], call [phone] ([phone]). If you don't have a family doctor or clinic, we will help you find one. [facility] clinics are conveniently located to serve the needs of you and your family.             Review of your medicines      Our records show that you are taking the medicines listed below. If these are incorrect, please call your family doctor or clinic.        Dose / Directions Last dose taken    acetaminophen 325 MG tablet   Commonly known as:  TYLENOL   Dose:  1000 mg   Quantity:  100 tablet        Take 3 tablets (975 mg) by mouth every 6 hours as needed   Refills:  0        amLODIPine 2.5 MG tablet   Commonly known as:  NORVASC   Dose:  2.5 mg   Quantity:  30 tablet        Take 1 tablet (2.5 mg) by mouth daily   Refills:  0        atorvastatin 10 MG tablet   Commonly known as:  LIPITOR   Dose:  10 mg   Quantity:  30 tablet        Take 1 tablet (10 mg) by mouth daily   Refills:  1        calcium carbonate 600 MG tablet   Commonly known as:  OS- mg [place]. [place]   Dose:  1 tablet   Indication:  Osteoporosis   Quantity:  60 tablet        Take 1 tablet (600 mg) by mouth daily   Refills:  5        GABAPENTIN PO   Dose:  400  mg        Take 400 mg by mouth 3 times daily   Refills:  0        hydrochlorothiazide 25 MG tablet   Commonly known as:  HYDRODIURIL   Dose:  25 mg   Quantity:  90 tablet        Take 1 tablet (25 mg) by mouth daily   Refills:  3        hypromellose-dextran 0.3-0.1% opthalmic solution   Dose:  1 drop   Quantity:  15 mL        Place 1 drop into both eyes 3 times daily   Refills:  0        IMODIUM A-D 2 MG tablet   Dose:  2 mg   Indication:  Diarrhea   Generic drug:  loperamide        Take 2 mg by mouth 4 times daily as needed for diarrhea   Refills:  0        lisinopril 40 MG tablet   Commonly known as:  PRINIVIL/ZESTRIL   Dose:  40 mg   Quantity:  30 tablet        Take 1 tablet (40 mg) by mouth daily   Refills:  5        Menthol (Topical Analgesic) 4 % Gel   Commonly known as:  BIOFREEZE        Externally apply topically 3 times daily as needed For muscle aches   Refills:  0        metFORMIN 500 MG 24 hr tablet   Commonly known as:  GLUCOPHAGE-XR   Dose:  2000 mg   Indication:  Type 2 Diabetes   Quantity:  30 tablet        Take 4 tablets (2,000 mg) by mouth daily (with dinner)   Refills:  0        multivitamin  with lutein Caps per capsule   Dose:  1 capsule   Quantity:  30 capsule        Take 1 capsule by mouth daily   Refills:  5                Procedures and tests performed during your visit     CT Head w/o Contrast      Orders Needing Specimen Collection     None      Pending Results     Date and Time Order Name Status Description    2/17/2017 1832 CT Head w/o Contrast Preliminary             Pending Culture Results     No orders found from 2/15/2017 to 2/18/2017.             Test Results from your hospital stay     2/17/2017  6:49 PM - Interface, Radiant Ib      Narrative     CT HEAD W/O CONTRAST   2/17/2017 6:43 PM     HISTORY: Recent fall with concussion/positive LOC/subdural-left  frontal parietal-presents with expressive aphasia    TECHNIQUE:  Axial images of the head without IV contrast material.  Radiation  "dose for this scan was reduced using automated exposure  control, adjustment of the mA and/or kV according to patient size, or  iterative reconstruction technique.    COMPARISON: CT dated to/4/2017    FINDINGS: A small left subdural is present. This subdural has  decreased in size when compared to to/4/2017. The subdural along the  tentorium has completely resolved. The subdural fluid collection is  low in density and currently measures about 0.5 cm in thickness in the  left parietal region. The left temporal lobe hemorrhagic contusion has  improved. The right frontal contusion has improved. No new areas of  hemorrhage are identified. The degree of mass effect has decreased  significantly. There is now only about 5 mm of midline shift.  Previously there was up to 7 mm of midline shift. The ventricles are  normal in size, shape and configuration. The brain parenchyma and  subarachnoid spaces are normal. There is no evidence of intracranial  hemorrhage, mass, acute infarct or anomaly. The visualized portions of  the sinuses and mastoids appear normal. There is no evidence of  trauma.        Impression     IMPRESSION:    1. No acute hemorrhage is identified.  2. Improved left subdural. Improved left temporal lobe hemorrhagic  contusion. Right frontal hemorrhagic contusion is no longer  identified. Decreased mass effect.                  Thank you for choosing [facility]       Thank you for choosing [facility] for your care. Our goal is always to provide you with excellent care. Hearing back from our patients is one way we can continue to improve our services. Please take a few minutes to complete the written survey that you may receive in the mail after you visit with us. Thank you!        [facility] Information     Transmit lets you send messages to your doctor, view your test results, renew your prescriptions, schedule appointments and more. To sign up, go to [email or web address] . Click on \"Log in\" on the left side of " "the screen, which will take you to the Welcome page. Then click on \"Sign up Now\" on the right side of the page.     You will be asked to enter the access code listed below, as well as some personal information. Please follow the directions to create your username and password.     Your access code is: [email or web address]  Expires: 2017  5:17 PM     Your access code will  in 90 days. If you need help or a new code, please call your [facility] or [phone].        Care EveryWhere ID     This is your Care EveryWhere ID. This could be used by other organizations to access your [facility] medical records  [id number]        After Visit Summary       This is your record. Keep this with you and show to your community pharmacist(s) and doctor(s) at your next visit.                  "

## 2017-02-17 NOTE — ED AVS SNAPSHOT
[facility] Emergency Department    [address] [address]    Phone:  [phone]    Fax:  [phone]                                       Anya Gaines   MRN: [id number]    Department:  [facility] Emergency Department   Date of Visit:  2/17/2017           After Visit Summary Signature Page     I have received my discharge instructions, and my questions have been answered. I have discussed any challenges I see with this plan with the nurse or doctor.    ..........................................................................................................................................  Patient/Patient Representative Signature      ..........................................................................................................................................  Patient Representative Print Name and Relationship to Patient    ..................................................               ................................................  Date                                            Time    ..........................................................................................................................................  Reviewed by Signature/Title    ...................................................              ..............................................  Date                                                            Time

## 2017-02-18 NOTE — DISCHARGE INSTRUCTIONS
Continue with current restorative care plan at the transitional care unit.  Suspect Anya experienced a transient ischemic attack.

## 2017-02-18 NOTE — ED PROVIDER NOTES
History     Chief Complaint   Patient presents with     Altered Mental Status     pt here after daughter was concerned with pt changes in mentation     HPI  Anya Gaines is a 89 year old female who presents with daughter for evaluation of altered mental status.  Currently living at [facility] transitional care unit.  Patient was recently hospitalized at the neuro ICU at [facility].  Fall at her assisted living facility led to a concussion with LOC.  She presented with a large right parietal hematoma.  CT noncontrast confirmed a subdural bleed that was in the left parietal temporal frontal region.  She was on Plavix at the time.  This was discontinued and she will be off of it for 30 days.  She was not felt to be a surgical candidate.  During her neuro ICU stay she did have some aphasia which led to repeat CT imaging that showed no further acute concerns regarding her subdural.  They did an EEG to make sure it wasn't seizure based original indication for seizure.  She was then discharged to the [facility] transitional care unit.  Her daughter came to visit today and recognize that she was having some speech difficulties.  Daughter indicated that it appears from others fully understanding what questions are asked but she is having difficulty with expression and formulating words to complete sentences.  There is no concern for any seizure-type movement.  No fever.    I reviewed medical record at the time of her discharge from the neuro ICU.  Baseline mentation was only alert to person not place or time.  She otherwise had a nonfocal neurological examination and her aphasia had resolved.      I have reviewed the Medications, Allergies, Past Medical and Surgical History, and Social History in the Epic system.  Patient Active Problem List   Diagnosis     Fall at home     Type 2 diabetes mellitus without complication, without long-term current use of insulin (H)     UTI (urinary tract infection)     Hyperkinetic      Age-related osteoporosis without current pathological fracture     History of TIA (transient ischemic attack) and stroke     Advance care planning     Macular degeneration (senile) of [facility]     Dizziness     Essential hypertension with goal blood pressure less than 140/90     Subdural hematoma caused by concussion, with loss of consciousness of any duration with death due to brain injury prior to regaining consciousness, sequela     Seizures (H)     No current facility-administered medications for this encounter.      Current Outpatient Prescriptions   Medication     acetaminophen (TYLENOL) 325 MG tablet     gabapentin (NEURONTIN) 100 MG capsule     metFORMIN (GLUCOPHAGE-XR) 500 MG 24 hr tablet     atorvastatin (LIPITOR) 10 MG tablet     lisinopril (PRINIVIL/ZESTRIL) 40 MG tablet     amLODIPine (NORVASC) 2.5 MG tablet     Menthol, Topical Analgesic, (BIOFREEZE) 4 % GEL     hydrochlorothiazide (HYDRODIURIL) 25 MG tablet     calcium carbonate (OS- MG [place]. [place]) 600 MG tablet     multivitamin  with lutein (OCUVITE WITH LTEIN) CAPS per capsule     hypromellose-dextran 0.3-0.1% (ARTIFICIAL TEARS) opthalmic solution     loperamide (IMODIUM A-D) 2 MG tablet     No Known Allergies    Review of Systems    Constitutional: Negative.    HENT: Negative.    Eyes: Negative.    Respiratory: Negative.    Cardiovascular: Negative.    Gastrointestinal: Negative.    Endocrine: Negative.    Genitourinary: Negative.    Musculoskeletal: Negative.    Skin: Negative.    Allergic/Immunologic: Negative.    Neurological: Per chief complaint   Hematological: Negative.    Psychiatric/Behavioral: Per chief complaint  All other systems reviewed and are negative.    Physical Exam   BP: (!) 169/91  Pulse: 82  Heart Rate: 82  Temp: 97.6  F (36.4  C)  Resp: 20  Weight: 55.8 kg (123 lb)  SpO2: 91 %  Physical Exam  Elevated blood pressure  Nursing notes reviewed  Patient is alert to person.  Recognize her daughter.  Is not  able to indicate where she currently is, now the time, is unable to identify month or year.  Follows all commands during assessment of cranial nerve function.  All renal nerves are functioning normal.  There was no facial droop.  She displayed no aphasia-afferent or efferent.  Displayed no motor or sensory deficits.  Tremor became more active with intention.  Minimal resting tremor.  No cogwheel rigidity.  DTRs were normal/not suppressed.  Displayed no pathologic reflexes.  ED Course     ED Course     Procedures               Results for orders placed or performed during the hospital encounter of 02/17/17 (from the past 24 hour(s))   CT Head w/o Contrast    Narrative    CT HEAD W/O CONTRAST   2/17/2017 6:43 PM     HISTORY: Recent fall with concussion/positive LOC/subdural-left  frontal parietal-presents with expressive aphasia    TECHNIQUE:  Axial images of the head without IV contrast material.  Radiation dose for this scan was reduced using automated exposure  control, adjustment of the mA and/or kV according to patient size, or  iterative reconstruction technique.    COMPARISON: CT dated to/4/2017    FINDINGS: A small left subdural is present. This subdural has  decreased in size when compared to to/4/2017. The subdural along the  tentorium has completely resolved. The subdural fluid collection is  low in density and currently measures about 0.5 cm in thickness in the  left parietal region. The left temporal lobe hemorrhagic contusion has  improved. The right frontal contusion has improved. No new areas of  hemorrhage are identified. The degree of mass effect has decreased  significantly. There is now only about 5 mm of midline shift.  Previously there was up to 7 mm of midline shift. The ventricles are  normal in size, shape and configuration. The brain parenchyma and  subarachnoid spaces are normal. There is no evidence of intracranial  hemorrhage, mass, acute infarct or anomaly. The visualized portions of  the  sinuses and mastoids appear normal. There is no evidence of  trauma.      Impression    IMPRESSION:    1. No acute hemorrhage is identified.  2. Improved left subdural. Improved left temporal lobe hemorrhagic  contusion. Right frontal hemorrhagic contusion is no longer  identified. Decreased mass effect.             Assessments & Plan (with Medical Decision Making)  No defined acute CNS process that would've caused transient expressive aphasia.  Currently has a normal neurologic examination including no aphasia.  CT shows no acute process and the left subdural has improved.  Identifying no other process such as infectious or metabolic cause such a focal neurologic deficit . I can't rule out TIA .  With her age of 89 years and not being a candidate to be on any anticoagulant doesn't make sense to do any medical workup for TIA .  Daughter where that this most likely represent a TIA.  She is in agreement with the fact that mother is not a candidate for any anticoagulation therapy given her traumatic brain injury with subdural bleeding.  Recommended following up at the [facility] in 2-3 weeks as planned to determine when/if she can be placed back on her Plavix.  Discharge back to the traditional care unit at [facility] .     I have reviewed the nursing notes.    I have reviewed the findings, diagnosis, plan and need for follow up with the patient.    New Prescriptions    No medications on file       Final diagnoses:   Subdural hematoma (H)   Transient cerebral ischemia, unspecified type-expressive aphasia       2/17/2017   [facility] EMERGENCY DEPARTMENT     Jarocho Barajas DO  02/17/17 1945

## 2017-02-20 ENCOUNTER — NURSING HOME VISIT (OUTPATIENT)
Dept: GERIATRICS | Facility: CLINIC | Age: 82
End: 2017-02-20
Payer: COMMERCIAL

## 2017-02-20 VITALS
OXYGEN SATURATION: 93 % | WEIGHT: 122 LBS | BODY MASS INDEX: 24.64 KG/M2 | RESPIRATION RATE: 16 BRPM | DIASTOLIC BLOOD PRESSURE: 79 MMHG | SYSTOLIC BLOOD PRESSURE: 119 MMHG | TEMPERATURE: 97.5 F | HEART RATE: 81 BPM

## 2017-02-20 DIAGNOSIS — I10 ESSENTIAL HYPERTENSION WITH GOAL BLOOD PRESSURE LESS THAN 140/90: ICD-10-CM

## 2017-02-20 DIAGNOSIS — Z86.73 HISTORY OF TIA (TRANSIENT ISCHEMIC ATTACK) AND STROKE: ICD-10-CM

## 2017-02-20 DIAGNOSIS — E11.9 TYPE 2 DIABETES MELLITUS WITHOUT COMPLICATION, WITHOUT LONG-TERM CURRENT USE OF INSULIN (H): ICD-10-CM

## 2017-02-20 PROCEDURE — 99207 ZZC CDG-CORRECTLY CODED, REVIEWED AND AGREE: CPT | Performed by: NURSE PRACTITIONER

## 2017-02-20 PROCEDURE — 99310 SBSQ NF CARE HIGH MDM 45: CPT | Performed by: NURSE PRACTITIONER

## 2017-02-20 NOTE — PROGRESS NOTES
[facility]    Chief Complaint   Patient presents with     Nursing Home Acute       HPI:    Anya Gaines is a 89 year old  (7/4/1920), who is being seen today for an episodic care visit at [facility]. Today's concern is:  Subdural hematoma caused by concussion, with loss of consciousness of any duration with death due to brain injury prior to regaining consciousness, sequela  Increased confusion over the last few days, and was evaluated in the ED and found to have resolving SDH and resolving hematomas. She is, however, still quite confused. Thought maybe to have had a TIA last week, but symptoms still persisting now. She does spend much of her time in her room sleeping and often with the light out, which is not her normal pattern. She does not remember that she lives in assisted living facility and reports a desire to return to her home (where she has not lived for several years).    Reviewed with daughter, who notes consistent confusion since last week and is worried that she will not be able to return to her apartment at assisted living.  Anya is working with therapy and is improving in strength.     Type 2 diabetes mellitus without complication, without long-term current use of insulin (H)  On metformin, no hypoglycemia. She reports eating well, drinking well.     Essential hypertension with goal blood pressure less than 140/90  No headaches, no dizziness. On amlodipine, hydrochlorothiazide, lisinopril.     History of TIA (transient ischemic attack) and stroke  Concerning for TIA, but appears that confusion has remained. I suspect the confusion may more be a lack of stimulation as she is often sleeping in her room when not in therapy, and is not liking people to bother her.        ALLERGIES: Review of patient's allergies indicates no known allergies.  Past Medical, Surgical, Family and Social History reviewed and updated in [place].    Current Outpatient Prescriptions   Medication Sig Dispense  Refill     GABAPENTIN PO Take 400 mg by mouth 3 times daily       acetaminophen (TYLENOL) 325 MG tablet Take 3 tablets (975 mg) by mouth every 6 hours as needed 100 tablet      metFORMIN (GLUCOPHAGE-XR) 500 MG 24 hr tablet Take 4 tablets (2,000 mg) by mouth daily (with dinner) 30 tablet      atorvastatin (LIPITOR) 10 MG tablet Take 1 tablet (10 mg) by mouth daily 30 tablet 1     lisinopril (PRINIVIL/ZESTRIL) 40 MG tablet Take 1 tablet (40 mg) by mouth daily 30 tablet 5     amLODIPine (NORVASC) 2.5 MG tablet Take 1 tablet (2.5 mg) by mouth daily 30 tablet      Menthol, Topical Analgesic, (BIOFREEZE) 4 % GEL Externally apply topically 3 times daily as needed For muscle aches       hydrochlorothiazide (HYDRODIURIL) 25 MG tablet Take 1 tablet (25 mg) by mouth daily 90 tablet 3     calcium carbonate (OS- MG [place]. [place]) 600 MG tablet Take 1 tablet (600 mg) by mouth daily 60 tablet 5     multivitamin  with lutein (OCUVITE WITH LTEIN) CAPS per capsule Take 1 capsule by mouth daily 30 capsule 5     hypromellose-dextran 0.3-0.1% (ARTIFICIAL TEARS) opthalmic solution Place 1 drop into both eyes 3 times daily 15 mL 0     loperamide (IMODIUM A-D) 2 MG tablet Take 2 mg by mouth 4 times daily as needed for diarrhea          Medications reconciled to facility chart and changes were made to reflect current medications as identified as above med list. Below are the changes that were made:   Medications stopped since last EPIC medication reconciliation:   There are no discontinued medications.    Medications started since last [facility] medication reconciliation:  No orders of the defined types were placed in this encounter.           REVIEW OF SYSTEMS:  10 point ROS of systems including Constitutional, Eyes, Respiratory, Cardiovascular, Gastroenterology, Genitourinary, Integumentary, Muscularskeletal, Psychiatric were all negative except for pertinent positives noted in my HPI.    PHYSICAL EXAM:  /79  Pulse 81  Temp 97.5  F  (36.4  C)  Resp 16  Wt 122 lb (55.3 kg)  SpO2 93%  BMI 24.64 kg/m2  GENERAL APPEARANCE:  Alert, in no acute distress  HEAD:  Normal, normocephalic, scabbed area with decreased swelling at top of the head  EYE EXAM: normal external eye, conjunctiva, lids, JUAN  NECK EXAM: supple, no JVD  CHEST/RESP:  respiratory effort normal, lung sounds CTA , no respiratory distress  CV:  Rate reg, rhythm reg, no murmur, no peripheral edema   M/S:   extremities normal, gait abnormal-with hyperkinesis but using rolling walker , normal muscle tone, and range of motion normal   NEUROLOGIC EXAM: Normal gross motor movement, tone and coordination. No tremor.  Cranial nerves 2-12 are normal tested and grossly at patient's baseline  PSYCH:  Alert and oriented to self but confused as to location, time, affect pleasant, judgement appropriate     RECENT LABS:    Blood sugars as follows   - 251  Noon 238 - 359  Supper 221 - 367   - 407    Last Basic Metabolic Panel:  Lab Results   Component Value Date     02/04/2017      Lab Results   Component Value Date    POTASSIUM 3.4 02/04/2017     Lab Results   Component Value Date    CHLORIDE 100 02/04/2017     Lab Results   Component Value Date    KEN 8.6 02/04/2017     Lab Results   Component Value Date    CO2 24 02/04/2017     Lab Results   Component Value Date    BUN 18 02/04/2017     Lab Results   Component Value Date    CR 0.44 02/04/2017     Lab Results   Component Value Date     02/04/2017        Lab Results   Component Value Date    WBC 10.8 02/05/2017     Lab Results   Component Value Date    RBC 4.00 02/05/2017     Lab Results   Component Value Date    HGB 11.4 02/05/2017     Lab Results   Component Value Date    HCT 36.2 02/05/2017     Lab Results   Component Value Date    MCV 91 02/05/2017     Lab Results   Component Value Date    MCH 28.5 02/05/2017     Lab Results   Component Value Date    MCHC 31.5 02/05/2017     Lab Results   Component Value Date    RDW 13.4  02/05/2017     Lab Results   Component Value Date     02/05/2017        Lab Results   Component Value Date    A1C 7.3 02/04/2017    A1C 7.9 11/07/2016    A1C 7.5 08/08/2016    A1C 8.1 03/02/2016    A1C 7.0 03/26/2006            ASSESSMENT/PLAN:  Subdural hematoma caused by concussion, with loss of consciousness of any duration with death due to brain injury prior to regaining consciousness, sequela  Ongoing confusion since last week. SDH resolving per most recent scan, less headache. Therapy staff reports some confusion as well. She is very tired and sleeping much of the time, does not like to come out of her room.     Type 2 diabetes mellitus without complication, without long-term current use of insulin (H)  Blood sugars- fair control; last A1C wnl as noted above; The current medical regimen is effective;  continue present plan and medications. Goal A1C <8% given age and goals of care    Essential hypertension with goal blood pressure less than 140/90  Generally normotensive on current regimen, goal BP <140/90 to reduce risk of falls, hypotension. The current medical regimen is effective;  continue present plan and medications.      History of TIA (transient ischemic attack) and stroke  Previously on plavix and this has been held in light of recent SDH (x at least 1 month) until seen in neurology follow up. She may have had another TIA last week, but no evidence on CT scan. She remains confused but is also very disinterested in doing anything and is spending much of her time in her room sleeping and with the light off.   Reviewed with nursing/sw and will try to get her involved in other activities in the building and more engaged in her environment-see if this improves her mentation. May need to consider LTC placement if mentation does not improve.     She is on gabapentin 400 tid, and has been for a long time. Per recommendations of pharmacy in relation to her kidney function, and in light of her apparent  somnolence during the day/disinterest in surroundings, will trial decrease to BID.  Daughter is in agreement with this trial plan. Will also get labs tomorrow to rule out metabolic concerns.        ORDERS:  1. DC gabapentin 400 mg po TID  2. Gabapentin 400 mg po BID Dx involuntary movements  3. Lab draw on 2/21/17 BMP and CBC Dx hypokalemia and anemia    Total time spent with patient visit was 35 min including patient visit, review of past records and phone call to patient contact   Greater than 50% of total time spent with counseling and coordinating care    Electronically signed by  Carlene Serrano CNP   [facility]  [phone] cell

## 2017-02-21 ENCOUNTER — TRANSFERRED RECORDS (OUTPATIENT)
Dept: HEALTH INFORMATION MANAGEMENT | Facility: CLINIC | Age: 82
End: 2017-02-21

## 2017-02-21 ENCOUNTER — CARE COORDINATION (OUTPATIENT)
Dept: GERIATRIC MEDICINE | Facility: CLINIC | Age: 82
End: 2017-02-21

## 2017-02-21 LAB
ANION GAP SERPL CALCULATED.3IONS-SCNC: 8 MMOL/L (ref 5–18)
BUN SERPL-MCNC: 13 MG/DL (ref 8–28)
CALCIUM SERPL-MCNC: 10.4 MG/DL (ref 8.5–10.5)
CHLORIDE SERPLBLD-SCNC: 99 MMOL/L (ref 98–107)
CO2 SERPL-SCNC: 32 MMOL/L (ref 22–31)
CREAT SERPL-MCNC: 0.63 MG/DL (ref 0.6–1.1)
DIFFERENTIAL: ABNORMAL
ERYTHROCYTE [DISTWIDTH] IN BLOOD BY AUTOMATED COUNT: 12.3 % (ref 11–14.5)
GFR SERPL CREATININE-BSD FRML MDRD: >60 ML/MIN/1.73M2
GLUCOSE SERPL-MCNC: 187 MG/DL (ref 70–125)
HCT VFR BLD AUTO: 42.1 % (ref 35–47)
HEMOGLOBIN: 13.1 G/DL (ref 12–16)
MCH RBC QN AUTO: 28.6 PG (ref 27–34)
MCHC RBC AUTO-ENTMCNC: 31.1 G/DL (ref 32–36)
MCV RBC AUTO: 92 FL (ref 80–100)
PLATELET # BLD AUTO: 236 THOU/UL (ref 140–440)
POTASSIUM SERPL-SCNC: 4.1 MMOL/L (ref 3.5–5)
RBC # BLD AUTO: 4.58 MILL/UL (ref 3.8–5.4)
SODIUM SERPL-SCNC: 139 MMOL/L (ref 136–145)
WBC # BLD AUTO: 6.4 THOU/UL (ref 4–11)

## 2017-02-21 NOTE — PROGRESS NOTES
2-21-17  CC received notice that client as in the ED over the weekend and was just DC back to the TCU and was not admitted.  CC reached out to Ema VAZQUEZ at the TCU.  She stated that client has altered mental status with her speech and they wanted to eval for TIA.  Client was DC back to the TCU. Per SW she has been declining somewhat in the last week, been isolating her self more in her room and sleeping a great deal more.  PT went to do home eval with client yesterday and client did not remember she had a room in the AL    TAYLOR is calling for a care conference with family to just discuss.  She will let CC know when this is scheduled so CC can attend.   Arely Duckworth MA [facility] Care Coordinator   [phone]

## 2017-02-23 ENCOUNTER — CARE COORDINATION (OUTPATIENT)
Dept: GERIATRIC MEDICINE | Facility: CLINIC | Age: 82
End: 2017-02-23

## 2017-02-23 NOTE — PROGRESS NOTES
2-23--17   Care conference held at the TCU today with several family members.  TCU staff was also present, SW, OT, PT and RN.  RN from AL and CC were also there.     TCU staff has seen a significant decline in client over the last several week since she has been admitted to TCU on 2-7.  Client was doing well and thought of DC back to the AL were possible right after admission but client has taken a steady declined every week since then.     Client has been having increased confusion, more queuing and assistance with more of there ADL's and just this morning and a significant bowel incontinence episode.  All staff present at this time are questioning her ability to go back to the AL.  Family would like to see what another week of PT/OT will do before a definite decision is made.  RN from AL also expressed concerns about this due to the level of care that client is needing at this time.     All present felt that another week would determined if client would be able to go back to the AL.  CC explained to all present that at 30 days EW would have to be closed DTR sent out.  All present stated they understood so CC will f/u with SW at TCU next week to get updated on client and move forward at this time.     CC did talk with RN at TCU to see if client does stay if HO would be discussed at sometime with client and family. RN stated that NP had been thinking of this as well and would f/u with family once decision is made on LTC placement      Arely Duckworth MA [facility] Coordinator   [phone]

## 2017-02-27 ENCOUNTER — NURSING HOME VISIT (OUTPATIENT)
Dept: GERIATRICS | Facility: CLINIC | Age: 82
End: 2017-02-27
Payer: COMMERCIAL

## 2017-02-27 VITALS
BODY MASS INDEX: 24.64 KG/M2 | WEIGHT: 122 LBS | TEMPERATURE: 98.5 F | HEART RATE: 85 BPM | RESPIRATION RATE: 18 BRPM | SYSTOLIC BLOOD PRESSURE: 136 MMHG | DIASTOLIC BLOOD PRESSURE: 72 MMHG | OXYGEN SATURATION: 96 %

## 2017-02-27 DIAGNOSIS — E11.9 TYPE 2 DIABETES MELLITUS WITHOUT COMPLICATION, WITHOUT LONG-TERM CURRENT USE OF INSULIN (H): Primary | ICD-10-CM

## 2017-02-27 DIAGNOSIS — G31.84 MILD COGNITIVE IMPAIRMENT: ICD-10-CM

## 2017-02-27 DIAGNOSIS — Z86.73 HISTORY OF TIA (TRANSIENT ISCHEMIC ATTACK) AND STROKE: ICD-10-CM

## 2017-02-27 PROCEDURE — 99309 SBSQ NF CARE MODERATE MDM 30: CPT | Performed by: NURSE PRACTITIONER

## 2017-02-27 NOTE — PROGRESS NOTES
[facility]    Chief Complaint   Patient presents with     Nursing Home Acute       HPI:    Anya Gaines is a 89 year old  (7/4/1920), who is being seen today for an episodic care visit at [facility]. Today's concern is:  Type 2 diabetes mellitus without complication, without long-term current use of insulin (H)  On metformin. No reports of hypoglycemia. Blood sugar trending 177-185 fasting. Goal A1C <8% given age and goals of care  Lab Results   Component Value Date    A1C 7.3 02/04/2017    A1C 7.9 11/07/2016    A1C 7.5 08/08/2016    A1C 8.1 03/02/2016    A1C 7.0 03/26/2006          History of TIA (transient ischemic attack) and stroke  Has had 1 witnessed episode of increased slurring of speech, confusion. Of note, Anya also does not remember the incident of the fall that caused this hospital admission. In the past, she has remembered them all. CT negative for CVA.     Mild cognitive impairment  Anya's cognitive function has declined. Today she is able to state month, not year, knows she is at [facility] but not that this is where her apartment is. She appears to recognize me, but does not know my name as previously.        ALLERGIES: Review of patient's allergies indicates no known allergies.  Past Medical, Surgical, Family and Social History reviewed and updated in [facility].    Current Outpatient Prescriptions   Medication Sig Dispense Refill     GABAPENTIN PO Take 400 mg by mouth 2 times daily        acetaminophen (TYLENOL) 325 MG tablet Take 3 tablets (975 mg) by mouth every 6 hours as needed 100 tablet      metFORMIN (GLUCOPHAGE-XR) 500 MG 24 hr tablet Take 4 tablets (2,000 mg) by mouth daily (with dinner) 30 tablet      atorvastatin (LIPITOR) 10 MG tablet Take 1 tablet (10 mg) by mouth daily 30 tablet 1     lisinopril (PRINIVIL/ZESTRIL) 40 MG tablet Take 1 tablet (40 mg) by mouth daily 30 tablet 5     amLODIPine (NORVASC) 2.5 MG tablet Take 1 tablet (2.5 mg) by mouth daily 30 tablet       Menthol, Topical Analgesic, (BIOFREEZE) 4 % GEL Externally apply topically 3 times daily as needed For muscle aches       hydrochlorothiazide (HYDRODIURIL) 25 MG tablet Take 1 tablet (25 mg) by mouth daily 90 tablet 3     calcium carbonate (OS- MG [place]. CA) 600 MG tablet Take 1 tablet (600 mg) by mouth daily 60 tablet 5     multivitamin  with lutein (OCUVITE WITH LTEIN) CAPS per capsule Take 1 capsule by mouth daily 30 capsule 5     hypromellose-dextran 0.3-0.1% (ARTIFICIAL TEARS) opthalmic solution Place 1 drop into both eyes 3 times daily 15 mL 0     loperamide (IMODIUM A-D) 2 MG tablet Take 2 mg by mouth 4 times daily as needed for diarrhea          Medications reconciled to facility chart and changes were made to reflect current medications as identified as above med list. Below are the changes that were made:   Medications stopped since last EPIC medication reconciliation:   There are no discontinued medications.    Medications started since last [facility] medication reconciliation:  No orders of the defined types were placed in this encounter.      REVIEW OF SYSTEMS:  4 point ROS including Respiratory, CV, GI and , other than that noted in the HPI,  is negative    PHYSICAL EXAM:  /72  Pulse 85  Temp 98.5  F (36.9  C)  Resp 18  Wt 122 lb (55.3 kg)  SpO2 96%  BMI 24.64 kg/m2  GENERAL APPEARANCE:  Alert, in no distress  HEAD:  Normal, normocephalic, atraumatic  EYE EXAM: normal external eye, conjunctiva, lids, JUAN  NECK EXAM: supple, no JVD  CHEST/RESP:  respiratory effort normal, lung sounds CTA , no respiratory distress  CV:  Rate reg, rhythm reg, no murmur, no peripheral edema   GI/ABDOMEN:  soft, nontender, no palpable masses  M/S:   extremities normal, gait normal-with hyperkinesia and rolling walker-is falls risk, normal muscle tone, and range of motion normal   SKIN EXAM: scab on top of scalp is healing nicely without symptoms infection, no redness, no pain, no swelling  NEUROLOGIC EXAM:  Normal gross motor movement, tone and coordination. No tremor.  Cranial nerves 2-12 are normal tested and grossly at patient's baseline  PSYCH:  Alert and oriented to self and month, affect pleasant , judgement appropriate     RECENT LABS:  All labs reviewed, none recent    Blood sugars as follows   - 340        ASSESSMENT/PLAN:  Type 2 diabetes mellitus without complication, without long-term current use of insulin (H)  Blood sugars- good control; last A1C wnl as noted above; The current medical regimen is effective;  continue present plan and medications.      History of TIA (transient ischemic attack) and stroke  Potentially could have some lasting effects after previous episode of decreased responsiveness and aphasia. SLUMS worse.     Mild cognitive impairment  Worse, with SLUMS much lower than previously (9/30?). Family considering LTC placement instead of returning to her apartment.    Will continue with therapy for now, she would like to return to her apartment but not sure if this will be safe or not for her.       ORDERS:  1. May see Dr Eubanks, previous neurology, in follow up    Total time spent with patient visit was 25 min including patient visit and review of past records   Greater than 50% of total time spent with counseling and coordinating care    Electronically signed by  Carlene Serrano CNP   [facility]  [phone] cell

## 2017-03-01 ENCOUNTER — CARE COORDINATION (OUTPATIENT)
Dept: GERIATRIC MEDICINE | Facility: CLINIC | Age: 82
End: 2017-03-01

## 2017-03-01 NOTE — PROGRESS NOTES
3-1-17   CC talked with both RN at AL [facility] and SW at [facility] in regards to client potentially going back to the AL and not staying LTC.  CC talked with Angy RN at Al and she said that client daughter Kimi and family has concerns about her quality of life she would have at the LTC.  RN does have significant concerns about client ability to be safe if she moves back to the AL so would like to wait. She is going talk with staff at the [facility] and see if PT/OT could do another home eval with client to determined if she would be able to transfer and manage around her apartment on her own.  During the last eval client did not know where her bathroom and bedroom was.  She also stated that client had CT scan scheduled on Monday so would like to see the results of this.  RN is also going to be on vacation the week of the 6th so would not want DC until she is back the week of the 13th.    CC stated that cares could be increase but more then likely client could be capped out on her RS tool due to the level of care that client is needed.  RN agreed and will She is doing to f/u with staff at the U and discuss and will f/u with CC .   CC did state that she will have to close EW down next week due to 30 day at the [facility] and RN stated understanding of this.      CC then reached out to TAYLOR Boo at [facility] and she also has concerns about client ability to go back safely to the AL.  Daughter had stated that same concern to SW as she did to AL.  SW is going to also talk with RN at AL, PT/OT and also NP to see if they could visit with family as well when then are in the facility to discuss these options.    CC then brought up [facility].  This was talked about after the CC with family and RN at [facility] stated that NP had thought about this referral as well. CC strongly encouraged this conversation with family and staff and SW will f/u with NP on this as well and then f/u with CC.    Arely Duckworth MA [facility] Care Coordinator    [phone]

## 2017-03-03 VITALS
DIASTOLIC BLOOD PRESSURE: 79 MMHG | HEART RATE: 68 BPM | BODY MASS INDEX: 24.04 KG/M2 | SYSTOLIC BLOOD PRESSURE: 115 MMHG | WEIGHT: 119 LBS | OXYGEN SATURATION: 95 % | TEMPERATURE: 97.4 F | RESPIRATION RATE: 18 BRPM

## 2017-03-03 NOTE — PROGRESS NOTES
[facility]  PRIMARY CARE PROVIDER AND CLINIC:  Carlene Serrano  [facility] [address] / [place] *  Chief Complaint   Patient presents with     Hospital F/U     Nursing Home Acute       HPI:   Obtained from the patient, medical record and from the medial staffs.     Anya Gaines is a 89 year old  (7/4/1920),[facility].  Hospital stay 2/3/17 through 2/7/17.  Admitted to this facility for  rehab, medical management and nursing care. Current issues are:      Subdural hematoma caused by concussion, sequela  - Fell  At her apartment, developed subdural hematoma.   - Denies headache, blurry vision.   - Reports issue with balance for a very long time and uses a rolatter at home  - Started OT/PT and feels making a progress    Essential hypertension with goal blood pressure less than 140/9  - No CP, HA or fainting    History of TIA (transient ischemic attack) and stroke  - no new issue.     Type 2 diabetes mellitus without complication, without long-term current use of insulin (H)  - denies any low glucose reading.   - reports appetite is good.       CODE STATUS/ADVANCE DIRECTIVES DISCUSSION:   DNR / DNI  Patient's living condition: lives in an assisted living facility    ALLERGIES:Review of patient's allergies indicates no known allergies.  PAST MEDICAL HISTORY:  has no past medical history on file.  PAST SURGICAL HISTORY:  has a past surgical history that includes surgical history of -.  FAMILY HISTORY: family history is not on file.  SOCIAL HISTORY:  reports that she has never smoked. She has never used smokeless tobacco. She reports that she does not drink alcohol or use illicit drugs.    Post Discharge Medication Reconciliation Status: discharge medications reconciled and changed, per note/orders (see AVS).  Current Outpatient Prescriptions   Medication Sig Dispense Refill     GABAPENTIN PO Take 400 mg by  mouth 2 times daily        acetaminophen (TYLENOL) 325 MG tablet Take 3 tablets (975 mg) by mouth every 6 hours as needed 100 tablet      metFORMIN (GLUCOPHAGE-XR) 500 MG 24 hr tablet Take 4 tablets (2,000 mg) by mouth daily (with dinner) 30 tablet      atorvastatin (LIPITOR) 10 MG tablet Take 1 tablet (10 mg) by mouth daily 30 tablet 1     lisinopril (PRINIVIL/ZESTRIL) 40 MG tablet Take 1 tablet (40 mg) by mouth daily 30 tablet 5     amLODIPine (NORVASC) 2.5 MG tablet Take 1 tablet (2.5 mg) by mouth daily 30 tablet      Menthol, Topical Analgesic, (BIOFREEZE) 4 % GEL Externally apply topically 3 times daily as needed For muscle aches       hydrochlorothiazide (HYDRODIURIL) 25 MG tablet Take 1 tablet (25 mg) by mouth daily 90 tablet 3     calcium carbonate (OS- MG [place]. [place]) 600 MG tablet Take 1 tablet (600 mg) by mouth daily 60 tablet 5     multivitamin  with lutein (OCUVITE WITH LTEIN) CAPS per capsule Take 1 capsule by mouth daily 30 capsule 5     hypromellose-dextran 0.3-0.1% (ARTIFICIAL TEARS) opthalmic solution Place 1 drop into both eyes 3 times daily 15 mL 0     loperamide (IMODIUM A-D) 2 MG tablet Take 2 mg by mouth 4 times daily as needed for diarrhea          ROS:  10 point ROS of systems including Constitutional, Eyes, Respiratory, Cardiovascular, Gastroenterology, Genitourinary, Integumentary, Muscularskeletal, Psychiatric were all negative except for pertinent positives noted in my HPI.    Exam:  /79  Pulse 68  Temp 97.4  F (36.3  C)  Resp 18  Wt 119 lb (54 kg)  SpO2 95%  BMI 24.04 kg/m2  GENERAL APPEARANCE:  Alert, in no distress  ENT:  Mouth and posterior oropharynx normal, moist mucous membranes, edentulous  with denture plateboth level. [place], wears hearing aids.   EYES:  EOM, conjunctivae, lids, pupils and irises normal, wears glasses  NECK:  No adenopathy,masses or thyromegaly  RESP:  respiratory effort and palpation of chest normal, lungs clear to auscultation , no respiratory  distress  CV:  Palpation and auscultation of heart done , regular rate and rhythm, no murmur, rub, or gallop, no edema  ABDOMEN:  normal bowel sounds, soft, nontender, no hepatosplenomegaly or other masses  M/S:   Gait and station abnormal uses  Rolator. [place] is shuffling and wide range.   SKIN:  Inspection of skin and subcutaneous tissue baseline, Palpation of skin and subcutaneous tissue baseline  NEURO:   Cranial nerves 2-12 are normal tested and grossly at patient's baseline, INVOLUNTARY MOVEMENTS (CHOREA-LIKE DANCING)  PSYCH:  oriented to ., insight and judgement impaired, memory impaired     Lab/Diagnostic data:   Lab Results   Component Value Date    A1C 7.3 02/04/2017    A1C 7.9 11/07/2016    A1C 7.5 08/08/2016    A1C 8.1 03/02/2016    A1C 7.0 03/26/2006     Last Basic Metabolic Panel:  Lab Results   Component Value Date     02/04/2017      Lab Results   Component Value Date    POTASSIUM 3.4 02/04/2017     Lab Results   Component Value Date    CHLORIDE 100 02/04/2017     Lab Results   Component Value Date    KEN 8.6 02/04/2017     Lab Results   Component Value Date    CO2 24 02/04/2017     Lab Results   Component Value Date    BUN 18 02/04/2017     Lab Results   Component Value Date    CR 0.44 02/04/2017     Lab Results   Component Value Date     02/04/2017            ASSESSMENT/PLAN:  Subdural hematoma (H)    Essential hypertension with goal blood pressure less than 140/90   BP Readings from Last 3 Encounters:   03/03/17 115/79   02/27/17 136/72   02/20/17 119/79   - Keep SBP> 130 mmHg and DBP > 65 mmHg (levels below these increase mortality as shown by standard studies and observations).   - on Lisinopril 40 mg, amlodipine 2.5 mg, HCTZ 25 mg,   - DC amlodipine    History of TIA (transient ischemic attack) and stroke  - no issue. On ASA    Type 2 diabetes mellitus without complication, without long-term current use of insulin (H)  Lab Results   Component Value Date    A1C 7.3 02/04/2017    A1C  7.9 11/07/2016    A1C 7.5 08/08/2016    A1C 8.1 03/02/2016    A1C 7.0 03/26/2006     - On Metformin 2000 mg.   -  Keep HbA1C b/w 8-9% (per AGS there is a potential harm in lowering A1C to <6.5 % in older adults with diabetes), life expectancy less than 5 years, tight glucose control is note recommended  - Reduce metformin to 850 bid.     Cognitive impairment  - MOCA: recall 3 items: 0/3, ODT: 3/5. C/w dementia.  - perform SLUM       Recommendations:  - Perform SLUM  -  Start Vit D3 1000 units daily  - Reduce metformin to 850 bid.   - Dc Amlodipine 2.5 mg      Information reviewed:  Medications, vital signs, orders, nursing notes, problem list, hospital information. Total time spent with patient visit was 45 min including patient visit and review of past records.       Electronically signed by:  Yoan Camp MD

## 2017-03-06 ENCOUNTER — NURSING HOME VISIT (OUTPATIENT)
Dept: GERIATRICS | Facility: CLINIC | Age: 82
End: 2017-03-06
Payer: COMMERCIAL

## 2017-03-06 DIAGNOSIS — R41.89 COGNITIVE IMPAIRMENT: ICD-10-CM

## 2017-03-06 DIAGNOSIS — I10 ESSENTIAL HYPERTENSION WITH GOAL BLOOD PRESSURE LESS THAN 140/90: ICD-10-CM

## 2017-03-06 DIAGNOSIS — S06.5XAA SUBDURAL HEMATOMA (H): Primary | ICD-10-CM

## 2017-03-06 DIAGNOSIS — Z86.73 HISTORY OF TIA (TRANSIENT ISCHEMIC ATTACK) AND STROKE: ICD-10-CM

## 2017-03-06 DIAGNOSIS — E11.9 TYPE 2 DIABETES MELLITUS WITHOUT COMPLICATION, WITHOUT LONG-TERM CURRENT USE OF INSULIN (H): ICD-10-CM

## 2017-03-06 PROCEDURE — 99207 ZZC CDG-CORRECTLY CODED, REVIEWED AND AGREE: CPT | Performed by: FAMILY MEDICINE

## 2017-03-06 PROCEDURE — 99306 1ST NF CARE HIGH MDM 50: CPT | Performed by: FAMILY MEDICINE

## 2017-03-08 ENCOUNTER — CARE COORDINATION (OUTPATIENT)
Dept: GERIATRIC MEDICINE | Facility: CLINIC | Age: 82
End: 2017-03-08

## 2017-03-09 ENCOUNTER — CARE COORDINATION (OUTPATIENT)
Dept: GERIATRIC MEDICINE | Facility: CLINIC | Age: 82
End: 2017-03-09

## 2017-03-09 ENCOUNTER — NURSING HOME VISIT (OUTPATIENT)
Dept: GERIATRICS | Facility: CLINIC | Age: 82
End: 2017-03-09
Payer: COMMERCIAL

## 2017-03-09 VITALS
TEMPERATURE: 97.2 F | WEIGHT: 120.2 LBS | DIASTOLIC BLOOD PRESSURE: 80 MMHG | BODY MASS INDEX: 24.28 KG/M2 | RESPIRATION RATE: 18 BRPM | OXYGEN SATURATION: 95 % | HEART RATE: 85 BPM | SYSTOLIC BLOOD PRESSURE: 145 MMHG

## 2017-03-09 DIAGNOSIS — Z86.73 HISTORY OF TIA (TRANSIENT ISCHEMIC ATTACK) AND STROKE: ICD-10-CM

## 2017-03-09 DIAGNOSIS — R41.89 COGNITIVE IMPAIRMENT: ICD-10-CM

## 2017-03-09 DIAGNOSIS — E11.9 TYPE 2 DIABETES MELLITUS WITHOUT COMPLICATION, WITHOUT LONG-TERM CURRENT USE OF INSULIN (H): ICD-10-CM

## 2017-03-09 PROCEDURE — 99207 ZZC CDG-DX INCORRECT: CPT | Performed by: NURSE PRACTITIONER

## 2017-03-09 PROCEDURE — 99310 SBSQ NF CARE HIGH MDM 45: CPT | Performed by: NURSE PRACTITIONER

## 2017-03-09 NOTE — PROGRESS NOTES
3-8-17   CC called and left VM on RN line at the AL informing them that CC will be closing her EW as of 3-9-17 due to 30 days in the TCU.  CC asked for call back if they had any concerns or questions.   [id number] will be sent to the [facility] and MMIS will be entered by CMS to close waiver as of 2-7-17    CC then talked with SW at [facility] to her know that EW will be closed as well.  She stated that client has not improved much in the last week and there is a concerns about client not being able to go back to the AL at this time.  She stated that staff at the TCU will work with family to schedule CT scan and then she will f/u with RN at AL when she is back from vacation to see if another care conference is needed and what the next steps will be then for client.  She will keep CC updated as needed.  Arely Duckworth MA [facility] Coordinator   [phone]      3-7- 17   CC reached out to client daughter Kimi to f/u and see how client was doing.  CC had received email from SW at [facility] that client had been found wandering around the facility and has attempted to get outside so now she had a wander guard on.    Kimi stated that client was doing okay at this time but she did have some concerns about client ability to go back to the AL. She stated that her brother is struggling with client staying LTC.  They would like to have a CT scan done of client to determined if there is any more damage or injury in her brain and Kimi feels this will help them in making their decision of client should stay LTC.  She has been communicating with staff at the [facility] to see if they can help schedule this.    CC explained that EW will be closed on 3-9-17 due client being at the TCU since 2-7-17 for 30 days.  Daughter stated understanding of this and asked if the would affect her GRH. CC stated she just would close down her EW and inform the financial worker that it was unsure yet if client was staying LTC.    Kimi stated understanding of  this and will f/u with CC as needed once she finds out more from the scan.   Arely Duckworth MA [facility] Coordinator   [phone]

## 2017-03-09 NOTE — PROGRESS NOTES
[facility]    Chief Complaint   Patient presents with     Nursing Home Acute       HPI:    Anya Gaines is a 89 year old  (7/4/1920), who is being seen today for an episodic care visit at [facility]. Today's concern is:  Subdural hematoma caused by concussion, with loss of consciousness of any duration with death due to brain injury prior to regaining consciousness, sequela  Anya suffered a fall at her apartment with SDH on 2/3/17. She suffered aphasia, and repeat CT scan was recommended about 4 weeks ago    History of TIA (transient ischemic attack) and stroke  Also noted to have transient aphasia, having occurred at least 2 times since the last fall to the floor. She has been off her routine plavix since fall. Family would like plavix to be resumed if SDH has resolved.     Type 2 diabetes mellitus without complication, without long-term current use of insulin (H)  Upon review of patient's chart, BS have been as follows:  3/9/2017 188  3/8/2017 163  3/7/2017 156  3/6/2017 181  3/5/2017 174  On metformin.   Lab Results   Component Value Date    A1C 7.3 02/04/2017    A1C 7.9 11/07/2016    A1C 7.5 08/08/2016    A1C 8.1 03/02/2016    A1C 7.0 03/26/2006       Cognitive impairment  She has continued to be quite confused, and staff notes that she has observed to be wandering in the hallway at night, unable to find her room. Today, she is unable to state year/location/city. She is able to state that it is March, and states that she is in her apartment. She appears to be content today.        ALLERGIES: Review of patient's allergies indicates no known allergies.  Past Medical, Surgical, Family and Social History reviewed and updated in [facility].    Current Outpatient Prescriptions   Medication Sig Dispense Refill     GABAPENTIN PO Take 600 mg by mouth 2 times daily        acetaminophen (TYLENOL) 325 MG tablet Take 3 tablets (975 mg) by mouth every 6 hours as needed 100 tablet      metFORMIN  (GLUCOPHAGE-XR) 500 MG 24 hr tablet Take 4 tablets (2,000 mg) by mouth daily (with dinner) 30 tablet      atorvastatin (LIPITOR) 10 MG tablet Take 1 tablet (10 mg) by mouth daily 30 tablet 1     lisinopril (PRINIVIL/ZESTRIL) 40 MG tablet Take 1 tablet (40 mg) by mouth daily 30 tablet 5     amLODIPine (NORVASC) 2.5 MG tablet Take 1 tablet (2.5 mg) by mouth daily 30 tablet      Menthol, Topical Analgesic, (BIOFREEZE) 4 % GEL Externally apply topically 3 times daily as needed For muscle aches       hydrochlorothiazide (HYDRODIURIL) 25 MG tablet Take 1 tablet (25 mg) by mouth daily 90 tablet 3     calcium carbonate (OS- MG [place]. [place]) 600 MG tablet Take 1 tablet (600 mg) by mouth daily 60 tablet 5     multivitamin  with lutein (OCUVITE WITH LTEIN) CAPS per capsule Take 1 capsule by mouth daily 30 capsule 5     hypromellose-dextran 0.3-0.1% (ARTIFICIAL TEARS) opthalmic solution Place 1 drop into both eyes 3 times daily 15 mL 0     loperamide (IMODIUM A-D) 2 MG tablet Take 2 mg by mouth 4 times daily as needed for diarrhea And 1 tab every other day         Medications reconciled to facility chart and changes were made to reflect current medications as identified as above med list. Below are the changes that were made:   Medications stopped since last EPIC medication reconciliation:   There are no discontinued medications.    Medications started since last [facility] medication reconciliation:  No orders of the defined types were placed in this encounter.      REVIEW OF SYSTEMS:  4 point ROS including Respiratory, CV, GI and , other than that noted in the HPI,  is negative    PHYSICAL EXAM:  /80  Pulse 85  Temp 97.2  F (36.2  C)  Resp 18  Wt 120 lb 3.2 oz (54.5 kg)  SpO2 95%  BMI 24.28 kg/m2  GENERAL APPEARANCE:  Alert, in no acute distress  HEAD:  Normal, normocephalic, atraumatic  EYE EXAM: normal external eye, conjunctiva, lids, JUAN  NECK EXAM: supple, no JVD  CHEST/RESP:  respiratory effort normal, lung sounds  CTA , no respiratory distress  CV:  Rate reg, rhythm reg, no murmur, no peripheral edema   M/S:   extremities normal, gait abnormal-with hyperkinetic activity at rest and while ambulating, normal muscle tone, and range of motion normal   SKIN EXAM: CDI  NEUROLOGIC EXAM: Normal gross motor movement, tone and coordination. No tremor.  Cranial nerves 2-12 are normal tested and grossly at patient's baseline  PSYCH:  Alert and oriented to self, affect pleasant , judgement impaired by cognitive losses     RECENT LABS:  Reviewed in facility records     ASSESSMENT/PLAN:  Subdural hematoma caused by concussion, with loss of consciousness of any duration with death due to brain injury prior to regaining consciousness, sequela  Due for repeat CT in order to determine status of SDH, this will be scheduled at [facility]     History of TIA (transient ischemic attack) and stroke  Occasional aphasia, not currently on plavix. Will eval if SDH is resolved, then start Plavix again.     Type 2 diabetes mellitus without complication, without long-term current use of insulin (H)  Blood sugars- good control; last A1C within normal limits as noted above; The current medical regimen is effective;  continue present plan and medications.      Cognitive impairment  Ongoing and increased memory loss, confusion with some wandering. Family would really like her to be able to return to her assisted living facility apartment. However, when she has been to the apartment, she is not remembering it, nor can she find her apartment at assisted living facility with assist of OT/PT.    Long discussion with daughter, Kimi, today. Recommend LTC placement as this is the safest place for her. Kimi remains unconvinced, but is understanding of safety concerns. In light of that, the plan will be to transition [place] to LTC bed, monitor her adjustment. Kimi has paid rent in the apartment for the month of March, so this will be a good trial.  "If Anya improves, may consider back to assisted living facility apartment with increased services. I doubt this will be the case.     Jalen 17/28  CPT 4.3/6      ORDERS:  1. Vitamin D 2000 units po QD   Dx: Supplement  2. D/C Amlodipine  3. Please schedule \"CT Scan of Head\" at [facility]   Dx: SDH  4. To dining room for meals, please    Total time spent with patient visit was 35 min including patient visit, review of past records and phone call to patient contact   Greater than 50% of total time spent with counseling and coordinating care    Electronically signed by  Carlene Serrano CNP   [facility]  [phone] cell       "

## 2017-03-10 ENCOUNTER — CARE COORDINATION (OUTPATIENT)
Dept: GERIATRIC MEDICINE | Facility: CLINIC | Age: 82
End: 2017-03-10

## 2017-03-10 NOTE — PROGRESS NOTES
Received a request to submit a DTR for the termination of assisted living and elderly waiver. Documentation completed and faxed to the health plan.  [employer].    Araceli Garcia RN  Utilization   [facility]  [phone]

## 2017-03-13 ENCOUNTER — NURSING HOME VISIT (OUTPATIENT)
Dept: GERIATRICS | Facility: CLINIC | Age: 82
End: 2017-03-13
Payer: COMMERCIAL

## 2017-03-13 VITALS
OXYGEN SATURATION: 95 % | SYSTOLIC BLOOD PRESSURE: 130 MMHG | BODY MASS INDEX: 23.83 KG/M2 | WEIGHT: 118 LBS | DIASTOLIC BLOOD PRESSURE: 79 MMHG | RESPIRATION RATE: 16 BRPM | TEMPERATURE: 96.6 F | HEART RATE: 89 BPM

## 2017-03-13 DIAGNOSIS — I10 ESSENTIAL HYPERTENSION WITH GOAL BLOOD PRESSURE LESS THAN 140/90: ICD-10-CM

## 2017-03-13 DIAGNOSIS — E11.9 TYPE 2 DIABETES MELLITUS WITHOUT COMPLICATION, WITHOUT LONG-TERM CURRENT USE OF INSULIN (H): Primary | ICD-10-CM

## 2017-03-13 PROCEDURE — 99309 SBSQ NF CARE MODERATE MDM 30: CPT | Performed by: NURSE PRACTITIONER

## 2017-03-13 RX ORDER — LOPERAMIDE HCL 2 MG
2 CAPSULE ORAL EVERY OTHER DAY
COMMUNITY
End: 2019-08-06

## 2017-03-13 NOTE — PROGRESS NOTES
[facility]    Chief Complaint   Patient presents with     Nursing Home Acute       HPI:    Anya Gaines is a 89 year old  (7/4/1920), who is being seen today for an episodic care visit at [facility]. Today's concern is:  Type 2 diabetes mellitus without complication, without long-term current use of insulin (H)  On metformin routinely, blood sugars trending 150-190 . She reports she is eating well, no new concerns. She is planning to start to go the dining room for meals and this will help her feel less isolated. .   Lab Results   Component Value Date    A1C 7.3 02/04/2017    A1C 7.9 11/07/2016    A1C 7.5 08/08/2016    A1C 8.1 03/02/2016    A1C 7.0 03/26/2006          Essential hypertension with goal blood pressure less than 140/90  No headache, no dizziness. No falls. On lisinopril, hydrochlorothiazide,     Subdural hematoma caused by concussion, with loss of consciousness of any duration with death due to brain injury prior to regaining consciousness, sequela  Anya continues to be quite confused, is not able to state where she is. She does verbalize understanding of the need to ask for help with all transfers and ambulation. She is pleasant today and states that she likes to be by herself, doesn't like to participate in actvities.        ALLERGIES: Review of patient's allergies indicates no known allergies.  Past Medical, Surgical, Family and Social History reviewed and updated in [employer].    Current Outpatient Prescriptions   Medication Sig Dispense Refill     loperamide (IMODIUM) 2 MG capsule Take 2 mg by mouth every other day       cholecalciferol (VITAMIN D) 1000 UNIT tablet Take 2,000 Units by mouth daily       GABAPENTIN PO Take 600 mg by mouth 2 times daily        acetaminophen (TYLENOL) 325 MG tablet Take 3 tablets (975 mg) by mouth every 6 hours as needed 100 tablet      metFORMIN (GLUCOPHAGE-XR) 500 MG 24 hr tablet Take 4 tablets (2,000 mg) by mouth daily (with dinner) 30 tablet       atorvastatin (LIPITOR) 10 MG tablet Take 1 tablet (10 mg) by mouth daily 30 tablet 1     lisinopril (PRINIVIL/ZESTRIL) 40 MG tablet Take 1 tablet (40 mg) by mouth daily 30 tablet 5     amLODIPine (NORVASC) 2.5 MG tablet Take 1 tablet (2.5 mg) by mouth daily 30 tablet      Menthol, Topical Analgesic, (BIOFREEZE) 4 % GEL Externally apply topically 3 times daily as needed For muscle aches       hydrochlorothiazide (HYDRODIURIL) 25 MG tablet Take 1 tablet (25 mg) by mouth daily 90 tablet 3     calcium carbonate (OS- MG [place]. [place]) 600 MG tablet Take 1 tablet (600 mg) by mouth daily 60 tablet 5     multivitamin  with lutein (OCUVITE WITH LTEIN) CAPS per capsule Take 1 capsule by mouth daily 30 capsule 5     hypromellose-dextran 0.3-0.1% (ARTIFICIAL TEARS) opthalmic solution Place 1 drop into both eyes 3 times daily 15 mL 0       Medications reconciled to facility chart and changes were made to reflect current medications as identified as above med list. Below are the changes that were made:   Medications stopped since last EPIC medication reconciliation:   Medications Discontinued During This Encounter   Medication Reason     loperamide (IMODIUM A-D) 2 MG tablet        Medications started since last [facility] medication reconciliation:  Orders Placed This Encounter   Medications     loperamide (IMODIUM) 2 MG capsule     Sig: Take 2 mg by mouth every other day            REVIEW OF SYSTEMS:  4 point ROS including Respiratory, CV, GI and , other than that noted in the HPI,  is negative    PHYSICAL EXAM:  /79  Pulse 89  Temp 96.6  F (35.9  C)  Resp 16  Wt 118 lb (53.5 kg)  SpO2 95%  BMI 23.83 kg/m2  GENERAL APPEARANCE:  Alert, in pablo cute distress  HEAD:  Normal, normocephalic, atraumatic  EYE EXAM: normal external eye, conjunctiva, lids, JUAN  NECK EXAM: supple, no JVD  CHEST/RESP:  respiratory effort normal, lung sounds CTA , no respiratory distress  CV:  Rate reg, rhythm reg, no murmur, no peripheral edema    GI/ABDOMEN:  normal bowel sounds, soft, nontender, no palpable masses  M/S:   extremities normal, gait normal-with rolling walker and sba of staff, normal muscle tone, and range of motion normal   SKIN EXAM: CDI, no hematoma on scalp.   NEUROLOGIC EXAM: Normal gross motor movement, tone and coordination. No tremor.  Cranial nerves 2-12 are normal tested and grossly at patient's baseline  PSYCH:  Alert and oriented to self and surroundings with forgetfulness , affect pleasant , judgement appropriate     RECENT LABS:  All labs reviewed, none recent  Last Basic Metabolic Panel:  Lab Results   Component Value Date     02/21/2017      Lab Results   Component Value Date    POTASSIUM 4.1 02/21/2017     Lab Results   Component Value Date    CHLORIDE 99 02/21/2017     Lab Results   Component Value Date    KEN 10.4 02/21/2017     Lab Results   Component Value Date    CO2 32 02/21/2017     Lab Results   Component Value Date    BUN 13 02/21/2017     Lab Results   Component Value Date    CR 0.63 02/21/2017     Lab Results   Component Value Date     02/21/2017            ASSESSMENT/PLAN:  Type 2 diabetes mellitus without complication, without long-term current use of insulin (H)  Blood sugar trending in acceptable range on metformin. Will continue to monitor blood sugar and check A1C to monitor if decrease in metformin is needed.     Essential hypertension with goal blood pressure less than 140/90  Generally normotensive on current regimen, goal BP <140/90 to reduce risk of falls, hypotension. BP trending 130-150 systolic much of the time. The current medical regimen is effective;  continue present plan and medications.      Subdural hematoma caused by concussion, with loss of consciousness of any duration with death due to brain injury prior to regaining consciousness, sequela  Planning CT scan for this week. Goal is to determine if any bleeding remains, if not, will resume on plavix. Family would like plavix  restarted, to prevent further embolic events.     Future Appointments  Date Time Provider Department Center   3/14/2017 11:00 AM [id number] MANISHA SOLANO          ORDERS:  1. No new orders-will await results of CT scan.     Total time spent with patient visit was 25 min including patient visit and review of past records   Greater than 50% of total time spent with counseling and coordinating care    Electronically signed by  Carlene Serrano CNP   [facility]  [phone] cell

## 2017-03-14 ENCOUNTER — CARE COORDINATION (OUTPATIENT)
Dept: GERIATRIC MEDICINE | Facility: CLINIC | Age: 82
End: 2017-03-14

## 2017-03-14 ENCOUNTER — HOSPITAL ENCOUNTER (OUTPATIENT)
Dept: CT IMAGING | Facility: CLINIC | Age: 82
Discharge: HOME | End: 2017-03-14
Attending: NURSE PRACTITIONER | Admitting: NURSE PRACTITIONER
Payer: COMMERCIAL

## 2017-03-14 PROCEDURE — 70450 CT HEAD/BRAIN W/O DYE: CPT

## 2017-03-16 ENCOUNTER — NURSING HOME VISIT (OUTPATIENT)
Dept: GERIATRICS | Facility: CLINIC | Age: 82
End: 2017-03-16
Payer: COMMERCIAL

## 2017-03-16 VITALS
WEIGHT: 118.8 LBS | HEART RATE: 81 BPM | SYSTOLIC BLOOD PRESSURE: 123 MMHG | DIASTOLIC BLOOD PRESSURE: 82 MMHG | OXYGEN SATURATION: 96 % | TEMPERATURE: 97.6 F | RESPIRATION RATE: 16 BRPM | BODY MASS INDEX: 23.99 KG/M2

## 2017-03-16 DIAGNOSIS — R41.89 COGNITIVE IMPAIRMENT: ICD-10-CM

## 2017-03-16 DIAGNOSIS — Z86.73 HISTORY OF TIA (TRANSIENT ISCHEMIC ATTACK) AND STROKE: ICD-10-CM
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
## 2017-03-16 DIAGNOSIS — F90.9 HYPERKINETIC: ICD-10-CM

## 2017-03-16 PROCEDURE — 99207 ZZC CDG-CORRECTLY CODED, REVIEWED AND AGREE: CPT | Performed by: NURSE PRACTITIONER

## 2017-03-16 PROCEDURE — 99310 SBSQ NF CARE HIGH MDM 45: CPT | Performed by: NURSE PRACTITIONER

## 2017-03-16 RX ORDER — CLOPIDOGREL BISULFATE 75 MG/1
75 TABLET ORAL DAILY
COMMUNITY
End: 2018-08-16

## 2017-03-16 NOTE — PROGRESS NOTES
[place] [facility]    Chief Complaint   Patient presents with     Nursing Home Acute       HPI:    Anya Gaines is a 89 year old  (7/4/1920), who is being seen today for an episodic care visit at [facility]. Today's concern is:  Subdural hematoma caused by concussion, with loss of consciousness of any duration with death due to brain injury prior to regaining consciousness, sequela  History of TIA (transient ischemic attack) and stroke  Had CT scan this week to assess resolution of SDH. See CT scan results in EPIC. She is clearer and brighter today, more conversant but remains quite confused . She is asking questions re her prognosis and discussing her goals of care      Cognitive impairment  Remains quite confused, CPT 4.3/6.  Alert and able to make needs known but unable to state year/date/month/location with any accuracy.        ALLERGIES: Review of patient's allergies indicates no known allergies.  Past Medical, Surgical, Family and Social History reviewed and updated in [facility].    Current Outpatient Prescriptions   Medication Sig Dispense Refill     acetaminophen (TYLENOL) 325 MG tablet Take 3 tablets (975 mg) by mouth 3 times daily 100 tablet      clopidogrel (PLAVIX) 75 MG tablet Take 75 mg by mouth daily       loperamide (IMODIUM) 2 MG capsule Take 2 mg by mouth every other day AND QID PRN for diarrhea       cholecalciferol (VITAMIN D) 1000 UNIT tablet Take 2,000 Units by mouth daily       GABAPENTIN PO Take 600 mg by mouth 2 times daily        metFORMIN (GLUCOPHAGE-XR) 500 MG 24 hr tablet Take 4 tablets (2,000 mg) by mouth daily (with dinner) 30 tablet      atorvastatin (LIPITOR) 10 MG tablet Take 1 tablet (10 mg) by mouth daily 30 tablet 1     lisinopril (PRINIVIL/ZESTRIL) 40 MG tablet Take 1 tablet (40 mg) by mouth daily 30 tablet 5     Menthol, Topical Analgesic, (BIOFREEZE) 4 % GEL Externally apply topically 3 times daily as needed For muscle aches       hydrochlorothiazide (HYDRODIURIL) 25  MG tablet Take 1 tablet (25 mg) by mouth daily 90 tablet 3     calcium carbonate (OS- MG [place]. CA) 600 MG tablet Take 1 tablet (600 mg) by mouth daily 60 tablet 5     multivitamin  with lutein (OCUVITE WITH LTEIN) CAPS per capsule Take 1 capsule by mouth daily 30 capsule 5     hypromellose-dextran 0.3-0.1% (ARTIFICIAL TEARS) opthalmic solution Place 1 drop into both eyes 3 times daily 15 mL 0        REVIEW OF SYSTEMS:  4 point ROS including Respiratory, CV, GI and , other than that noted in the HPI,  is negative    PHYSICAL EXAM:  /82  Pulse 81  Temp 97.6  F (36.4  C)  Resp 16  Wt 118 lb 12.8 oz (53.9 kg)  SpO2 96%  BMI 23.99 kg/m2  GENERAL APPEARANCE:  Alert, in no acute distress  HEAD:  Normal, normocephalic, atraumatic  EYE EXAM: normal external eye, conjunctiva, lids, JUAN  NECK EXAM: supple, no JVD  CHEST/RESP:  respiratory effort normal, lung sounds CTA , no respiratory distress  CV:  Rate reg, rhythm reg, no murmur, no peripheral edema   M/S:   extremities normal, gait abnormal-hyperkinesia and poor balance, normal muscle tone, and range of motion normal   SKIN EXAM: CDI  NEUROLOGIC EXAM: Normal gross motor movement, tone and coordination. No tremor.  Cranial nerves 2-12 are normal tested and grossly at patient's baseline  PSYCH:  Alert and oriented to self with forgetfulness, affect pleasant , judgement appropriate     RECENT CT SCAN on 3/14/17:  COMPARISON: CT dated 2/17/2017.     FINDINGS: The previously seen left subdural has almost completely  resolved. There is only a small low dense subdural over the left  frontal region which currently measures only about 4 mm in maximum  thickness. There is no shift of midline structures. No new hemorrhage.  The left temporal lobe now appears normal. There is generalized  atrophy of the brain. Areas of low attenuation are present in the  white matter of the cerebral hemispheres that are consistent with  small vessel ischemic disease in this age  patient. There is no  evidence of acute intracranial hemorrhage, mass, acute infarct or  anomaly. The visualized portions of the sinuses and mastoids appear  normal. There is no evidence of trauma.         IMPRESSION:   1. Evolving small left subdural. Decrease in the size of the subdural  and decrease in the density. No new bleed.  2. Left temporal lobe now appears normal. The temporal lobe contusion  has resolved.  3. Atrophy of the brain. White matter changes consistent with small  vessel ischemic disease.     ISABELLA MARIA MD          ASSESSMENT/PLAN:  Subdural hematoma caused by concussion, with loss of consciousness of any duration with death due to brain injury prior to regaining consciousness, sequela  Fall at home, sequela  History of TIA (transient ischemic attack) and stroke  SDH resolving, per CT scan above.  Reviewed with family risks/benefits of resuming plavix and they would like to resume the plavix. We are concerned that she may have had TIA, but no visible infarct on CT scan. She tolerated the plavix in the past without difficulty, so this is reasonable.     Cognitive impairment  Ongoing, with CPT as noted above. 2 times, while in tcu, she has not been able to find her room after going for a walk with rolling walker. This is also concerning if she returns to her assisted living facility.  She is, however, much brighter today, and more conversant.     Hyperkinetic  She continues to be a high falls risk due to hyperkinesia and cognitive impairment.   ROBERSON score today 32/56  TINETTI score 17-19/28.    Both of these scores indicate a moderate to HIGH falls risk. Family (daughter, Kimi) informed of this. The family is still trying to decide best course of action, between return to assisted living facility or LTC placement. She does not remember her apartment, so would need much assistance to return there. She remains a high falls risk-and this could result in significant injury or death.          ORDERS:  1. Plavix 75 mg po QD   Dx: TIA/CVA Prevention    Total time spent with patient visit was 35 min including patient visit, review of past records and phone call to patient contact   Greater than 50% of total time spent with counseling and coordinating care    Electronically signed by  Carlene Serrano CNP   [facility]  [phone] cell

## 2017-03-17 ENCOUNTER — CARE COORDINATION (OUTPATIENT)
Dept: GERIATRIC MEDICINE | Facility: CLINIC | Age: 82
End: 2017-03-17

## 2017-03-17 RX ORDER — ACETAMINOPHEN 325 MG/1
1000 TABLET ORAL 3 TIMES DAILY
Qty: 100 TABLET
Start: 2017-03-17 | End: 2017-04-12

## 2017-03-20 ENCOUNTER — NURSING HOME VISIT (OUTPATIENT)
Dept: GERIATRICS | Facility: CLINIC | Age: 82
End: 2017-03-20
Payer: COMMERCIAL

## 2017-03-20 VITALS
HEART RATE: 89 BPM | RESPIRATION RATE: 20 BRPM | BODY MASS INDEX: 23.83 KG/M2 | SYSTOLIC BLOOD PRESSURE: 178 MMHG | WEIGHT: 118 LBS | TEMPERATURE: 96.5 F | OXYGEN SATURATION: 95 % | DIASTOLIC BLOOD PRESSURE: 87 MMHG

## 2017-03-20 DIAGNOSIS — I10 ESSENTIAL HYPERTENSION WITH GOAL BLOOD PRESSURE LESS THAN 140/90: ICD-10-CM

## 2017-03-20 DIAGNOSIS — R41.89 COGNITIVE IMPAIRMENT: ICD-10-CM

## 2017-03-20 DIAGNOSIS — E11.9 TYPE 2 DIABETES MELLITUS WITHOUT COMPLICATION, WITHOUT LONG-TERM CURRENT USE OF INSULIN (H): ICD-10-CM

## 2017-03-20 DIAGNOSIS — F90.9 HYPERKINETIC: ICD-10-CM

## 2017-03-20 DIAGNOSIS — Z86.73 HISTORY OF TIA (TRANSIENT ISCHEMIC ATTACK) AND STROKE: ICD-10-CM

## 2017-03-20 PROCEDURE — 99316 NF DSCHRG MGMT 30 MIN+: CPT | Performed by: NURSE PRACTITIONER

## 2017-03-20 PROCEDURE — 99207 ZZC CDG-CORRECTLY CODED, REVIEWED AND AGREE: CPT | Performed by: NURSE PRACTITIONER

## 2017-03-20 NOTE — PROGRESS NOTES
[place] [facility] DISCHARGE SUMMARY    PATIENT'S NAME: Anya Gaines  YOB: 1920    PRIMARY CARE PROVIDER AND CLINIC RESPONSIBLE AFTER TRANSFER: Carlene Serrano  [facility] [address] / [place] *     CODE STATUS/ADVANCE DIRECTIVES DISCUSSION: DNR / DNI    ALLERGIES: Review of patient's allergies indicates no known allergies.    TRANSFERRING PROVIDERS:  Carlene Serrano CNP,   DATE OF SNF ADMISSION:  February / 07 / 2017  DATE OF SNF (anticipated) DISCHARGE: March / 20 / 2017  DISCHARGE DISPOSITION: [facility] Provider  Nursing Facility: [facility] stay 2/3/17 to 2/7/17.     DISCHARGE DIAGNOSIS:   1. Subdural hematoma caused by concussion, with loss of consciousness of any duration with death due to brain injury prior to regaining consciousness, sequela    2. History of TIA (transient ischemic attack) and stroke    3. Cognitive impairment    4. Type 2 diabetes mellitus without complication, without long-term current use of insulin (H)    5. Hyperkinetic    6. Essential hypertension with goal blood pressure less than 140/90        Condition on Discharge:  Stable.    Function:  Ambulatory with staff, high falls risk  Tinetti 17/28  ROBERSON 32/56  Cognitive Scores: CPT 4.3/6    Equipment: rolling walker, anti-rollback wheelchair       HPI Nursing Facility Course:    Subdural hematoma caused by concussion, with loss of consciousness of any duration with death due to brain injury prior to regaining consciousness, sequela  Resolving SDH after fall, resumed on plavix.     History of TIA (transient ischemic attack) and stroke  Resumed on plavix after SDH resolved. Apparently had at least x2 episodes of TIA symptoms while in TCU    Cognitive impairment  Cognitive skills have declined significantly since admission. She is able to make her needs known, but is quite forgetful. She does not seem to recall her apartment at  "assisted living facility, but is interested in \"getting out of here\".  She may need more assistance than can be safely done at assisted living facility, but family has agreed to monitor closely and services will be maximized.      Type 2 diabetes mellitus without complication, without long-term current use of insulin (H)  Blood sugar stable on metformin    Hyperkinetic  Ongoing and chronic, no improvement, remains on gabapentin at PTA dosing, though is now at BID due to renal function, better clearance.     Essential hypertension with goal blood pressure less than 140/90  Trending elevated BP with some dizziness. Will resume amlodipine and monitor after her return to assisted living facility.            PAST MEDICAL HISTORY:  has no past medical history on file.    DISCHARGE MEDICATIONS:  Current Outpatient Prescriptions   Medication Sig Dispense Refill     AMLODIPINE BESYLATE PO Take 2.5 mg by mouth daily       acetaminophen (TYLENOL) 325 MG tablet Take 3 tablets (975 mg) by mouth 3 times daily 100 tablet      clopidogrel (PLAVIX) 75 MG tablet Take 75 mg by mouth daily       loperamide (IMODIUM) 2 MG capsule Take 2 mg by mouth every other day AND QID PRN for diarrhea       cholecalciferol (VITAMIN D) 1000 UNIT tablet Take 2,000 Units by mouth daily       GABAPENTIN PO Take 600 mg by mouth 2 times daily        metFORMIN (GLUCOPHAGE-XR) 500 MG 24 hr tablet Take 4 tablets (2,000 mg) by mouth daily (with dinner) 30 tablet      atorvastatin (LIPITOR) 10 MG tablet Take 1 tablet (10 mg) by mouth daily 30 tablet 1     lisinopril (PRINIVIL/ZESTRIL) 40 MG tablet Take 1 tablet (40 mg) by mouth daily 30 tablet 5     Menthol, Topical Analgesic, (BIOFREEZE) 4 % GEL Externally apply topically 3 times daily as needed For muscle aches       hydrochlorothiazide (HYDRODIURIL) 25 MG tablet Take 1 tablet (25 mg) by mouth daily 90 tablet 3     calcium carbonate (OS- MG [place]. CA) 600 MG tablet Take 1 tablet (600 mg) by mouth daily " 60 tablet 5     multivitamin  with lutein (OCUVITE WITH LTEIN) CAPS per capsule Take 1 capsule by mouth daily 30 capsule 5     hypromellose-dextran 0.3-0.1% (ARTIFICIAL TEARS) opthalmic solution Place 1 drop into both eyes 3 times daily 15 mL 0       MEDICATION CHANGES/RATIONALE:   As noted above.     Review of Systems:  No CP, SOB, Cough, dizziness, fevers, chills, HA, N/V, dysuria or Bowel Abnormalities. Appetite is good.  Pain none    /87  Pulse 89  Temp 96.5  F (35.8  C)  Resp 20  Wt 118 lb (53.5 kg)  SpO2 95%  BMI 23.83 kg/m2    Physical Exam:  A & O x 3 with forgetfulnes, NAD. Lungs CTA, non labored. RRR, S1/S2 w/o murmur,gallop or rub.  No edema.    DISCHARGE PLAN:  Physical Therapy, Registered Nurse and From:  [facility] Follow-up with PCP in 7 days by calling [phone] to schedule an appointment.     Pending labs: none  SNF labs   Date:  2/21/17  Na 139 , K+ 4.1, BUN 13, Creat 0.63, eGFR >60  Ca 10.4    Discharge Treatments:none    1. Amlodipine 2.5 mg po QD Dx HTN    TOTAL DISCHARGE TIME:   Greater than 30 minutes  Electronically signed by:  Carlene Serrano CNP   [facility]  [phone] cell       Documentation of Face to Face and Certification for Home Health Services    I certify that patient: Anya Gaines is under my care and that I, or a nurse practitioner or physician's assistant working with me, had a face-to-face encounter that meets the physician face-to-face encounter requirements with this patient on: 3/20/2017.    This encounter with the patient was in whole, or in part, for the following medical condition, which is the primary reason for home health care: Subdural Hematoma, Hx TIA, Cognitive impairment, DM2, Hyperkinetic and HTN..    I certify that, based on my findings, the following services are medically necessary home health services: Nursing and Physical Therapy.    My clinical findings support the need for the above services because: Nurse is needed: To  provide caregiver training to assist with: Subdural Hematoma, Hx TIA, Cognitive impairment, DM2, Hyperkinetic and HTN.... and Physical Therapy Services are needed to assess and treat the following functional impairments: Subdural Hematoma, Hx TIA, Cognitive impairment, DM2, Hyperkinetic and HTN...    Further, I certify that my clinical findings support that this patient is homebound (i.e. absences from home require considerable and taxing effort and are for medical reasons or Congregational services or infrequently or of short duration when for other reasons) because: Requires assistance of another person or specialized equipment to access medical services because patient:  hyperkinesia creates significantly increased falls risk. ..    Based on the above findings. I certify that this patient is confined to the home and needs intermittent skilled nursing care, physical therapy and/or speech therapy.  The patient is under my care, and I have initiated the establishment of the plan of care.  This patient will be followed by a physician who will periodically review the plan of care.  Physician/Provider to provide follow up care: Carlene Serrano    Attending hospital physician (the Medicare certified [place] provider): Yoan Camp MD   Physician Signature: See electronic signature associated with these discharge orders.  Date: 3/20/2017

## 2017-03-21 ENCOUNTER — CARE COORDINATION (OUTPATIENT)
Dept: GERIATRIC MEDICINE | Facility: CLINIC | Age: 82
End: 2017-03-21

## 2017-03-22 ENCOUNTER — ASSISTED LIVING VISIT (OUTPATIENT)
Dept: GERIATRICS | Facility: CLINIC | Age: 82
End: 2017-03-22
Payer: COMMERCIAL

## 2017-03-22 VITALS
SYSTOLIC BLOOD PRESSURE: 151 MMHG | HEART RATE: 82 BPM | DIASTOLIC BLOOD PRESSURE: 80 MMHG | TEMPERATURE: 98.2 F | RESPIRATION RATE: 20 BRPM

## 2017-03-22 DIAGNOSIS — F90.9 HYPERKINETIC: Primary | ICD-10-CM

## 2017-03-22 DIAGNOSIS — E11.9 TYPE 2 DIABETES MELLITUS WITHOUT COMPLICATION, WITHOUT LONG-TERM CURRENT USE OF INSULIN (H): ICD-10-CM

## 2017-03-22 DIAGNOSIS — Z86.73 HISTORY OF TIA (TRANSIENT ISCHEMIC ATTACK) AND STROKE: ICD-10-CM

## 2017-03-22 DIAGNOSIS — F03.90 DEMENTIA (H): ICD-10-CM

## 2017-03-22 PROCEDURE — 99207 ZZC CDG-CORRECTLY CODED, REVIEWED AND AGREE: CPT | Performed by: NURSE PRACTITIONER

## 2017-03-22 NOTE — PROGRESS NOTES
[place] [facility]    Chief Complaint   Patient presents with     Nursing Home Acute       HPI:    Anya Gaines is a 89 year old  (7/4/1920), who is being seen today for an episodic care visit at [facility]. Today's concern is:  Hyperkinetic  Subdural hematoma caused by concussion, with loss of consciousness of any duration with death due to brain injury prior to regaining consciousness, sequela  History of TIA (transient ischemic attack) and stroke  Dementia  Type 2 diabetes mellitus without complication, without long-term current use of insulin (H)  Recently discharged from [facility] back to [facility]. She has been back in her apartmetn for 2 days. She reports that she is pleased to be back, but remembers little of this apartment and is unable to state the room number. She reports that she is very tired, preferring to go back to bed and sleep this am. She reports that she did not go down to dining room-the staff brought her meal to her this am. She reports no pain, no other concerns. She just wants to sleep some more.        ALLERGIES: Review of patient's allergies indicates no known allergies.  Past Medical, Surgical, Family and Social History reviewed and updated in [place].    Current Outpatient Prescriptions   Medication Sig Dispense Refill     AMLODIPINE BESYLATE PO Take 2.5 mg by mouth daily       acetaminophen (TYLENOL) 325 MG tablet Take 3 tablets (975 mg) by mouth 3 times daily 100 tablet      clopidogrel (PLAVIX) 75 MG tablet Take 75 mg by mouth daily       loperamide (IMODIUM) 2 MG capsule Take 2 mg by mouth every other day AND QID PRN for diarrhea       cholecalciferol (VITAMIN D) 1000 UNIT tablet Take 2,000 Units by mouth daily       GABAPENTIN PO Take 600 mg by mouth 2 times daily        metFORMIN (GLUCOPHAGE-XR) 500 MG 24 hr tablet Take 4 tablets (2,000 mg) by mouth daily (with dinner) 30 tablet      atorvastatin (LIPITOR) 10 MG tablet Take 1 tablet (10 mg)  by mouth daily 30 tablet 1     lisinopril (PRINIVIL/ZESTRIL) 40 MG tablet Take 1 tablet (40 mg) by mouth daily 30 tablet 5     Menthol, Topical Analgesic, (BIOFREEZE) 4 % GEL Externally apply topically 3 times daily as needed For muscle aches       hydrochlorothiazide (HYDRODIURIL) 25 MG tablet Take 1 tablet (25 mg) by mouth daily 90 tablet 3     calcium carbonate (OS- MG [place]. [place]) 600 MG tablet Take 1 tablet (600 mg) by mouth daily 60 tablet 5     multivitamin  with lutein (OCUVITE WITH LTEIN) CAPS per capsule Take 1 capsule by mouth daily 30 capsule 5     hypromellose-dextran 0.3-0.1% (ARTIFICIAL TEARS) opthalmic solution Place 1 drop into both eyes 3 times daily 15 mL 0       Medications reconciled to facility chart and changes were made to reflect current medications as identified as above med list. Below are the changes that were made:   Medications stopped since last EPIC medication reconciliation:   There are no discontinued medications.    Medications started since last [facility] medication reconciliation:  No orders of the defined types were placed in this encounter.           REVIEW OF SYSTEMS:  4 point ROS including Respiratory, CV, GI and , other than that noted in the HPI,  is negative    PHYSICAL EXAM:  /80  Pulse 82  Resp 20  GENERAL APPEARANCE:  Alert, in no distress  HEAD:  Normal, normocephalic, atraumatic  EYE EXAM: normal external eye, conjunctiva, lids, JUAN  NECK EXAM: supple, no JVD  CHEST/RESP:  respiratory effort normal, lung sounds CTA , no respiratory distress  CV:  Rate reg, rhythm reg, no murmur, no peripheral edema   M/S:   extremities normal, gait abnormal-with hyperkinesis, normal muscle tone, and range of motion normal   SKIN EXAM: CDI   NEUROLOGIC EXAM: Normal gross motor movement, tone and coordination. No tremor.  Cranial nerves 2-12 are normal tested and grossly at patient's baseline  PSYCH:  Alert and oriented to self and family-no recall of her apartment, but enjoys  being in quieter space, affect pleasant , judgement appropriate     RECENT LABS:    Results for orders placed or performed during the hospital encounter of 03/14/17   CT Head w/o Contrast    Narrative    CT HEAD W/O CONTRAST   3/14/2017 11:10 AM     HISTORY: follow up of SDH on 2/3/17 and CT scan on 2/17/17, Traumatic  subdural hemorrhage with loss of consciousness of any duration with  death due to brain injury before regaining consciousness, sequela    TECHNIQUE: Axial images of the head without IV contrast material.  Radiation dose for this scan was reduced using automated exposure  control, adjustment of the mA and/or kV according to patient size, or  iterative reconstruction technique.    COMPARISON: CT dated to/17/2017.    FINDINGS: The previously seen left subdural has almost completely  resolved. There is only a small low dense subdural over the left  frontal region which currently measures only about 4 mm in maximum  thickness. There is no shift of midline structures. No new hemorrhage.  The left temporal lobe now appears normal. There is generalized  atrophy of the brain.  Areas of low attenuation are present in the  white matter of the cerebral hemispheres that are consistent with  small vessel ischemic disease in this age patient. There is no  evidence of acute intracranial hemorrhage, mass, acute infarct or  anomaly. The visualized portions of the sinuses and mastoids appear  normal. There is no evidence of trauma.      Impression    IMPRESSION:   1. Evolving small left subdural. Decrease in the size of the subdural  and decrease in the density. No new bleed.  2. Left temporal lobe now appears normal. The temporal lobe contusion  has resolved.  3. Atrophy of the brain.  White matter changes consistent with small  vessel ischemic disease.    ISABELLA MARIA MD   Last Basic Metabolic Panel:  Lab Results   Component Value Date     02/21/2017      Lab Results   Component Value Date    POTASSIUM 4.1  2017     Lab Results   Component Value Date    CHLORIDE 99 2017     Lab Results   Component Value Date    KEN 10.4 2017     Lab Results   Component Value Date    CO2 32 2017     Lab Results   Component Value Date    BUN 13 2017     Lab Results   Component Value Date    CR 0.63 2017     Lab Results   Component Value Date     2017        Lab Results   Component Value Date    WBC 6.4 2017     Lab Results   Component Value Date    RBC 4.58 2017     Lab Results   Component Value Date    HGB 13.1 2017     Lab Results   Component Value Date    HCT 42.1 2017     Lab Results   Component Value Date    MCV 92 2017     Lab Results   Component Value Date    MCH 28.6 2017     Lab Results   Component Value Date    MCHC 31.1 2017     Lab Results   Component Value Date    RDW 12.3 2017     Lab Results   Component Value Date     2017            ASSESSMENT/PLAN:  Hyperkinetic  Stable, no new symptoms. On gabapentin. The current medical regimen is effective;  continue present plan and medications.     Subdural hematoma caused by concussion, with loss of consciousness of any duration with death due to brain injury prior to regaining consciousness, sequela  No new symptoms. Stable mentation    History of TIA (transient ischemic attack) and stroke  She realizes that she is much more forgetful. She reports she knows this is her apartment and that she lived here before, but she can recall no aspects of it. She recalls that she feels as if her parents/siblings just , but knows that they all  several years ago    Dementia  New onset, since SDH. This is limiting her ability to properly care for herself. Chronic condition, ongoing decline expected. Maintain safe living situation with goals focused on comfort.      Type 2 diabetes mellitus without complication, without long-term current use of insulin (H)  On metformin, no new  concerns.     Services have been increased, and family is wanting her to try to live independently for now to improve quality of life. They fully understand that she is at high risk to fall again,a nd are willing to take that risk. Anya appears somewhat relieved to be more by herself now, will monitor carefully.       ORDERS:  1. No new orders      Electronically signed by  Carlene Serrano CNP   [facility]  [phone] cell

## 2017-03-23 ENCOUNTER — CARE COORDINATION (OUTPATIENT)
Dept: GERIATRIC MEDICINE | Facility: CLINIC | Age: 82
End: 2017-03-23

## 2017-03-24 ENCOUNTER — DOCUMENTATION ONLY (OUTPATIENT)
Dept: OTHER | Facility: CLINIC | Age: 82
End: 2017-03-24

## 2017-03-24 NOTE — PROGRESS NOTES
"3-17-17   CC talked with both TAYLOR Boo at [facility] and CHANTAL Travis at [facility].  It is the plan that client will be DC back to the [facility] on 3-20-17.  Both are unsure at this time if orders will be able to be completed by NP at that time but it is the hope that she will be able too.  CC asked about the wheelchair that is needed for client per PT discussion. Angy will f/wayne with PT on this to see if they will start on this.  Angy did state that anti lock wheelchair is a definite needed so she will f/u with [facility] to see if they will loan client one until she can get one of her own thru insurance.      CC then called and talked with Kimi, client daughter.  She stated that client \"happiness outweighs the risk\" for her family at this time.  She is still very concerns about client going back to [facility] but other siblings want her to be able to try.  She stated that CAT scan was good at this time which she was very thankful for.      CC told her that she was out to see client at the [facility] on the 15th for visit to be able to open EW if she were to DC back.  Client did not want to sign the CP at that time so CC asked if she would meet with her and client again at the [facility] once she is DC back.  CC said she would be in the area on 3-21-17 and Kimi stated this was fine.  CC will f/wayne with her at that time.     Arely Duckworth MA [facility] Care Coordinator   [phone]        "

## 2017-03-24 NOTE — PROGRESS NOTES
"3-21-17   CC went to visit with client again to review services and f/u with family and staff to see how DC went.   Present was client, daugther Kimi and her spouse, CC and Ana Paulashante with [facility].     Transition went well but client is more confused today per daughter.  CC said it will take time to adjust back to being in the AL and that staff will continue to monitor this.  Kimi still has some concerns and 4 wheeled walker has been taken out of client so she is not tempted to use this.  CC agreed with this and also encouraged client to use the wheelchair and to call for staff when she is transferring and needing help.    Client said she is thankful to be back and does not know why everyone is so worried.  She looked at CC and said \"if God takes my hand then it is my time to go, I am not going to be afraid of this\"      CC then followed up with RN and reviewed services, LTCC and RS tool.     CC will send 5181 to Erwin now that client is DC back to the AL and ask that the Ucode be removed.     Arely Duckworth MA [facility] Care Coordinator   [phone]        "

## 2017-03-24 NOTE — PROGRESS NOTES
3-15-17   CC reached out to Inés - financial worker at [facility] with update on client.  CC asked if 3543 form was needed for client if she were to return to the AL. Erwin stated to CC that it was not needed.  Client did not have a lapse in coverage thru [place], had been EW before so no additional verification were needed.  He did request the 5181 if client were to return or to stay LTC so that he is able to update his systems to reflect this.    CC stated that she will know by the end of the week and will send this to him once she is made aware.   Arely Duckworth MA [facility] Care Coordinator   [phone]

## 2017-03-24 NOTE — PROGRESS NOTES
3-23-17   CC reached out to Erwin at [facility] to see if he had received the 5181 and if the Ucode was removed.  He stated to CC that the U code was not place in the chart at this time and that he did receive the 5181.  He will update client living situation and CC can enter MMIS.     3-22-17   CC received call from [facility] asking for verbal auth for SN, PT and OT for client.  Karen at [facility] [phone].  CC forwarded this message on to Arely Dominguez to f/u with NP because orders should have been give at time of DC.      CC also received call from [facility] OT @ [phone] about what DME CC wanted to use.  CC called her back on 3-24-17 instructing her to use [facility].       Arely Duckworth MA [facility] Coordinator   [phone]

## 2017-03-27 ENCOUNTER — CARE COORDINATION (OUTPATIENT)
Dept: GERIATRIC MEDICINE | Facility: CLINIC | Age: 82
End: 2017-03-27

## 2017-03-27 NOTE — PROGRESS NOTES
Mailed copy of CL tool to client, faxed copy to AL facility, uploaded into MNits and emailed copy to Leigh Z for auth.    Mailed copy of care plan to client.  As requested/needed:  emailed CPS to Leigh for auths, updated services in access as needed and submitted appropriate referrals/auths, and entered MMIS. Chart was returned to OSVALDO Barger  Case Management Specialist   [facility]   [phone]

## 2017-04-04 DIAGNOSIS — E11.9 DIABETES MELLITUS WITHOUT COMPLICATION (H): Primary | ICD-10-CM

## 2017-04-05 ENCOUNTER — CARE COORDINATION (OUTPATIENT)
Dept: GERIATRIC MEDICINE | Facility: CLINIC | Age: 82
End: 2017-04-05

## 2017-04-05 NOTE — PROGRESS NOTES
4-5-17   CMS received call from RN at AL stating that client was going to Rx filled and RN was told that client does not have [facility] at this time.  Client was opened to EW last month after greater then 30 days in TCU and [facility]ica was still in place at this time.  CC left a message with both Erwin at [facility] who is out today and also with Angélica financial worker to see what occurred.   Arely Duckworth MA [facility] Coordinator   [phone]
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
## 2017-04-12 DIAGNOSIS — F90.9 HYPERKINETIC DISORDER: Primary | ICD-10-CM
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
## 2017-04-12 RX ORDER — ACETAMINOPHEN 325 MG/1
1000 TABLET ORAL 3 TIMES DAILY
Qty: 100 TABLET | Refills: 5 | Status: SHIPPED | OUTPATIENT
Start: 2017-04-12 | End: 2017-05-22

## 2017-04-12 RX ORDER — GABAPENTIN 300 MG/1
600 CAPSULE ORAL 2 TIMES DAILY
Qty: 90 CAPSULE | Refills: 5 | Status: SHIPPED | OUTPATIENT
Start: 2017-04-12 | End: 2017-05-08

## 2017-04-13 ENCOUNTER — CARE COORDINATION (OUTPATIENT)
Dept: GERIATRIC MEDICINE | Facility: CLINIC | Age: 82
End: 2017-04-13

## 2017-04-13 NOTE — PROGRESS NOTES
4-13-17   Email information received from Aleena bhatia so CC completed UTF information and disenrollment check list.  CC also contacted Rubio and they have submitted to the GAP program already so will get paid for this month.  CC also called and talked with Leo at [facility] and informed him of insurance change.  He stated that they are in the beginning stages of the wheel chair do he does not anticipate having to submit anything to insurance until May.    CC also called and left message for daughter Kimi and CC will reach out to AL billing as well.   Case will be transferred and disenrolled.   Arely Duckworth MA [facility] Care Coordinator   [phone]      4-12-17  CC did not hear back from Angélica so reached out to [facility] intake department and talked with Aleena Loo in intake to explain the transfer.  She stated understanding and provided CC with email address so that UTF information could be sent.  She then transferred CC to Leigh Pruitt- supervisor for CM and CC explained this transfer as well.  Leigh wanted to know if client was stable at this time and CC reported that she had not heard anything concerning at this time.  Leigh said they would take this transfer then and stated understanding that authorization needed to be submitted.  CC stated this was the case and that she will send UFT information and email contact to CMS for transfer.    Arely Duckworth MA [facility] Care Coordinator   [phone]      4-11-17  CC received VM from Angélica, financial worker at [facility] that DHS would not go back and put client back on M[facility]O for the month of April so she is a straight MA client for this month.   She stated she would submit the paperwork for client to be put back on [facility] M[facility]O for May.   CC VM back to see if she could provide contact information for CM department then because case will need to be transferred.    Arely Duckworth MA [facility] Care Coordinator   [phone]

## 2017-04-13 NOTE — PROGRESS NOTES
4-5-17   CC was able to connect with [place] [facility] financial worker for update on client.  Thru review Angélica was able to find that a clerical error occurred on the financial worker end in imputing the cnty of residents that client was in.  The information put that was inputted had client residing in a cnty that did not have [facility] so this is why she was dis-enrolled.       Angélica stated that she would reach out to the [facility] and see if this can be corrected but expressed her doudts in their ability to retro back this for the month so April.  She will updated CC when she hears back.   Arely Duckworth MA [facility] Coordinator   [phone]

## 2017-04-17 PROBLEM — Z76.89 HEALTH CARE HOME: Status: ACTIVE | Noted: 2017-04-17

## 2017-04-21 DIAGNOSIS — R79.89 LOW VITAMIN D LEVEL: Primary | ICD-10-CM

## 2017-04-30 DIAGNOSIS — E78.2 MIXED HYPERLIPIDEMIA: ICD-10-CM

## 2017-05-02 RX ORDER — ATORVASTATIN CALCIUM 10 MG/1
10 TABLET, FILM COATED ORAL DAILY
Qty: 30 TABLET | Refills: 1 | Status: SHIPPED | OUTPATIENT
Start: 2017-05-02 | End: 2018-08-16

## 2017-05-03 ENCOUNTER — DOCUMENTATION ONLY (OUTPATIENT)
Dept: OTHER | Facility: CLINIC | Age: 82
End: 2017-05-03

## 2017-05-03 DIAGNOSIS — Z71.89 ADVANCE CARE PLANNING: Chronic | ICD-10-CM

## 2017-05-05 PROBLEM — Z76.89 HEALTH CARE HOME: Status: RESOLVED | Noted: 2017-04-17 | Resolved: 2017-05-05

## 2017-05-08 ENCOUNTER — RECORDS - HEALTHEAST (OUTPATIENT)
Dept: LAB | Facility: CLINIC | Age: 82
End: 2017-05-08

## 2017-05-08 DIAGNOSIS — F90.9 HYPERKINETIC DISORDER: ICD-10-CM

## 2017-05-08 DIAGNOSIS — R79.89 LOW VITAMIN D LEVEL: ICD-10-CM

## 2017-05-08 RX ORDER — GABAPENTIN 300 MG/1
600 CAPSULE ORAL 2 TIMES DAILY
Qty: 90 CAPSULE | Refills: 5 | Status: SHIPPED | OUTPATIENT
Start: 2017-05-08 | End: 2018-12-12 | Stop reason: DRUGHIGH

## 2017-05-09 ENCOUNTER — CARE COORDINATION (OUTPATIENT)
Dept: GERIATRIC MEDICINE | Facility: CLINIC | Age: 82
End: 2017-05-09

## 2017-05-09 LAB — HBA1C MFR BLD: 8.3 % (ref 4.2–6.1)
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
## 2017-05-22 RX ORDER — ACETAMINOPHEN 325 MG/1
1000 TABLET ORAL 3 TIMES DAILY
Qty: 100 TABLET | Refills: 5 | Status: SHIPPED | OUTPATIENT
Start: 2017-05-22 | End: 2017-08-09

## 2017-05-23 DIAGNOSIS — I10 BENIGN ESSENTIAL HYPERTENSION: ICD-10-CM

## 2017-05-23 DIAGNOSIS — M81.0 OSTEOPOROSIS: ICD-10-CM

## 2017-05-24 ENCOUNTER — ASSISTED LIVING VISIT (OUTPATIENT)
Dept: GERIATRICS | Facility: CLINIC | Age: 82
End: 2017-05-24
Payer: COMMERCIAL

## 2017-05-24 ENCOUNTER — CARE COORDINATION (OUTPATIENT)
Dept: GERIATRIC MEDICINE | Facility: CLINIC | Age: 82
End: 2017-05-24

## 2017-05-24 VITALS
DIASTOLIC BLOOD PRESSURE: 77 MMHG | TEMPERATURE: 97.8 F | HEART RATE: 82 BPM | OXYGEN SATURATION: 94 % | SYSTOLIC BLOOD PRESSURE: 146 MMHG | RESPIRATION RATE: 18 BRPM | WEIGHT: 122 LBS | BODY MASS INDEX: 24.64 KG/M2

## 2017-05-24 DIAGNOSIS — F90.9 HYPERKINETIC: Primary | ICD-10-CM

## 2017-05-24 DIAGNOSIS — R41.89 COGNITIVE IMPAIRMENT: ICD-10-CM

## 2017-05-24 DIAGNOSIS — E11.9 TYPE 2 DIABETES MELLITUS WITHOUT COMPLICATION, WITHOUT LONG-TERM CURRENT USE OF INSULIN (H): ICD-10-CM

## 2017-05-24 DIAGNOSIS — Z76.89 HEALTH CARE HOME: Chronic | ICD-10-CM

## 2017-05-24 DIAGNOSIS — I10 ESSENTIAL HYPERTENSION WITH GOAL BLOOD PRESSURE LESS THAN 140/90: ICD-10-CM

## 2017-05-24 DIAGNOSIS — L60.3 DYSTROPHIC NAIL: ICD-10-CM

## 2017-05-24 PROCEDURE — 99318 ZZC ANNUAL NURSING FAC ASSESSMNT, STABLE: CPT | Mod: 25 | Performed by: NURSE PRACTITIONER

## 2017-05-24 PROCEDURE — G0127 TRIM NAIL(S): HCPCS | Performed by: NURSE PRACTITIONER

## 2017-05-24 RX ORDER — HYDROCHLOROTHIAZIDE 25 MG/1
25 TABLET ORAL DAILY
Qty: 90 TABLET | Refills: 3 | Status: SHIPPED | OUTPATIENT
Start: 2017-05-24 | End: 2018-08-16

## 2017-05-24 RX ORDER — PHENOL 1.4 %
1 AEROSOL, SPRAY (ML) MUCOUS MEMBRANE DAILY
Qty: 60 TABLET | Refills: 5 | Status: SHIPPED | OUTPATIENT
Start: 2017-05-24 | End: 2018-12-12

## 2017-05-24 RX ORDER — AMLODIPINE BESYLATE 2.5 MG/1
2.5 TABLET ORAL DAILY
Qty: 30 TABLET | Refills: 5 | Status: SHIPPED | OUTPATIENT
Start: 2017-05-24 | End: 2018-08-16

## 2017-05-24 NOTE — PROGRESS NOTES
[facility]  Chief Complaint   Patient presents with     Annual Comprehensive Nursing Home       HPI:    Anya Gaines is a 89 year old  (7/4/1920), who is being seen today for an annual comprehensive visit at [facility].  HPI information obtained from: facility chart records and facility staff.  Today's concerns are:  Hyperkinetic  No new concerns, no recent falls. Now using wheelchair for mobility, and is managing this well    Type 2 diabetes mellitus without complication, without long-term current use of insulin (H)  On metformin, blood sugar trends 140-200 fasting.   Lab Results   Component Value Date    A1C 7.3 02/04/2017    A1C 7.9 11/07/2016    A1C 7.5 08/08/2016    A1C 8.1 03/02/2016    A1C 7.0 03/26/2006       Essential hypertension with goal blood pressure less than 140/90  On amlodipine, hydrochlorothiazide, lisinopril. No headache, no dizziness reported.     Cognitive impairment  Mild, last CPT 4.3/6. She is actually doing quite well upon her return to her assisted living facility apartment, managing well, no safety concerns expressed by staff.       ALLERGIES: Review of patient's allergies indicates no known allergies.  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  PAST SURGICAL HISTORY:  has a past surgical history that includes surgical history of -.  FAMILY HISTORY: family history is not on file.  SOCIAL HISTORY:  reports that she has never smoked. She has never used smokeless tobacco. She reports that she does not drink alcohol or use illicit drugs.  IMMUNIZATIONS:  Most Recent Immunizations   Administered Date(s) Administered     Influenza (High Dose) 3 valent vaccine 10/12/2015     Influenza (IIV3) 11/25/2013     Pneumococcal (PCV 13) 10/12/2015     Pneumococcal 23 valent 12/22/2007     TDAP Vaccine (Boostrix) 11/09/2016     Above immunizations pulled from [facility]. [facility] and facility records also reconciled. Outstanding information sent to  to update [facility] yes.  Future immunizations needed:  PPSV23 and yearly influenza per facility protocol, will defer until fall immunization clinic.   MEDICATIONS:  Current Outpatient Prescriptions   Medication Sig Dispense Refill     hydrochlorothiazide (HYDRODIURIL) 25 MG tablet Take 1 tablet (25 mg) by mouth daily 90 tablet 3     amLODIPine (NORVASC) 2.5 MG tablet Take 1 tablet (2.5 mg) by mouth daily 30 tablet 5     calcium carbonate (OS- MG [place]. CA) 600 MG tablet Take 1 tablet (600 mg) by mouth daily 60 tablet 5     acetaminophen (TYLENOL) 325 MG tablet Take 3 tablets (975 mg) by mouth 3 times daily 100 tablet 5     cholecalciferol 2000 UNITS tablet Take 2,000 Units by mouth daily 30 tablet 5     gabapentin (NEURONTIN) 300 MG capsule Take 2 capsules (600 mg) by mouth 2 times daily 90 capsule 5     atorvastatin (LIPITOR) 10 MG tablet Take 1 tablet (10 mg) by mouth daily 30 tablet 1     order for DME Equipment being ordered: Contour test strips 100 each 5     clopidogrel (PLAVIX) 75 MG tablet Take 75 mg by mouth daily       loperamide (IMODIUM) 2 MG capsule Take 2 mg by mouth every other day AND QID PRN for diarrhea       metFORMIN (GLUCOPHAGE-XR) 500 MG 24 hr tablet Take  4 tablets (2,000 mg) by mouth daily (with dinner) 30 tablet      lisinopril (PRINIVIL/ZESTRIL) 40 MG tablet Take 1 tablet (40 mg) by mouth daily 30 tablet 5     Menthol, Topical Analgesic, (BIOFREEZE) 4 % GEL Externally apply topically 3 times daily as needed For muscle aches       multivitamin  with lutein (OCUVITE WITH LTEIN) CAPS per capsule Take 1 capsule by mouth daily 30 capsule 5     hypromellose-dextran 0.3-0.1% (ARTIFICIAL TEARS) opthalmic solution Place 1 drop into both eyes 3 times daily 15 mL 0     Medications reviewed:  Medications reconciled to facility chart and changes were made to reflect current medications as identified as above med list. Below are the changes that were made:   Medications stopped since last EPIC medication reconciliation:   There are no discontinued medications.    Medications started since last [facility] medication reconciliation:  No orders of the defined types were placed in this encounter.        Case Management:  I have reviewed the Assisted Living care plan, current immunizations and preventive care/cancer screening..Future cancer screening is not clinically indicated secondary to age/goals of care Patient's desire to return to the community is currently living in a community environment. Current Level of Care is appropriate.    Advance Directive Discussion:    I reviewed the current advanced directives as reflected in EPIC, the POLST and the facility chart, and verified the congruency of orders.  I contacted the first party and left a message regarding the plan of Care.  I did review the advance directives with the resident.     Team Discussion:  I communicated with the appropriate disciplines involved with the Plan of Care:   Nursing    Patient Goal:  Patient's goal is pain control and comfort and quality of life.    Information reviewed:  Medications, vital signs, orders, and nursing notes.    ROS:  10 point ROS of systems including Constitutional, Eyes, Respiratory,  Cardiovascular, Gastroenterology, Genitourinary, Integumentary, Muscularskeletal, Psychiatric were all negative except for pertinent positives noted in my HPI.    Exam:  /77  Pulse 82  Temp 97.8  F (36.6  C)  Resp 18  Wt 122 lb (55.3 kg)  SpO2 94%  BMI 24.64 kg/m2  GENERAL APPEARANCE:  Alert, in no distress  HEAD:  Normal, normocephalic, atraumatic  EYE EXAM: normal external eye, conjunctiva, lids, JUAN  NECK EXAM: supple, no JVD  CHEST/RESP:  respiratory effort normal, lung sounds CTA , no respiratory distress  CV:  Rate reg, rhythm reg, no murmur, no peripheral edema   GI/ABDOMEN:  normal bowel sounds, soft, nontender, no palpable masses  M/S:   extremities normal, gait abnormal-hyperkinetic, using wheelchair for most mobility, normal muscle tone, and range of motion normal   SKIN EXAM: CDI, toenails long and dystrophic  NEUROLOGIC EXAM: Normal gross motor movement, tone and coordination. Hyperkinesia at baseline L>R.   Cranial nerves 2-12 are normal tested and grossly at patient's baseline  PSYCH:  Alert and oriented to self and surroundings, affect pleasant , judgement appropriate       Lab/Diagnostic data:   Reviewed in facility records     ASSESSMENT/PLAN  Hyperkinetic  Stable on current regimen, symptoms at baseline. The current medical regimen is effective;  continue present plan and medications.     Type 2 diabetes mellitus without complication, without long-term current use of insulin (H)  Blood sugars- good control; last A1C within normal limits  as noted above; The current medical regimen is effective;  continue present plan and medications.      Cognitive impairment  Stable, some memory loss, but appears safe in her apartment. Goes to meals, makes a cup of coffee in Keurig. Checked on frequently. The current medical regimen is effective;  continue present plan and medications.     HYPERTENSION  BP Readings from Last 3 Encounters:   05/24/17 146/77   03/22/17 151/80   03/20/17 178/87       Based on JNC-8 goals,  patients age of 89 year old, presence of diabetes or CKD, and goals of care goal BP is  <140/90 mm Hg. patient is stable with current plan of care and routine assessment. No headache, no dizziness. Will continue to monitor.      DEPRESSION  Depression screen done: PHQ-2 Given screen score and clinical assessment patient is stable without any signs of depression and no futher interventions warrented at this time.    Dystrophic nail  After discussion, approval-all ten toenails trimmed without difficulty. Patient tolerated procedure well  - TRIMMING DYSTROPHIC NAILS,ANY NUMBER     Orders:  1. No New Orders      Electronically signed by:  Carlene Serrano CNP   [facility]  [phone] cell

## 2017-05-24 NOTE — PROGRESS NOTES
5-9-17  CC met with client and she looked well and had been doing well at the AL since DC there the end of March.  Client said she was using her wheelchair as she should and did not have any concerns that she expressed at this time.  CC encouraged her to let staff assist her with ADL's and cares and client said she would continue to try and do this.      CC then met with RN at AL to review RS tool and AL POC.  No significant changes in this RS tool since CC did the original one with client was DC from TCU.  CC asked for call back if any changes or concerns.   Arely Duckworth MA [facility] Care Coordinator   [phone]        5-4-17   CC received UTF information and AL authorization had been put in place for client and CC original [facility] back. CC then reached out to daughter Kimi stating that client was back with [facility] and asked if she wanted to meet with CC and client for brief face to face to review how client was doing.      Kimi stated she did not feel she needed to be there and that client was doing well at this time since coming out of the TCU.  CC said she would meet with client and f/u if there were any concerns or questions.   Arely Duckworth MA [facility] Care Coordinator   [phone]      5-2-17   CC asked that Marlena VICKIE CMS supervisor check to see if client was [facility] as of 5[facility].  Marlena replied she was but has been assigned to [facility] even though she was seeing NP at [facility].  CC asked her to reach out to [facility] to see if she could be assigned back to [facility].     Marlena was able to have her reassigned to [facility] as of 5-1-17.  CC then reached out to [facility] to see if they could sent UTF information back to CC now that she was assigned to [facility].   Arely Duckworth MA [facility] Care Coordinator   [phone]

## 2017-05-24 NOTE — PROGRESS NOTES
5-24-17   CC updated client information now that she is back with [facility].   will send CMS updated information so that authorization can be entered.  CC is not going to submit a new RS tool since one was submitted for client annual on 3-17 when client was open to EW.  CC will asked CMS to put auth in place again though.     CC did update RN at AL that client is back on [facility] and CC will f/u if there are any questions or concerns.   CMS will update MMIS with CM information and annual will be sent for 2-28-17.      CC also followed up with [employer] about wheelchair with auto locks and CC was told that family wanted to pursue something else for her back in April. CC does have call out to the OT at SNF who initally placed the order to see what she know of family choice in regards to the wheelchair.   Arely Duckworth MA [facility] Care Coordinator   [phone]

## 2017-06-05 DIAGNOSIS — I62.00 SUBDURAL HEMORRHAGE (H): ICD-10-CM

## 2017-06-06 RX ORDER — DIPHENHYDRAMINE HCL 25 MG
1 CAPSULE ORAL 3 TIMES DAILY
Qty: 15 ML | Refills: 0 | Status: SHIPPED | OUTPATIENT
Start: 2017-06-06 | End: 2017-12-17

## 2017-06-20 DIAGNOSIS — E11.8 TYPE 2 DIABETES MELLITUS WITH COMPLICATION, WITHOUT LONG-TERM CURRENT USE OF INSULIN (H): Primary | ICD-10-CM

## 2017-06-21 ENCOUNTER — ASSISTED LIVING VISIT (OUTPATIENT)
Dept: GERIATRICS | Facility: CLINIC | Age: 82
End: 2017-06-21
Payer: COMMERCIAL

## 2017-06-21 VITALS
BODY MASS INDEX: 24.64 KG/M2 | DIASTOLIC BLOOD PRESSURE: 98 MMHG | OXYGEN SATURATION: 92 % | SYSTOLIC BLOOD PRESSURE: 158 MMHG | RESPIRATION RATE: 18 BRPM | TEMPERATURE: 97.5 F | WEIGHT: 122 LBS | HEART RATE: 85 BPM

## 2017-06-21 DIAGNOSIS — E11.9 TYPE 2 DIABETES MELLITUS WITHOUT COMPLICATION, WITHOUT LONG-TERM CURRENT USE OF INSULIN (H): Primary | ICD-10-CM

## 2017-06-21 DIAGNOSIS — I10 BENIGN ESSENTIAL HYPERTENSION: ICD-10-CM

## 2017-06-21 DIAGNOSIS — F90.9 HYPERKINETIC: ICD-10-CM

## 2017-06-21 NOTE — PROGRESS NOTES
[facility]    Chief Complaint   Patient presents with     RECHECK       HPI:    Anya Gaines is a 89 year old  (7/4/1920), who is being seen today for an episodic care visit at [facility].  HPI information obtained from: facility chart records and facility staff.Today's concern is:     Type 2 diabetes mellitus without complication, without long-term current use of insulin (H)  Benign essential hypertension  Hyperkinetic   Anya offers no new concerns. She is alert and oriented to self and surroundings. She has adapted well to her return to the [facility] living [facility] apartment.     ALLERGIES: Review of patient's allergies indicates no known allergies.  Past Medical, Surgical, Family and Social History reviewed and updated in [facility].    Current Outpatient Prescriptions   Medication Sig Dispense Refill     order for DME Safety lancets 100 strip 11     hypromellose-dextran 0.3-0.1% (ARTIFICIAL TEARS) opthalmic solution Place 1 drop into both eyes 3 times daily 15 mL 0     hydrochlorothiazide (HYDRODIURIL) 25 MG tablet Take 1 tablet (25 mg) by mouth daily 90 tablet 3     amLODIPine (NORVASC) 2.5 MG tablet Take 1 tablet (2.5 mg) by mouth daily 30 tablet 5     calcium carbonate (OS- MG [place]. CA) 600 MG tablet Take 1 tablet (600 mg) by mouth daily 60 tablet 5     acetaminophen (TYLENOL) 325 MG tablet Take 3 tablets (975 mg) by mouth 3 times daily 100 tablet 5     cholecalciferol 2000 UNITS tablet Take 2,000 Units by mouth daily 30 tablet 5     gabapentin (NEURONTIN) 300 MG capsule Take 2 capsules (600 mg) by mouth 2 times daily 90 capsule 5     atorvastatin (LIPITOR) 10 MG tablet Take 1 tablet (10 mg) by mouth daily 30 tablet 1     order for DME Equipment being ordered: Contour test strips 100 each 5     clopidogrel (PLAVIX) 75 MG tablet Take 75 mg by mouth daily       loperamide (IMODIUM) 2 MG capsule Take 2 mg by mouth every other day AND QID PRN for diarrhea        metFORMIN (GLUCOPHAGE-XR) 500 MG 24 hr tablet Take 4 tablets (2,000 mg) by mouth daily (with dinner) 30 tablet      lisinopril (PRINIVIL/ZESTRIL) 40 MG tablet Take 1 tablet (40 mg) by mouth daily 30 tablet 5     Menthol, Topical Analgesic, (BIOFREEZE) 4 % GEL Externally apply topically 3 times daily as needed For muscle aches       multivitamin  with lutein (OCUVITE WITH LTEIN) CAPS per capsule Take 1 capsule by mouth daily 30 capsule 5     Medications reviewed:  Medications reconciled to facility chart and changes were made to reflect current medications as identified as above med list. Below are the changes that were made:   Medications stopped since last EPIC medication reconciliation:   There are no discontinued medications.    Medications started since last [facility] medication reconciliation:  No orders of the defined types were placed in this encounter.        REVIEW OF SYSTEMS:  10 point ROS of systems including Constitutional, Eyes, Respiratory, Cardiovascular, Gastroenterology, Genitourinary, Integumentary, Muscularskeletal, Psychiatric were all negative except for pertinent positives noted in my HPI.    Physical Exam:  BP (!) 158/98  Pulse 85  Temp 97.5  F (36.4  C)  Resp 18  Wt 122 lb (55.3 kg)  SpO2 92%  BMI 24.64 kg/m2  GENERAL APPEARANCE:  Alert, in no distress  HEAD:  Normal, normocephalic, atraumatic  EYE EXAM: normal external eye, conjunctiva, lids, JUAN  NECK EXAM: supple, no JVD  CHEST/RESP:  respiratory effort normal, lung sounds CTA , no respiratory distress  CV:  Rate reg, rhythm reg, no murmur, no peripheral edema   M/S:   extremities normal, gait abnormal-unable to ambulate and uses wheelchair for all mobility, normal muscle tone, and range of motion normal , hyperkinetic movements at baseline.   NEUROLOGIC EXAM: Normal gross motor movement, tone and coordination. No tremor.  Cranial nerves 2-12 are normal tested and grossly at patient's baseline  PSYCH:  Alert and oriented to  person-place-time with forgetfulness, affect pleasant , judgement appropriate     Recent Labs:  All labs reviewed, none recent    Assessment/Plan:  Type 2 diabetes mellitus without complication, without long-term current use of insulin (H)  Blood sugars not routinely checked.The current medical regimen is effective;  continue present plan and medications.      Benign essential hypertension  Generally normotensive on current regimen, goal BP <140/90 to reduce risk of falls, hypotension. The current medical regimen is effective;  continue present plan and medications.      Hyperkinetic  Stable, compensated. The current medical regimen is effective;  continue present plan and medications.       Orders:  1. No new orders        Electronically signed by  Carlene Serrano CNP   [facility]  [phone] cell

## 2017-08-04 ENCOUNTER — RECORDS - HEALTHEAST (OUTPATIENT)
Dept: LAB | Facility: CLINIC | Age: 82
End: 2017-08-04

## 2017-08-07 ENCOUNTER — TRANSFERRED RECORDS (OUTPATIENT)
Dept: HEALTH INFORMATION MANAGEMENT | Facility: CLINIC | Age: 82
End: 2017-08-07

## 2017-08-07 LAB
HBA1C MFR BLD: 7.6 % (ref 4.2–6.1)
HBA1C MFR BLD: 7.6 % (ref 4.2–6.1)

## 2017-08-08 NOTE — PROGRESS NOTES
[place] [facility]    Chief Complaint   Patient presents with     RECHECK       HPI:    Anya Gaines is a 89 year old  (7/4/1920), who is being seen today for an episodic care visit at [facility].  HPI information obtained from: facility chart records, facility staff and patient report.Today's concern is:     Type 2 diabetes mellitus without complication, without long-term current use of insulin (H)  Glaucoma of both eyes, unspecified glaucoma type  Hyperkinetic  Essential hypertension with goal blood pressure less than 140/90   Anya is generally doing well since her return to assisted living facility. She did have one incident where she fell/slipped out of her chair, but suffered no injuries. She likes to remain independent, but staff would like her to ask for more assist. She is using wheelchair for most mobility, but does at times ambulate without it. She is at high risk for falls due to her hyperkinesis. She reports no pain, no other concerns.     ALLERGIES: Review of patient's allergies indicates no known allergies.  Past Medical, Surgical, Family and Social History reviewed and updated in [facility].    Current Outpatient Prescriptions   Medication Sig Dispense Refill     order for DME Safety lancets 100 strip 11     hypromellose-dextran 0.3-0.1% (ARTIFICIAL TEARS) opthalmic solution Place 1 drop into both eyes 3 times daily 15 mL 0     hydrochlorothiazide (HYDRODIURIL) 25 MG tablet Take 1 tablet (25 mg) by mouth daily 90 tablet 3     amLODIPine (NORVASC) 2.5 MG tablet Take 1 tablet (2.5 mg) by mouth daily 30 tablet 5     calcium carbonate (OS- MG [place]. [place]) 600 MG tablet Take 1 tablet (600 mg) by mouth daily 60 tablet 5     acetaminophen (TYLENOL) 325 MG tablet Take 3 tablets (975 mg) by mouth 3 times daily 100 tablet 5     cholecalciferol 2000 UNITS tablet Take 2,000 Units by mouth daily 30 tablet 5     gabapentin (NEURONTIN) 300 MG capsule Take 2 capsules (600 mg) by mouth  2 times daily 90 capsule 5     atorvastatin (LIPITOR) 10 MG tablet Take 1 tablet (10 mg) by mouth daily 30 tablet 1     order for DME Equipment being ordered: Contour test strips 100 each 5     clopidogrel (PLAVIX) 75 MG tablet Take 75 mg by mouth daily       loperamide (IMODIUM) 2 MG capsule Take 2 mg by mouth every other day AND QID PRN for diarrhea       metFORMIN (GLUCOPHAGE-XR) 500 MG 24 hr tablet Take 4 tablets (2,000 mg) by mouth daily (with dinner) 30 tablet      lisinopril (PRINIVIL/ZESTRIL) 40 MG tablet Take 1 tablet (40 mg) by mouth daily 30 tablet 5     Menthol, Topical Analgesic, (BIOFREEZE) 4 % GEL Externally apply topically 3 times daily as needed For muscle aches       multivitamin  with lutein (OCUVITE WITH LTEIN) CAPS per capsule Take 1 capsule by mouth daily 30 capsule 5     Medications reviewed:  Medications reconciled to facility chart and changes were made to reflect current medications as identified as above med list. Below are the changes that were made:   Medications stopped since last EPIC medication reconciliation:   There are no discontinued medications.    Medications started since last [facility] medication reconciliation:  No orders of the defined types were placed in this encounter.        REVIEW OF SYSTEMS:  10 point ROS of systems including Constitutional, Eyes, Respiratory, Cardiovascular, Gastroenterology, Genitourinary, Integumentary, Muscularskeletal, Psychiatric were all negative except for pertinent positives noted in my HPI.    Physical Exam:  BP (!) 158/98  Pulse 96  Temp 97.3  F (36.3  C)  Resp 20  Wt 121 lb (54.9 kg)  SpO2 95%  BMI 24.44 kg/m2  GENERAL APPEARANCE:  Alert, in no acute distress  HEAD:  Normal, normocephalic, atraumatic  EYE EXAM: normal external eye, conjunctiva, lids, JUAN  NECK EXAM: supple, no JVD  CHEST/RESP:  respiratory effort normal, lung sounds CTA , no respiratory distress  CV:  Rate reg, rhythm reg, no murmur, no peripheral edema   M/S:    extremities normal, gait abnormal-ataxic and poor balance, normal muscle tone, and range of motion normal   SKIN EXAM: CDI  NEUROLOGIC EXAM: hyperkinetic gross motor movement L>R, normal tone and decreased coordination.   Cranial nerves 2-12 are normal tested and grossly at patient's baseline  PSYCH:  Alert and oriented to person-place-time with forgetfulness, affect pleasant , judgement impaired.     Recent Labs:    Results for orders placed or performed in visit on 08/07/17   Hemoglobin A1c   Result Value Ref Range    Hemoglobin A1C 7.6 (H) 4.2 - 6.1 %    Narrative    LABS, [facility]     Hemoglobin   Date Value Ref Range Status   02/21/2017 13.1 12.0 - 16.0 g/dL Final   ]   Last Basic Metabolic Panel:  Lab Results   Component Value Date     02/21/2017      Lab Results   Component Value Date    POTASSIUM 4.1 02/21/2017     Lab Results   Component Value Date    CHLORIDE 99 02/21/2017     Lab Results   Component Value Date    KEN 10.4 02/21/2017     Lab Results   Component Value Date    CO2 32 02/21/2017     Lab Results   Component Value Date    BUN 13 02/21/2017     Lab Results   Component Value Date    CR 0.63 02/21/2017     Lab Results   Component Value Date     02/21/2017          Assessment/Plan:  Type 2 diabetes mellitus without complication, without long-term current use of insulin (H)  Blood sugars- fair control; last A1C within normal limits as noted above; The current medical regimen is effective;  continue present plan and medications. Goal A1C <8% to reduce risk of hypoglycemia in      Glaucoma of both eyes, unspecified glaucoma type  Reports worsening vision, did see eye doctor recently.     Hyperkinetic  Stable, no new concerns. She is on scheduled tylenol, and reports that she has no pain. She would like to try without scheduled tylenol, reduce number of pills. Will decrease to prn and monitor.     Essential hypertension with goal blood pressure less than 140/90  Stable, compensated. The  current medical regimen is effective;  continue present plan and medications.       Orders:  1. DC scheduled tylenol 975mg  2. Tylenol 650 mg po Q 4 hr prn pain    Electronically signed by  Carlene Serrano CNP   [place]  [phone] cell

## 2017-08-09 ENCOUNTER — ASSISTED LIVING VISIT (OUTPATIENT)
Dept: GERIATRICS | Facility: CLINIC | Age: 82
End: 2017-08-09
Payer: COMMERCIAL

## 2017-08-09 VITALS
SYSTOLIC BLOOD PRESSURE: 158 MMHG | WEIGHT: 121 LBS | HEART RATE: 96 BPM | BODY MASS INDEX: 24.44 KG/M2 | OXYGEN SATURATION: 95 % | DIASTOLIC BLOOD PRESSURE: 98 MMHG | TEMPERATURE: 97.3 F | RESPIRATION RATE: 20 BRPM

## 2017-08-09 DIAGNOSIS — R52 GENERALIZED PAIN: ICD-10-CM

## 2017-08-09 DIAGNOSIS — I10 ESSENTIAL HYPERTENSION WITH GOAL BLOOD PRESSURE LESS THAN 140/90: ICD-10-CM

## 2017-08-09 DIAGNOSIS — E11.9 TYPE 2 DIABETES MELLITUS WITHOUT COMPLICATION, WITHOUT LONG-TERM CURRENT USE OF INSULIN (H): Primary | ICD-10-CM

## 2017-08-09 DIAGNOSIS — F90.9 HYPERKINETIC: ICD-10-CM

## 2017-08-09 DIAGNOSIS — H40.9 GLAUCOMA OF BOTH EYES, UNSPECIFIED GLAUCOMA TYPE: ICD-10-CM

## 2017-08-10 PROBLEM — H40.9 GLAUCOMA OF BOTH EYES: Status: ACTIVE | Noted: 2017-08-10

## 2017-08-10 RX ORDER — ACETAMINOPHEN 325 MG/1
650 TABLET ORAL EVERY 4 HOURS PRN
Qty: 100 TABLET
Start: 2017-08-10 | End: 2019-10-22

## 2017-08-14 ENCOUNTER — RECORDS - HEALTHEAST (OUTPATIENT)
Dept: LAB | Facility: CLINIC | Age: 82
End: 2017-08-14

## 2017-08-14 LAB — HBA1C MFR BLD: 7.6 % (ref 4.2–6.1)

## 2017-08-30 ENCOUNTER — CARE COORDINATION (OUTPATIENT)
Dept: GERIATRIC MEDICINE | Facility: CLINIC | Age: 82
End: 2017-08-30

## 2017-08-30 NOTE — PROGRESS NOTES
8-30-17   CC was contacted by RN at [facility] stating that client wheelchair auto lock was broken on her wheelchair.  RN wanted to know if this was still the SNF or clients.  CC looked back at notes and it was stated that CC had called [employer] and that order had been cancelled at [facility] and that CC had contacted OT at [facility] about this and that CC had not been informed of anything since then.      CC left message for OT at [facility] to see if she had any updated information and CC also talked with RN at to let her know as well.  CC asked that if process to order client wheelchair needed to be started again that CC needed to be informed and RN will f/u with CC as needed. CC also asked if that wheelchair was still SNF if it could be fixed until it was determined if one needed to be ordered again.      CC then reached out to [facility] just to double check to see if anything  and was told that client did have wheelchair delivered on 7-31-17.  CC then asked if this could be repaired and CC was told it was still under rental so this would be fine.  CC then provided the RN office number to the repair department and they will reach out to RN.     CC then talked with Shira in the therapy department at the [facility] to let her know that CC was able to find out that client did have her own wheelchair thru [employer] but asked that she reach out to RN at the [facility] to discuss options for client since it was unsure when her current wheelchair could be fixed.  Shira said she would reach out to RN.  CC left similar message with RN at [facility] and CC asked that she be called with any questions or concerns.     Arely Duckworth MA [facility] Care Coordinator   [phone]

## 2017-09-15 ENCOUNTER — CARE COORDINATION (OUTPATIENT)
Dept: GERIATRIC MEDICINE | Facility: CLINIC | Age: 82
End: 2017-09-15

## 2017-09-15 NOTE — PROGRESS NOTES
9-15-17   [facility] Six-Month Telephone Assessment    6 month telephone assessment completed with RN at [facility].    ER visits: No  Hospitalizations: No  TCU stays: No  Significant health status changes: none at this time.  RN stated that she did get her wheelchair fixed and this was working fine at this time.    Falls/Injuries: No  ADL/IADL changes: No  Changes in services: No    Caregiver Assessment follow up:  None     Goals: See POC in chart for goal progress documentation.  Updated     Will see client in 6 months for an annual health risk assessment.   Encouraged client to call CM with any questions or concerns in the meantime.   Arely Duckworth MA [facility] Care Coordinator   [phone]

## 2017-10-18 ENCOUNTER — ASSISTED LIVING VISIT (OUTPATIENT)
Dept: GERIATRICS | Facility: CLINIC | Age: 82
End: 2017-10-18
Payer: COMMERCIAL

## 2017-10-18 VITALS
RESPIRATION RATE: 18 BRPM | HEART RATE: 82 BPM | BODY MASS INDEX: 25.45 KG/M2 | OXYGEN SATURATION: 94 % | DIASTOLIC BLOOD PRESSURE: 70 MMHG | WEIGHT: 126 LBS | TEMPERATURE: 96.9 F | SYSTOLIC BLOOD PRESSURE: 129 MMHG

## 2017-10-18 DIAGNOSIS — R56.9 SEIZURES (H): ICD-10-CM

## 2017-10-18 DIAGNOSIS — L60.2 OVERGROWN TOENAILS: ICD-10-CM

## 2017-10-18 DIAGNOSIS — F90.9 HYPERKINETIC: ICD-10-CM

## 2017-10-18 DIAGNOSIS — I10 ESSENTIAL HYPERTENSION WITH GOAL BLOOD PRESSURE LESS THAN 140/90: ICD-10-CM

## 2017-10-18 DIAGNOSIS — E11.9 TYPE 2 DIABETES MELLITUS WITHOUT COMPLICATION, WITHOUT LONG-TERM CURRENT USE OF INSULIN (H): Primary | ICD-10-CM

## 2017-10-18 PROCEDURE — 11719 TRIM NAIL(S) ANY NUMBER: CPT | Performed by: NURSE PRACTITIONER

## 2017-10-18 NOTE — PROGRESS NOTES
[place] [facility]    Chief Complaint   Patient presents with     RECHECK       HPI:    Anya Gaines is a 89 year old  (7/4/1920), who is being seen today for an episodic care visit at [facility].  HPI information obtained from: facility chart records, facility staff and patient report.Today's concern is:     Type 2 diabetes mellitus without complication, without long-term current use of insulin (H)  Seizures (H)  Hyperkinetic  Essential hypertension with goal blood pressure less than 140/90  Overgrown toenails     Anya reports she is feeling well and doing well in her apartment. She reports she is no longer able to cut her toenails and would like this done. Nursing reports no new concerns.     ALLERGIES: Review of patient's allergies indicates no known allergies.  Past Medical, Surgical, Family and Social History reviewed and updated in [facility].    Current Outpatient Prescriptions   Medication Sig Dispense Refill     acetaminophen (TYLENOL) 325 MG tablet Take 2 tablets (650 mg) by mouth every 4 hours as needed for mild pain 100 tablet      order for DME Safety lancets 100 strip 11     hypromellose-dextran 0.3-0.1% (ARTIFICIAL TEARS) opthalmic solution Place 1 drop into both eyes 3 times daily 15 mL 0     hydrochlorothiazide (HYDRODIURIL) 25 MG tablet Take 1 tablet (25 mg) by mouth daily 90 tablet 3     amLODIPine (NORVASC) 2.5 MG tablet Take 1 tablet (2.5 mg) by mouth daily 30 tablet 5     calcium carbonate (OS- MG [place]. CA) 600 MG tablet Take 1 tablet (600 mg) by mouth daily 60 tablet 5     cholecalciferol 2000 UNITS tablet Take 2,000 Units by mouth daily 30 tablet 5     gabapentin (NEURONTIN) 300 MG capsule Take 2 capsules (600 mg) by mouth 2 times daily 90 capsule 5     atorvastatin (LIPITOR) 10 MG tablet Take 1 tablet (10 mg) by mouth daily 30 tablet 1     order for DME Equipment being ordered: Contour test strips 100 each 5     clopidogrel (PLAVIX) 75 MG tablet Take 75 mg  by mouth daily       loperamide (IMODIUM) 2 MG capsule Take 2 mg by mouth every other day AND QID PRN for diarrhea       metFORMIN (GLUCOPHAGE-XR) 500 MG 24 hr tablet Take 4 tablets (2,000 mg) by mouth daily (with dinner) 30 tablet      lisinopril (PRINIVIL/ZESTRIL) 40 MG tablet Take 1 tablet (40 mg) by mouth daily 30 tablet 5     Menthol, Topical Analgesic, (BIOFREEZE) 4 % GEL Externally apply topically 3 times daily as needed For muscle aches       multivitamin  with lutein (OCUVITE WITH LTEIN) CAPS per capsule Take 1 capsule by mouth daily 30 capsule 5     Medications reviewed:  Medications reconciled to facility chart and changes were made to reflect current medications as identified as above med list. Below are the changes that were made:   Medications stopped since last EPIC medication reconciliation:   There are no discontinued medications.    Medications started since last [facility] medication reconciliation:  No orders of the defined types were placed in this encounter.        REVIEW OF SYSTEMS:  4 point ROS including Respiratory, CV, GI and , other than that noted in the HPI,  is negative    Physical Exam:  /70  Pulse 82  Temp 96.9  F (36.1  C)  Resp 18  Wt 126 lb (57.2 kg)  SpO2 94%  BMI 25.45 kg/m2  GENERAL APPEARANCE:  Alert, in no distress  HEAD:  Normal, normocephalic, atraumatic  EYE EXAM: normal external eye, conjunctiva, lids, JUAN  NECK EXAM: supple, no JVD  CHEST/RESP:  respiratory effort normal, lung sounds CTA , no respiratory distress  CV:  Rate reg, rhythm reg, no murmur, no peripheral edema   M/S:   extremities normal, gait abnormal-unable to ambulate safely and uses wheelchair for mobility, normal muscle tone, and range of motion normal  SKIN EXAM: long, overgrown toenails with nail on 4th toe R foot traumatically broken too short with blood dried surrounding it  NEUROLOGIC EXAM: Normal gross motor movement, tone and coordination. Chronic hyperkinetic movement at  baseline-R>L  Cranial nerves 2-12 are normal tested and grossly at patient's baseline  PSYCH:  Alert and oriented to person-place-time with forgetfulness, affect pleasant , judgement appropriate     Recent Labs:  All labs reviewed, none recent.  Lab Results   Component Value Date    A1C 7.6 08/07/2017    A1C 7.3 02/04/2017    A1C 7.9 11/07/2016    A1C 7.5 08/08/2016    A1C 8.1 03/02/2016        Assessment/Plan:  Type 2 diabetes mellitus without complication, without long-term current use of insulin (H)  Blood sugar trending 140-190 consistently with stable A1C. Would not need to check blood sugar routinely and she agrees with this. Will check prn only, monitor A1C routinely.     Seizures (H)  No seizure activity, stable.     Hyperkinetic  Stable, no new concerns. She is using her wheelchair appropriately, no recent falls    Essential hypertension with goal blood pressure less than 140/90  Generally normotensive on current regimen, goal BP <140/90 to reduce risk of falls, hypotension. The current medical regimen is effective;  continue present plan and medications.      Overgrown toenails  Toenails are long and one is broken very short. All nails trimmed and filed, cleansed broken toenail area and applied a bandaid.       Orders:  1. DC routine blood sugar checks  2. PRN blood sugar check if somnolent, poor appetite, ill or prn.      Electronically signed by  Carlene Serrano CNP   [place] [facility]  [phone] cell

## 2017-12-05 ENCOUNTER — ASSISTED LIVING VISIT (OUTPATIENT)
Dept: GERIATRICS | Facility: CLINIC | Age: 82
End: 2017-12-05
Payer: COMMERCIAL

## 2017-12-05 VITALS
DIASTOLIC BLOOD PRESSURE: 81 MMHG | RESPIRATION RATE: 20 BRPM | BODY MASS INDEX: 26.26 KG/M2 | WEIGHT: 130 LBS | TEMPERATURE: 97.1 F | SYSTOLIC BLOOD PRESSURE: 149 MMHG | HEART RATE: 86 BPM | OXYGEN SATURATION: 98 %

## 2017-12-05 DIAGNOSIS — I10 ESSENTIAL HYPERTENSION WITH GOAL BLOOD PRESSURE LESS THAN 140/90: ICD-10-CM

## 2017-12-05 DIAGNOSIS — F90.9 HYPERKINETIC: ICD-10-CM

## 2017-12-05 DIAGNOSIS — L60.3 NAIL DYSTROPHY: ICD-10-CM

## 2017-12-05 DIAGNOSIS — E11.9 TYPE 2 DIABETES MELLITUS WITHOUT COMPLICATION, WITHOUT LONG-TERM CURRENT USE OF INSULIN (H): Primary | ICD-10-CM

## 2017-12-05 DIAGNOSIS — R56.9 SEIZURES (H): ICD-10-CM

## 2017-12-05 PROCEDURE — 11721 DEBRIDE NAIL 6 OR MORE: CPT | Performed by: NURSE PRACTITIONER

## 2017-12-05 NOTE — PROGRESS NOTES
[place] [facility]    Chief Complaint   Patient presents with     RECHECK       HPI:    Anya Gaines is a 89 year old  (7/4/1920), who is being seen today for an episodic care visit at [facility].  HPI information obtained from: facility chart records, facility staff, patient report and [facility] chart review.Today's concern is:     Type 2 diabetes mellitus without complication, without long-term current use of insulin (H)  Essential hypertension with goal blood pressure less than 140/90  Seizures (H)  Hyperkinetic  Nail dystrophy     Anya reports she is generally feeling well, but reports chronic fatigue which she reports as new. She is a poor historian, nursing staff report no new concerns.     ALLERGIES: Review of patient's allergies indicates no known allergies.  Past Medical, Surgical, Family and Social History reviewed and updated in [facility].    Current Outpatient Prescriptions   Medication Sig Dispense Refill     acetaminophen (TYLENOL) 325 MG tablet Take 2 tablets (650 mg) by mouth every 4 hours as needed for mild pain 100 tablet      order for DME Safety lancets 100 strip 11     hypromellose-dextran 0.3-0.1% (ARTIFICIAL TEARS) opthalmic solution Place 1 drop into both eyes 3 times daily 15 mL 0     hydrochlorothiazide (HYDRODIURIL) 25 MG tablet Take 1 tablet (25 mg) by mouth daily 90 tablet 3     amLODIPine (NORVASC) 2.5 MG tablet Take 1 tablet (2.5 mg) by mouth daily 30 tablet 5     calcium carbonate (OS- MG [place]. [place]) 600 MG tablet Take 1 tablet (600 mg) by mouth daily 60 tablet 5     cholecalciferol 2000 UNITS tablet Take 2,000 Units by mouth daily 30 tablet 5     gabapentin (NEURONTIN) 300 MG capsule Take 2 capsules (600 mg) by mouth 2 times daily 90 capsule 5     atorvastatin (LIPITOR) 10 MG tablet Take 1 tablet (10 mg) by mouth daily 30 tablet 1     order for DME Equipment being ordered: Contour test strips 100 each 5     clopidogrel (PLAVIX) 75 MG tablet Take  75 mg by mouth daily       loperamide (IMODIUM) 2 MG capsule Take 2 mg by mouth every other day AND QID PRN for diarrhea       metFORMIN (GLUCOPHAGE-XR) 500 MG 24 hr tablet Take 4 tablets (2,000 mg) by mouth daily (with dinner) 30 tablet      lisinopril (PRINIVIL/ZESTRIL) 40 MG tablet Take 1 tablet (40 mg) by mouth daily 30 tablet 5     Menthol, Topical Analgesic, (BIOFREEZE) 4 % GEL Externally apply topically 3 times daily as needed For muscle aches       multivitamin  with lutein (OCUVITE WITH LTEIN) CAPS per capsule Take 1 capsule by mouth daily 30 capsule 5     Medications reviewed:  Medications reconciled to facility chart and changes were made to reflect current medications as identified as above med list. Below are the changes that were made:   Medications stopped since last EPIC medication reconciliation:   There are no discontinued medications.    Medications started since last [facility] medication reconciliation:  No orders of the defined types were placed in this encounter.        REVIEW OF SYSTEMS:  4 point ROS including Respiratory, CV, GI and , other than that noted in the HPI,  is negative    Physical Exam:  /81  Pulse 86  Temp 97.1  F (36.2  C)  Resp 20  Wt 130 lb (59 kg)  SpO2 98%  BMI 26.26 kg/m2  GENERAL APPEARANCE:  Alert, in no distress  HEAD:  Normal, normocephalic, atraumatic  EYE EXAM: normal external eye, conjunctiva, lids, JUAN  NECK EXAM: supple, no JVD  CHEST/RESP:  respiratory effort normal, lung sounds CTA , no respiratory distress  CV:  Rate reg, rhythm reg, no murmur, no peripheral edema   M/S:   extremities normal, gait abnormal-does not ambulate and uses wheelchair for all mobility is able to transfer self to bed/chair/toilet from wheelchair, normal muscle tone, and range of motion stable  SKIN EXAM: CDI, nails long and dystrophic  NEUROLOGIC EXAM: Normal gross motor movement, tone and coordination. No tremor.  Cranial nerves 2-12 are normal tested and grossly at patient's  baseline  PSYCH:  Alert and oriented to self and surroundings with forgetfulness , affect pleasant , judgement appropriate     Recent Labs:    Lab Results   Component Value Date    A1C 7.6 08/07/2017    A1C 7.3 02/04/2017    A1C 7.9 11/07/2016    A1C 7.5 08/08/2016    A1C 8.1 03/02/2016        Assessment/Plan:  Type 2 diabetes mellitus without complication, without long-term current use of insulin (H)  Blood sugar not routinely checked, on metformin. Eating well, weight stable. Will check A1C .     Essential hypertension with goal blood pressure less than 140/90  No headache, no dizziness, generally feeling well. Generally normotensive on current regimen, goal BP <1500/90 to reduce risk of falls, hypotension. The current medical regimen is effective;  continue present plan and medications.      Seizures (H)  No seizures, on gabapentin. Stable, compensated. The current medical regimen is effective;  continue present plan and medications.     Hyperkinetic  Stable, no new concerns. No recent falls and is successfully using the wheelchair.     Nail dystrophy  Ten nails needed trimming and she is unable to do this. With her permission, I trimmed all ten nails without difficulty and without trauma.   - DEBRIDEMENT OF NAILS, 6 OR MORE    Orders:  1. Lab draw on 12/11/17 - A1C, Hgb, BMP, TSH Dx DM2, Fatigue r/o anemia, Htn       Electronically signed by  Carlene Serrano CNP   [facility]  [phone] cell

## 2017-12-06 PROBLEM — L60.3 DYSTROPHIC NAIL: Status: ACTIVE | Noted: 2017-12-06

## 2017-12-08 ENCOUNTER — RECORDS - HEALTHEAST (OUTPATIENT)
Dept: LAB | Facility: CLINIC | Age: 82
End: 2017-12-08

## 2017-12-11 ENCOUNTER — TRANSFERRED RECORDS (OUTPATIENT)
Dept: HEALTH INFORMATION MANAGEMENT | Facility: CLINIC | Age: 82
End: 2017-12-11

## 2017-12-11 LAB
ANION GAP SERPL CALCULATED.3IONS-SCNC: 9 MMOL/L (ref 5–18)
BUN SERPL-MCNC: 14 MG/DL (ref 8–28)
CALCIUM SERPL-MCNC: 9.6 MG/DL (ref 8.5–10.5)
CHLORIDE SERPLBLD-SCNC: 102 MMOL/L (ref 98–107)
CO2 SERPL-SCNC: 29 MMOL/L (ref 22–31)
CREAT SERPL-MCNC: 0.57 MG/DL (ref 0.6–1.1)
GFR SERPL CREATININE-BSD FRML MDRD: >60 ML/MIN/1.73M2
GLUCOSE SERPL-MCNC: 145 MG/DL (ref 70–125)
HBA1C MFR BLD: 7.6 % (ref 4.2–6.1)
HBA1C MFR BLD: 7.6 % (ref 4.2–6.1)
HEMOGLOBIN: 12.3 G/DL (ref 12–16)
POTASSIUM SERPL-SCNC: 3.9 MMOL/L (ref 3.5–5)
SODIUM SERPL-SCNC: 140 MMOL/L (ref 136–145)
TSH SERPL-ACNC: 0.99 UIU/ML (ref 0.3–5)

## 2017-12-17 DIAGNOSIS — I62.00 SUBDURAL HEMORRHAGE (H): ICD-10-CM

## 2017-12-19 RX ORDER — DIPHENHYDRAMINE HCL 25 MG
1 CAPSULE ORAL 3 TIMES DAILY
Qty: 15 ML | Refills: 3 | Status: SHIPPED | OUTPATIENT
Start: 2017-12-19 | End: 2020-04-14

## 2018-02-11 NOTE — PROGRESS NOTES
[facility]    Chief Complaint   Patient presents with     RECHECK       HPI:    Anya Gaines is a 89 year old  (7/4/1920), who is being seen today for an episodic care visit at [facility].  HPI information obtained from: facility chart records, facility staff, patient report and [facility] chart review.Today's concern is:     Type 2 diabetes mellitus without complication, without long-term current use of insulin (H)  Seizures (H)  Essential hypertension with goal blood pressure less than 140/90  Hyperkinetic  Dystrophic nail     Anya reports she is generally feeling well and without new concerns. She reports no new pain, no chest pain, no cough, no congestion, no dizziness. She reports no constipation, no dysuria. She reports her toenails are long and thick-she would like them trimmed today.     ALLERGIES: Review of patient's allergies indicates no known allergies.  Past Medical, Surgical, Family and Social History reviewed and updated in [facility].    Current Outpatient Prescriptions   Medication Sig Dispense Refill     hypromellose-dextran 0.3-0.1% (ARTIFICIAL TEARS) opthalmic solution Place 1 drop into both eyes 3 times daily 15 mL 3     acetaminophen (TYLENOL) 325 MG tablet Take 2 tablets (650 mg) by mouth every 4 hours as needed for mild pain 100 tablet      order for DME Safety lancets 100 strip 11     hydrochlorothiazide (HYDRODIURIL) 25 MG tablet Take 1 tablet (25 mg) by mouth daily 90 tablet 3     amLODIPine (NORVASC) 2.5 MG tablet Take 1 tablet (2.5 mg) by mouth daily 30 tablet 5     calcium carbonate (OS- MG [place]. CA) 600 MG tablet Take 1 tablet (600 mg) by mouth daily 60 tablet 5     cholecalciferol 2000 UNITS tablet Take 2,000 Units by mouth daily 30 tablet 5     gabapentin (NEURONTIN) 300 MG capsule Take 2 capsules (600 mg) by mouth 2 times daily 90 capsule 5     atorvastatin (LIPITOR) 10 MG tablet Take 1 tablet (10 mg) by mouth daily 30 tablet 1      order for DME Equipment being ordered: Contour test strips 100 each 5     clopidogrel (PLAVIX) 75 MG tablet Take 75 mg by mouth daily       loperamide (IMODIUM) 2 MG capsule Take 2 mg by mouth every other day AND QID PRN for diarrhea       metFORMIN (GLUCOPHAGE-XR) 500 MG 24 hr tablet Take 4 tablets (2,000 mg) by mouth daily (with dinner) 30 tablet      lisinopril (PRINIVIL/ZESTRIL) 40 MG tablet Take 1 tablet (40 mg) by mouth daily 30 tablet 5     Menthol, Topical Analgesic, (BIOFREEZE) 4 % GEL Externally apply topically 3 times daily as needed For muscle aches       multivitamin  with lutein (OCUVITE WITH LTEIN) CAPS per capsule Take 1 capsule by mouth daily 30 capsule 5     Medications reviewed:  Medications reconciled to facility chart and changes were made to reflect current medications as identified as above med list. Below are the changes that were made:   Medications stopped since last EPIC medication reconciliation:   There are no discontinued medications.    Medications started since last [facility] medication reconciliation:  No orders of the defined types were placed in this encounter.        REVIEW OF SYSTEMS:  4 point ROS including Respiratory, CV, GI and , other than that noted in the HPI,  is negative    Physical Exam:  BP (!) 151/96  Pulse 84  Temp 98.2  F (36.8  C)  Resp 18  Wt 130 lb (59 kg)  BMI 26.26 kg/m2  GENERAL APPEARANCE:  Alert, in no acute distress  HEAD:  Normal, normocephalic, atraumatic  EYE EXAM: normal external eye, conjunctiva, lids, JUAN  NECK EXAM: supple, no JVD  CHEST/RESP:  respiratory effort normal, lung sounds CTA , no respiratory distress  CV:  Rate reg, rhythm reg, no murmur, no peripheral edema   M/S:   extremities normal, gait abnormal-unsteady and only transfers using wheelchair for most mobility , normal muscle tone, and range of motion normal   SKIN EXAM: CDI, bilateral feet with long dystrophic nails  NEUROLOGIC EXAM: Normal gross motor movement, tone and  coordination. No tremor.  Cranial nerves 2-12 are normal tested and grossly at patient's baseline  PSYCH:  Alert and oriented to person-place-time with mild forgetfulness, affect pleasant , judgement appropriate     Recent Labs:  Reviewed in facility records and Epic     Assessment/Plan:  Type 2 diabetes mellitus without complication, without long-term current use of insulin (H)  Blood sugar not routinely checked, A1C within normal limits and weight is stable, eating well. The current medical regimen is effective;  continue present plan and medications.   Lab Results   Component Value Date    A1C 7.6 12/11/2017    A1C 7.6 08/07/2017    A1C 7.3 02/04/2017    A1C 7.9 11/07/2016    A1C 7.5 08/08/2016        Seizures (H)  She has had no known seizure activity, no unusual unresponsive episodes. Not routinely on medications, but is on gabapentin to help with hyperkinesia.     Essential hypertension with goal blood pressure less than 140/90  BP goals are  <140/90 mm Hg.This is higher than ACC and AHA recommendations due to goals of care, risk for hypotension, risk of dizziness and falls and frailty. Patient is stable with current plan of care and routine assessment.     Hyperkinetic  Stable, compensated. The current medical regimen is effective;  continue present plan and medications.     Dystrophic nail  All 10 nails long and thick, trimmed after consent obtained.   - TRIMMING DYSTROPHIC NAILS,ANY NUMBER    Orders:  1. No new orders      Electronically signed by  Carlene Serrano CNP   [facility]  [phone] cell

## 2018-02-12 VITALS
RESPIRATION RATE: 18 BRPM | SYSTOLIC BLOOD PRESSURE: 151 MMHG | HEART RATE: 84 BPM | DIASTOLIC BLOOD PRESSURE: 96 MMHG | BODY MASS INDEX: 26.26 KG/M2 | TEMPERATURE: 98.2 F | WEIGHT: 130 LBS

## 2018-02-13 ENCOUNTER — ASSISTED LIVING VISIT (OUTPATIENT)
Dept: GERIATRICS | Facility: CLINIC | Age: 83
End: 2018-02-13
Payer: COMMERCIAL

## 2018-02-13 DIAGNOSIS — E11.9 TYPE 2 DIABETES MELLITUS WITHOUT COMPLICATION, WITHOUT LONG-TERM CURRENT USE OF INSULIN (H): Primary | ICD-10-CM

## 2018-02-13 DIAGNOSIS — F90.9 HYPERKINETIC: ICD-10-CM

## 2018-02-13 DIAGNOSIS — I10 ESSENTIAL HYPERTENSION WITH GOAL BLOOD PRESSURE LESS THAN 140/90: ICD-10-CM

## 2018-02-13 DIAGNOSIS — R56.9 SEIZURES (H): ICD-10-CM

## 2018-02-13 DIAGNOSIS — L60.3 DYSTROPHIC NAIL: ICD-10-CM

## 2018-02-13 PROCEDURE — G0127 TRIM NAIL(S): HCPCS | Performed by: NURSE PRACTITIONER

## 2018-02-21 ENCOUNTER — CARE COORDINATION (OUTPATIENT)
Dept: GERIATRIC MEDICINE | Facility: CLINIC | Age: 83
End: 2018-02-21

## 2018-02-21 NOTE — PROGRESS NOTES
[facility] Home Visit Assessment     Home visit for Health Risk Assessment with Anya Gaines completed on February 21, 2018  Member resides in Assisted living  [facility] and lives alone  Present at assessment: member, this care coordinator and CC did review of CP with CHANTAL santiago at [facility]      Current Care Plan  Member currently receiving the following services: Assisted Living and Supplies incontinence   CC did contact members daughter Kimi, prior to scheduling visit and she did not feel she needed to be present at time of assessment.  CC will f/u with her after assessment to review visit and answer any questions or concerns she may have.      Medication Review  Medication reconciliation completed in Epic:If no, please explain member lives in [facility] and [facility] NP is her PCP so medications are managed and updated as needed in EMR per NP    Medication set-up & administration: RN sets up  Every week.  Assisting Living staff administers medications.  Medication understanding concerns (by member, family or CC): No  Medication adherence concerns (by member, family or CC): No    Mental/Behavioral Health   Depression Screening: See PHQ assessment flowsheet.   Mental Health Diagnosis: No  No current MH services    Falls in last 12 months: No - but assessment this year was done one year after fall which lead to subdermal hematoma which resulted in hospital and TCU stay.  Member returned to [facility] one year ago and has been doing very well.  Member is now in her wheelchair at all times and staff is attempting to walk with her as member will allow.  Member has been doing very well this last year per family and [facility] staff.      ADL/IADL Dependencies: Bathing, Dressing, Grooming, Transfers, Toileting, Wheelchair, Cleaning, Cooking, Laundry, Shopping, Medication Management and Money Management - member family assist with this.      [facility] Plan sponsored benefits: Shared information re: Silver Sneakers/gym  memberships, ASA, Calcium +D.  PCA Assessment completed at visit: Not applicable     Elderly Waiver Eligibility: Yes-will continue on EW    Care Plan & Recommendations: CC and RN review RS tool and member has improved in some of her cares at this time.  RN is going to review with staff and then f/u with CC before plans is finalized     See [facility] for detailed assessment information.    Follow-Up Plan: Member informed of future contact, plan to f/u with member with a 6 month telephone assessment.  Contact information shared with member and family, encouraged member to call with any questions or concerns at any time.  [facility] care continuum providers: Please refer to Health Care Home on the Epic Problem List to view this patient's [facility] Care Plan Summary.  Arely Duckworth MA [facility] Care Coordinator   [phone]

## 2018-03-08 ENCOUNTER — CARE COORDINATION (OUTPATIENT)
Dept: GERIATRIC MEDICINE | Facility: CLINIC | Age: 83
End: 2018-03-08

## 2018-03-08 NOTE — PROGRESS NOTES
3-8-18   CC did reach out to member daughter to review the visit that was done with member and to let her know that CC is doing to order wheelchair arm replacement because the ones she currently have is worn.      CC also talked about member wanting to walk with staff to keep up her endurance and strength.  CC said she did put this into member plan and daughter said this was fine.  She did not have any concerns at this time but did ask if CC could call [employer] to see if they could update her address because she is still receiving letters to her old address   Arely Duckworth MA [facility] Care Coordinator   [phone]

## 2018-03-08 NOTE — PROGRESS NOTES
Received after visit chart from care coordinator.  Completed following tasks: Mailed copy of care plan to client, Updated services in access, Submitted referrals/auths for [facility] and Entered MMIS  Chart was returned to CC.     Mailed copy of CL tool to member, faxed copy to [facility] facility, uploaded into [employer] and submitted authorization to health plan.    Jodi Persaudatodonna  Case Management Specialist   [facility]   [phone]

## 2018-03-29 NOTE — PROGRESS NOTES
[facility]  Chief Complaint   Patient presents with     Annual Comprehensive Nursing Home       HPI:    Anya Gaines is a 89 year old  (7/4/1920), who is being seen today for an annual comprehensive visit at [facility].  HPI information obtained from: facility chart records, facility staff, patient report and [facility] chart review.  Today's concerns are:     Essential hypertension with goal blood pressure less than 140/90  Type 2 diabetes mellitus without complication, without long-term current use of insulin (H)  Seizures (H)  Hyperkinetic  Dizziness  Macular degeneration (senile) of retina  Routine general medical examination at a health care facility  Advanced directives, counseling/discussion    Anya reports she is generally feeling well and doing well. She reports no new pain, no new concerns. She reports she is sleeping well, eating well, bowels stable, no new concerns. She continues to use her wheelchair for all mobility but is able to transfer without assist. She has not fallen in over 1 year, since her return to assisted living facility with wheelchair level of mobility. She reports her eyes continue to worsen but the eye dr told her there is nothing to be done. I spoke with her daughter, iKmi, who feels her mom is doing well and has no concerns.     ALLERGIES: Review of patient's allergies indicates no known allergies.  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  PAST SURGICAL HISTORY:  has a past surgical history that includes surgical history of - .  FAMILY HISTORY: family history is not on file.  SOCIAL HISTORY:  reports that she has never smoked. She has never used smokeless tobacco. She reports that she does not drink alcohol or use illicit drugs.  IMMUNIZATIONS:  Most Recent Immunizations   Administered Date(s) Administered     Influenza (High Dose) 3 valent vaccine 10/12/2015     Influenza (IIV3) PF 11/25/2013     Pneumo Conj 13-V (2010&after) 10/12/2015     Pneumococcal 23 valent 12/22/2007     TDAP Vaccine (Boostrix) 11/09/2016     Above immunizations pulled from [place] [facility]. [facility] and facility records also reconciled. Outstanding information sent to  to update [facility] Yes.  Future immunizations are not needed at this point as all recommended immunizations are up to date.   MEDICATIONS:  Current Outpatient Prescriptions   Medication Sig Dispense Refill     hypromellose-dextran 0.3-0.1% (ARTIFICIAL TEARS) opthalmic solution Place 1 drop into both eyes 3 times daily 15 mL 3     acetaminophen (TYLENOL) 325 MG tablet Take 2 tablets (650 mg) by mouth every 4 hours as needed for mild pain 100 tablet      order for DME Safety lancets 100 strip 11     hydrochlorothiazide (HYDRODIURIL) 25 MG tablet Take 1 tablet (25 mg) by mouth daily 90 tablet 3     amLODIPine (NORVASC) 2.5 MG tablet Take 1 tablet (2.5 mg) by mouth daily 30 tablet 5     calcium carbonate (OS- MG [place]. [place]) 600 MG tablet Take 1 tablet (600 mg) by mouth daily 60 tablet 5     cholecalciferol 2000 UNITS tablet Take 2,000 Units by mouth daily 30 tablet 5     gabapentin (NEURONTIN) 300 MG capsule Take 2 capsules  (600 mg) by mouth 2 times daily 90 capsule 5     atorvastatin (LIPITOR) 10 MG tablet Take 1 tablet (10 mg) by mouth daily 30 tablet 1     order for DME Equipment being ordered: Contour test strips 100 each 5     clopidogrel (PLAVIX) 75 MG tablet Take 75 mg by mouth daily       loperamide (IMODIUM) 2 MG capsule Take 2 mg by mouth every other day AND QID PRN for diarrhea       metFORMIN (GLUCOPHAGE-XR) 500 MG 24 hr tablet Take 4 tablets (2,000 mg) by mouth daily (with dinner) 30 tablet      lisinopril (PRINIVIL/ZESTRIL) 40 MG tablet Take 1 tablet (40 mg) by mouth daily 30 tablet 5     Menthol, Topical Analgesic, (BIOFREEZE) 4 % GEL Externally apply topically 3 times daily as needed For muscle aches       multivitamin  with lutein (OCUVITE WITH LTEIN) CAPS per capsule Take 1 capsule by mouth daily 30 capsule 5     Medications reviewed:  Medications reconciled to facility chart and changes were made to reflect current medications as identified as above med list. Below are the changes that were made:   Medications stopped since last EPIC medication reconciliation:   There are no discontinued medications.    Medications started since last [facility] medication reconciliation:  No orders of the defined types were placed in this encounter.        Case Management:  I have reviewed the Assisted Living care plan, current immunizations and preventive care/cancer screening..Future cancer screening is not clinically indicated secondary to age/goals of care Patient's desire to return to the community is currently living in a community environment. Current Level of Care is appropriate.    Advance Directive Discussion:    I reviewed the current advanced directives as reflected in EPIC, the POLST and the facility chart, and verified the congruency of orders. I contacted the first party , daughter Kimi, and discussed the plan of Care.  I did review the advance directives with the resident.     Team Discussion:  I communicated with the  appropriate disciplines involved with the Plan of Care:   Nursing      Patient Goal:  Patient's goal is pain control and comfort and family's/responsible party's goal for the patient is pain control and comfort.    Information reviewed:  Medications, vital signs, orders, and nursing notes.    ROS:  4 point ROS including Respiratory, CV, GI and , other than that noted in the HPI,  is negative    Exam:  /87  Pulse 88  Temp 98  F (36.7  C)  Resp 14  Wt 130 lb (59 kg)  SpO2 98%  BMI 26.26 kg/m2  GENERAL APPEARANCE:  Alert, in no distress  HEAD:  Normal, normocephalic, atraumatic  EYE EXAM: normal external eye, conjunctiva, lids, JUAN  NECK EXAM: supple, no JVD  CHEST/RESP:  respiratory effort normal, lung sounds CTA , no respiratory distress  CV:  Rate reg, rhythm reg, no murmur, no peripheral edema   M/S:   extremities normal, gait abnormal-does not ambulate but does transfer without assist and uses wheelchair for most mobility, normal muscle tone, and range of motion normal   SKIN EXAM: CDI  NEUROLOGIC EXAM: Normal gross motor movement, tone and coordination. Continues with hyperkinetic movement at baseline and this does not usually interfere with her mobility or adls.   Cranial nerves 2-12 are normal tested and grossly at patient's baseline  PSYCH:  Alert and oriented to person-place-time with mild forgetfulness, affect pleasant , judgement appropriate        Lab/Diagnostic data:      CBC RESULTS:   Recent Labs   Lab Test 12/11/17 02/21/17 02/05/17   0925   WBC   --   6.4  10.8   RBC   --   4.58  4.00   HGB  12.3  13.1  11.4*   HCT   --   42.1  36.2   MCV   --   92  91   MCH   --   28.6  28.5   MCHC   --   31.1*  31.5   RDW   --   12.3  13.4   PLT   --   236  205       Last Basic Metabolic Panel:  Recent Labs   Lab Test 12/11/17 02/21/17   NA  140  139   POTASSIUM  3.9  4.1   CHLORIDE  102  99   KEN  9.6  10.4   CO2  29  32*   BUN  14  13   CR  0.57*  0.63   GLC  145*  187*       Liver Function Studies  -   Recent Labs   Lab Test  03/02/16   1600  02/11/15   1505   PROTTOTAL  7.6  7.3   ALBUMIN  4.2  3.9   BILITOTAL  0.6  0.4   ALKPHOS  76  82   AST  34  16   ALT  35  27       TSH   Date Value Ref Range Status   12/11/2017 0.99 0.30 - 5.00 uIU/mL Final   03/26/2006 1.92 0.4 - 5.0 mU/L Final   ]    Lab Results   Component Value Date    A1C 7.6 12/11/2017    A1C 7.6 08/07/2017         ASSESSMENT/PLAN  Essential hypertension with goal blood pressure less than 140/90  BP Readings from Last 6 Encounters:   04/03/18 123/87   02/12/18 (!) 151/96   12/05/17 149/81   10/18/17 129/70   08/09/17 (!) 158/98   06/21/17 (!) 158/98      BP goals are  <140/90 mm Hg.This is higher than ACC and AHA recommendations due to goals of care, risk for hypotension, risk of dizziness and falls, risk of tissue/cerebral hypoperfusion and frailty. Patient is stable with current plan of care and routine assessment.     Type 2 diabetes mellitus without complication, without long-term current use of insulin (H)  Lab Results   Component Value Date    A1C 7.6 12/11/2017    A1C 7.6 08/07/2017    A1C 7.3 02/04/2017    A1C 7.9 11/07/2016    A1C 7.5 08/08/2016      Blood sugars not routinely checked, on metformin. The current medical regimen is effective;  continue present plan and medications. Will recheck A1C       Seizures (H)  No seizure activity, on gabapentin for hyperkinesis. Stable, no further testing is indicated.     Hyperkinetic  Stable on gabapentin. The current medical regimen is effective;  continue present plan and medications.     Dizziness  Stable, no falls recently. She is compliant with wheelchair level of mobility.     Macular degeneration (senile) of retina  Stable, follows with opthalmology. No further treatment or testing indicated.     Annual 4/3/18  Advanced directives, counseling/discussion  - DNR/DNI      Orders:  1. Lab draw on 5/7/18- A1c DX DM2        Electronically signed by:  TONYA Tran CNP

## 2018-04-03 ENCOUNTER — ASSISTED LIVING VISIT (OUTPATIENT)
Dept: GERIATRICS | Facility: CLINIC | Age: 83
End: 2018-04-03
Payer: COMMERCIAL

## 2018-04-03 VITALS
RESPIRATION RATE: 14 BRPM | HEART RATE: 88 BPM | TEMPERATURE: 98 F | OXYGEN SATURATION: 98 % | SYSTOLIC BLOOD PRESSURE: 123 MMHG | BODY MASS INDEX: 26.26 KG/M2 | DIASTOLIC BLOOD PRESSURE: 87 MMHG | WEIGHT: 130 LBS

## 2018-04-03 DIAGNOSIS — F90.9 HYPERKINETIC: ICD-10-CM

## 2018-04-03 DIAGNOSIS — Z71.89 ADVANCED DIRECTIVES, COUNSELING/DISCUSSION: ICD-10-CM

## 2018-04-03 DIAGNOSIS — R42 DIZZINESS: ICD-10-CM

## 2018-04-03 DIAGNOSIS — R56.9 SEIZURES (H): ICD-10-CM

## 2018-04-03 DIAGNOSIS — Z00.00 ROUTINE GENERAL MEDICAL EXAMINATION AT A HEALTH CARE FACILITY: ICD-10-CM

## 2018-04-03 DIAGNOSIS — H35.30 MACULAR DEGENERATION (SENILE) OF RETINA: ICD-10-CM

## 2018-04-03 DIAGNOSIS — E11.9 TYPE 2 DIABETES MELLITUS WITHOUT COMPLICATION, WITHOUT LONG-TERM CURRENT USE OF INSULIN (H): ICD-10-CM

## 2018-04-03 DIAGNOSIS — I10 ESSENTIAL HYPERTENSION WITH GOAL BLOOD PRESSURE LESS THAN 140/90: Primary | ICD-10-CM

## 2018-04-03 PROCEDURE — 99318 ZZC ANNUAL NURSING FAC ASSESSMNT, STABLE: CPT | Performed by: NURSE PRACTITIONER

## 2018-04-04 PROBLEM — R41.89 COGNITIVE IMPAIRMENT: Status: RESOLVED | Noted: 2017-03-06 | Resolved: 2018-04-04

## 2018-04-04 PROBLEM — Z00.00 ROUTINE GENERAL MEDICAL EXAMINATION AT A HEALTH CARE FACILITY: Chronic | Status: ACTIVE | Noted: 2017-09-10

## 2018-04-04 PROBLEM — Z00.00 ROUTINE GENERAL MEDICAL EXAMINATION AT A HEALTH CARE FACILITY: Status: ACTIVE | Noted: 2017-09-10

## 2018-05-07 ENCOUNTER — TRANSFERRED RECORDS (OUTPATIENT)
Dept: HEALTH INFORMATION MANAGEMENT | Facility: CLINIC | Age: 83
End: 2018-05-07

## 2018-05-07 ENCOUNTER — RECORDS - HEALTHEAST (OUTPATIENT)
Dept: LAB | Facility: CLINIC | Age: 83
End: 2018-05-07

## 2018-05-07 LAB
HBA1C MFR BLD: 9.2 % (ref 4.2–6.1)
HBA1C MFR BLD: 9.2 % (ref 4.2–6.1)

## 2018-06-18 NOTE — PROGRESS NOTES
[place] [facility]    Chief Complaint   Patient presents with     BOSSMAN       [place] Medical Record Number:  [id number]    HPI:    Anya Gaines is a 89 year old  (7/4/1920), who is being seen today for an episodic care visit at [facility].  HPI information obtained from: facility chart records, facility staff, patient report and [facility] chart review.Today's concern is:     Benign essential hypertension  Type 2 diabetes mellitus without complication, without long-term current use of insulin (H)  Hyperkinetic disorder  Nail dystrophy  Type 2 diabetes mellitus with complication, without long-term current use of insulin (H)     Anya reports no new concerns, and she is feeling well. Nursing staff reports no new concerns.     ALLERGIES: Review of patient's allergies indicates no known allergies.  Past Medical, Surgical, Family and Social History reviewed and updated in [facility].    Current Outpatient Prescriptions   Medication Sig Dispense Refill     acetaminophen (TYLENOL) 325 MG tablet Take 2 tablets (650 mg) by mouth every 4 hours as needed for mild pain 100 tablet      amLODIPine (NORVASC) 2.5 MG tablet Take 1 tablet (2.5 mg) by mouth daily 30 tablet 5     atorvastatin (LIPITOR) 10 MG tablet Take 1 tablet (10 mg) by mouth daily 30 tablet 1     calcium carbonate (OS- MG [place]. CA) 600 MG tablet Take 1 tablet (600 mg) by mouth daily 60 tablet 5     cholecalciferol 2000 UNITS tablet Take 2,000 Units by mouth daily 30 tablet 5     clopidogrel (PLAVIX) 75 MG tablet Take 75 mg by mouth daily       gabapentin (NEURONTIN) 300 MG capsule Take 2 capsules (600 mg) by mouth 2 times daily 90 capsule 5     hydrochlorothiazide (HYDRODIURIL) 25 MG tablet Take 1 tablet (25 mg) by mouth daily 90 tablet 3     hypromellose-dextran 0.3-0.1% (ARTIFICIAL TEARS) opthalmic solution Place 1 drop into both eyes 3 times daily 15 mL 3     lisinopril (PRINIVIL/ZESTRIL) 40 MG tablet Take 1 tablet (40  mg) by mouth daily 30 tablet 5     loperamide (IMODIUM) 2 MG capsule Take 2 mg by mouth every other day AND QID PRN for diarrhea       Menthol, Topical Analgesic, (BIOFREEZE) 4 % GEL Externally apply topically 3 times daily as needed For muscle aches       metFORMIN (GLUCOPHAGE-XR) 500 MG 24 hr tablet Take 4 tablets (2,000 mg) by mouth daily (with dinner) 30 tablet      multivitamin  with lutein (OCUVITE WITH LTEIN) CAPS per capsule Take 1 capsule by mouth daily 30 capsule 5     order for DME Safety lancets 100 strip 11     order for DME Equipment being ordered: Contour test strips 100 each 5     Medications reviewed:  Medications reconciled to facility chart and changes were made to reflect current medications as identified as above med list. Below are the changes that were made:   Medications stopped since last EPIC medication reconciliation:   There are no discontinued medications.    Medications started since last [facility] medication reconciliation:  No orders of the defined types were placed in this encounter.        REVIEW OF SYSTEMS:  4 point ROS including Respiratory, CV, GI and , other than that noted in the HPI,  is negative    Physical Exam:  /74  Pulse 98  Temp 97.9  F (36.6  C)  Resp 22  Wt 128 lb (58.1 kg)  SpO2 95%  BMI 25.85 kg/m2  GENERAL APPEARANCE:  Alert, in no distress  ENT:  Mouth and posterior oropharynx normal, moist mucous membranes  EYES:  EOM, conjunctivae, lids, pupils and irises normal  RESP:  respiratory effort and palpation of chest normal, lungs clear to auscultation , no respiratory distress  CV:  Palpation and auscultation of heart done , regular rate and rhythm, no murmur, rub, or gallop, no edema  SKIN:  dystrophic nails x 10 on feet  PSYCH:  oriented X 3, memory impairment     Recent Labs: All labs reviewed, none recent.        Assessment/Plan:  Benign essential hypertension  Generally normotensive on current regimen, goal BP <140/90 to reduce risk of falls,  hypotension. The current medical regimen is effective;  continue present plan and medications.      Type 2 diabetes mellitus without complication, without long-term current use of insulin (H)  Lab Results   Component Value Date    A1C 9.2 05/07/2018    A1C 7.6 12/11/2017    A1C 7.6 08/07/2017   .  Blood sugar not routinely checked, last A1C as noted above which is a bit higher than goal 8-9.  Goals of care focused on comfort, on no oral agents. The current medical regimen is effective;  continue present plan and medications.     Hyperkinetic disorder  Stable, on gabapentin.     Nail dystrophy  10 dystrophic nails trimmed after consent obtained.         Orders:  1. No new orders      Electronically signed by  TONYA Tran CNP

## 2018-06-19 ENCOUNTER — ASSISTED LIVING VISIT (OUTPATIENT)
Dept: GERIATRICS | Facility: CLINIC | Age: 83
End: 2018-06-19
Payer: COMMERCIAL

## 2018-06-19 VITALS
TEMPERATURE: 97.9 F | RESPIRATION RATE: 22 BRPM | OXYGEN SATURATION: 95 % | WEIGHT: 128 LBS | DIASTOLIC BLOOD PRESSURE: 74 MMHG | BODY MASS INDEX: 25.85 KG/M2 | HEART RATE: 98 BPM | SYSTOLIC BLOOD PRESSURE: 130 MMHG

## 2018-06-19 DIAGNOSIS — E11.9 TYPE 2 DIABETES MELLITUS WITHOUT COMPLICATION, WITHOUT LONG-TERM CURRENT USE OF INSULIN (H): ICD-10-CM

## 2018-06-19 DIAGNOSIS — L60.3 NAIL DYSTROPHY: ICD-10-CM

## 2018-06-19 DIAGNOSIS — I10 BENIGN ESSENTIAL HYPERTENSION: Primary | ICD-10-CM

## 2018-06-19 DIAGNOSIS — F90.9 HYPERKINETIC DISORDER: ICD-10-CM

## 2018-06-19 PROCEDURE — 11719 TRIM NAIL(S) ANY NUMBER: CPT | Performed by: NURSE PRACTITIONER

## 2018-08-06 NOTE — PROGRESS NOTES
[place] [facility]    Chief Complaint   Patient presents with     BOSSMAN       [place] Medical Record Number:  [id number]    HPI:    Anya Gaines is a 89 year old  (7/4/1920), who is being seen today for an episodic care visit at [facility].  HPI information obtained from: facility chart records, facility staff, patient report and [facility] chart review.Today's concern is:     Benign essential hypertension  Type 2 diabetes mellitus without complication, without long-term current use of insulin (H)  Hyperkinetic disorder  Nail dystrophy     Anya Gaines reports no new concerns, nursing staff reports no new concerns.  She does note improved bowel habits with less diarrhea/constipation since initiation of senna S.    ALLERGIES: Review of patient's allergies indicates no known allergies.  Past Medical, Surgical, Family and Social History reviewed and updated in [facility].    Current Outpatient Prescriptions   Medication Sig Dispense Refill     acetaminophen (TYLENOL) 325 MG tablet Take 2 tablets (650 mg) by mouth every 4 hours as needed for mild pain 100 tablet      amLODIPine (NORVASC) 2.5 MG tablet Take 1 tablet (2.5 mg) by mouth daily 30 tablet 5     atorvastatin (LIPITOR) 10 MG tablet Take 1 tablet (10 mg) by mouth daily 30 tablet 1     calcium carbonate (OS- MG [place]. [place]) 600 MG tablet Take 1 tablet (600 mg) by mouth daily 60 tablet 5     cholecalciferol 2000 UNITS tablet Take 2,000 Units by mouth daily 30 tablet 5     clopidogrel (PLAVIX) 75 MG tablet Take 75 mg by mouth daily       gabapentin (NEURONTIN) 300 MG capsule Take 2 capsules (600 mg) by mouth 2 times daily 90 capsule 5     hydrochlorothiazide (HYDRODIURIL) 25 MG tablet Take 1 tablet (25 mg) by mouth daily 90 tablet 3     hypromellose-dextran 0.3-0.1% (ARTIFICIAL TEARS) opthalmic solution Place 1 drop into both eyes 3 times daily 15 mL 3     lisinopril (PRINIVIL/ZESTRIL) 40 MG tablet Take 1 tablet (40 mg) by  mouth daily 30 tablet 5     loperamide (IMODIUM) 2 MG capsule Take 2 mg by mouth every other day AND QID PRN for diarrhea       Menthol, Topical Analgesic, (BIOFREEZE) 4 % GEL Externally apply topically 3 times daily as needed For muscle aches       metFORMIN (GLUCOPHAGE-XR) 500 MG 24 hr tablet Take 4 tablets (2,000 mg) by mouth daily (with dinner) 30 tablet      multivitamin  with lutein (OCUVITE WITH LTEIN) CAPS per capsule Take 1 capsule by mouth daily 30 capsule 5     order for DME Safety lancets 100 strip 11     order for DME Equipment being ordered: Contour test strips 100 each 5     Medications reviewed:  Medications reconciled to facility chart and changes were made to reflect current medications as identified as above med list. Below are the changes that were made:   Medications stopped since last EPIC medication reconciliation:   There are no discontinued medications.    Medications started since last [facility] medication reconciliation:  No orders of the defined types were placed in this encounter.        REVIEW OF SYSTEMS:  4 point ROS including Respiratory, CV, GI and , other than that noted in the HPI,  is negative    Physical Exam:  /90  Pulse 68  Temp 97.5  F (36.4  C)  Resp 16  Wt 129 lb (58.5 kg)  SpO2 95%  BMI 26.05 kg/m2  GENERAL APPEARANCE:  Alert, in no distress, thin  ENT:  Mouth and posterior oropharynx normal, moist mucous membranes  EYES:  EOM, conjunctivae, lids, pupils and irises normal  RESP:  respiratory effort and palpation of chest normal, lungs clear to auscultation , no respiratory distress  CV:  Palpation and auscultation of heart done , regular rate and rhythm, no murmur, rub, or gallop, no edema  M/S:   Gait and station abnormal Transfers, and uses wheelchair for most mobility, hyperkinesis at baseline with left greater than right  SKIN:  Inspection of skin and subcutaneous tissue baseline, Dystrophic nails ×10 on the feet need trimming  PSYCH:  oriented X 3    Recent  Labs: All labs reviewed, none recent.          Assessment/Plan:  Benign essential hypertension  Generally normotensive on current regimen, goal BP <140/90 to reduce risk of falls, hypotension. The current medical regimen is effective;  continue present plan and medications.      Type 2 diabetes mellitus without complication, without long-term current use of insulin (H)  Blood sugars not routinely checked, on metformin. The current medical regimen is effective;  continue present plan and medications.  A1c is slightly higher than goal, patient with advanced age and no complications at this time.  Lab Results   Component Value Date    A1C 9.2 05/07/2018    A1C 7.6 12/11/2017       Hyperkinetic disorder  Stable without concerns    Nail dystrophy  ×10 dystrophic toenails trimmed with concerns today without incident  - TRIM NONDYSTRPHIC NAIL(S)    Orders:  1. No new orders      Electronically signed by  TONYA Tran CNP

## 2018-08-07 ENCOUNTER — ASSISTED LIVING VISIT (OUTPATIENT)
Dept: GERIATRICS | Facility: CLINIC | Age: 83
End: 2018-08-07
Payer: COMMERCIAL

## 2018-08-07 VITALS
SYSTOLIC BLOOD PRESSURE: 147 MMHG | TEMPERATURE: 97.5 F | WEIGHT: 129 LBS | HEART RATE: 68 BPM | RESPIRATION RATE: 16 BRPM | BODY MASS INDEX: 26.05 KG/M2 | DIASTOLIC BLOOD PRESSURE: 90 MMHG | OXYGEN SATURATION: 95 %

## 2018-08-07 DIAGNOSIS — E11.9 TYPE 2 DIABETES MELLITUS WITHOUT COMPLICATION, WITHOUT LONG-TERM CURRENT USE OF INSULIN (H): ICD-10-CM

## 2018-08-07 DIAGNOSIS — I10 BENIGN ESSENTIAL HYPERTENSION: Primary | ICD-10-CM

## 2018-08-07 DIAGNOSIS — L60.3 NAIL DYSTROPHY: ICD-10-CM

## 2018-08-07 DIAGNOSIS — F90.9 HYPERKINETIC DISORDER: ICD-10-CM

## 2018-08-07 PROCEDURE — 11719 TRIM NAIL(S) ANY NUMBER: CPT | Performed by: NURSE PRACTITIONER

## 2018-08-16 DIAGNOSIS — I10 BENIGN ESSENTIAL HYPERTENSION: ICD-10-CM

## 2018-08-16 DIAGNOSIS — M81.0 AGE-RELATED OSTEOPOROSIS WITHOUT CURRENT PATHOLOGICAL FRACTURE: Primary | ICD-10-CM

## 2018-08-16 DIAGNOSIS — Z86.73 HISTORY OF CVA (CEREBROVASCULAR ACCIDENT): ICD-10-CM

## 2018-08-16 DIAGNOSIS — E78.2 MIXED HYPERLIPIDEMIA: ICD-10-CM

## 2018-08-16 DIAGNOSIS — H40.9 GLAUCOMA OF BOTH EYES: ICD-10-CM

## 2018-08-16 DIAGNOSIS — R79.89 LOW VITAMIN D LEVEL: ICD-10-CM

## 2018-08-16 DIAGNOSIS — E11.9 TYPE 2 DIABETES MELLITUS WITHOUT COMPLICATION, WITHOUT LONG-TERM CURRENT USE OF INSULIN (H): ICD-10-CM

## 2018-08-16 DIAGNOSIS — F90.9 HYPERKINESIS: ICD-10-CM

## 2018-08-16 DIAGNOSIS — H40.001 GLAUCOMA SUSPECT, RIGHT: ICD-10-CM

## 2018-08-16 DIAGNOSIS — I10 ESSENTIAL HYPERTENSION WITH GOAL BLOOD PRESSURE LESS THAN 140/90: ICD-10-CM

## 2018-08-18 RX ORDER — AMLODIPINE BESYLATE 2.5 MG/1
TABLET ORAL
Qty: 30 TABLET | Refills: 5 | Status: SHIPPED | OUTPATIENT
Start: 2018-08-18 | End: 2018-10-02

## 2018-08-18 RX ORDER — LISINOPRIL 40 MG/1
TABLET ORAL
Qty: 30 TABLET | Refills: 11 | Status: SHIPPED | OUTPATIENT
Start: 2018-08-18

## 2018-08-18 RX ORDER — VIT C/E/ZN/COPPR/LUTEIN/ZEAXAN 60 MG-6 MG
CAPSULE ORAL
Qty: 30 CAPSULE | Refills: 11 | Status: SHIPPED | OUTPATIENT
Start: 2018-08-18 | End: 2019-04-05

## 2018-08-18 RX ORDER — CLOPIDOGREL BISULFATE 75 MG/1
TABLET ORAL
Qty: 30 TABLET | Refills: 11 | Status: SHIPPED | OUTPATIENT
Start: 2018-08-18

## 2018-08-18 RX ORDER — GABAPENTIN 600 MG/1
TABLET ORAL
Qty: 60 TABLET | Refills: 11 | Status: SHIPPED | OUTPATIENT
Start: 2018-08-18 | End: 2020-04-14

## 2018-08-18 RX ORDER — ATORVASTATIN CALCIUM 10 MG/1
TABLET, FILM COATED ORAL
Qty: 30 TABLET | Refills: 5 | Status: SHIPPED | OUTPATIENT
Start: 2018-08-18 | End: 2018-12-12

## 2018-08-18 RX ORDER — CHOLECALCIFEROL (VITAMIN D3) 50 MCG
TABLET ORAL
Qty: 30 TABLET | Refills: 11 | Status: SHIPPED | OUTPATIENT
Start: 2018-08-18 | End: 2019-04-05

## 2018-08-18 RX ORDER — HYDROCHLOROTHIAZIDE 25 MG/1
TABLET ORAL
Qty: 30 TABLET | Refills: 5 | Status: SHIPPED | OUTPATIENT
Start: 2018-08-18 | End: 2019-03-14

## 2018-08-18 RX ORDER — METFORMIN HCL 500 MG
TABLET, EXTENDED RELEASE 24 HR ORAL
Qty: 120 TABLET | Refills: 11 | Status: SHIPPED | OUTPATIENT
Start: 2018-08-18 | End: 2019-04-05

## 2018-09-27 ENCOUNTER — PATIENT OUTREACH (OUTPATIENT)
Dept: GERIATRIC MEDICINE | Facility: CLINIC | Age: 83
End: 2018-09-27

## 2018-09-27 NOTE — PROGRESS NOTES
[facility] Six-Month Telephone Assessment    6 month telephone assessment completed.    ER visits: No  Hospitalizations: No  TCU stays: No  Significant health status changes: none at this time.  RN said that family had asked about having toaster and microwave back in her room.  RN does support this because of what occurred in the past.  She will work with family and OT as needed if family persists.    Falls/Injuries: No  ADL/IADL changes: No  Changes in services: No    Caregiver Assessment follow up:  None     Goals: See POC in chart for goal progress documentation.  Update     Will see member in 6 months for an annual health risk assessment.   Encouraged member to call CC with any questions or concerns in the meantime.   Arely Duckworth MA [facility] Care Coordinator   [phone]

## 2018-10-02 ENCOUNTER — ASSISTED LIVING VISIT (OUTPATIENT)
Dept: GERIATRICS | Facility: CLINIC | Age: 83
End: 2018-10-02
Payer: COMMERCIAL

## 2018-10-02 VITALS
WEIGHT: 132 LBS | RESPIRATION RATE: 18 BRPM | OXYGEN SATURATION: 95 % | SYSTOLIC BLOOD PRESSURE: 171 MMHG | TEMPERATURE: 97.4 F | BODY MASS INDEX: 26.66 KG/M2 | HEART RATE: 80 BPM | DIASTOLIC BLOOD PRESSURE: 83 MMHG

## 2018-10-02 DIAGNOSIS — S06.5XAA SUBDURAL HEMATOMA (H): ICD-10-CM

## 2018-10-02 DIAGNOSIS — I10 BENIGN ESSENTIAL HYPERTENSION: Primary | ICD-10-CM

## 2018-10-02 DIAGNOSIS — Z86.73 HISTORY OF CVA (CEREBROVASCULAR ACCIDENT): ICD-10-CM

## 2018-10-02 DIAGNOSIS — E11.9 TYPE 2 DIABETES MELLITUS WITHOUT COMPLICATION, WITHOUT LONG-TERM CURRENT USE OF INSULIN (H): ICD-10-CM

## 2018-10-02 DIAGNOSIS — L60.3 NAIL DYSTROPHY: ICD-10-CM

## 2018-10-02 PROCEDURE — 11719 TRIM NAIL(S) ANY NUMBER: CPT | Performed by: NURSE PRACTITIONER

## 2018-10-02 NOTE — PROGRESS NOTES
[place] [facility]    Chief Complaint   Patient presents with     BOSSMAN       [place] Medical Record Number:  [id number]    HPI:    Anya Gaines is a 89 year old  (7/4/1920), who is being seen today for an episodic care visit at [facility] .  HPI information obtained from: facility chart records, facility staff, patient report and [facility] chart review.Today's concern is:     Benign essential hypertension  Type 2 diabetes mellitus without complication, without long-term current use of insulin (H)  History of CVA (cerebrovascular accident)  Nail dystrophy  Subdural hematoma (H)     Anya reports no new concerns and she is feeling fine.  Nursing note no new concerns.     ALLERGIES: Review of patient's allergies indicates no known allergies.  Past Medical, Surgical, Family and Social History reviewed and updated in [facility].    Current Outpatient Prescriptions   Medication Sig Dispense Refill     acetaminophen (TYLENOL) 325 MG tablet Take 2 tablets (650 mg) by mouth every 4 hours as needed for mild pain 100 tablet      atorvastatin (LIPITOR) 10 MG tablet TAKE 1 TABLET BY MOUTH DAILY AT BEDTIME DX HYPERLIPIDEMIA 30 tablet 5     calcium carbonate (OS- MG [place]. CA) 1500 (600 Ca) MG tablet TAKE 1 TABLET BY MOUTH EVERY DAY DX OSTEOPOROSIS 30 tablet 11     calcium carbonate (OS- MG [place]. [place]) 600 MG tablet Take 1 tablet (600 mg) by mouth daily 60 tablet 5     Cholecalciferol (VITAMIN D) 2000 units tablet TAKE 1 TABLET BY MOUTH EVERY DAY DX  SUPPLEMENT 30 tablet 11     clopidogrel (PLAVIX) 75 MG tablet TAKE 1 TABLET BY MOUTH EVERY DAY DX CEREBROVASCULAR ACCIDENT 30 tablet 11     gabapentin (NEURONTIN) 300 MG capsule Take 2 capsules (600 mg) by mouth 2 times daily 90 capsule 5     gabapentin (NEURONTIN) 600 MG tablet TAKE 1 TABLET BY MOUTH TWICE DAILY FOR INVOLUNTARY MOVEMENTS 60 tablet 11     hydrochlorothiazide (HYDRODIURIL) 25 MG tablet TAKE 1 TABLET BY  MOUTH EVERY MORNING DX HYPERTENSION 30 tablet 5     hypromellose-dextran 0.3-0.1% (ARTIFICIAL TEARS) opthalmic solution Place 1 drop into both eyes 3 times daily 15 mL 3     lisinopril (PRINIVIL/ZESTRIL) 40 MG tablet TAKE 1 TABLET BY MOUTH EVERY DAY DX HYPERTENSION 30 tablet 11     loperamide (IMODIUM) 2 MG capsule Take 2 mg by mouth every other day AND QID PRN for diarrhea       Menthol, Topical Analgesic, (BIOFREEZE) 4 % GEL Externally apply topically 3 times daily as needed For muscle aches       metFORMIN (GLUCOPHAGE-XR) 500 MG 24 hr tablet TAKE 4 TABLETS (2000MG) BY MOUTH EVERY DAY WITH DINNER DX DIABETES MELLITUS 120 tablet 11     multivitamin  with lutein (OCUVITE WITH LTEIN) CAPS per capsule TAKE 1 CAPSULE BY MOUTH EVERY DAY DX  SUPPLEMENT 30 capsule 11     order for DME Safety lancets 100 strip 11     order for DME Equipment being ordered: Contour test strips 100 each 5     Medications reviewed:  Medications reconciled to facility chart and changes were made to reflect current medications as identified as above med list. Below are the changes that were made:   Medications stopped since last EPIC medication reconciliation:   There are no discontinued medications.    Medications started since last [facility] medication reconciliation:  No orders of the defined types were placed in this encounter.        REVIEW OF SYSTEMS:  4 point ROS including Respiratory, CV, GI and , other than that noted in the HPI,  is negative    Physical Exam:  /83  Pulse 80  Temp 97.4  F (36.3  C)  Resp 18  Wt 132 lb (59.9 kg)  SpO2 95%  BMI 26.66 kg/m2  GENERAL APPEARANCE:  Alert, in no distress, thin  RESP:  respiratory effort and palpation of chest normal, lungs clear to auscultation , no respiratory distress  CV:  Palpation and auscultation of heart done , regular rate and rhythm, no murmur, rub, or gallop, no edema  SKIN:  ten dystrophic nails on bialtearl feet  PSYCH:  oriented X 3, insight and judgement  impaired    Recent Labs: All labs reviewed, none recent.        Assessment/Plan:  Benign essential hypertension  Noted to be trending elevated BP, will increase amlodipine.   - amLODIPine (NORVASC) 5 MG tablet; Take 1 tablet (5 mg) by mouth At Bedtime    Type 2 diabetes mellitus without complication, without long-term current use of insulin (H)  Blood sugar not routinely checked, needs A1C done soon     History of CVA (cerebrovascular accident)  No new symptoms, stable.     Nail dystrophy  x10 dystrophic nails trimmed after consent obtained. No difficulty noted,   - TRIM NONDYSTRPHIC NAIL(S)    Subdural hematoma (H)  No new symptoms, no recent falls, stable.       Orders:  1. discontinue amlodipine 2.5 every day  2. Amlodipine 5 mg po at bedtime Dx HTN  3. BP and HR 3 x week x 2 weeks and fax to NP  4. Annual labs 11/12/18 to include A1c, BMP, HGB Dx DM2, HTN, fatigue and r/o anemia      Electronically signed by  TONYA Tran CNP

## 2018-10-04 PROBLEM — S06.5XAA SUBDURAL HEMATOMA (H): Status: ACTIVE | Noted: 2017-02-04

## 2018-10-04 RX ORDER — AMLODIPINE BESYLATE 5 MG/1
5 TABLET ORAL AT BEDTIME
Qty: 90 TABLET | Refills: 1
Start: 2018-10-04 | End: 2019-03-14

## 2018-11-12 ENCOUNTER — TRANSFERRED RECORDS (OUTPATIENT)
Dept: HEALTH INFORMATION MANAGEMENT | Facility: CLINIC | Age: 83
End: 2018-11-12

## 2018-11-12 ENCOUNTER — RECORDS - HEALTHEAST (OUTPATIENT)
Dept: LAB | Facility: CLINIC | Age: 83
End: 2018-11-12

## 2018-11-12 LAB
ANION GAP SERPL CALCULATED.3IONS-SCNC: 10 MMOL/L (ref 5–18)
ANION GAP SERPL CALCULATED.3IONS-SCNC: 10 MMOL/L (ref 5–18)
BUN SERPL-MCNC: 15 MG/DL (ref 8–28)
BUN SERPL-MCNC: 15 MG/DL (ref 8–28)
CALCIUM SERPL-MCNC: 10.2 MG/DL (ref 8.5–10.5)
CALCIUM SERPL-MCNC: 10.2 MG/DL (ref 8.5–10.5)
CHLORIDE BLD-SCNC: 100 MMOL/L (ref 98–107)
CHLORIDE SERPLBLD-SCNC: 100 MMOL/L (ref 98–107)
CO2 SERPL-SCNC: 29 MMOL/L (ref 22–31)
CO2 SERPL-SCNC: 29 MMOL/L (ref 22–31)
CREAT SERPL-MCNC: 0.6 MG/DL (ref 0.6–1.1)
CREAT SERPL-MCNC: 0.6 MG/DL (ref 0.6–1.1)
GFR SERPL CREATININE-BSD FRML MDRD: >60 ML/MIN/1.73M2
GFR SERPL CREATININE-BSD FRML MDRD: >60 ML/MIN/1.73M2
GLUCOSE BLD-MCNC: 210 MG/DL (ref 70–125)
GLUCOSE SERPL-MCNC: 210 MG/DL (ref 70–125)
HBA1C MFR BLD: 9.9 % (ref 4.2–6.1)
HBA1C MFR BLD: 9.9 % (ref 4.2–6.1)
HEMOGLOBIN: 12.7 G/DL (ref 12–16)
HGB BLD-MCNC: 12.7 G/DL (ref 12–16)
POTASSIUM BLD-SCNC: 4 MMOL/L (ref 3.5–5)
POTASSIUM SERPL-SCNC: 4 MMOL/L (ref 3.5–5)
SODIUM SERPL-SCNC: 139 MMOL/L (ref 136–145)
SODIUM SERPL-SCNC: 139 MMOL/L (ref 136–145)

## 2018-12-11 ENCOUNTER — ASSISTED LIVING VISIT (OUTPATIENT)
Dept: GERIATRICS | Facility: CLINIC | Age: 83
End: 2018-12-11
Payer: COMMERCIAL

## 2018-12-11 VITALS
SYSTOLIC BLOOD PRESSURE: 179 MMHG | OXYGEN SATURATION: 93 % | RESPIRATION RATE: 18 BRPM | DIASTOLIC BLOOD PRESSURE: 91 MMHG | TEMPERATURE: 97.8 F | WEIGHT: 135 LBS | BODY MASS INDEX: 27.27 KG/M2 | HEART RATE: 87 BPM

## 2018-12-11 DIAGNOSIS — L60.3 NAIL DYSTROPHY: ICD-10-CM

## 2018-12-11 DIAGNOSIS — R52 GENERALIZED PAIN: ICD-10-CM

## 2018-12-11 DIAGNOSIS — I10 ESSENTIAL HYPERTENSION WITH GOAL BLOOD PRESSURE LESS THAN 140/90: ICD-10-CM

## 2018-12-11 DIAGNOSIS — Z86.73 HISTORY OF CVA (CEREBROVASCULAR ACCIDENT): ICD-10-CM

## 2018-12-11 DIAGNOSIS — H35.30 MACULAR DEGENERATION (SENILE) OF RETINA: ICD-10-CM

## 2018-12-11 DIAGNOSIS — E11.9 TYPE 2 DIABETES MELLITUS WITHOUT COMPLICATION, WITHOUT LONG-TERM CURRENT USE OF INSULIN (H): Primary | ICD-10-CM

## 2018-12-11 DIAGNOSIS — F90.9 HYPERKINETIC DISORDER: ICD-10-CM

## 2018-12-11 DIAGNOSIS — R56.9 SEIZURES (H): ICD-10-CM

## 2018-12-11 PROCEDURE — G0127 TRIM NAIL(S): HCPCS | Performed by: NURSE PRACTITIONER

## 2018-12-11 NOTE — PROGRESS NOTES
[place] [facility]    Chief Complaint   Patient presents with     RECHECK       [place] Medical Record Number:  [id number]  Place of Service where encounter took place:  [facility] ([facility] [[id number]]    HPI:    Anya Gaines is a 89 year old  (7/4/1920), who is being seen today for an episodic care visit.  HPI information obtained from: facility chart records, facility staff, patient report and [facility] chart review.Today's concern is:     Type 2 diabetes mellitus without complication, without long-term current use of insulin (H)  History of CVA (cerebrovascular accident)  Hyperkinetic disorder  Seizures (H)  Essential hypertension with goal blood pressure less than 140/90  Nail dystrophy  Macular degeneration (senile) of retina  Generalized pain     Patient reports no new concerns, generally feeling well. Wishes she was able to do more but has less energy and strength.  No recent falls, uses wheelchair for all mobility but does self transfer without incident.     ALLERGIES: Patient has no known allergies.  Past Medical, Surgical, Family and Social History reviewed and updated in [facility].    Current Outpatient Medications   Medication Sig Dispense Refill     acetaminophen (TYLENOL) 325 MG tablet Take 2 tablets (650 mg) by mouth 2 times daily 360 tablet 3     acetaminophen (TYLENOL) 325 MG tablet Take 2 tablets (650 mg) by mouth every 4 hours as needed for mild pain 100 tablet      amLODIPine (NORVASC) 5 MG tablet Take 1 tablet (5 mg) by mouth At Bedtime 90 tablet 1     atorvastatin (LIPITOR) 10 MG tablet TAKE 1 TABLET BY MOUTH DAILY AT BEDTIME DX HYPERLIPIDEMIA 30 tablet 5     calcium carbonate (OS- MG [place]. CA) 1500 (600 Ca) MG tablet TAKE 1 TABLET BY MOUTH EVERY DAY DX OSTEOPOROSIS 30 tablet 11     calcium carbonate (OS- MG [place]. CA) 600 MG tablet Take 1 tablet (600 mg) by mouth daily 60 tablet 5     Cholecalciferol (VITAMIN D) 2000 units tablet TAKE 1 TABLET BY MOUTH EVERY DAY DX   SUPPLEMENT 30 tablet 11     clopidogrel (PLAVIX) 75 MG tablet TAKE 1 TABLET BY MOUTH EVERY DAY DX CEREBROVASCULAR ACCIDENT 30 tablet 11     gabapentin (NEURONTIN) 300 MG capsule Take 2 capsules (600 mg) by mouth 2 times daily 90 capsule 5     gabapentin (NEURONTIN) 600 MG tablet TAKE 1 TABLET BY MOUTH TWICE DAILY FOR INVOLUNTARY MOVEMENTS 60 tablet 11     hydrochlorothiazide (HYDRODIURIL) 25 MG tablet TAKE 1 TABLET BY MOUTH EVERY MORNING DX HYPERTENSION 30 tablet 5     hypromellose-dextran 0.3-0.1% (ARTIFICIAL TEARS) opthalmic solution Place 1 drop into both eyes 3 times daily 15 mL 3     lisinopril (PRINIVIL/ZESTRIL) 40 MG tablet TAKE 1 TABLET BY MOUTH EVERY DAY DX HYPERTENSION 30 tablet 11     loperamide (IMODIUM) 2 MG capsule Take 2 mg by mouth every other day AND QID PRN for diarrhea       Menthol, Topical Analgesic, (BIOFREEZE) 4 % GEL Externally apply topically 3 times daily as needed For muscle aches       metFORMIN (GLUCOPHAGE-XR) 500 MG 24 hr tablet TAKE 4 TABLETS (2000MG) BY MOUTH EVERY DAY WITH DINNER DX DIABETES MELLITUS 120 tablet 11     multivitamin  with lutein (OCUVITE WITH LTEIN) CAPS per capsule TAKE 1 CAPSULE BY MOUTH EVERY DAY DX  SUPPLEMENT 30 capsule 11     order for DME Safety lancets 100 strip 11     order for DME Equipment being ordered: Contour test strips 100 each 5     Medications reviewed:  Medications reconciled to facility chart and changes were made to reflect current medications as identified as above med list. Below are the changes that were made:   Medications stopped since last EPIC medication reconciliation:   There are no discontinued medications.    Medications started since last [facility] medication reconciliation:  No orders of the defined types were placed in this encounter.        REVIEW OF SYSTEMS:  4 point ROS including Respiratory, CV, GI and , other than that noted in the HPI,  is negative    Physical Exam:  BP (!) 179/91   Pulse 87   Temp 97.8  F (36.6  C)   Resp 18    Wt 61.2 kg (135 lb)   SpO2 93%   BMI 27.27 kg/m    GENERAL APPEARANCE:  Alert, in no distress, appears healthy, thin  ENT:  Mouth and posterior oropharynx normal, moist mucous membranes  EYES:  EOM, conjunctivae, lids, pupils and irises normal  RESP:  respiratory effort and palpation of chest normal, lungs clear to auscultation , no respiratory distress  CV:  Palpation and auscultation of heart done , regular rate and rhythm, no murmur, rub, or gallop, no edema  M/S:   Gait and station abnormal does not ambulate, uses wc for all moblity   Digits and nails abnormal dystrophic nails long and trimmed today  SKIN:  Inspection of skin and subcutaneous tissue baseline  NEURO:   continues with hyperkinetic movements at baseline, stable.   PSYCH:  oriented X 3, normal insight, judgement and memory    Recent Labs:     CBC RESULTS:   Recent Labs   Lab Test 11/12/18 12/11/17 02/21/17 02/05/17  0925   WBC  --   --  6.4 10.8   RBC  --   --  4.58 4.00   HGB 12.7 12.3 13.1 11.4*   HCT  --   --  42.1 36.2   MCV  --   --  92 91   MCH  --   --  28.6 28.5   MCHC  --   --  31.1* 31.5   RDW  --   --  12.3 13.4   PLT  --   --  236 205       Last Basic Metabolic Panel:  Recent Labs   Lab Test 11/12/18 12/11/17    140   POTASSIUM 4.0 3.9   CHLORIDE 100 102   KEN 10.2 9.6   CO2 29 29   BUN 15 14   CR 0.60 0.57*   * 145*       Lab Results   Component Value Date    A1C 9.9 11/12/2018    A1C 9.2 05/07/2018       Assessment/Plan:  Type 2 diabetes mellitus without complication, without long-term current use of insulin (H)  Blood sugar not routinely tested, goals of care focused on comfort, advanced age. A1C higher than goal, is on metformin ER 2000 daily.  She is experiencing no new symptoms, other than fatigue which is likely to be expected at her advanced age.  Addition of glipizide could potentially worsen hypoglycemia, which is likely more dangerous than hyperglycemia at her advanced age and frailty.     History of CVA  (cerebrovascular accident)  No new symptoms, stable     Hyperkinetic disorder  Stable, this limits her ability to ambulate and she uses rolling walker for all mobility but does self-transfer. Stable.     Seizures (H)  No seizures reported in several years. Stable.     Essential hypertension with goal blood pressure less than 140/90  BP goals are  <140/90 mm Hg.This is higher than ACC and AHA recommendations due to goals of care, risk for hypotension, risk of dizziness and falls, risk of tissue/cerebral hypoperfusion, frailty and Yadi et al (2018) found that sBPs greater than 170 had improved mortality and improved cognitive retention over sBPs under 140 in patients 85 years and older. Patient is stable with current plan of care and routine assessment.       Nail dystrophy  x10 nail dystrophy and all nails trimmed today without incident    Macular degeneration (senile) of retina  Stable, poor vision does not allow her to read any longer.     Generalized pain  Reports some generalized pain, would like to take tylenol regularly.  This may also help BP.   - acetaminophen (TYLENOL) 325 MG tablet; Take 2 tablets (650 mg) by mouth 2 times daily    Orders:  1. Discontinue atorvastatin due to advanced age.  2. Tylenol 325 mg - give 2 tabs BID po pain    Electronically signed by  TONYA Tran CNP

## 2018-12-12 PROBLEM — R52 GENERALIZED PAIN: Status: ACTIVE | Noted: 2018-12-12

## 2018-12-12 RX ORDER — ACETAMINOPHEN 325 MG/1
650 TABLET ORAL 2 TIMES DAILY
Qty: 360 TABLET | Refills: 3
Start: 2018-12-12 | End: 2019-04-05

## 2019-01-16 ENCOUNTER — HOSPITAL ENCOUNTER (EMERGENCY)
Facility: CLINIC | Age: 84
Discharge: HOME | End: 2019-01-16
Attending: EMERGENCY MEDICINE | Admitting: EMERGENCY MEDICINE
Payer: COMMERCIAL

## 2019-01-16 VITALS
OXYGEN SATURATION: 96 % | DIASTOLIC BLOOD PRESSURE: 70 MMHG | TEMPERATURE: 98.2 F | HEART RATE: 88 BPM | SYSTOLIC BLOOD PRESSURE: 110 MMHG

## 2019-01-16 DIAGNOSIS — S01.511A LIP LACERATION, INITIAL ENCOUNTER: ICD-10-CM

## 2019-01-16 PROCEDURE — 99283 EMERGENCY DEPT VISIT LOW MDM: CPT | Mod: Z6 | Performed by: EMERGENCY MEDICINE

## 2019-01-16 PROCEDURE — 99283 EMERGENCY DEPT VISIT LOW MDM: CPT

## 2019-01-16 PROCEDURE — 99281 EMR DPT VST MAYX REQ PHY/QHP: CPT | Performed by: EMERGENCY MEDICINE

## 2019-01-16 NOTE — ED AVS SNAPSHOT
[facility] Emergency Department  [address] [address]  Phone:  [phone]  Fax:  [phone]                                    Anya Gaines   MRN: [id number]    Department:  [facility] Emergency Department   Date of Visit:  1/16/2019           After Visit Summary Signature Page    I have received my discharge instructions, and my questions have been answered. I have discussed any challenges I see with this plan with the nurse or doctor.    ..........................................................................................................................................  Patient/Patient Representative Signature      ..........................................................................................................................................  Patient Representative Print Name and Relationship to Patient    ..................................................               ................................................  Date                                   Time    ..........................................................................................................................................  Reviewed by Signature/Title    ...................................................              ..............................................  Date                                               Time          22EPIC Rev 08/18

## 2019-01-16 NOTE — ED TRIAGE NOTES
"fell out of bed yesterday and \"split my lip\"  staff thought lip lac.  looked worse today.  pt has no complaints  "

## 2019-01-16 NOTE — ED PROVIDER NOTES
"  History     Chief Complaint   Patient presents with     Fall     fell out of bed yesterday and \"split my lip\"  staff thought lip lac.  looked worse today.  pt has no complaints     HPI   History per patient, her son, and review of EMR.  Anya Gaines is a 89 year old female who rolled and fell out of bed yesterday morning, striking her lip on the table next to the bed causing a right upper lip laceration over 24 hours ago. Staff at her memory care unit or assisted living unit Steri-Strip the laceration after it was incurred, but today felt that he should be evaluated today and she now presents with her son. No other acute complaints or concerns. She wears dentures and has no dental injury or trauma, no malocclusion, no neck or back injury or pain and no neurologic abnormality or change from baseline. Her tetanus is believed to be up-to-date.    Allergies:  No Known Allergies    Problem List:    Patient Active Problem List    Diagnosis Date Noted     Generalized pain 2018     Priority: Medium     Dystrophic nail 2017     Priority: Medium     Annual 4/3/18 09/10/2017     Priority: Medium     Glaucoma of both eyes 08/10/2017     Priority: Medium     Seizures (H) 2017     Priority: Medium     Subdural hematoma (H) 2017     Priority: Medium     Dizziness 2016     Priority: Medium     Essential hypertension with goal blood pressure less than 140/90 2016     Priority: Medium     Health Care Home 2016     Priority: Medium     [facility]   Arely Duckworth MA, LSW  [phone]    [facility] CARE PLAN SUMMARY    Client Name:  Anya Gaines    Address:  [facility] [address]    Dec 2017 address update   CHARISSE DONG   [address]  [place] [address] Phone: [phone] (daughter)    :  1920 Date of Assessment: 18   Health Plan:  [facility] [place]  Health Plan Number: [id number]       Medical " "Assistance Number: [id number]  Financial Worker: Erwin rojas     Case #:     FVP :  Arely Duckworth MA, LSW CM Phone:  [phone]  CM Fax:  [phone]   FV Enrollment Date: 5/1/16 Case Mix:  E  Rate Cell:  B  Waiver Type:  EW     Primary Emergency Contact: CHARISSE DONG  Address: [address] [place] [address] [address]  [place] [address]  Mobile Phone: [phone]  Relation: Daughter    Secondary Emergency Contact: SWAPNILALEXANDER   [place] Phone: [phone]  Relation: Daughter Language:  English  :  No   Health Care Agent/POA:  None listed  Advanced Directives/Living Will:     Primary Care Clinic/Phone/Fax:  [facility]/(p) [phone], (f) [phone] Primary Dx:  Diabtetes E11. 9  Secondary Dx: Cognitive Impairment R41.89   Primary Physician: Teresita Serrano NP    Height:  5' 3\"  Weight:  130 lbs   Specialty Physician:    Audiologist:  [facility]  - [place]     Eye Care Provider:  Dr. Austin in [facility]  [facility]  Dental [facility] Provider:    [facility] [phone]    Other:        [facility] CURRENT SERVICES SUMMARY  Equipment owned/DME history: Wheelchair with auto lock breaks - [employer] (2017)    SERVICE TYPE/PROVIDER NAME/PHONE AUTH DATE FREQUENCY Units OR $ Amt   [facility] [phone] 2-1-18 thru 1-31-19   Review annually/ PRN   as needed      Assisted Living: [facility] [phone] 24 hr Customized Living  Fax: [phone] 2-1-18 thru 1-31-19   Review annually/ PRN  monthly See RS/AL Tool      Supplies: [employer] [phone]  Fax:  2-1-18 thru 1-31-19   Review annually/ PRN Monthly       Pull - ups  - M             DME: [employer] [phone]  Fax:  3-8-18 5-31-18   One time order  Wheelchair arm replacement                      Type 2 diabetes mellitus without complication, without long-term current use of insulin (H) 03/07/2016     Priority: Medium     UTI " (urinary tract infection) 03/07/2016     Priority: Medium     Hyperkinetic 03/07/2016     Priority: Medium     Age-related osteoporosis without current pathological fracture 03/07/2016     Priority: Medium     History of TIA (transient ischemic attack) and stroke 03/07/2016     Priority: Medium     Advance care planning 03/07/2016     Priority: Medium     Advance Care Planning 5/3/2017: Receipt of ACP document:  Received: Resuscitation Guidelines order which was signed and dated by provider on 2-8-17.  Document previously scanned on 2-21-17.  Order reviewed and found to be valid.  Code Status reflects choices in most recent ACP document..  Confirmed/documented designated decision maker(s).  Added by Sabrina Barajas RN Advance Care Planning Liaison with [facility]  Advance Care Planning 3/24/2017: ACP Review of Chart / Resources Provided:  Reviewed chart for advance care plan.  Anya Gaines has an up to date advance care plan on file.  Added by Arely Duckworth  POLST: POLST discussed and completed. DNR/DNI selected. POLST signed by Usman Rae NP with the inpatient palliative consult service. Copies submitted to bedside chart as well as HIMS. - Robert Steiner, Palliative , 2-7-17, 1817  Advance Care Planning 6/28/2016: Receipt of ACP document:  Received: Resuscitation Guidelines order which was signed and dated by provider on 3/31/16.  Document previously scanned on 4/4/16.  Order reviewed and found to be valid.  Code Status needs to be updated to reflect choices in most recent ACP document. Notification sent to Dr. Alford for followup.  Confirmed/documented designated decision maker(s).  Added by Gail Liu   Advance Care Planning Liaison                     Macular degeneration (senile) of retina 03/07/2016     Priority: Medium     Fall at home 03/02/2016     Priority: Medium        Past Medical History:    No past medical history on file.    Past Surgical History:    Past Surgical History:    Procedure Laterality Date     SURGICAL HISTORY OF -       Cataract, left eye       Family History:    No family history on file.    Social History:  Marital Status:   [5]  Social History     Tobacco Use     Smoking status: Never Smoker     Smokeless tobacco: Never Used   Substance Use Topics     Alcohol use: No     Drug use: No        Medications:      acetaminophen (TYLENOL) 325 MG tablet   acetaminophen (TYLENOL) 325 MG tablet   amLODIPine (NORVASC) 5 MG tablet   calcium carbonate (OS- MG [place]. CA) 1500 (600 Ca) MG tablet   Cholecalciferol (VITAMIN D) 2000 units tablet   clopidogrel (PLAVIX) 75 MG tablet   gabapentin (NEURONTIN) 600 MG tablet   hydrochlorothiazide (HYDRODIURIL) 25 MG tablet   hypromellose-dextran 0.3-0.1% (ARTIFICIAL TEARS) opthalmic solution   lisinopril (PRINIVIL/ZESTRIL) 40 MG tablet   loperamide (IMODIUM) 2 MG capsule   Menthol, Topical Analgesic, (BIOFREEZE) 4 % GEL   metFORMIN (GLUCOPHAGE-XR) 500 MG 24 hr tablet   multivitamin  with lutein (OCUVITE WITH LTEIN) CAPS per capsule   order for DME   order for DME         Review of Systems   HENT: Negative.    Musculoskeletal: Negative for back pain and neck pain.   Skin: Positive for wound ( Right upper lip laceration).   Neurological: Negative.    Psychiatric/Behavioral: Negative for behavioral problems.       Physical Exam   BP: 110/70  Pulse: 88  Temp: 98.2  F (36.8  C)  SpO2: 96 %      Physical Exam   Constitutional: She is oriented to person, place, and time. She appears well-developed and well-nourished. No distress.   HENT:   Head: Normocephalic. Head is with laceration ( Right upper lip partial-thickness laceration which crosses the vermilion border, as diagrammed.  Wound edges  2-3 mm.  Wound is clean dry and with eschar at the base.). Head is without raccoon's eyes, without Rothman's sign, without abrasion and without contusion.   Right Ear: External ear and ear canal normal. No hemotympanum.   Left Ear: External  ear and ear canal normal. No hemotympanum.   Nose: Nose normal.   Mouth/Throat: Uvula is midline and oropharynx is clear and moist. No trismus in the jaw. Lacerations present.       Face stable, occlusion normal.   Eyes: Conjunctivae and EOM are normal. No scleral icterus.   Neck: Normal range of motion and full passive range of motion without pain. Neck supple. No spinous process tenderness and no muscular tenderness present. No tracheal deviation present.   Cardiovascular: Normal rate, regular rhythm and normal heart sounds.   Pulmonary/Chest: Effort normal and breath sounds normal. No respiratory distress.   Abdominal: Soft. She exhibits no distension. There is no tenderness.   Musculoskeletal: Normal range of motion. She exhibits no edema or tenderness.   Neurological: She is alert and oriented to person, place, and time. She has normal strength. No cranial nerve deficit (2-12 intact.) or sensory deficit. GCS eye subscore is 4. GCS verbal subscore is 5. GCS motor subscore is 6.   Skin: Skin is warm and dry. No rash noted. She is not diaphoretic. No erythema. No pallor.   Psychiatric: She has a normal mood and affect. Her behavior is normal.   Nursing note and vitals reviewed.      ED Course        Procedures                 No results found for this or any previous visit (from the past 24 hour(s)).    Medications - No data to display    Assessments & Plan (with Medical Decision Making)   89-year-old female with partial thickness right upper lip laceration which is over 24 hours old. There is eschar in the base of the laceration, but no evidence of infection.  No other injury or trauma she appears at her neurologic baseline.  I discussed wound management options with her and her son and we elected to defer primary closure or delayed primary closure and allow this to continue to heal by secondary intention.  Steri-Strips were replaced.  They were given wound care instructions and instructions for supportive care  and will return as needed.    I have reviewed the nursing notes.    I have reviewed the findings, diagnosis, plan and need for follow up with the patient and her son.       Medication List      There are no discharge medications for this visit.         Final diagnoses:   Lip laceration, initial encounter       1/16/2019   [facility] EMERGENCY DEPARTMENT     Jeff Locke MD  01/21/19 1026

## 2019-01-17 ENCOUNTER — PATIENT OUTREACH (OUTPATIENT)
Dept: GERIATRIC MEDICINE | Facility: CLINIC | Age: 84
End: 2019-01-17

## 2019-01-17 NOTE — PROGRESS NOTES
1-17-19  CC reached out RN at the AL to follow on member ED visit.  RN stated that member was found on the floor and stated she fell out of bed.  Member did not want to go in but staff encouraged her to go in and be check out.    Per RN no issues at this time and she will reach out to CC if needed.   Arely Duckworth MA [facility] Care Coordinator   [phone]

## 2019-01-21 ASSESSMENT — ENCOUNTER SYMPTOMS
NEUROLOGICAL NEGATIVE: 1
WOUND: 1
NECK PAIN: 0
BACK PAIN: 0

## 2019-01-29 ENCOUNTER — ASSISTED LIVING VISIT (OUTPATIENT)
Dept: GERIATRICS | Facility: CLINIC | Age: 84
End: 2019-01-29
Payer: COMMERCIAL

## 2019-01-29 VITALS
WEIGHT: 135 LBS | BODY MASS INDEX: 27.27 KG/M2 | TEMPERATURE: 98.2 F | HEART RATE: 88 BPM | DIASTOLIC BLOOD PRESSURE: 70 MMHG | RESPIRATION RATE: 18 BRPM | SYSTOLIC BLOOD PRESSURE: 110 MMHG

## 2019-01-29 DIAGNOSIS — B02.9 HERPES ZOSTER WITHOUT COMPLICATION: Primary | ICD-10-CM

## 2019-01-29 RX ORDER — ACYCLOVIR 400 MG/1
400 TABLET ORAL 4 TIMES DAILY
COMMUNITY
End: 2019-04-05

## 2019-01-29 NOTE — PROGRESS NOTES
[facility]    Chief Complaint   Patient presents with     RECHECK       [place] Medical Record Number:  [id number]  Place of Service where encounter took place:  [facility] ([facility]) [[id number]]    HPI:    Anya Gaines is a 89 year old  (7/4/1920), who is being seen today for an episodic care visit.  HPI information obtained from: facility chart records, facility staff, patient report and [facility] chart review.    Today's concern is:  Herpes zoster without complication     Patient presented with 24-48 h of blistering rash from midline abdomen to midline spine. The area does not itch, burn, or hurt.      ALLERGIES: Patient has no known allergies.  Past Medical, Surgical, Family and Social History reviewed and updated in [facility].    Current Outpatient Medications   Medication Sig Dispense Refill     acetaminophen (TYLENOL) 325 MG tablet Take 2 tablets (650 mg) by mouth 2 times daily 360 tablet 3     acetaminophen (TYLENOL) 325 MG tablet Take 2 tablets (650 mg) by mouth every 4 hours as needed for mild pain 100 tablet      acyclovir (ZOVIRAX) 400 MG tablet Take 400 mg by mouth 4 times daily x7 days       amLODIPine (NORVASC) 5 MG tablet Take 1 tablet (5 mg) by mouth At Bedtime 90 tablet 1     calcium carbonate (OS- MG [place]. CA) 1500 (600 Ca) MG tablet TAKE 1 TABLET BY MOUTH EVERY DAY DX OSTEOPOROSIS 30 tablet 11     Cholecalciferol (VITAMIN D) 2000 units tablet TAKE 1 TABLET BY MOUTH EVERY DAY DX  SUPPLEMENT 30 tablet 11     clopidogrel (PLAVIX) 75 MG tablet TAKE 1 TABLET BY MOUTH EVERY DAY DX CEREBROVASCULAR ACCIDENT 30 tablet 11     gabapentin (NEURONTIN) 600 MG tablet TAKE 1 TABLET BY MOUTH TWICE DAILY FOR INVOLUNTARY MOVEMENTS 60 tablet 11     hydrochlorothiazide (HYDRODIURIL) 25 MG tablet TAKE 1 TABLET BY MOUTH EVERY MORNING DX HYPERTENSION 30 tablet 5     hypromellose-dextran 0.3-0.1% (ARTIFICIAL TEARS) opthalmic solution Place 1 drop into both eyes 3 times daily 15 mL 3      lisinopril (PRINIVIL/ZESTRIL) 40 MG tablet TAKE 1 TABLET BY MOUTH EVERY DAY DX HYPERTENSION 30 tablet 11     loperamide (IMODIUM) 2 MG capsule Take 2 mg by mouth every other day AND QID PRN for diarrhea       Menthol, Topical Analgesic, (BIOFREEZE) 4 % GEL Externally apply topically 3 times daily as needed For muscle aches       metFORMIN (GLUCOPHAGE-XR) 500 MG 24 hr tablet TAKE 4 TABLETS (2000MG) BY MOUTH EVERY DAY WITH DINNER DX DIABETES MELLITUS 120 tablet 11     multivitamin  with lutein (OCUVITE WITH LTEIN) CAPS per capsule TAKE 1 CAPSULE BY MOUTH EVERY DAY DX  SUPPLEMENT 30 capsule 11     order for DME Safety lancets 100 strip 11     order for DME Equipment being ordered: Contour test strips 100 each 5     Medications reviewed:  Medications reconciled to facility chart and changes were made to reflect current medications as identified as above med list. Below are the changes that were made:   Medications stopped since last EPIC medication reconciliation:   There are no discontinued medications.    Medications started since last [facility] medication reconciliation:  No orders of the defined types were placed in this encounter.    REVIEW OF SYSTEMS:  4 point ROS including Respiratory, CV, GI and , other than that noted in the HPI,  is negative    Physical Exam:  /70   Pulse 88   Temp 98.2  F (36.8  C)   Resp 18   Wt 61.2 kg (135 lb)   BMI 27.27 kg/m    GENERAL APPEARANCE:  Alert, in no distress  RESP:  respiratory effort and palpation of chest normal, no respiratory distress  SKIN:  blisters, scabs and reddened raised rash in the T8-10 dermatome range, from midline front to midline back  PSYCH:  oriented to self, family, and surroundings with forgetfulness    Recent Labs:     CBC RESULTS:   Recent Labs   Lab Test 11/12/18 12/11/17 02/21/17 02/05/17  0925   WBC  --   --  6.4 10.8   RBC  --   --  4.58 4.00   HGB 12.7 12.3 13.1 11.4*   HCT  --   --  42.1 36.2   MCV  --   --  92 91   MCH  --   --  28.6 28.5    MCHC  --   --  31.1* 31.5   RDW  --   --  12.3 13.4   PLT  --   --  236 205       Last Basic Metabolic Panel:  Recent Labs   Lab Test 11/12/18 12/11/17    140   POTASSIUM 4.0 3.9   CHLORIDE 100 102   KEN 10.2 9.6   CO2 29 29   BUN 15 14   CR 0.60 0.57*   * 145*       Liver Function Studies -   Recent Labs   Lab Test 03/02/16  1600 02/11/15  1505   PROTTOTAL 7.6 7.3   ALBUMIN 4.2 3.9   BILITOTAL 0.6 0.4   ALKPHOS 76 82   AST 34 16   ALT 35 27       TSH   Date Value Ref Range Status   12/11/2017 0.99 0.30 - 5.00 uIU/mL Final   03/26/2006 1.92 0.4 - 5.0 mU/L Final       Lab Results   Component Value Date    A1C 9.9 11/12/2018    A1C 9.2 05/07/2018         Assessment/Plan:  Herpes zoster without complication  Obvious herpes zoster outbreak in the area of the T8-10 dermatomes.  No itching, no pain, no burning.  On acyclovir.  The current medical regimen is effective;  continue present plan and medications.       Orders:  1. Likely shingles R flank midline front to midline spine without pain or itching  2. Already started on acyclovir via [place] 400 mg QID x 5 days    Electronically signed by  TONYA Tran CNP

## 2019-02-12 ENCOUNTER — PATIENT OUTREACH (OUTPATIENT)
Dept: GERIATRIC MEDICINE | Facility: CLINIC | Age: 84
End: 2019-02-12

## 2019-02-19 ENCOUNTER — ASSISTED LIVING VISIT (OUTPATIENT)
Dept: GERIATRICS | Facility: CLINIC | Age: 84
End: 2019-02-19
Payer: COMMERCIAL

## 2019-02-19 VITALS
DIASTOLIC BLOOD PRESSURE: 75 MMHG | RESPIRATION RATE: 18 BRPM | TEMPERATURE: 97.9 F | HEART RATE: 90 BPM | OXYGEN SATURATION: 98 % | WEIGHT: 126.2 LBS | BODY MASS INDEX: 25.49 KG/M2 | SYSTOLIC BLOOD PRESSURE: 134 MMHG

## 2019-02-19 DIAGNOSIS — B02.9 HERPES ZOSTER WITHOUT COMPLICATION: Primary | ICD-10-CM

## 2019-02-19 DIAGNOSIS — F90.9 HYPERKINETIC DISORDER: ICD-10-CM

## 2019-02-19 DIAGNOSIS — R41.0 CONFUSION: ICD-10-CM

## 2019-02-19 DIAGNOSIS — R62.7 FAILURE TO THRIVE IN ADULT: ICD-10-CM

## 2019-02-19 RX ORDER — LIDOCAINE 50 MG/G
1 PATCH TOPICAL EVERY 24 HOURS
COMMUNITY
End: 2019-08-06

## 2019-02-19 RX ORDER — AMOXICILLIN 250 MG
1 CAPSULE ORAL DAILY PRN
COMMUNITY
End: 2019-08-06

## 2019-02-19 NOTE — PROGRESS NOTES
[facility]  Chief Complaint   Patient presents with     [facility]     [place] Medical Record Number:  [id number]  Place of Service where encounter took place[facility]  [facility] [[id number]]    HPI:    Anya Gaines  is a 89 year old (7/4/1920), who is being seen today for an episodic care visit.  HPI information obtained from: facility chart records, facility staff, patient report and [facility] chart review.     Today's concern is:     Herpes zoster without complication  Hyperkinetic disorder  Confusion  Failure to thrive in adult     Patient diagnosed with shingles and received treatment of acyclovir about 3 weeks ago.  Since that time, she has been increasingly spending more time in bed, eating less, weight loss of about 9 pounds in last 2 months.  She reports pain at site of shingles, that is preventing her from sleeping well.  Nursing reports no other concerns.  Did get OT/PT involved, who are working with her and know her well.  They report no significant change in ability, but more change in motivation to get up.  She has been taking most meals in her room.  She is found in bed today and this is unusual for her.      Past Medical and Surgical History reviewed in Epic today.    REVIEW OF SYSTEMS:  4 point ROS including Respiratory, CV, GI and , other than that noted in the HPI,  is negative    Physical Exam:  /75   Pulse 90   Temp 97.9  F (36.6  C)   Resp 18   Wt 57.2 kg (126 lb 3.2 oz)   SpO2 98%   BMI 25.49 kg/m    GENERAL APPEARANCE:  Sleepy, in no distress, resting in bed and awakens easily  HEAD:  Normal, normocephalic, atraumatic  EYE EXAM: normal external eye, conjunctiva, lids, JUAN  NECK EXAM: supple, no JVD  CHEST/RESP:  respiratory effort normal, lung sounds CTA , no respiratory distress  CV:  Rate reg, rhythm reg, no murmur, no peripheral edema  GI/ABDOMEN:  normal bowel sounds, soft, nontender, no palpable masses  M/S:   extremities normal, gait  "abnormal-does not ambulate but does transfer self, normal muscle tone, and range of motion at baseline   SKIN EXAM: wide band of resolving obviously herpetic lesions on R flank about at T8-10 dermatome. No open areas, no further scabs, still has some lesions that are dark purple/healing   NEUROLOGIC EXAM: Normal gross motor movement, tone and coordination. Chronic hyperkinesis is improved at rest  Cranial nerves 2-12 are normal tested and grossly at patient's baseline  PSYCH:  Alert and oriented as normal, but does report \"My kids and grandkids have been here all night, you can't kick them out, can you?\" , affect pleasant , judgement appears at baseline.       Labs:   Recent labs in EPIC reviewed by me today.     ASSESSMENT/PLAN:     Herpes zoster without complication  Hyperkinetic disorder  Confusion  Failure to thrive in adult     Anya is not doing well in her assisted living apartment.  She is spending more time alone, more time sleeping in bed.  She states \"a doctor told me sleep is good for me, so I'm going to sleep as much as I want\".  She is not coming out for meals/activities as is her norm.  She is eating less and has lost weight.  Her comments about her children/grandchildren visiting are not normal for her, and this is concerning for increase in confusion-trending toward delirium.      Nursing staff at assisted living facility are currently able to assist her with cares as she needs, they have been encouraged to get her out of her apartment and to meals in the dining room-she is not contagious.      I spoke with her daughter, Kimi, who is currently out of town.  She reports family members have been stopping by frequently.     Orders:  1. No new orders , encourage her to get out of her apartment and back on her normal schedule.  May need LTC/TCU placement if not improving.   2.  Gabapentin increased to 900 at hs for pain control, need to monitor this.     Electronically signed by:  TONYA Tran CNP "      2/22/2019 addendum  Anya has had increased confusion over last days, see copied notes from [facility].        Labs ordered today:  CBC with difficulty  BMP  UA/C    Plan:  Discussion with assisted living facility staff if Anya can remain there or if she needs placement at TCU next door.  Will get labs, UA, and continue with evaluation of next steps.     Carlene Serrano, CHASE   [facility]  [phone] cell

## 2019-02-21 ASSESSMENT — ACTIVITIES OF DAILY LIVING (ADL)
DEPENDENT_IADLS:: CLEANING;COOKING;LAUNDRY;SHOPPING;MEAL PREPARATION;MEDICATION MANAGEMENT;MONEY MANAGEMENT;TRANSPORTATION;INCONTINENCE

## 2019-02-21 NOTE — PROGRESS NOTES
[facility] Care Coordination Contact    [facility] Home Visit Assessment     Home visit for Health Risk Assessment with Anya Gaines completed on February 12, 2019    Type of residence:: Assisted living - [facility]    Current living arrangement: I live in assisted living     Assessment completed with: Patient, Care Team Member    Current Care Plan  Member currently receiving the following home care services:   [facility] Customized Living.  NP also ordered PT/OT for member to help increase strengthening    Member currently receiving the following community resources: Incontinence        Medication Review  Medication reconciliation completed in [facility]: No - NP at AL managed medications   Medication set-up & administration: RN set up weekly.  Assisting Living staff administers medications.  Medication Risk Assessment Medication (1 or more, place referral to MTM): N/A: No risk factors identified  MTM Referral Placed: No: No risk factors idenified    Mental/Behavioral Health   Depression Screening: See PHQ assessment flowsheet. - note completed at time of visit due to cognition    Mental health DX:: No      Mental Health Diagnosis: No  Mental Health Services: None: No further intervention needed at this time.    Falls Assessment:   Fallen 2 or more times in the past year?: Yes -   Any fall with injury in the past year?  Most recent had a lip laceration      ADL/IADL Dependencies:   Dependent ADLs:: Ambulation-walker, Toileting, Bathing, Dressing, Grooming, Incontinence, Transfers  Dependent IADLs:: Cleaning, Cooking, Laundry, Shopping, Meal Preparation, Medication Management, Money Management, Transportation, Incontinence    [facility] sponsored benefits: Shared information re: Silver Sneakers/gym memberships, ASA, Calcium +D.    PCA Assessment completed at visit: Not applicable     Elderly Waiver Eligibility: Yes-will continue on EW    Care Plan & Recommendations: Member recently had shingles and  also had a fall.  Member stated she was hurting at this time and was in bed during CC visit.  CC did talked with NP after visit was done and NP is going order PT/OT for this and let RN at AL know as well.         See [facility] for detailed assessment information.    Follow-Up Plan: Member informed of future contact, plan to f/u with member with a 6 month telephone assessment.  Contact information shared with member and family, encouraged member to call with any questions or concerns at any time.    [place] care continuum providers: Please refer to Health Care Home on the Epic Problem List to view this patient's [facility] Care Plan Summary.    Arely Duckworth MA [facility] Care Coordinator   [phone]

## 2019-02-22 ENCOUNTER — PATIENT OUTREACH (OUTPATIENT)
Dept: GERIATRIC MEDICINE | Facility: CLINIC | Age: 84
End: 2019-02-22

## 2019-02-22 PROBLEM — F90.9: Status: ACTIVE | Noted: 2019-02-22

## 2019-02-22 NOTE — PROGRESS NOTES
[facility] Care Coordination Contact    Received after visit chart from care coordinator.  Completed following tasks: Mailed copy of care plan to client, Updated services in access and Entered MMIS  Chart was returned to CC.    and Mailed copy of CL tool to member, faxed copy to AL facility, uploaded into [employer] and submitted authorization to health plan.  Gail Tate  Case Management Specialist  [facility]  [phone]

## 2019-02-22 NOTE — LETTER
February 22, 2019    Important Plan Information    ANYA ELLINGTON  [facility]  [address]  [address]  [facility] [address]  Your Care Plan  Dear Anya,  When we spoke recently, I promised to send you a Care Plan. The plan enclosed is a summary of our discussion. It includes the steps we agreed would help you meet your health goals. In addition, I can help you with:  [id number]  This program is available to members who need a ride to medical and dental visits. To schedule a ride, call [phone] or [phone] (toll free). TTY/TTD: 711. You can call Monday - Thursday 8 a.m. to 5 p.m. and Fridays 9 a.m. to 5 p.m.   The Exchange   The The Exchange program empowers you to improve your health through education and exercise. To learn more, visit [employer], or call [employer] Service at [phone] (toll free) (TTY:711) from 7 a.m. - 7 p.m. Central Time, Monday-Friday.  Health Care Directive   This form helps you outline your health care wishes. You can request a form from me and I will answer any questions you have before you discuss it with your doctor.   Annual Physical  Take a key step on your path to good health and set up an annual physical at your clinic.  Questions?  Call me at [phone] Monday-Friday between 8am and 5pm.  TTY/TTD: 711. As we discussed, I plan to be in touch with you again in 6 months to follow up via phone.  Sincerely,    Arely Duckworth MA, LSW    E-mail: [email or web address]  Phone: [phone]      [facility]          cc: member records            American Indians can continue to use [facility] and [facility] ([facility]) clinics. We will not require prior approval or impose any conditions for you to get services at these clinics. For elders age 65 years and older this includes Elderly Waiver (EW) services accessed through the [place]. If a doctor or other provider in a [facility] or [facility] clinic refers you to a provider in our  network, we will not require you to see your primary care provider prior to the referral.    For accessible formats of this publication or assistance with equal or access to our services, visit [employer]/contactmedicaid, or call [phone] (toll free) or use your preferred relay service.    Auxiliary Aids and Services.   [facility] provides auxiliary aids and services, like qualified interpreters or information in accessible formats, free of charge and in a timely manner, to ensure an equal opportunity to participate in our health care programs. Contact [facility] Customer Service at [employer]/contactmedicaid or call [phone] (toll free) or use your preferred relay service.    Language Assistance Services.   [facility] provides translated documents and spoken language interpreting, free of charge and in a timely manner, when language assistance services are necessary to ensure limited English speakers have meaningful access to our information and services. Contact [employer] at [employer]/contactmedicaid or call [phone] (toll free) or use your preferred relay service.     Civil Rights Notice  Discrimination is against the law. [facility] does not discriminate on the basis of any of the following:    Race    Color    National Origin    Creed    Taoist    Age    Public Assistance Status    Receipt of Health Care Services    Disability (including physical or mental impairment)    Sex (including sex stereotypes and gender identity)    Marital Status    Political Beliefs    Medical Condition    Genetic Information    Sexual Orientation    Claims Experience    Medical History    Health Status    Civil Rights Complaints.   You have the right to file a discrimination complaint if you believe you were treated in a discriminatory way by [facility]. You may contact any of the following four agencies directly to file a discrimination complaint.    U.S. Department of Health and Human Services  Office for Civil  Rights (OCR)  You have the right to file a complaint with the OCR, a federal agency, if you believe you have been discriminated against because of any of the following:    Race    Disability    Color    Sex (including sex stereotypes and gender identity)    National Origin    Age    Contact the OCR directly to file a complaint:         Director         U.S. Department of Health and Human Services  Office for Civil Rights         [address] [place] [address] [address]         [phone] (Voice)         [phone] (TDD)         Complaint Portal - [email or web address]     [place] Department of Human Rights (MDHR)  In [place], you have the right to file a complaint with the MD if you believe you have been discriminated against because of any of the following:      Race    Color    National Origin    Pentecostal    Creed    Sex    Sexual Orientation    Marital Status    Public Assistance Status    Contact the [facility] directly to file a complaint:         [place] Department [place] [address]         [phone] (voice)          [phone] (toll free)         711 or [phone] ([place] Relay)         [phone] (Fax)         [email or web address] (Email)     [place] Department of Human Services (DHS)  You have the right to file a complaint with [facility] if you believe you have been discriminated against in our health care programs because of any of the following:    Race    Color    National Origin    Creed    Pentecostal    Age    Public Assistance Status    Receipt of Health Care Services    Disability (including physical or mental impairment)    Sex (including sex stereotypes and gender identity)    Marital Status    Political Beliefs    Medical Condition    Genetic Information    Sexual Orientation    Claims Experience    Medical History    Health  Status    Complaints must be in writing and filed within 180 days of the date you discovered the alleged discrimination. The complaint must contain your name and address and describe the discrimination you are complaining about. After we get your complaint, we will review it and notify you in writing about whether we have authority to investigate. If we do, we will investigate the complaint.      [facility] will notify you in writing of the investigation s outcome. You have a right to appeal the outcome if you disagree with the decision. To appeal, you must send a written request to have [facility] review the investigation outcome period. Be brief and state why you disagree with the decision. Include additional information you think is important.      If you file a complaint in this way, the people who work for the agency named in the complaint cannot retaliate against you. This means they cannot punish you in any way for filing a complaint. Filing a complaint in this way does not stop you from seeking out other legal or administration actions.     Contact [facility] directly to file a discrimination complaint:        ATTN: Civil Rights Coordinator        [place] Department of Human Services        Equal Opportunity and Access Division        [address] [address]        [place] [address] [phone] (voice) or use your preferred relay service     [facility] Complaint Notice   Contact [facility] directly to file a discrimination complaint:  [facility] Civil Rights Coordinator  Mail Route   [address]  [place] [address] [phone] (voice) or use your preferred relay service  [email or web address]

## 2019-02-23 ENCOUNTER — TRANSFERRED RECORDS (OUTPATIENT)
Dept: HEALTH INFORMATION MANAGEMENT | Facility: CLINIC | Age: 84
End: 2019-02-23

## 2019-02-23 ENCOUNTER — RECORDS - HEALTHEAST (OUTPATIENT)
Dept: LAB | Facility: CLINIC | Age: 84
End: 2019-02-23

## 2019-02-23 LAB
ALBUMIN UR-MCNC: NEGATIVE MG/DL
ANION GAP SERPL CALCULATED.3IONS-SCNC: 8 MMOL/L (ref 5–18)
ANION GAP SERPL CALCULATED.3IONS-SCNC: 8 MMOL/L (ref 5–18)
APPEARANCE UR: CLEAR
BASOPHILS # BLD AUTO: 0 THOU/UL (ref 0–0.2)
BASOPHILS NFR BLD AUTO: 1 % (ref 0–2)
BILIRUB UR QL STRIP: NEGATIVE
BUN SERPL-MCNC: 8 MG/DL (ref 8–28)
BUN SERPL-MCNC: 8 MG/DL (ref 8–28)
CALCIUM SERPL-MCNC: 10 MG/DL (ref 8.5–10.5)
CALCIUM SERPL-MCNC: 10 MG/DL (ref 8.5–10.5)
CHLORIDE BLD-SCNC: 97 MMOL/L (ref 98–107)
CHLORIDE SERPLBLD-SCNC: 97 MMOL/L (ref 98–107)
CO2 SERPL-SCNC: 32 MMOL/L (ref 22–31)
CO2 SERPL-SCNC: 32 MMOL/L (ref 22–31)
COLOR UR AUTO: NORMAL
CREAT SERPL-MCNC: 0.53 MG/DL (ref 0.6–1.1)
CREAT SERPL-MCNC: 0.53 MG/DL (ref 0.6–1.1)
DIFFERENTIAL: NORMAL
EOSINOPHIL # BLD AUTO: 0.2 THOU/UL (ref 0–0.4)
EOSINOPHIL NFR BLD AUTO: 4 % (ref 0–6)
ERYTHROCYTE [DISTWIDTH] IN BLOOD BY AUTOMATED COUNT: 13.4 % (ref 11–14.5)
ERYTHROCYTE [DISTWIDTH] IN BLOOD BY AUTOMATED COUNT: 13.4 % (ref 11–14.5)
GFR SERPL CREATININE-BSD FRML MDRD: >60 ML/MIN/1.73M2
GFR SERPL CREATININE-BSD FRML MDRD: >60 ML/MIN/1.73M2
GLUCOSE BLD-MCNC: 125 MG/DL (ref 70–125)
GLUCOSE SERPL-MCNC: 125 MG/DL (ref 70–125)
GLUCOSE UR STRIP-MCNC: NEGATIVE MG/DL
HCT VFR BLD AUTO: 39.1 % (ref 35–47)
HCT VFR BLD AUTO: 39.1 % (ref 35–47)
HEMOGLOBIN: 13 G/DL (ref 12–16)
HGB BLD-MCNC: 13 G/DL (ref 12–16)
HGB UR QL STRIP: NEGATIVE
KETONES UR STRIP-MCNC: NEGATIVE MG/DL
LEUKOCYTE ESTERASE UR QL STRIP: NEGATIVE
LYMPHOCYTES # BLD AUTO: 1.1 THOU/UL (ref 0.8–4.4)
LYMPHOCYTES NFR BLD AUTO: 25 % (ref 20–40)
MCH RBC QN AUTO: 28.8 PG (ref 27–34)
MCH RBC QN AUTO: 28.8 PG (ref 27–34)
MCHC RBC AUTO-ENTMCNC: 33.2 G/DL (ref 32–36)
MCHC RBC AUTO-ENTMCNC: 33.2 G/DL (ref 32–36)
MCV RBC AUTO: 87 FL (ref 80–100)
MCV RBC AUTO: 87 FL (ref 80–100)
MONOCYTES # BLD AUTO: 0.4 THOU/UL (ref 0–0.9)
MONOCYTES NFR BLD AUTO: 8 % (ref 2–10)
NEUTROPHILS # BLD AUTO: 2.9 THOU/UL (ref 2–7.7)
NEUTROPHILS NFR BLD AUTO: 62 % (ref 50–70)
NITRATE UR QL: NEGATIVE
PH UR STRIP: 6.5 [PH] (ref 4.5–8)
PLATELET # BLD AUTO: 174 THOU/UL (ref 140–440)
PLATELET # BLD AUTO: 174 THOU/UL (ref 140–440)
PMV BLD AUTO: 10.3 FL (ref 8.5–12.5)
POTASSIUM BLD-SCNC: 4 MMOL/L (ref 3.5–5)
POTASSIUM SERPL-SCNC: 4 MMOL/L (ref 3.5–5)
RBC # BLD AUTO: 4.51 MILL/UL (ref 3.8–5.4)
RBC # BLD AUTO: 4.51 MILL/UL (ref 3.8–5.4)
SODIUM SERPL-SCNC: 137 MMOL/L (ref 136–145)
SODIUM SERPL-SCNC: 137 MMOL/L (ref 136–145)
SP GR UR STRIP: 1.01 (ref 1–1.03)
UROBILINOGEN UR STRIP-ACNC: NORMAL
WBC # BLD AUTO: 4.7 THOU/UL (ref 4–11)
WBC: 4.7 THOU/UL (ref 4–11)

## 2019-03-05 ENCOUNTER — PATIENT OUTREACH (OUTPATIENT)
Dept: GERIATRIC MEDICINE | Facility: CLINIC | Age: 84
End: 2019-03-05

## 2019-03-05 NOTE — PROGRESS NOTES
3-5-19   CC received call from RN at AL with update on member.  RN stated there has been a declined and they are feeling  is appropriate at this time.  She will request orders.    CC then received email from  intake letting her know they received orders.  CC asked for information if she will be opened and then CC will f/u as needed.   Arely Duckworth MA [facility] Coordinator   [phone]

## 2019-03-14 ENCOUNTER — DOCUMENTATION ONLY (OUTPATIENT)
Dept: OTHER | Facility: CLINIC | Age: 84
End: 2019-03-14

## 2019-03-14 ENCOUNTER — AMBULATORY - HEALTHEAST (OUTPATIENT)
Dept: OTHER | Facility: CLINIC | Age: 84
End: 2019-03-14

## 2019-03-14 DIAGNOSIS — I10 BENIGN ESSENTIAL HYPERTENSION: ICD-10-CM

## 2019-03-15 RX ORDER — HYDROCHLOROTHIAZIDE 25 MG/1
TABLET ORAL
Qty: 31 TABLET | Refills: 11 | Status: SHIPPED | OUTPATIENT
Start: 2019-03-15 | End: 2021-01-01 | Stop reason: DRUGHIGH

## 2019-03-15 RX ORDER — AMLODIPINE BESYLATE 5 MG/1
TABLET ORAL
Qty: 31 TABLET | Refills: 11 | Status: SHIPPED | OUTPATIENT
Start: 2019-03-15 | End: 2019-11-04

## 2019-03-19 NOTE — PROGRESS NOTES
2-15-17  CC called TAYLOR Boo at [facility] to f/u and see if client was DC back to the AL.  SW stated that PT was still working with client and thought now that they would keep her thru the week.  Client is still going to be a falls risk which PT does not feel will ever go away, but PT is working on this. CC had reviewed EPIC and found that discussion was had with client about having Parkinson's Dx while inpatient. CC asked that this be clarified with client/family and AL staff.  SW will keep CC informed when DC date has been set.   Arely Duckworth MA [facility] Care Coordinator   [phone]        2-10-17  CC had reached out to TAYOLR Boo with [facility] to get update on client since she was admitted to TCU.  Ema stated that client was doing well and it was the thought she would be DC early next week back to the AL.  Client was doing well at this time so CC asked that if client does go home that she or RN at AL let her know.  SW said she would updated CC as needed.   Arely Duckworth MA [facility] Care Coordinator   [phone]        
no

## 2019-04-02 ENCOUNTER — NURSING HOME VISIT (OUTPATIENT)
Dept: GERIATRICS | Facility: CLINIC | Age: 84
End: 2019-04-02
Payer: COMMERCIAL

## 2019-04-02 VITALS
DIASTOLIC BLOOD PRESSURE: 74 MMHG | RESPIRATION RATE: 16 BRPM | HEART RATE: 95 BPM | TEMPERATURE: 96.7 F | SYSTOLIC BLOOD PRESSURE: 144 MMHG

## 2019-04-02 DIAGNOSIS — B02.9 HERPES ZOSTER WITHOUT COMPLICATION: ICD-10-CM

## 2019-04-02 DIAGNOSIS — Z00.00 ROUTINE GENERAL MEDICAL EXAMINATION AT A HEALTH CARE FACILITY: ICD-10-CM

## 2019-04-02 DIAGNOSIS — R19.7 DIARRHEA, UNSPECIFIED TYPE: Primary | ICD-10-CM

## 2019-04-02 DIAGNOSIS — I10 BENIGN ESSENTIAL HYPERTENSION: ICD-10-CM

## 2019-04-02 DIAGNOSIS — R56.9 SEIZURES (H): ICD-10-CM

## 2019-04-02 DIAGNOSIS — E11.9 TYPE 2 DIABETES MELLITUS WITHOUT COMPLICATION, WITHOUT LONG-TERM CURRENT USE OF INSULIN (H): ICD-10-CM

## 2019-04-02 DIAGNOSIS — I10 ESSENTIAL HYPERTENSION WITH GOAL BLOOD PRESSURE LESS THAN 140/90: ICD-10-CM

## 2019-04-02 DIAGNOSIS — Z86.73 HISTORY OF TIA (TRANSIENT ISCHEMIC ATTACK) AND STROKE: ICD-10-CM

## 2019-04-02 DIAGNOSIS — Z51.5 HOSPICE CARE PATIENT: ICD-10-CM

## 2019-04-02 DIAGNOSIS — F90.9 HYPERKINETIC DISORDER: ICD-10-CM

## 2019-04-02 RX ORDER — GABAPENTIN 300 MG/1
600 CAPSULE ORAL 2 TIMES DAILY
Status: CANCELLED
Start: 2019-04-02

## 2019-04-02 NOTE — PROGRESS NOTES
[facility]  Chief Complaint   Patient presents with     Annual Comprehensive Nursing Home     [facility] Record Number:  [id number]  Place of Service where encounter took place:  [facility] ([facility]) [[id number]]    HPI:    Anya Gaines  is a 89 year old  (7/4/1920), who is being seen today for an annual comprehensive visit. HPI information obtained from: facility staff, patient report, [facility] chart review and family/first contact , Kimi, report.  Today's concerns are:     Diarrhea, unspecified type  Hyperkinetic disorder  Benign essential hypertension  Type 2 diabetes mellitus without complication, without long-term current use of insulin (H)  Seizures (H)  Herpes zoster without complication  History of TIA (transient ischemic attack) and stroke  Essential hypertension with goal blood pressure less than 140/90  Hospice care patient  Routine general medical examination at a health care facility     Anya has had a significant decline in function and cognition, prompting enrollment in hospice over the last month.  She developed shingles on R flank, completed course of acylcovir and rash has resolved.  She also has marked decrease in her ability to care for herself, has demonstrated incontinence of stool and has required much more help with toileting.  Now, hospice concerned Anya may need more assistance than can be provided at assisted living facility, she has been accepted into [facility] for LTC. Family is reluctant, and Anya is also reluctant to make this move.     ALLERGIES: Patient has no known allergies.  PAST MEDICAL HISTORY:  has a past medical history of Cerebral artery occlusion with cerebral infarction (H), Cerebral infarction (H), Diabetes mellitus, type 2 (H), Hypertension, and Seizures (H).  PAST SURGICAL HISTORY:  has a past surgical history that includes surgical history of - .  IMMUNIZATIONS:  Immunization History   Administered Date(s) Administered     Influenza  (High Dose) 3 valent vaccine 10/01/2014, 10/12/2015, 10/06/2017     Influenza (IIV3) PF 01/26/2010, 10/20/2011, 11/05/2012, 11/25/2013     Influenza (intradermal) 09/20/2018     Pneumo Conj 13-V (2010&after) 10/12/2015     Pneumococcal 23 valent 12/22/2007     TDAP Vaccine (Boostrix) 11/09/2016     Above immunizations pulled from [facility]. [facility] and facility records also reconciled. Outstanding information sent to  to update [facility] .  Future immunizations are deferred as: not clinically appropriate given goals of care    Current Outpatient Medications   Medication Sig Dispense Refill     acetaminophen (TYLENOL) 325 MG tablet Take 2 tablets (650 mg) by mouth 4 times daily       acetaminophen (TYLENOL) 325 MG tablet Take 2 tablets (650 mg) by mouth every 4 hours as needed for mild pain 100 tablet      amLODIPine (NORVASC) 5 MG tablet TAKE 1 TABLET BY MOUTH DAILY AT BEDTIME DX HYPERTENSION 31 tablet 11     clopidogrel (PLAVIX) 75 MG tablet TAKE 1 TABLET BY MOUTH EVERY DAY DX CEREBROVASCULAR ACCIDENT 30 tablet 11     haloperidol (HALDOL) 0.5 MG tablet Take 1 tablet (0.5 mg) by mouth every 6 hours as needed for agitation       hydrochlorothiazide (HYDRODIURIL) 25 MG tablet TAKE 1 TABLET BY MOUTH EVERY MORNING DX HYPERTENSION 31 tablet 11     hypromellose-dextran 0.3-0.1% (ARTIFICIAL TEARS) opthalmic solution Place 1 drop into both eyes 3 times daily 15 mL 3     lidocaine (LIDODERM) 5 % patch Place 1 patch onto the skin every 24 hours       lisinopril (PRINIVIL/ZESTRIL) 40 MG tablet TAKE 1 TABLET BY MOUTH EVERY DAY DX HYPERTENSION 30 tablet 11     loperamide (IMODIUM) 2 MG capsule Take 2 mg by mouth every other day AND QID PRN for diarrhea       Menthol, Topical Analgesic, (BIOFREEZE) 4 % GEL Externally apply topically 3 times daily as needed For muscle aches       menthol-zinc oxide (CALMOSEPTINE) 0.44-20.6 % OINT ointment Apply topically 2 times daily as needed for skin protection        metFORMIN (GLUCOPHAGE-XR) 500 MG 24 hr tablet Take 2 tablets (1,000 mg) by mouth daily (with dinner) 120 tablet 11     morphine 2.5 MG solu-tab Take 1 tablet (2.5 mg) by mouth every 2 hours as needed for shortness of breath / dyspnea or moderate to severe pain  0     order for DME Safety lancets 100 strip 11     order for DME Equipment being ordered: Contour test strips 100 each 5     senna-docusate (SENOKOT-S/PERICOLACE) 8.6-50 MG tablet Take 1 tablet by mouth daily as needed for constipation       gabapentin (NEURONTIN) 600 MG tablet TAKE 1 TABLET BY MOUTH TWICE DAILY FOR INVOLUNTARY MOVEMENTS 60 tablet 11     Case Management:  I have reviewed the Assisted Living care plan, current immunizations and preventive care/cancer screening. .Future cancer screening is not clinically indicated secondary to age/goals of care Patient's desire to return to the community is currently living in a community environment. Current Level of Care is appropriate.    Advance Directive Discussion:    I reviewed the current advanced directives as reflected in EPIC, the POLST and the facility chart, and verified the congruency of orders . I contacted the first party , Kimi (daughter) and discussed the plan of Care.  I did review the advance directives with the resident.     Team Discussion:  I communicated with the appropriate disciplines involved with the Plan of Care:   Nursing   and     Patient's goal is pain control and comfort and family's/responsible party's goal for the patient is pain control and comfort.  Information reviewed:  Medications, vital signs, orders, and nursing notes.    ROS:  4 point ROS including Respiratory, CV, GI and , other than that noted in the HPI,  is negative    Vitals:  /74   Pulse 95   Temp 96.7  F (35.9  C)   Resp 16  There is no height or weight on file to calculate BMI.  Exam:  GENERAL APPEARANCE:  Alert, in no distress   HEAD:  Normal, normocephalic, atraumatic  EYE EXAM:  normal external eye, conjunctiva, lids, JUAN  NECK EXAM: supple, no JVD  CHEST/RESP:  respiratory effort normal, lung sounds CTA , no respiratory distress  CV:  Rate reg, rhythm reg, no murmur, no peripheral edema   GI/ABDOMEN:  normal bowel sounds, soft, nontender, no palpable masses  M/S:   extremities normal, gait abnormal-unable ambulate-does self transfer, normal muscle tone, and range of motion normal , ongoing and chronic hyperkinesis  SKIN EXAM: CDI, shingles lesions R flank well healed  NEUROLOGIC EXAM: Normal gross motor movement, tone and coordination. No tremor. Cranial nerves 2-12 are normal tested and grossly at patient's baseline  PSYCH:  Alert and oriented to self and surroundings with forgetfulness , affect pleasant , judgement impaired by cognitive losses       Lab/Diagnostic data:     Most Recent 3 Creatinines:  Recent Labs   Lab Test 02/23/19 11/12/18 12/11/17   CR 0.53* 0.60 0.57*     Most Recent 3 Hemoglobins:  Recent Labs   Lab Test 02/23/19 11/12/18 12/11/17   HGB 13.0 12.7 12.3     Most Recent Hemoglobin A1c:  Recent Labs   Lab Test 04/03/19   A1C 7.4*     Lab Results   Component Value Date    A1C 7.4 04/03/2019    A1C 9.9 11/12/2018    A1C 9.2 05/07/2018    A1C 7.6 12/11/2017    A1C 7.6 08/07/2017         ASSESSMENT/PLAN  Diarrhea, unspecified type  Anya has had some explosive diarrhea which is causing care difficulty, as she is not asking for help with cleaning it up and has been found covered in feces.  She reports she dislikes asking for help and verbalizes that she knows she has trouble cleaning up. Discussed concerns with her, and she verbalizes understanding that she is going to have to ask for help, or she may need to move to LTC where there is more help available. In the meantime, will try to establish reason for diarrhea, and encourage nursing staff to work with her to see if routine imodium would be helpful or not.      Hyperkinetic disorder  Chronic and ongoing, though is  noticeably less today than usual. This could be as a result of increase in gabapentin to 600/900 due to shingles, but may also be causing increased weakness  - acetaminophen (TYLENOL) 325 MG tablet; Take 2 tablets (650 mg) by mouth 4 times daily  -gabapentin 600 BID    Benign essential hypertension  BP goals are <150/90 mm Hg.This is higher than ACC and AHA recommendations due to goals of care, risk of dizziness and falls, frailty and Yadi et al (2018) found that sBPs greater than 170 had improved mortality and improved cognitive retention over sBPs under 140 in patients 85 years and older. Patient is stable with current plan of care and routine assessment.     Type 2 diabetes mellitus without complication, without long-term current use of insulin (H)  Not routinely checking blood sugar, noted elevated A1C in 2018 and subsequent increase of metformin to 2000 daily.  One side effect of metformin is diarrhea and this could be contributing to current issues  -check A1C   4/3/19-A1C found to be decreased and with goals of care now focused on comfort will decrease metformin-may improve concerns with diarrhea  - metFORMIN (GLUCOPHAGE-XR) 500 MG 24 hr tablet; Take 2 tablets (1,000 mg) by mouth daily (with dinner)    Seizures (H)  No observed seizures in recent 2 years.     Herpes zoster without complication  Resolved, no ongoing pain. Of note, gabapentin was increased when shingles pain was troublesome (600 am and 900 pm).  Now, without pain, will decrease back to her usual level of gabapentin 600 bid     History of TIA (transient ischemic attack) and stroke  Now enrolled in hospice, with goals focused on comfort and she desires to stay in her assisted living facility apartment if at all possible.     Hospice care patient  Comfort pack has been started for patient, she has not needed these medications yet.   - haloperidol (HALDOL) 0.5 MG tablet; Take 1 tablet (0.5 mg) by mouth every 6 hours as needed for agitation  -  morphine 2.5 MG solu-tab; Take 1 tablet (2.5 mg) by mouth every 2 hours as needed for shortness of breath / dyspnea or moderate to severe pain    Annual 4/2/19  Discussions over last several days with hospice nurse and , as well as [facility] , Arely Duckworth, regarding goals of care and safety at home.  Phone calls x 2 with Kimi, daughter, as well.  At this time, will try to decrease medications that could be causing weakness and diarrhea, to facilitate her ability to stay in assisted living facility.  Discussed emergency situation care with Kimi, as the concern is always that assisted living facility staff does not have enough help if an urgent situation were to arise at night/weekends.  Kimi states that there is a lot of local family that could potentially be rallied in an urgent situation to assist Anya until placement in [facility] could be achieved. She will work with her family as needed.  Goal remains to keep Anya at assisted living [facility] if possible at this time.       transcribed by : Jeff Serrano  1. discontinue Gabapentin 600AM/900HS  2. Gabapentin 600 mg BID PO Dx: Hyperkinetic disorder    Electronically signed by:  TONYA Tran CNP

## 2019-04-03 ENCOUNTER — RECORDS - HEALTHEAST (OUTPATIENT)
Dept: LAB | Facility: CLINIC | Age: 84
End: 2019-04-03

## 2019-04-03 ENCOUNTER — TRANSFERRED RECORDS (OUTPATIENT)
Dept: HEALTH INFORMATION MANAGEMENT | Facility: CLINIC | Age: 84
End: 2019-04-03

## 2019-04-03 LAB
HBA1C MFR BLD: 7.4 % (ref 4.2–6.1)
HBA1C MFR BLD: 7.4 % (ref 4.2–6.1)

## 2019-04-04 ENCOUNTER — PATIENT OUTREACH (OUTPATIENT)
Dept: GERIATRIC MEDICINE | Facility: CLINIC | Age: 84
End: 2019-04-04

## 2019-04-04 NOTE — PROGRESS NOTES
4-4-19   CC received call from ALE VAZQUEZ stating that care conference was done with NP, HO and family and it was decided that member is going to stay at AL at this time.  It was a discussion last week due to member cares that she may need to move over to the SNF.    CC had reached out to member daughter to discuss and CC was in support of what HO, family and AL decided.      CC then reached out to NP Carlene Serrano, and she said that medication were being adjusted at this time to help with bowel incontinence and staff was going to increase as needed.      She also said she had conversation with daughter about family having to stay with member for short time if there was a need for supervision until SNF could be arranged.  NP stated that daughter had agreed to this and would reach out to other family to discuss as well.      CC said she would review RS tool to see that services were appropriately maximized and CC will f/u with AL as appropriate.   Arely Duckworth MA [facility] Care Coordinator   [phone]

## 2019-04-05 PROBLEM — Z51.5 HOSPICE CARE PATIENT: Status: ACTIVE | Noted: 2019-04-05

## 2019-04-05 PROBLEM — S06.5XAA SUBDURAL HEMATOMA (H): Status: RESOLVED | Noted: 2017-02-04 | Resolved: 2019-04-05

## 2019-04-05 RX ORDER — HALOPERIDOL 0.5 MG/1
0.5 TABLET ORAL EVERY 6 HOURS PRN
Start: 2019-04-05 | End: 2019-08-06

## 2019-04-05 RX ORDER — METFORMIN HCL 500 MG
1000 TABLET, EXTENDED RELEASE 24 HR ORAL
Qty: 120 TABLET | Refills: 11
Start: 2019-04-05 | End: 2019-11-04

## 2019-04-05 RX ORDER — ACETAMINOPHEN 325 MG/1
650 TABLET ORAL 4 TIMES DAILY
Start: 2019-04-05 | End: 2019-10-22

## 2019-04-05 RX ORDER — MORPHINE SULFATE 30 MG/1
2.5 TABLET ORAL
Refills: 0
Start: 2019-04-05 | End: 2019-10-22

## 2019-04-09 ENCOUNTER — PATIENT OUTREACH (OUTPATIENT)
Dept: GERIATRIC MEDICINE | Facility: CLINIC | Age: 84
End: 2019-04-09

## 2019-04-09 ENCOUNTER — ASSISTED LIVING VISIT (OUTPATIENT)
Dept: GERIATRICS | Facility: CLINIC | Age: 84
End: 2019-04-09
Payer: COMMERCIAL

## 2019-04-09 VITALS
RESPIRATION RATE: 16 BRPM | DIASTOLIC BLOOD PRESSURE: 74 MMHG | TEMPERATURE: 96.7 F | WEIGHT: 120.8 LBS | OXYGEN SATURATION: 93 % | HEART RATE: 95 BPM | BODY MASS INDEX: 24.4 KG/M2 | SYSTOLIC BLOOD PRESSURE: 144 MMHG

## 2019-04-09 DIAGNOSIS — Z51.5 HOSPICE CARE PATIENT: ICD-10-CM

## 2019-04-09 DIAGNOSIS — Z86.73 HISTORY OF TIA (TRANSIENT ISCHEMIC ATTACK) AND STROKE: ICD-10-CM

## 2019-04-09 DIAGNOSIS — F90.9 HYPERKINETIC DISORDER: ICD-10-CM

## 2019-04-09 DIAGNOSIS — I10 BENIGN ESSENTIAL HYPERTENSION: ICD-10-CM

## 2019-04-09 DIAGNOSIS — R62.7 FAILURE TO THRIVE IN ADULT: ICD-10-CM

## 2019-04-09 DIAGNOSIS — Z86.73 HISTORY OF CVA (CEREBROVASCULAR ACCIDENT): Primary | ICD-10-CM

## 2019-04-09 DIAGNOSIS — R56.9 SEIZURES (H): ICD-10-CM

## 2019-04-09 DIAGNOSIS — E11.9 TYPE 2 DIABETES MELLITUS WITHOUT COMPLICATION, WITHOUT LONG-TERM CURRENT USE OF INSULIN (H): ICD-10-CM

## 2019-04-09 NOTE — PROGRESS NOTES
[facility]  [place] Medical Record Number:  [id number]  Place of Service where encounter took place:  [facility] ([facility]) [[id number]]  Chief Complaint   Patient presents with     Discharge Summary Nursing Home     leaving [facility] and admiting to attached LTC faclility       HPI:    Anya Gaines  is a 89 year old (7/4/1920), who is being seen today for an episodic care visit.  HPI information obtained from: facility chart records, facility staff, patient report and [facility] chart review. Today's concern is:  Patient has been living at assisted living facility for about 3 years.  She has gradually become less able to care for herself and now is requiring more care than facility can provide.        REVIEW OF SYSTEMS:  Limited secondary to cognitive impairment but today pt reports no new concerns.     Objective:  /74   Pulse 95   Temp 96.7  F (35.9  C)   Resp 16   Wt 54.8 kg (120 lb 12.8 oz)   SpO2 93%   BMI 24.40 kg/m    Exam:  GENERAL APPEARANCE:  Alert, in no distress   HEAD:  Normal, normocephalic, atraumatic  EYE EXAM: normal external eye, conjunctiva, lids, JUAN  NECK EXAM: supple, no JVD  CHEST/RESP:  respiratory effort normal, lung sounds CTA , no respiratory distress  CV:  Rate reg, rhythm reg, no murmur, no peripheral edema   M/S:   extremities normal, gait abnormal-unable to ambulate and is wheelchair bound, does transfer independently, normal muscle tone, and range of motion normal.   SKIN EXAM: CDI  NEUROLOGIC EXAM: Hyperkinetic at baseline with random movements greater L than R. Cranial nerves 2-12 are normal tested and grossly at patient's baseline  PSYCH:  Alert and oriented to self and surroundings with forgetfulness and mild delusions, affect pleasant , judgement impaired by cognitive losses     Labs:   Most Recent 3 CBC's:  Recent Labs   Lab Test 02/23/19 11/12/18 12/11/17 02/21/17 02/05/17  0925   WBC 4.7  --   --  6.4 10.8   HGB 13.0 12.7 12.3 13.1 11.4*   MCV 87   --   --  92 91     --   --  236 205     Most Recent 3 BMP's:  Recent Labs   Lab Test 02/23/19 11/12/18 12/11/17    139 140   POTASSIUM 4.0 4.0 3.9   CHLORIDE 97* 100 102   CO2 32* 29 29   BUN 8 15 14   CR 0.53* 0.60 0.57*   ANIONGAP 8 10 9   KEN 10.0 10.2 9.6    210* 145*     Most Recent Hemoglobin A1c:  Recent Labs   Lab Test 04/03/19   A1C 7.4*     ASSESSMENT/PLAN:  History of CVA (cerebrovascular accident)  History of TIA (transient ischemic attack) and stroke  Patient with past medical history significant for CVA/TIA and now progressive decline in function, resulting in decreased ability to care for self.  She is alert, oriented at times and able to make her needs known.  She, however, has had decreased ability to care for herself in her assisted living facility apartment, found to be incontinent of stool occasionally and inappropriately dressed to come out of her room.  Family, hospice team, and assisted living facility staff are in agreement that placement in long term care/skilled nursing facility is now appropriate.       Seizures (H)  Patient with a long standing history of seizure activity, without seizures in recent several years.  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 At baseline with significant tremor, but this is consistently not bothersome to her.  She has been stable on gabapentin 600 mg BID which has been helpful in controlling of hyperkinetic disorder.      Benign essential hypertension  On amlodipine, lisinopril, hydrochlorothiazide.  BP continues to trend within goal.  The current medical regimen is effective;  continue present plan and medications.   BP Readings from Last 6 Encounters:   04/09/19 144/74   04/02/19 144/74   02/19/19 134/75   01/29/19 110/70   01/16/19 110/70   12/11/18 (!) 179/91        Type 2  diabetes mellitus without complication, without long-term current use of insulin (H)  Blood sugars- not routinely checked in assisted living facility; last A1C wnl as noted below; recent decrease of metformin from 2000 xl per day to 1000 xl per day.  Thought maybe this would improve episodes of diarrhea and tight control no longer needed in this hospice patient.    Lab Results   Component Value Date    A1C 7.4 04/03/2019    A1C 9.9 11/12/2018    A1C 9.2 05/07/2018    A1C 7.6 12/11/2017    A1C 7.6 08/07/2017       Failure to thrive in adult  Hospice care patient  Patient admitted to [facility] in March 2019.  She had an episode of shingles in Nov 2018 that caused her to lose ground, and she did not return to baseline.  She was enrolled in hospice, and now with plans to enter long term care.    Other problems stable.         Anya Gaines is 89 year old (7/4/1920) who needs Skilled Nursing Facility (SNF) admission orders due to increased weakness and inability for self care.     [facility] team will be the Provider responsible for care at the SNF.  For questions on these admission orders please call [phone] and ask to speak to the MD on call.     [facility] has accepted patient.    History of CVA (cerebrovascular accident) [Z86.73]  Primary Diagnosis  Secondary Diagnosis:    ICD-10-CM    1. History of CVA (cerebrovascular accident) Z86.73    2. History of TIA (transient ischemic attack) and stroke Z86.73    3. Seizures (H) R56.9    4. Hyperkinetic disorder F90.9    5. Benign essential hypertension I10    6. Type 2 diabetes mellitus without complication, without long-term current use of insulin (H) E11.9    7. Failure to thrive in adult R62.7    8. Hospice care patient Z51.5       Past Medical History:   Diagnosis Date     Cerebral artery occlusion with cerebral infarction (H)      Cerebral infarction (H)      Diabetes mellitus, type 2 (H)      Hypertension      Seizures (H)         No  Known Allergies  Current Immunization in [place] [facility]:   Most Recent Immunizations   Administered Date(s) Administered     Influenza (High Dose) 3 valent vaccine 10/06/2017     Influenza (IIV3) PF 11/25/2013     Influenza (intradermal) 09/20/2018     Pneumo Conj 13-V (2010&after) 10/12/2015     Pneumococcal 23 valent 12/22/2007     TDAP Vaccine (Boostrix) 11/09/2016     Immunizations up to date    Orders:  Current Outpatient Medications   Medication Sig Dispense Diagnosis      acetaminophen (TYLENOL) 325 MG tablet Take 2 tablets (650 mg) by mouth 4 times daily  AND       acetaminophen (TYLENOL) 325 MG tablet Take 2 tablets (650 mg) by mouth every 4 hours as needed for mild pain 100 tablet pain     amLODIPine (NORVASC) 5 MG tablet TAKE 1 TABLET BY MOUTH DAILY AT BEDTIME DX HYPERTENSION 31 tablet HTN     clopidogrel (PLAVIX) 75 MG tablet TAKE 1 TABLET BY MOUTH EVERY DAY DX CEREBROVASCULAR ACCIDENT 30 tablet CVA     gabapentin (NEURONTIN) 600 MG tablet TAKE 1 TABLET BY MOUTH TWICE DAILY FOR INVOLUNTARY MOVEMENTS 60 tablet hyperkinesis     haloperidol (HALDOL) 0.5 MG tablet Take 1 tablet (0.5 mg) by mouth every 6 hours as needed for agitation  Anxiety at end of life     hydrochlorothiazide (HYDRODIURIL) 25 MG tablet TAKE 1 TABLET BY MOUTH EVERY MORNING DX HYPERTENSION 31 tablet HTN     hypromellose-dextran 0.3-0.1% (ARTIFICIAL TEARS) opthalmic solution Place 1 drop into both eyes 3 times daily 15 mL Dry eyes     lidocaine (LIDODERM) 5 % patch Place 1 patch onto the skin every 24 hours  Post herpetic neuralgia     lisinopril (PRINIVIL/ZESTRIL) 40 MG tablet TAKE 1 TABLET BY MOUTH EVERY DAY DX HYPERTENSION 30 tablet HTN     loperamide (IMODIUM) 2 MG capsule Take 2 mg by mouth every other day AND QID PRN for diarrhea  diarrhea     Menthol, Topical Analgesic, (BIOFREEZE) 4 % GEL Externally apply topically 3 times daily as needed For muscle aches  Arthritic pain     menthol-zinc oxide (CALMOSEPTINE) 0.44-20.6 % OINT ointment  Apply topically 2 times daily as needed for skin protection  Skin protection     metFORMIN (GLUCOPHAGE-XR) 500 MG 24 hr tablet Take 2 tablets (1,000 mg) by mouth daily (with dinner) 120 tablet DM2     morphine 2.5 MG solu-tab Take 1 tablet (2.5 mg) by mouth every 2 hours as needed for shortness of breath / dyspnea or moderate to severe pain  Pain at end of life                   senna-docusate (SENOKOT-S/PERICOLACE) 8.6-50 MG tablet Take 1 tablet by mouth daily as needed for constipation  Constipation      Medication changes made for transition none  Controlled Medications: may use home supply of morphine and haldol until updated by [facility] team    Treatments:  none    1. May have two Step Mantoux: YES   Chest X ray if Positive Mantoux history/Results: YES  2. Activity Level: up as tolerated  3. Code Status: Comfort Care  4. Diet: Regular   Texture: regular   Thicken Liquids? No  5. Routine labs to be ordered? None  6. Oxygen: NA    7. Therapy Evaluation/Treatment: none  8. May use House Standing/Wound orders: YES  9 Level of care: Skilled  10. Prognosis:Terminal  11. Rehab Potential:Guarded  12: Discharge Plan:Long term care > 180 days  13. History and Physical: Will be done today by me, see attached  14. All orders x 75 days unless otherwise indicated.  15. Continue Hospice with [facility]      Signed: Electronically signed by  TONYA Tran CNP     [facility]  [phone] cell

## 2019-04-09 NOTE — PROGRESS NOTES
4-9-19   CC received update from ALE Lindsay  SW that member had a very difficult weekend with a great deal of incontinence in her room but also in the common areas at the AL.  [facility] was there over the weekend and as of today they have reached out to family and encouraging again member moved to SNF.      Angélica said that LAE would workd with SNF on if they have openings and when this move could take place.  Angélica anticipated it will be tomorrow and will udpate if there is a change to this plan.  CC said she would then reach out to SNF to confirm admission and then will follow up with family as needed.   CC then called and updated Carlene Serrano NP of this change as well     Arely Duckworth MA [facility] Coordinator   [phone]      4-5-19   CC had talked with RN at AL late in the day about member staying at AL. CC asked if RS tool needed to be reviewed or update and RN stated that it was fine with no concern at this time but she will reach out to CC as needed.   Arely Duckworth MA [facility] Coordinator   [phone]

## 2019-04-09 NOTE — LETTER
4/9/2019        RE: Anya Gaines  [facility]  [address]  [facility] [address]        [facility]  [place] [facility] Record Number:  [id number]  Place of Service where encounter took place[facility] [[id number]]  Chief Complaint   Patient presents with     Discharge Summary Nursing Home     leaving [facility] and admiting to attached LTC faclility       HPI:    Anya Gaines  is a 89 year old (7/4/1920), who is being seen today for an episodic care visit.  HPI information obtained from: facility chart records, facility staff, patient report and [facility] chart review. Today's concern is:  Patient has been living at assisted living facility for about 3 years.  She has gradually become less able to care for herself and now is requiring more care than facility can provide.        REVIEW OF SYSTEMS:  Limited secondary to cognitive impairment but today pt reports no new concerns.     Objective:  /74   Pulse 95   Temp 96.7  F (35.9  C)   Resp 16   Wt 54.8 kg (120 lb 12.8 oz)   SpO2 93%   BMI 24.40 kg/m     Exam:  GENERAL APPEARANCE:  Alert, in no distress   HEAD:  Normal, normocephalic, atraumatic  EYE EXAM: normal external eye, conjunctiva, lids, JUAN  NECK EXAM: supple, no JVD  CHEST/RESP:  respiratory effort normal, lung sounds CTA , no respiratory distress  CV:  Rate reg, rhythm reg, no murmur, no peripheral edema   M/S:   extremities normal, gait abnormal-unable to ambulate and is wheelchair bound, does transfer independently, normal muscle tone, and range of motion normal.   SKIN EXAM: CDI  NEUROLOGIC EXAM: Hyperkinetic at baseline with random movements greater L than R. Cranial nerves 2-12 are normal tested and grossly at patient's baseline  PSYCH:  Alert and oriented to self and surroundings with forgetfulness and mild delusions, affect pleasant , judgement impaired by cognitive losses     Labs:   Most Recent 3 CBC's:  Recent Labs   Lab Test 02/23/19  11/12/18 12/11/17 02/21/17 02/05/17  0925   WBC 4.7  --   --  6.4 10.8   HGB 13.0 12.7 12.3 13.1 11.4*   MCV 87  --   --  92 91     --   --  236 205     Most Recent 3 BMP's:  Recent Labs   Lab Test 02/23/19 11/12/18 12/11/17    139 140   POTASSIUM 4.0 4.0 3.9   CHLORIDE 97* 100 102   CO2 32* 29 29   BUN 8 15 14   CR 0.53* 0.60 0.57*   ANIONGAP 8 10 9   KEN 10.0 10.2 9.6    210* 145*     Most Recent Hemoglobin A1c:  Recent Labs   Lab Test 04/03/19   A1C 7.4*     ASSESSMENT/PLAN:  History of CVA (cerebrovascular accident)  History of TIA (transient ischemic attack) and stroke  Patient with past medical history significant for CVA/TIA and now progressive decline in function, resulting in decreased ability to care for self.  She is alert, oriented at times and able to make her needs known.  She, however, has had decreased ability to care for herself in her assisted living facility apartment, found to be incontinent of stool occasionally and inappropriately dressed to come out of her room.  Family, hospice team, and assisted living facility staff are in agreement that placement in long term care/skilled nursing facility is now appropriate.       Seizures (H)  Patient with a long standing history of seizure activity, without seizures in recent several years.  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 At baseline with significant tremor, but this is consistently not bothersome to her.  She has been stable on gabapentin 600 mg BID which has been helpful in controlling of hyperkinetic disorder.      Benign essential hypertension  On amlodipine, lisinopril, hydrochlorothiazide.  BP continues to trend within goal.  The current medical regimen is effective;  continue present plan and medications.   BP Readings from Last 6 Encounters:   04/09/19 144/74    04/02/19 144/74   02/19/19 134/75   01/29/19 110/70   01/16/19 110/70   12/11/18 (!) 179/91        Type 2 diabetes mellitus without complication, without long-term current use of insulin (H)  Blood sugars- not routinely checked in assisted living facility; last A1C wnl as noted below; recent decrease of metformin from 2000 xl per day to 1000 xl per day.  Thought maybe this would improve episodes of diarrhea and tight control no longer needed in this hospice patient.    Lab Results   Component Value Date    A1C 7.4 04/03/2019    A1C 9.9 11/12/2018    A1C 9.2 05/07/2018    A1C 7.6 12/11/2017    A1C 7.6 08/07/2017       Failure to thrive in adult  Hospice care patient  Patient admitted to [facility] in March 2019.  She had an episode of shingles in Nov 2018 that caused her to lose ground, and she did not return to baseline.  She was enrolled in hospice, and now with plans to enter long term care.    Other problems stable.         Anya Gaines is 89 year old (7/4/1920) who needs Skilled Nursing Facility (SNF) admission orders due to increased weakness and inability for self care.     [facility] team will be the Provider responsible for care at the SNF.  For questions on these admission orders please call [phone] and ask to speak to the MD on call.     [facility] has accepted patient.    History of CVA (cerebrovascular accident) [Z86.73]  Primary Diagnosis  Secondary Diagnosis:    ICD-10-CM    1. History of CVA (cerebrovascular accident) Z86.73    2. History of TIA (transient ischemic attack) and stroke Z86.73    3. Seizures (H) R56.9    4. Hyperkinetic disorder F90.9    5. Benign essential hypertension I10    6. Type 2 diabetes mellitus without complication, without long-term current use of insulin (H) E11.9    7. Failure to thrive in adult R62.7    8. Hospice care patient Z51.5       Past Medical History:   Diagnosis Date     Cerebral artery occlusion with cerebral infarction (H)       Cerebral infarction (H)      Diabetes mellitus, type 2 (H)      Hypertension      Seizures (H)         No Known Allergies  Current Immunization in [facility]:   Most Recent Immunizations   Administered Date(s) Administered     Influenza (High Dose) 3 valent vaccine 10/06/2017     Influenza (IIV3) PF 11/25/2013     Influenza (intradermal) 09/20/2018     Pneumo Conj 13-V (2010&after) 10/12/2015     Pneumococcal 23 valent 12/22/2007     TDAP Vaccine (Boostrix) 11/09/2016     Immunizations up to date    Orders:  Current Outpatient Medications   Medication Sig Dispense Diagnosis      acetaminophen (TYLENOL) 325 MG tablet Take 2 tablets (650 mg) by mouth 4 times daily  AND       acetaminophen (TYLENOL) 325 MG tablet Take 2 tablets (650 mg) by mouth every 4 hours as needed for mild pain 100 tablet pain     amLODIPine (NORVASC) 5 MG tablet TAKE 1 TABLET BY MOUTH DAILY AT BEDTIME DX HYPERTENSION 31 tablet HTN     clopidogrel (PLAVIX) 75 MG tablet TAKE 1 TABLET BY MOUTH EVERY DAY DX CEREBROVASCULAR ACCIDENT 30 tablet CVA     gabapentin (NEURONTIN) 600 MG tablet TAKE 1 TABLET BY MOUTH TWICE DAILY FOR INVOLUNTARY MOVEMENTS 60 tablet hyperkinesis     haloperidol (HALDOL) 0.5 MG tablet Take 1 tablet (0.5 mg) by mouth every 6 hours as needed for agitation  Anxiety at end of life     hydrochlorothiazide (HYDRODIURIL) 25 MG tablet TAKE 1 TABLET BY MOUTH EVERY MORNING DX HYPERTENSION 31 tablet HTN     hypromellose-dextran 0.3-0.1% (ARTIFICIAL TEARS) opthalmic solution Place 1 drop into both eyes 3 times daily 15 mL Dry eyes     lidocaine (LIDODERM) 5 % patch Place 1 patch onto the skin every 24 hours  Post herpetic neuralgia     lisinopril (PRINIVIL/ZESTRIL) 40 MG tablet TAKE 1 TABLET BY MOUTH EVERY DAY DX HYPERTENSION 30 tablet HTN     loperamide (IMODIUM) 2 MG capsule Take 2 mg by mouth every other day AND QID PRN for diarrhea  diarrhea     Menthol, Topical Analgesic, (BIOFREEZE) 4 % GEL Externally apply topically 3 times  daily as needed For muscle aches  Arthritic pain     menthol-zinc oxide (CALMOSEPTINE) 0.44-20.6 % OINT ointment Apply topically 2 times daily as needed for skin protection  Skin protection     metFORMIN (GLUCOPHAGE-XR) 500 MG 24 hr tablet Take 2 tablets (1,000 mg) by mouth daily (with dinner) 120 tablet DM2     morphine 2.5 MG solu-tab Take 1 tablet (2.5 mg) by mouth every 2 hours as needed for shortness of breath / dyspnea or moderate to severe pain  Pain at end of life                   senna-docusate (SENOKOT-S/PERICOLACE) 8.6-50 MG tablet Take 1 tablet by mouth daily as needed for constipation  Constipation      Medication changes made for transition none  Controlled Medications: may use home supply of morphine and haldol until updated by [facility] team    Treatments:  none    1. May have two Step Mantoux: YES   Chest X ray if Positive Mantoux history/Results: YES  2. Activity Level: up as tolerated  3. Code Status: Comfort Care  4. Diet: Regular   Texture: regular   Thicken Liquids? No  5. Routine labs to be ordered? None  6. Oxygen: NA    7. Therapy Evaluation/Treatment: none  8. May use House Standing/Wound orders: YES  9 Level of care: Skilled  10. Prognosis:Terminal  11. Rehab Potential:Guarded  12: Discharge Plan:Long term care > 180 days  13. History and Physical: Will be done today by me, see attached  14. All orders x 75 days unless otherwise indicated.  15. Continue Hospice with [facility]      Signed: Electronically signed by  {1/2/3/4/5:[id number]}, MD.        Sincerely,        TONYA Tran CNP

## 2019-04-09 NOTE — LETTER
4/9/2019        RE: Anya Gaines  [facility]  [address]  [facility] [address]        [place] [facility]  [place] Medical Record Number:  [id number]  Place of Service where encounter took place:  [facility] ([facility]) [[id number]]  Chief Complaint   Patient presents with     Discharge Summary Nursing Home     leaving [facility] and admiting to attached LTC faclility       HPI:    Anya Gaines  is a 89 year old (7/4/1920), who is being seen today for an episodic care visit.  HPI information obtained from: facility chart records, facility staff, patient report and [facility] chart review. Today's concern is:  Patient has been living at assisted living facility for about 3 years.  She has gradually become less able to care for herself and now is requiring more care than facility can provide.        REVIEW OF SYSTEMS:  Limited secondary to cognitive impairment but today pt reports no new concerns.     Objective:  /74   Pulse 95   Temp 96.7  F (35.9  C)   Resp 16   Wt 54.8 kg (120 lb 12.8 oz)   SpO2 93%   BMI 24.40 kg/m     Exam:  GENERAL APPEARANCE:  Alert, in no distress   HEAD:  Normal, normocephalic, atraumatic  EYE EXAM: normal external eye, conjunctiva, lids, JUAN  NECK EXAM: supple, no JVD  CHEST/RESP:  respiratory effort normal, lung sounds CTA , no respiratory distress  CV:  Rate reg, rhythm reg, no murmur, no peripheral edema   M/S:   extremities normal, gait abnormal-unable to ambulate and is wheelchair bound, does transfer independently, normal muscle tone, and range of motion normal.   SKIN EXAM: CDI  NEUROLOGIC EXAM: Hyperkinetic at baseline with random movements greater L than R. Cranial nerves 2-12 are normal tested and grossly at patient's baseline  PSYCH:  Alert and oriented to self and surroundings with forgetfulness and mild delusions, affect pleasant , judgement impaired by cognitive losses     Labs:   Most Recent 3 CBC's:  Recent Labs   Lab Test 02/23/19  11/12/18 12/11/17 02/21/17 02/05/17  0925   WBC 4.7  --   --  6.4 10.8   HGB 13.0 12.7 12.3 13.1 11.4*   MCV 87  --   --  92 91     --   --  236 205     Most Recent 3 BMP's:  Recent Labs   Lab Test 02/23/19 11/12/18 12/11/17    139 140   POTASSIUM 4.0 4.0 3.9   CHLORIDE 97* 100 102   CO2 32* 29 29   BUN 8 15 14   CR 0.53* 0.60 0.57*   ANIONGAP 8 10 9   KEN 10.0 10.2 9.6    210* 145*     Most Recent Hemoglobin A1c:  Recent Labs   Lab Test 04/03/19   A1C 7.4*     ASSESSMENT/PLAN:  History of CVA (cerebrovascular accident)  History of TIA (transient ischemic attack) and stroke  Patient with past medical history significant for CVA/TIA and now progressive decline in function, resulting in decreased ability to care for self.  She is alert, oriented at times and able to make her needs known.  She, however, has had decreased ability to care for herself in her assisted living facility apartment, found to be incontinent of stool occasionally and inappropriately dressed to come out of her room.  Family, hospice team, and assisted living facility staff are in agreement that placement in long term care/skilled nursing facility is now appropriate.       Seizures (H)  Patient with a long standing history of seizure activity, without seizures in recent several years.  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 At baseline with significant tremor, but this is consistently not bothersome to her.  She has been stable on gabapentin 600 mg BID which has been helpful in controlling of hyperkinetic disorder.      Benign essential hypertension  On amlodipine, lisinopril, hydrochlorothiazide.  BP continues to trend within goal.  The current medical regimen is effective;  continue present plan and medications.   BP Readings from Last 6 Encounters:   04/09/19 144/74    04/02/19 144/74   02/19/19 134/75   01/29/19 110/70   01/16/19 110/70   12/11/18 (!) 179/91        Type 2 diabetes mellitus without complication, without long-term current use of insulin (H)  Blood sugars- not routinely checked in assisted living facility; last A1C wnl as noted below; recent decrease of metformin from 2000 xl per day to 1000 xl per day.  Thought maybe this would improve episodes of diarrhea and tight control no longer needed in this hospice patient.    Lab Results   Component Value Date    A1C 7.4 04/03/2019    A1C 9.9 11/12/2018    A1C 9.2 05/07/2018    A1C 7.6 12/11/2017    A1C 7.6 08/07/2017       Failure to thrive in adult  Hospice care patient  Patient admitted to [facility] in March 2019.  She had an episode of shingles in Nov 2018 that caused her to lose ground, and she did not return to baseline.  She was enrolled in hospice, and now with plans to enter long term care.    Other problems stable.         Anya Gaines is 89 year old (7/4/1920) who needs Skilled Nursing Facility (SNF) admission orders due to increased weakness and inability for self care.     [facility] team will be the Provider responsible for care at the SNF.  For questions on these admission orders please call [phone] and ask to speak to the MD on call.     [facility] has accepted patient.    History of CVA (cerebrovascular accident) [Z86.73]  Primary Diagnosis  Secondary Diagnosis:    ICD-10-CM    1. History of CVA (cerebrovascular accident) Z86.73    2. History of TIA (transient ischemic attack) and stroke Z86.73    3. Seizures (H) R56.9    4. Hyperkinetic disorder F90.9    5. Benign essential hypertension I10    6. Type 2 diabetes mellitus without complication, without long-term current use of insulin (H) E11.9    7. Failure to thrive in adult R62.7    8. Hospice care patient Z51.5       Past Medical History:   Diagnosis Date     Cerebral artery occlusion with cerebral infarction (H)       Cerebral infarction (H)      Diabetes mellitus, type 2 (H)      Hypertension      Seizures (H)         No Known Allergies  Current Immunization in [facility]:   Most Recent Immunizations   Administered Date(s) Administered     Influenza (High Dose) 3 valent vaccine 10/06/2017     Influenza (IIV3) PF 11/25/2013     Influenza (intradermal) 09/20/2018     Pneumo Conj 13-V (2010&after) 10/12/2015     Pneumococcal 23 valent 12/22/2007     TDAP Vaccine (Boostrix) 11/09/2016     Immunizations up to date    Orders:  Current Outpatient Medications   Medication Sig Dispense Diagnosis      acetaminophen (TYLENOL) 325 MG tablet Take 2 tablets (650 mg) by mouth 4 times daily  AND       acetaminophen (TYLENOL) 325 MG tablet Take 2 tablets (650 mg) by mouth every 4 hours as needed for mild pain 100 tablet pain     amLODIPine (NORVASC) 5 MG tablet TAKE 1 TABLET BY MOUTH DAILY AT BEDTIME DX HYPERTENSION 31 tablet HTN     clopidogrel (PLAVIX) 75 MG tablet TAKE 1 TABLET BY MOUTH EVERY DAY DX CEREBROVASCULAR ACCIDENT 30 tablet CVA     gabapentin (NEURONTIN) 600 MG tablet TAKE 1 TABLET BY MOUTH TWICE DAILY FOR INVOLUNTARY MOVEMENTS 60 tablet hyperkinesis     haloperidol (HALDOL) 0.5 MG tablet Take 1 tablet (0.5 mg) by mouth every 6 hours as needed for agitation  Anxiety at end of life     hydrochlorothiazide (HYDRODIURIL) 25 MG tablet TAKE 1 TABLET BY MOUTH EVERY MORNING DX HYPERTENSION 31 tablet HTN     hypromellose-dextran 0.3-0.1% (ARTIFICIAL TEARS) opthalmic solution Place 1 drop into both eyes 3 times daily 15 mL Dry eyes     lidocaine (LIDODERM) 5 % patch Place 1 patch onto the skin every 24 hours  Post herpetic neuralgia     lisinopril (PRINIVIL/ZESTRIL) 40 MG tablet TAKE 1 TABLET BY MOUTH EVERY DAY DX HYPERTENSION 30 tablet HTN     loperamide (IMODIUM) 2 MG capsule Take 2 mg by mouth every other day AND QID PRN for diarrhea  diarrhea     Menthol, Topical Analgesic, (BIOFREEZE) 4 % GEL Externally apply topically 3 times  daily as needed For muscle aches  Arthritic pain     menthol-zinc oxide (CALMOSEPTINE) 0.44-20.6 % OINT ointment Apply topically 2 times daily as needed for skin protection  Skin protection     metFORMIN (GLUCOPHAGE-XR) 500 MG 24 hr tablet Take 2 tablets (1,000 mg) by mouth daily (with dinner) 120 tablet DM2     morphine 2.5 MG solu-tab Take 1 tablet (2.5 mg) by mouth every 2 hours as needed for shortness of breath / dyspnea or moderate to severe pain  Pain at end of life                   senna-docusate (SENOKOT-S/PERICOLACE) 8.6-50 MG tablet Take 1 tablet by mouth daily as needed for constipation  Constipation      Medication changes made for transition none  Controlled Medications: may use home supply of morphine and haldol until updated by [facility] team    Treatments:  none    1. May have two Step Mantoux: YES   Chest X ray if Positive Mantoux history/Results: YES  2. Activity Level: up as tolerated  3. Code Status: Comfort Care  4. Diet: Regular   Texture: regular   Thicken Liquids? No  5. Routine labs to be ordered? None  6. Oxygen: NA    7. Therapy Evaluation/Treatment: none  8. May use House Standing/Wound orders: YES  9 Level of care: Skilled  10. Prognosis:Terminal  11. Rehab Potential:Guarded  12: Discharge Plan:Long term care > 180 days  13. History and Physical: Will be done today by me, see attached  14. All orders x 75 days unless otherwise indicated.  15. Continue Hospice with [facility]      Signed: Electronically signed by  TONYA Tran CNP     [facility]  [phone] cell         Sincerely,        TONYA Tran CNP

## 2019-04-10 VITALS
TEMPERATURE: 98 F | HEART RATE: 81 BPM | OXYGEN SATURATION: 96 % | BODY MASS INDEX: 24.64 KG/M2 | RESPIRATION RATE: 16 BRPM | DIASTOLIC BLOOD PRESSURE: 75 MMHG | SYSTOLIC BLOOD PRESSURE: 150 MMHG | WEIGHT: 122 LBS

## 2019-04-10 NOTE — PROGRESS NOTES
"[place] [facility]  PRIMARY CARE PROVIDER AND CLINIC:  TONYA Tran CNP, [address] / [place] [address]  Chief Complaint   Patient presents with     Establish Care   [place] Medical Record Number:  [id number]  Place of Service where encounter took place:  [facility]  ([facility]) [[id number]]    Anya Gaines  is a 89 year old  (7/4/1920), admitted to the above facility from [facility]. Admitted to this facility for  rehab, medical management and nursing care.  HPI: HPI information obtained from: facility chart records, facility staff, patient report, [facility] chart review and Care Everywhere [facility] chart review.     Brief Summary of AL/Transfer Background: Patient w/ sighx of CVA/TIA and having decline in functioning. Trajectory was worsened by shingles bout in November 2018, after-which she enrolled in hospice services. Care planning w/ ALIYAH, providers, hospice, and family determined readiness for higher level of nursing care, and thus she was transferred to [facility] on 4/10/19.     Updates since admission to long-term care unit: Patient admitted to [facility] on hospice yesterday 4/10 s/p the above decisions made by multidisciplinary team. Her long-term goal is that this is her destination. Today, patient is alert, she is eating well, she denies pain, and nursing notes no behaviors or concerns except she is making statements about \"not liking this new place\" and having trouble with the transition. Chart review shows her VS on admit are grossly stable, and due to her hospice status, we will likely not routinely check blood work. Noting labs on review from February 2019 and after (noted below) were grossly WDL for her age and goals-of-care.     CODE STATUS/ADVANCE DIRECTIVES DISCUSSION:   DNR / DNI.  Patient's living condition: lives in an assisted living facility    ALLERGIES: Patient has no known allergies. PAST MEDICAL HISTORY:  has a past medical history of Cerebral artery occlusion with " cerebral infarction (H), Cerebral infarction (H), Diabetes mellitus, type 2 (H), Hypertension, and Seizures (H).PAST SURGICAL HISTORY:   has a past surgical history that includes surgical history of - .FAMILY HISTORY: family history is not on file. SOCIAL HISTORY:   reports that she has never smoked. She has never used smokeless tobacco. She reports that she does not drink alcohol or use drugs.    Post Discharge Medication Reconciliation Status: discharge medications reconciled and changed, per note/orders (see AVS)  Current Outpatient Medications   Medication Sig Dispense Refill     acetaminophen (TYLENOL) 325 MG tablet Take 2 tablets (650 mg) by mouth 4 times daily       acetaminophen (TYLENOL) 325 MG tablet Take 2 tablets (650 mg) by mouth every 4 hours as needed for mild pain 100 tablet      amLODIPine (NORVASC) 5 MG tablet TAKE 1 TABLET BY MOUTH DAILY AT BEDTIME DX HYPERTENSION 31 tablet 11     clopidogrel (PLAVIX) 75 MG tablet TAKE 1 TABLET BY MOUTH EVERY DAY DX CEREBROVASCULAR ACCIDENT 30 tablet 11     gabapentin (NEURONTIN) 600 MG tablet TAKE 1 TABLET BY MOUTH TWICE DAILY FOR INVOLUNTARY MOVEMENTS 60 tablet 11     haloperidol (HALDOL) 0.5 MG tablet Take 1 tablet (0.5 mg) by mouth every 6 hours as needed for agitation       hydrochlorothiazide (HYDRODIURIL) 25 MG tablet TAKE 1 TABLET BY MOUTH EVERY MORNING DX HYPERTENSION 31 tablet 11     hypromellose-dextran 0.3-0.1% (ARTIFICIAL TEARS) opthalmic solution Place 1 drop into both eyes 3 times daily 15 mL 3     lidocaine (LIDODERM) 5 % patch Place 1 patch onto the skin every 24 hours       lisinopril (PRINIVIL/ZESTRIL) 40 MG tablet TAKE 1 TABLET BY MOUTH EVERY DAY DX HYPERTENSION 30 tablet 11     loperamide (IMODIUM) 2 MG capsule Take 2 mg by mouth every other day AND QID PRN for diarrhea       Menthol, Topical Analgesic, (BIOFREEZE) 4 % GEL Externally apply topically 3 times daily as needed For muscle aches       menthol-zinc oxide (CALMOSEPTINE) 0.44-20.6 %  OINT ointment Apply topically 2 times daily as needed for skin protection       metFORMIN (GLUCOPHAGE-XR) 500 MG 24 hr tablet Take 2 tablets (1,000 mg) by mouth daily (with dinner) 120 tablet 11     morphine 2.5 MG solu-tab Take 1 tablet (2.5 mg) by mouth every 2 hours as needed for shortness of breath / dyspnea or moderate to severe pain  0     order for DME Safety lancets 100 strip 11     order for DME Equipment being ordered: Contour test strips 100 each 5     senna-docusate (SENOKOT-S/PERICOLACE) 8.6-50 MG tablet Take 1 tablet by mouth daily as needed for constipation       ROS: Limited secondary to cognitive impairment but today pt reports the above and 10 point ROS of systems including Constitutional, Eyes, Respiratory, Cardiovascular, Gastroenterology, Genitourinary, Integumentary, Musculoskeletal, Psychiatric were all negative except for pertinent positives noted in my HPI.    Vitals:  /75   Pulse 81   Temp 98  F (36.7  C)   Resp 16   Wt 55.3 kg (122 lb)   SpO2 96%   BMI 24.64 kg/m    Exam:  GENERAL APPEARANCE: Alert, in no distress, cooperative.   ENT: Mouth/posterior oropharynx intact w/ moist mucous membranes, hearing acuity [place].  EYES: EOM, conjunctivae, lids, pupils and irises normal, PERRL2.   RESP: Respiratory effort good, no respiratory distress, Lung sounds clear. On RA.   CV: Auscultation of heart reveals S1, S2, rate and rhythm regular, no murmur, no rub or gallop, Edema 0+ BLE. Peripheral pulses are 2+.  ABDOMEN: Normal bowel sounds, soft, non-tender abdomen, and no masses palpated.  SKIN: Inspection/Palpation of skin and subcutaneous tissue baseline w/ fragility. No wounds/rashes noted.   NEURO: CN II-XII at patient's baseline, sensation baseline PPS.  PSYCH: Insight, judgement, and memory are baseline, affect and mood are engaged.    Lab/Diagnostic data:  Recent labs in [facility] reviewed by me today.    ASSESSMENT/PLAN:  History of CVA (cerebrovascular accident)  Seizures  (H)  Hyperkinetic disorder  Benign essential hypertension  Type 2 diabetes mellitus without complication, without long-term current use of insulin (H)  Hospice care patient  Failure to thrive in adult  Chronic. Worsening. Progressing toward end-of-life. Still on some medications for treatment, therefore we will checks BPs and BGs temporarily and then discontinue. Collaborated w/ hospice and family regarding ACP discussion and goals-of care.      Orders written by provider:  1. Accu-checks QAM x 14 days, then discontinue. Dx: DM II.    Total time spent with patient visit at the skilled nursing facility was 45 min including patient visit and review of past records. Greater than 50% of total time spent with counseling and coordinating care due to ACP discussion/goals-of-care, and coordinating w/ hospice.      Electronically signed by:  Dr. Lilian Streeter, TONYA CNP DNP

## 2019-04-11 ENCOUNTER — NURSING HOME VISIT (OUTPATIENT)
Dept: GERIATRICS | Facility: CLINIC | Age: 84
End: 2019-04-11
Payer: COMMERCIAL

## 2019-04-11 ENCOUNTER — PATIENT OUTREACH (OUTPATIENT)
Dept: GERIATRIC MEDICINE | Facility: CLINIC | Age: 84
End: 2019-04-11

## 2019-04-11 DIAGNOSIS — E11.9 TYPE 2 DIABETES MELLITUS WITHOUT COMPLICATION, WITHOUT LONG-TERM CURRENT USE OF INSULIN (H): ICD-10-CM

## 2019-04-11 DIAGNOSIS — R56.9 SEIZURES (H): ICD-10-CM

## 2019-04-11 DIAGNOSIS — R62.7 FAILURE TO THRIVE IN ADULT: ICD-10-CM

## 2019-04-11 DIAGNOSIS — I10 BENIGN ESSENTIAL HYPERTENSION: ICD-10-CM

## 2019-04-11 DIAGNOSIS — F90.9 HYPERKINETIC DISORDER: ICD-10-CM

## 2019-04-11 DIAGNOSIS — Z51.5 HOSPICE CARE PATIENT: ICD-10-CM

## 2019-04-11 DIAGNOSIS — Z86.73 HISTORY OF CVA (CEREBROVASCULAR ACCIDENT): Primary | ICD-10-CM

## 2019-04-11 PROCEDURE — 99310 SBSQ NF CARE HIGH MDM 45: CPT | Mod: GV | Performed by: NURSE PRACTITIONER

## 2019-04-11 NOTE — PROGRESS NOTES
Carlene,     I am the [facility] care coordinator for Anya Gaines, and I am writing to notify you of a change in services.   Other Assisted Living and Elderly Waiver services have been terminated    This change has occurred because member moved into LTC      I am required by the health plan to notify you of this change. No action is required on your part. Please do not hesitate to contact me with any questions or concerns. Thank you.    Arely Duckworth MA [facility] Care Coordinator   [phone]

## 2019-04-11 NOTE — LETTER
"    4/11/2019        RE: Anya Gaines  [facility]  [address]  [place] [address]        [place] [facility]  PRIMARY CARE PROVIDER AND CLINIC:  TONYA Tran CNP, [address] / [place] [address]  Chief Complaint   Patient presents with     Establish Care   [place] Medical Record Number:  [id number]  Place of Service where encounter took place:  [facility]  ([facility]) [[id number]]    Anya Gaines  is a 89 year old  (7/4/1920), admitted to the above facility from [facility]. Admitted to this facility for  rehab, medical management and nursing care.  HPI: HPI information obtained from: facility chart records, facility staff, patient report, [facility] chart review and Care Everywhere [facility] chart review.     Brief Summary of AL/Transfer Background: Patient w/ sighx of CVA/TIA and having decline in functioning. Trajectory was worsened by shingles bout in November 2018, after-which she enrolled in hospice services. Care planning w/ ALIYAH, providers, hospice, and family determined readiness for higher level of nursing care, and thus she was transferred to  LT on 4/10/19.     Updates since admission to long-term care unit: Patient admitted to [facility] on hospice yesterday 4/10 s/p the above decisions made by multidisciplinary team. Her long-term goal is that this is her destination. Today, patient is alert, she is eating well, she denies pain, and nursing notes no behaviors or concerns except she is making statements about \"not liking this new place\" and having trouble with the transition. Chart review shows her VS on admit are grossly stable, and due to her hospice status, we will likely not routinely check blood work. Noting labs on review from February 2019 and after (noted below) were grossly WDL for her age and goals-of-care.     CODE STATUS/ADVANCE DIRECTIVES DISCUSSION:   DNR / DNI.  Patient's living condition: lives in an assisted living facility    ALLERGIES: " Patient has no known allergies. PAST MEDICAL HISTORY:  has a past medical history of Cerebral artery occlusion with cerebral infarction (H), Cerebral infarction (H), Diabetes mellitus, type 2 (H), Hypertension, and Seizures (H).PAST SURGICAL HISTORY:   has a past surgical history that includes surgical history of - .FAMILY HISTORY: family history is not on file. SOCIAL HISTORY:   reports that she has never smoked. She has never used smokeless tobacco. She reports that she does not drink alcohol or use drugs.    Post Discharge Medication Reconciliation Status: discharge medications reconciled and changed, per note/orders (see AVS)  Current Outpatient Medications   Medication Sig Dispense Refill     acetaminophen (TYLENOL) 325 MG tablet Take 2 tablets (650 mg) by mouth 4 times daily       acetaminophen (TYLENOL) 325 MG tablet Take 2 tablets (650 mg) by mouth every 4 hours as needed for mild pain 100 tablet      amLODIPine (NORVASC) 5 MG tablet TAKE 1 TABLET BY MOUTH DAILY AT BEDTIME DX HYPERTENSION 31 tablet 11     clopidogrel (PLAVIX) 75 MG tablet TAKE 1 TABLET BY MOUTH EVERY DAY DX CEREBROVASCULAR ACCIDENT 30 tablet 11     gabapentin (NEURONTIN) 600 MG tablet TAKE 1 TABLET BY MOUTH TWICE DAILY FOR INVOLUNTARY MOVEMENTS 60 tablet 11     haloperidol (HALDOL) 0.5 MG tablet Take 1 tablet (0.5 mg) by mouth every 6 hours as needed for agitation       hydrochlorothiazide (HYDRODIURIL) 25 MG tablet TAKE 1 TABLET BY MOUTH EVERY MORNING DX HYPERTENSION 31 tablet 11     hypromellose-dextran 0.3-0.1% (ARTIFICIAL TEARS) opthalmic solution Place 1 drop into both eyes 3 times daily 15 mL 3     lidocaine (LIDODERM) 5 % patch Place 1 patch onto the skin every 24 hours       lisinopril (PRINIVIL/ZESTRIL) 40 MG tablet TAKE 1 TABLET BY MOUTH EVERY DAY DX HYPERTENSION 30 tablet 11     loperamide (IMODIUM) 2 MG capsule Take 2 mg by mouth every other day AND QID PRN for diarrhea       Menthol, Topical Analgesic, (BIOFREEZE) 4 % GEL  Externally apply topically 3 times daily as needed For muscle aches       menthol-zinc oxide (CALMOSEPTINE) 0.44-20.6 % OINT ointment Apply topically 2 times daily as needed for skin protection       metFORMIN (GLUCOPHAGE-XR) 500 MG 24 hr tablet Take 2 tablets (1,000 mg) by mouth daily (with dinner) 120 tablet 11     morphine 2.5 MG solu-tab Take 1 tablet (2.5 mg) by mouth every 2 hours as needed for shortness of breath / dyspnea or moderate to severe pain  0     order for DME Safety lancets 100 strip 11     order for DME Equipment being ordered: Contour test strips 100 each 5     senna-docusate (SENOKOT-S/PERICOLACE) 8.6-50 MG tablet Take 1 tablet by mouth daily as needed for constipation       ROS: Limited secondary to cognitive impairment but today pt reports the above and 10 point ROS of systems including Constitutional, Eyes, Respiratory, Cardiovascular, Gastroenterology, Genitourinary, Integumentary, Musculoskeletal, Psychiatric were all negative except for pertinent positives noted in my HPI.    Vitals:  /75   Pulse 81   Temp 98  F (36.7  C)   Resp 16   Wt 55.3 kg (122 lb)   SpO2 96%   BMI 24.64 kg/m     Exam:  GENERAL APPEARANCE: Alert, in no distress, cooperative.   ENT: Mouth/posterior oropharynx intact w/ moist mucous membranes, hearing acuity [place].  EYES: EOM, conjunctivae, lids, pupils and irises normal, PERRL2.   RESP: Respiratory effort good, no respiratory distress, Lung sounds clear. On RA.   CV: Auscultation of heart reveals S1, S2, rate and rhythm regular, no murmur, no rub or gallop, Edema 0+ BLE. Peripheral pulses are 2+.  ABDOMEN: Normal bowel sounds, soft, non-tender abdomen, and no masses palpated.  SKIN: Inspection/Palpation of skin and subcutaneous tissue baseline w/ fragility. No wounds/rashes noted.   NEURO: CN II-XII at patient's baseline, sensation baseline PPS.  PSYCH: Insight, judgement, and memory are baseline, affect and mood are engaged.    Lab/Diagnostic data:  Recent  labs in [facility] reviewed by me today.    ASSESSMENT/PLAN:  History of CVA (cerebrovascular accident)  Seizures (H)  Hyperkinetic disorder  Benign essential hypertension  Type 2 diabetes mellitus without complication, without long-term current use of insulin (H)  Hospice care patient  Failure to thrive in adult  Chronic. Worsening. Progressing toward end-of-life. Still on some medications for treatment, therefore we will checks BPs and BGs temporarily and then discontinue. Collaborated w/ hospice and family regarding ACP discussion and goals-of care.      Orders written by provider:  1. Accu-checks QAM x 14 days, then discontinue. Dx: DM II.    Total time spent with patient visit at the skilled nursing facility was 45 min including patient visit and review of past records. Greater than 50% of total time spent with counseling and coordinating care due to ACP discussion/goals-of-care, and coordinating w/ hospice.      Electronically signed by:  Dr. Lilian Streeter, TONYA CNP DNP                        Sincerely,        TONYA Obando CNP

## 2019-04-11 NOTE — PROGRESS NOTES
4-11-19   dis-enrolled member due to member moving to LTC. EW will be closed, 1150 will be sent to [facility], E&E Araceli and Deanna will be notified that member moved to LTC as of 4-10-19 and ALE will continue to follow member at SNF.     Arely Duckworth MA [facility] Coordinator   [phone]

## 2019-04-11 NOTE — PROGRESS NOTES
"4-10-19  CC visited with member and her daughter at the SNF.  Daughter was in the process of moving her things into her room.  Member was sad at the move but stated \"I don't blame anyone, I am just going to miss my room and making my own coffee\"      CC explained to her daughter and member that she would close her EW and end the services at the AL.      Daughter and member stated understanding of this and thanks  for her work with them.    Arely Duckworth MA [facility] Care Coordinator   [phone]      4-9-19   CC was contacted by IAM Lindsay stating that member had a very tough weekend again and the plan was to move her to LTC tomorrow.  She has been in contact with member daughter and also with admission at [facility]. She will update CC if anything changes with this admission date.    Arely Duckworth MA [facility] Care Coordinator   [phone]        "

## 2019-04-16 ENCOUNTER — PATIENT OUTREACH (OUTPATIENT)
Dept: GERIATRIC MEDICINE | Facility: CLINIC | Age: 84
End: 2019-04-16

## 2019-04-16 NOTE — PROGRESS NOTES
Received a request to submit a DTR for the termination of Assisted Living and elderly waiver. Documentation completed and faxed to the health plan. Care Coordinator aware.    Araceli Garcia RN  Utilization   [facility]  [phone]

## 2019-06-14 ENCOUNTER — TRANSFERRED RECORDS (OUTPATIENT)
Dept: HEALTH INFORMATION MANAGEMENT | Facility: CLINIC | Age: 84
End: 2019-06-14

## 2019-07-01 ENCOUNTER — NURSING HOME VISIT (OUTPATIENT)
Dept: GERIATRICS | Facility: CLINIC | Age: 84
End: 2019-07-01
Payer: COMMERCIAL

## 2019-07-01 VITALS
BODY MASS INDEX: 23.59 KG/M2 | HEART RATE: 85 BPM | RESPIRATION RATE: 14 BRPM | WEIGHT: 116.8 LBS | DIASTOLIC BLOOD PRESSURE: 63 MMHG | TEMPERATURE: 97.5 F | OXYGEN SATURATION: 94 % | SYSTOLIC BLOOD PRESSURE: 134 MMHG

## 2019-07-01 DIAGNOSIS — I10 BENIGN ESSENTIAL HYPERTENSION: ICD-10-CM

## 2019-07-01 DIAGNOSIS — Z51.5 HOSPICE CARE PATIENT: ICD-10-CM

## 2019-07-01 DIAGNOSIS — F90.9 HYPERKINETIC DISORDER: ICD-10-CM

## 2019-07-01 DIAGNOSIS — R62.7 FAILURE TO THRIVE IN ADULT: ICD-10-CM

## 2019-07-01 DIAGNOSIS — R56.9 SEIZURES (H): ICD-10-CM

## 2019-07-01 DIAGNOSIS — Z86.73 HISTORY OF CVA (CEREBROVASCULAR ACCIDENT): ICD-10-CM

## 2019-07-01 DIAGNOSIS — E11.9 TYPE 2 DIABETES MELLITUS WITHOUT COMPLICATION, WITHOUT LONG-TERM CURRENT USE OF INSULIN (H): Primary | ICD-10-CM

## 2019-07-01 PROCEDURE — 99305 1ST NF CARE MODERATE MDM 35: CPT | Mod: AI | Performed by: FAMILY MEDICINE

## 2019-07-01 NOTE — PROGRESS NOTES
[place] [facility]  PRIMARY CARE PROVIDER AND CLINIC:  TONYA Obando CNP, [address] / [place] [address]  Chief Complaint   Patient presents with     Hospital F/U     [place] Medical Record Number:  [id number]  Place of Service where encounter took place:  [facility]  (S) [[id number]]    Anya Gaines  is a 89 year old  (7/4/1920),admitted to this facility from next door [facility] for higher level of care. .    HPI:    HPI information obtained from: facility staff, patient report and [facility] chart review.     Today concern:reports   - Resident seen and examined today. feeling tired all the time.  - Denies fever, SOB, constipation, pain, fall, anorexia, loss of interest or feeling guilt.  ---------------------------------------------------------------------------------------------------  CODE STATUS/ADVANCE DIRECTIVES DISCUSSION:   DNR / DNI  Patient's living condition: used to live at [facility], now a resident of this facility  ALLERGIES: Patient has no known allergies.  PAST MEDICAL HISTORY:  has a past medical history of Cerebral artery occlusion with cerebral infarction (H), Cerebral infarction (H), Diabetes mellitus, type 2 (H), Hypertension, and Seizures (H).  PAST SURGICAL HISTORY:   has a past surgical history that includes surgical history of - .  FAMILY HISTORY: family history is not on file.  SOCIAL HISTORY:   reports that she has never smoked. She has never used smokeless tobacco. She reports that she does not drink alcohol or use drugs.    Post Discharge Medication Reconciliation Status: discharge medications reconciled, continue medications without change  Current Outpatient Medications   Medication Sig Dispense Refill     acetaminophen (TYLENOL) 325 MG tablet Take 2 tablets (650 mg) by mouth 4 times daily       acetaminophen (TYLENOL) 325 MG tablet Take 2 tablets (650 mg) by mouth every 4 hours as needed for mild pain 100 tablet      amLODIPine (NORVASC) 5 MG  tablet TAKE 1 TABLET BY MOUTH DAILY AT BEDTIME DX HYPERTENSION 31 tablet 11     clopidogrel (PLAVIX) 75 MG tablet TAKE 1 TABLET BY MOUTH EVERY DAY DX CEREBROVASCULAR ACCIDENT 30 tablet 11     gabapentin (NEURONTIN) 600 MG tablet TAKE 1 TABLET BY MOUTH TWICE DAILY FOR INVOLUNTARY MOVEMENTS 60 tablet 11     hydrochlorothiazide (HYDRODIURIL) 25 MG tablet TAKE 1 TABLET BY MOUTH EVERY MORNING DX HYPERTENSION 31 tablet 11     hypromellose-dextran 0.3-0.1% (ARTIFICIAL TEARS) opthalmic solution Place 1 drop into both eyes 3 times daily 15 mL 3     lisinopril (PRINIVIL/ZESTRIL) 40 MG tablet TAKE 1 TABLET BY MOUTH EVERY DAY DX HYPERTENSION 30 tablet 11     metFORMIN (GLUCOPHAGE-XR) 500 MG 24 hr tablet Take 2 tablets (1,000 mg) by mouth daily (with dinner) 120 tablet 11     MULTIPLE VITAMINS-MINERALS PO Take 1 tablet by mouth daily       order for DME Safety lancets 100 strip 11     order for DME Equipment being ordered: Contour test strips 100 each 5     haloperidol (HALDOL) 0.5 MG tablet Take 1 tablet (0.5 mg) by mouth every 6 hours as needed for agitation       lidocaine (LIDODERM) 5 % patch Place 1 patch onto the skin every 24 hours       loperamide (IMODIUM) 2 MG capsule Take 2 mg by mouth every other day AND QID PRN for diarrhea       Menthol, Topical Analgesic, (BIOFREEZE) 4 % GEL Externally apply topically 3 times daily as needed For muscle aches       menthol-zinc oxide (CALMOSEPTINE) 0.44-20.6 % OINT ointment Apply topically 2 times daily as needed for skin protection       morphine 2.5 MG solu-tab Take 1 tablet (2.5 mg) by mouth every 2 hours as needed for shortness of breath / dyspnea or moderate to severe pain  0     senna-docusate (SENOKOT-S/PERICOLACE) 8.6-50 MG tablet Take 1 tablet by mouth daily as needed for constipation       ROS:  10 point ROS of systems including Constitutional, Eyes, Respiratory, Cardiovascular, Gastroenterology, Genitourinary, Integumentary, Musculoskeletal, Psychiatric were all negative  except for pertinent positives noted in my HPI.    Vitals:  /63   Pulse 85   Temp 97.5  F (36.4  C)   Resp 14   Wt 53 kg (116 lb 12.8 oz)   SpO2 94%   BMI 23.59 kg/m    Exam:  GENERAL APPEARANCE:  in no distress, cooperative  ENT:  Mouth and posterior oropharynx normal, moist mucous membranes, oral mucosa moist, no lesion noted.   EYES:  EOMI, Pupil rounded and equal.  RESP:  lungs clear to auscultation   CV:  S1S2 audible, regular HR, no murmur appreciated.   ABDOMEN:  soft, NT/ND, BS audible. no mass appreciated on palpation.   M/S:   no joint deformity noted on observation.   SKIN:  No rash.   NEURO:   No NFD appreciated on observation.   PSYCH:  AAOx Person, place (State)/ day and year but not month. Mood and affect appropriate      Lab/Diagnostic data: Reviewed in the chart and EHR.    -----------------------------------------------------------------------  ASSESSMENT/PLAN:    Type 2 diabetes mellitus without complication, without long-term current use of insulin (H):   A1C 7.4 04/03/2019   - on metformin.  In this frail elderly adult with a limited life expectancy, the goal of  the management is to address the hyperglycemia sx if any,  rather than the  BGs numbers per se.     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   - Significant  Deficits requiring NH placement. Requiring extensive assistance from nursing. Up for meals only o/w spends the day resting in bed    Failure to thrive in adult / Hospice care patient: Symptoms managed by [facility] and Hospice Team.    Order: See above, otherwise, continue the rest of the current POC.       Electronically signed by:  Yoan Camp MD

## 2019-07-01 NOTE — LETTER
7/1/2019        RE: Anya Gaines  [facility]  [address]  [address]  [place] [address]        [place] [facility]  PRIMARY CARE PROVIDER AND CLINIC:  TONYA Obando CNP, [address] / [place] [address]  Chief Complaint   Patient presents with     Hospital F/U     [place] Medical Record Number:  [id number]  Place of Service where encounter took place:  [facility]  (S) [[id number]]    Anya Gaines  is a 89 year old  (7/4/1920),admitted to this facility from next door [facility] for higher level of care. .    HPI:    HPI information obtained from: facility staff, patient report and [facility] chart review.     Today concern:reports   - Resident seen and examined today. feeling tired all the time.  - Denies fever, SOB, constipation, pain, fall, anorexia, loss of interest or feeling guilt.  ---------------------------------------------------------------------------------------------------  CODE STATUS/ADVANCE DIRECTIVES DISCUSSION:   DNR / DNI  Patient's living condition: used to live at [facility], now a resident of this facility  ALLERGIES: Patient has no known allergies.  PAST MEDICAL HISTORY:  has a past medical history of Cerebral artery occlusion with cerebral infarction (H), Cerebral infarction (H), Diabetes mellitus, type 2 (H), Hypertension, and Seizures (H).  PAST SURGICAL HISTORY:   has a past surgical history that includes surgical history of - .  FAMILY HISTORY: family history is not on file.  SOCIAL HISTORY:   reports that she has never smoked. She has never used smokeless tobacco. She reports that she does not drink alcohol or use drugs.    Post Discharge Medication Reconciliation Status: discharge medications reconciled, continue medications without change  Current Outpatient Medications   Medication Sig Dispense Refill     acetaminophen (TYLENOL) 325 MG tablet Take 2 tablets (650 mg) by mouth 4 times daily       acetaminophen (TYLENOL) 325 MG tablet  Take 2 tablets (650 mg) by mouth every 4 hours as needed for mild pain 100 tablet      amLODIPine (NORVASC) 5 MG tablet TAKE 1 TABLET BY MOUTH DAILY AT BEDTIME DX HYPERTENSION 31 tablet 11     clopidogrel (PLAVIX) 75 MG tablet TAKE 1 TABLET BY MOUTH EVERY DAY DX CEREBROVASCULAR ACCIDENT 30 tablet 11     gabapentin (NEURONTIN) 600 MG tablet TAKE 1 TABLET BY MOUTH TWICE DAILY FOR INVOLUNTARY MOVEMENTS 60 tablet 11     hydrochlorothiazide (HYDRODIURIL) 25 MG tablet TAKE 1 TABLET BY MOUTH EVERY MORNING DX HYPERTENSION 31 tablet 11     hypromellose-dextran 0.3-0.1% (ARTIFICIAL TEARS) opthalmic solution Place 1 drop into both eyes 3 times daily 15 mL 3     lisinopril (PRINIVIL/ZESTRIL) 40 MG tablet TAKE 1 TABLET BY MOUTH EVERY DAY DX HYPERTENSION 30 tablet 11     metFORMIN (GLUCOPHAGE-XR) 500 MG 24 hr tablet Take 2 tablets (1,000 mg) by mouth daily (with dinner) 120 tablet 11     MULTIPLE VITAMINS-MINERALS PO Take 1 tablet by mouth daily       order for DME Safety lancets 100 strip 11     order for DME Equipment being ordered: Contour test strips 100 each 5     haloperidol (HALDOL) 0.5 MG tablet Take 1 tablet (0.5 mg) by mouth every 6 hours as needed for agitation       lidocaine (LIDODERM) 5 % patch Place 1 patch onto the skin every 24 hours       loperamide (IMODIUM) 2 MG capsule Take 2 mg by mouth every other day AND QID PRN for diarrhea       Menthol, Topical Analgesic, (BIOFREEZE) 4 % GEL Externally apply topically 3 times daily as needed For muscle aches       menthol-zinc oxide (CALMOSEPTINE) 0.44-20.6 % OINT ointment Apply topically 2 times daily as needed for skin protection       morphine 2.5 MG solu-tab Take 1 tablet (2.5 mg) by mouth every 2 hours as needed for shortness of breath / dyspnea or moderate to severe pain  0     senna-docusate (SENOKOT-S/PERICOLACE) 8.6-50 MG tablet Take 1 tablet by mouth daily as needed for constipation       ROS:  10 point ROS of systems including Constitutional, Eyes,  Respiratory, Cardiovascular, Gastroenterology, Genitourinary, Integumentary, Musculoskeletal, Psychiatric were all negative except for pertinent positives noted in my HPI.    Vitals:  /63   Pulse 85   Temp 97.5  F (36.4  C)   Resp 14   Wt 53 kg (116 lb 12.8 oz)   SpO2 94%   BMI 23.59 kg/m     Exam:  GENERAL APPEARANCE:  in no distress, cooperative  ENT:  Mouth and posterior oropharynx normal, moist mucous membranes, oral mucosa moist, no lesion noted.   EYES:  EOMI, Pupil rounded and equal.  RESP:  lungs clear to auscultation   CV:  S1S2 audible, regular HR, no murmur appreciated.   ABDOMEN:  soft, NT/ND, BS audible. no mass appreciated on palpation.   M/S:   no joint deformity noted on observation.   SKIN:  No rash.   NEURO:   No NFD appreciated on observation.   PSYCH:  AAOx Person, place (State)/ day and year but not month. Mood and affect appropriate      Lab/Diagnostic data: Reviewed in the chart and EHR.    -----------------------------------------------------------------------  ASSESSMENT/PLAN:    Type 2 diabetes mellitus without complication, without long-term current use of insulin (H):   A1C 7.4 04/03/2019   - on metformin.  In this frail elderly adult with a limited life expectancy, the goal of  the management is to address the hyperglycemia sx if any,  rather than the  BGs numbers per se.     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   - Significant  Deficits requiring NH placement. Requiring extensive assistance from nursing. Up for meals only o/w spends the day resting in bed    Failure to thrive in adult / Hospice care patient: Symptoms managed by [facility] and Hospice Team.    Order: See above, otherwise, continue the rest of the current POC.       Electronically signed by:  Yoan Camp MD                         Sincerely,        Yoan Camp MD

## 2019-08-06 ENCOUNTER — NURSING HOME VISIT (OUTPATIENT)
Dept: GERIATRICS | Facility: CLINIC | Age: 84
End: 2019-08-06
Payer: COMMERCIAL

## 2019-08-06 VITALS
DIASTOLIC BLOOD PRESSURE: 91 MMHG | SYSTOLIC BLOOD PRESSURE: 151 MMHG | HEART RATE: 80 BPM | RESPIRATION RATE: 20 BRPM | TEMPERATURE: 97.2 F | BODY MASS INDEX: 23.47 KG/M2 | WEIGHT: 116.2 LBS | OXYGEN SATURATION: 93 %

## 2019-08-06 DIAGNOSIS — E11.9 TYPE 2 DIABETES MELLITUS WITHOUT COMPLICATION, WITHOUT LONG-TERM CURRENT USE OF INSULIN (H): ICD-10-CM

## 2019-08-06 DIAGNOSIS — R56.9 SEIZURES (H): ICD-10-CM

## 2019-08-06 DIAGNOSIS — Z86.73 HISTORY OF CVA (CEREBROVASCULAR ACCIDENT): Primary | ICD-10-CM

## 2019-08-06 DIAGNOSIS — Z51.5 HOSPICE CARE PATIENT: ICD-10-CM

## 2019-08-06 DIAGNOSIS — Z86.73 HISTORY OF TIA (TRANSIENT ISCHEMIC ATTACK) AND STROKE: ICD-10-CM

## 2019-08-06 DIAGNOSIS — I10 BENIGN ESSENTIAL HYPERTENSION: ICD-10-CM

## 2019-08-06 PROCEDURE — 99309 SBSQ NF CARE MODERATE MDM 30: CPT | Mod: GW | Performed by: NURSE PRACTITIONER

## 2019-08-06 NOTE — LETTER
8/6/2019        RE: Anya Gaines  [facility]  [address]  [address]  [facility] [address]        [place] [facility]  Chief Complaint   Patient presents with     [facility] Regulatory   [place] Medical Record Number:  [id number]  Place of Service where encounter took place:  [facility]  ([facility]) [[id number]]    HPI: Anya Gaines  is 89 year old (7/4/1920), who is being seen today for a federally mandated E/M visit.  HPI information obtained from: facility chart records, facility staff, patient report, [facility] chart review and Care Everywhere [facility] chart review. Today's concerns are:    History of CVA (cerebrovascular accident)  Seizures (H)  History of TIA (transient ischemic attack) and stroke  Hospice care patient  Benign essential hypertension  Type 2 diabetes mellitus without complication, without long-term current use of insulin (H)  Patient denies SOB, CP, headache, dizziness, cough/fever, and states her bladder and bowel are functioning well. She states she has a good appetite and denies trouble sleeping. Nursing does not report any new concerns. Her BP trend is as noted below:  8/5/2019 10:06 138 / 85 mmHg   8/4/2019 10:10 136 / 77 mmHg   8/3/2019 09:51 132 / 74 mmHg   8/2/2019 10:10 128 / 76 mmHg  8/1/2019 08:39 139 / 91 mmHg   7/31/2019 09:34 131 / 84 mmHg   7/31/2019 09:33 131 / 84 mmHg   7/30/2019 08:49 108 / 71 mmHg  7/28/2019 10:30 128 / 70 mmHg     ALLERGIES:Patient has no known allergies. PAST MEDICAL HISTORY:   has a past medical history of Cerebral artery occlusion with cerebral infarction (H), Cerebral infarction (H), Diabetes mellitus, type 2 (H), Hypertension, and Seizures (H). PAST SURGICAL HISTORY:   has a past surgical history that includes surgical history of - . FAMILY HISTORY: family history is not on file. SOCIAL HISTORY:  reports that she has never smoked. She has never used smokeless tobacco. She reports that she does not drink alcohol or  use drugs.    MEDICATIONS:  Medication Sig     acetaminophen (TYLENOL) 325 MG tablet Take 2 tablets (650 mg) by mouth 4 times daily     acetaminophen (TYLENOL) 325 MG tablet Take 2 tablets (650 mg) by mouth every 4 hours as needed for mild pain     amLODIPine (NORVASC) 5 MG tablet TAKE 1 TABLET BY MOUTH DAILY AT BEDTIME DX HYPERTENSION     clopidogrel (PLAVIX) 75 MG tablet TAKE 1 TABLET BY MOUTH EVERY DAY DX CEREBROVASCULAR ACCIDENT     gabapentin (NEURONTIN) 600 MG tablet TAKE 1 TABLET BY MOUTH TWICE DAILY FOR INVOLUNTARY MOVEMENTS     hydrochlorothiazide (HYDRODIURIL) 25 MG tablet TAKE 1 TABLET BY MOUTH EVERY MORNING DX HYPERTENSION     hypromellose-dextran 0.3-0.1% (ARTIFICIAL TEARS) opthalmic solution Place 1 drop into both eyes 3 times daily     lisinopril (PRINIVIL/ZESTRIL) 40 MG tablet TAKE 1 TABLET BY MOUTH EVERY DAY DX HYPERTENSION     metFORMIN (GLUCOPHAGE-XR) 500 MG 24 hr tablet Take 2 tablets (1,000 mg) by mouth daily (with dinner)     MULTIPLE VITAMINS-MINERALS PO Take 1 tablet by mouth daily     order for DME Safety lancets     order for DME Equipment being ordered: Contour test strips     haloperidol (HALDOL) 0.5 MG tablet Take 1 tablet (0.5 mg) by mouth every 6 hours as needed for agitation     lidocaine (LIDODERM) 5 % patch Place 1 patch onto the skin every 24 hours     loperamide (IMODIUM) 2 MG capsule Take 2 mg by mouth every other day AND QID PRN for diarrhea     Menthol, Topical Analgesic, (BIOFREEZE) 4 % GEL Externally apply topically 3 times daily as needed For muscle aches     menthol-zinc oxide (CALMOSEPTINE) 0.44-20.6 % OINT ointment Apply topically 2 times daily as needed for skin protection     morphine 2.5 MG solu-tab Take 1 tablet (2.5 mg) by mouth every 2 hours as needed for shortness of breath / dyspnea or moderate to severe pain     senna-docusate (SENOKOT-S/PERICOLACE) 8.6-50 MG tablet Take 1 tablet by mouth daily as needed for constipation     Case Management:  I have reviewed the  care plan and MDS and do agree with the plan. Patient's desire to return to the community is not assessible due to cognitive impairment. Information reviewed:  Medications, vital signs, orders, and nursing notes.    ROS: Limited secondary to cognitive impairment but today pt reports the above and 4 point ROS including Respiratory, CV, GI and , other than that noted in the HPI, is negative.    Vitals:  BP (!) 151/91   Pulse 80   Temp 97.2  F (36.2  C)   Resp 20   Wt 52.7 kg (116 lb 3.2 oz)   SpO2 93%   BMI 23.47 kg/m     Body mass index is 23.47 kg/m .  Exam:  GENERAL APPEARANCE: Alert, in no distress, cooperative.   ENT: Mouth/posterior oropharynx intact w/ moist mucous membranes, hearing acuity [place].  EYES: EOM, conjunctivae, lids, pupils and irises normal, PERRL2.   RESP: Respiratory effort good, no respiratory distress, Lung sounds clear. On RA.   CV: Auscultation of heart reveals S1, S2, rate and rhythm regular, no murmur, no rub or gallop, Edema trace BLE. Peripheral pulses are 2+.  ABDOMEN: Normal bowel sounds, soft, non-tender abdomen, and no masses palpated.  SKIN: Inspection/Palpation of skin and subcutaneous tissue baseline w/ fragility. No wounds/rashes noted.  NEURO: CN II-XII at patient's baseline, sensation baseline PPS.  PSYCH: Insight, judgement, and memory are baseline, affect and mood are happy/engaged.    Lab/Diagnostic data:       ASSESSMENT/PLAN  History of CVA (cerebrovascular accident)  Seizures (H)  History of TIA (transient ischemic attack) and stroke  Hospice care patient  Benign essential hypertension  Type 2 diabetes mellitus without complication, without long-term current use of insulin (H)  Chronic. Stable. Unchanged. Follow-up routinely or as needed.    Orders written by provider at facility and transcribed by : Jeff Serrano  1. No new orders at this time.    Electronically signed by:  TONYA Walton CNP DNP      Sincerely,        TONYA Obando  CNP

## 2019-08-06 NOTE — PROGRESS NOTES
[place] [facility]  Chief Complaint   Patient presents with     shelter Regulatory   [place] Medical Record Number:  [id number]  Place of Service where encounter took place:  [facility]  (FGS) [[id number]]    HPI: Anya Gaines  is 89 year old (7/4/1920), who is being seen today for a federally mandated E/M visit.  HPI information obtained from: facility chart records, facility staff, patient report, [facility] Epic chart review and Care Everywhere Epic chart review. Today's concerns are:    History of CVA (cerebrovascular accident)  Seizures (H)  History of TIA (transient ischemic attack) and stroke  Hospice care patient  Benign essential hypertension  Type 2 diabetes mellitus without complication, without long-term current use of insulin (H)  Patient denies SOB, CP, headache, dizziness, cough/fever, and states her bladder and bowel are functioning well. She states she has a good appetite and denies trouble sleeping. Nursing does not report any new concerns. Her BP trend is as noted below:  8/5/2019 10:06 138 / 85 mmHg   8/4/2019 10:10 136 / 77 mmHg   8/3/2019 09:51 132 / 74 mmHg   8/2/2019 10:10 128 / 76 mmHg  8/1/2019 08:39 139 / 91 mmHg   7/31/2019 09:34 131 / 84 mmHg   7/31/2019 09:33 131 / 84 mmHg   7/30/2019 08:49 108 / 71 mmHg  7/28/2019 10:30 128 / 70 mmHg     ALLERGIES:Patient has no known allergies. PAST MEDICAL HISTORY:   has a past medical history of Cerebral artery occlusion with cerebral infarction (H), Cerebral infarction (H), Diabetes mellitus, type 2 (H), Hypertension, and Seizures (H). PAST SURGICAL HISTORY:   has a past surgical history that includes surgical history of - . FAMILY HISTORY: family history is not on file. SOCIAL HISTORY:  reports that she has never smoked. She has never used smokeless tobacco. She reports that she does not drink alcohol or use drugs.    MEDICATIONS:  Medication Sig     acetaminophen (TYLENOL) 325 MG tablet Take 2 tablets (650 mg) by  mouth 4 times daily     acetaminophen (TYLENOL) 325 MG tablet Take 2 tablets (650 mg) by mouth every 4 hours as needed for mild pain     amLODIPine (NORVASC) 5 MG tablet TAKE 1 TABLET BY MOUTH DAILY AT BEDTIME DX HYPERTENSION     clopidogrel (PLAVIX) 75 MG tablet TAKE 1 TABLET BY MOUTH EVERY DAY DX CEREBROVASCULAR ACCIDENT     gabapentin (NEURONTIN) 600 MG tablet TAKE 1 TABLET BY MOUTH TWICE DAILY FOR INVOLUNTARY MOVEMENTS     hydrochlorothiazide (HYDRODIURIL) 25 MG tablet TAKE 1 TABLET BY MOUTH EVERY MORNING DX HYPERTENSION     hypromellose-dextran 0.3-0.1% (ARTIFICIAL TEARS) opthalmic solution Place 1 drop into both eyes 3 times daily     lisinopril (PRINIVIL/ZESTRIL) 40 MG tablet TAKE 1 TABLET BY MOUTH EVERY DAY DX HYPERTENSION     metFORMIN (GLUCOPHAGE-XR) 500 MG 24 hr tablet Take 2 tablets (1,000 mg) by mouth daily (with dinner)     MULTIPLE VITAMINS-MINERALS PO Take 1 tablet by mouth daily     order for DME Safety lancets     order for DME Equipment being ordered: Contour test strips     haloperidol (HALDOL) 0.5 MG tablet Take 1 tablet (0.5 mg) by mouth every 6 hours as needed for agitation     lidocaine (LIDODERM) 5 % patch Place 1 patch onto the skin every 24 hours     loperamide (IMODIUM) 2 MG capsule Take 2 mg by mouth every other day AND QID PRN for diarrhea     Menthol, Topical Analgesic, (BIOFREEZE) 4 % GEL Externally apply topically 3 times daily as needed For muscle aches     menthol-zinc oxide (CALMOSEPTINE) 0.44-20.6 % OINT ointment Apply topically 2 times daily as needed for skin protection     morphine 2.5 MG solu-tab Take 1 tablet (2.5 mg) by mouth every 2 hours as needed for shortness of breath / dyspnea or moderate to severe pain     senna-docusate (SENOKOT-S/PERICOLACE) 8.6-50 MG tablet Take 1 tablet by mouth daily as needed for constipation     Case Management:  I have reviewed the care plan and MDS and do agree with the plan. Patient's desire to return to the community is not assessible due  to cognitive impairment. Information reviewed:  Medications, vital signs, orders, and nursing notes.    ROS: Limited secondary to cognitive impairment but today pt reports the above and 4 point ROS including Respiratory, CV, GI and , other than that noted in the HPI, is negative.    Vitals:  BP (!) 151/91   Pulse 80   Temp 97.2  F (36.2  C)   Resp 20   Wt 52.7 kg (116 lb 3.2 oz)   SpO2 93%   BMI 23.47 kg/m    Body mass index is 23.47 kg/m .  Exam:  GENERAL APPEARANCE: Alert, in no distress, cooperative.   ENT: Mouth/posterior oropharynx intact w/ moist mucous membranes, hearing acuity [place].  EYES: EOM, conjunctivae, lids, pupils and irises normal, PERRL2.   RESP: Respiratory effort good, no respiratory distress, Lung sounds clear. On RA.   CV: Auscultation of heart reveals S1, S2, rate and rhythm regular, no murmur, no rub or gallop, Edema trace BLE. Peripheral pulses are 2+.  ABDOMEN: Normal bowel sounds, soft, non-tender abdomen, and no masses palpated.  SKIN: Inspection/Palpation of skin and subcutaneous tissue baseline w/ fragility. No wounds/rashes noted.  NEURO: CN II-XII at patient's baseline, sensation baseline PPS.  PSYCH: Insight, judgement, and memory are baseline, affect and mood are happy/engaged.    Lab/Diagnostic data:       ASSESSMENT/PLAN  History of CVA (cerebrovascular accident)  Seizures (H)  History of TIA (transient ischemic attack) and stroke  Hospice care patient  Benign essential hypertension  Type 2 diabetes mellitus without complication, without long-term current use of insulin (H)  Chronic. Stable. Unchanged. Follow-up routinely or as needed.    Orders written by provider at facility and transcribed by : Jeff Serrano  1. No new orders at this time.    Electronically signed by:  Dr. Lilian Streeter, APRN CNP DNP

## 2019-09-20 NOTE — PROGRESS NOTES
[place]  PRIMARY CARE PROVIDER AND CLINIC:  TONYA Obando CNP, [address] / [place] [address]  Chief Complaint   Patient presents with     penitentiary Regulatory     [place] Medical Record Number:  [id number]  Place of Service where encounter took place:  [facility]  (S) [[id number]]    Anya Gaines  is a 89 year old  (7/4/1920),admitted to this facility from next door [facility] for higher level of care. .    HPI:    HPI information obtained from: facility staff, patient report and [facility] chart review.     Today concern:reports   - Resident seen and examined today. DNP reports recently was discharged from Hospice care for stable condition.   - Denies fever, SOB, constipation, pain, fall, anorexia, loss of interest or feeling guilt.  ---------------------------------------------------------------------------------------------------  CODE STATUS/ADVANCE DIRECTIVES DISCUSSION:   DNR / DNI  Patient's living condition: used to live at [facility], now a resident of this facility  ALLERGIES: Patient has no known allergies.  PAST MEDICAL HISTORY:  has a past medical history of Cerebral artery occlusion with cerebral infarction (H), Cerebral infarction (H), Diabetes mellitus, type 2 (H), Hypertension, and Seizures (H).  PAST SURGICAL HISTORY:   has a past surgical history that includes surgical history of - .  FAMILY HISTORY: family history is not on file.  SOCIAL HISTORY:   reports that she has never smoked. She has never used smokeless tobacco. She reports that she does not drink alcohol or use drugs.    Post Discharge Medication Reconciliation Status: discharge medications reconciled, continue medications without change  Current Outpatient Medications   Medication Sig Dispense Refill     acetaminophen (TYLENOL) 325 MG tablet Take 2 tablets (650 mg) by mouth 4 times daily       acetaminophen (TYLENOL) 325 MG tablet Take 2 tablets (650 mg) by mouth every 4 hours as  needed for mild pain 100 tablet      amLODIPine (NORVASC) 5 MG tablet TAKE 1 TABLET BY MOUTH DAILY AT BEDTIME DX HYPERTENSION 31 tablet 11     clopidogrel (PLAVIX) 75 MG tablet TAKE 1 TABLET BY MOUTH EVERY DAY DX CEREBROVASCULAR ACCIDENT 30 tablet 11     gabapentin (NEURONTIN) 600 MG tablet TAKE 1 TABLET BY MOUTH TWICE DAILY FOR INVOLUNTARY MOVEMENTS 60 tablet 11     hydrochlorothiazide (HYDRODIURIL) 25 MG tablet TAKE 1 TABLET BY MOUTH EVERY MORNING DX HYPERTENSION 31 tablet 11     hypromellose-dextran 0.3-0.1% (ARTIFICIAL TEARS) opthalmic solution Place 1 drop into both eyes 3 times daily 15 mL 3     lisinopril (PRINIVIL/ZESTRIL) 40 MG tablet TAKE 1 TABLET BY MOUTH EVERY DAY DX HYPERTENSION 30 tablet 11     metFORMIN (GLUCOPHAGE-XR) 500 MG 24 hr tablet Take 2 tablets (1,000 mg) by mouth daily (with dinner) 120 tablet 11     morphine 2.5 MG solu-tab Take 1 tablet (2.5 mg) by mouth every 2 hours as needed for shortness of breath / dyspnea or moderate to severe pain  0     MULTIPLE VITAMINS-MINERALS PO Take 1 tablet by mouth daily       order for DME Safety lancets 100 strip 11     order for DME Equipment being ordered: Contour test strips 100 each 5     ROS:  10 point ROS of systems including Constitutional, Eyes, Respiratory, Cardiovascular, Gastroenterology, Genitourinary, Integumentary, Musculoskeletal, Psychiatric were all negative except for pertinent positives noted in my HPI.    Vitals:  BP (!) 158/82   Pulse 85   Temp 98.1  F (36.7  C)   Resp 18   Wt 54.9 kg (121 lb)   SpO2 98%   BMI 24.44 kg/m    Exam:  GENERAL APPEARANCE:  in no distress, cooperative  ENT:  Mouth and posterior oropharynx normal, moist mucous membranes, oral mucosa moist, no lesion noted.   EYES:  EOMI, Pupil rounded and equal.  RESP:  lungs clear to auscultation   CV:  S1S2 audible, rapid regular HR, no murmur appreciated.   ABDOMEN:  soft, NT/ND, BS audible. no mass appreciated on palpation.   M/S:   no joint deformity noted on  observation.   SKIN:  No rash.   NEURO:   No NFD appreciated on observation.   PSYCH:  Mood and affect appropriate      Lab/Diagnostic data: Reviewed in the chart and EHR.    -----------------------------------------------------------------------  ASSESSMENT/PLAN:    Type 2 diabetes mellitus without complication, without long-term current use of insulin (H):   A1C 7.4 04/03/2019   - on metformin.  In this frail elderly adult with a limited life expectancy, the goal of  the management is to address the hyperglycemia sx if any,  rather than the  BGs numbers per se.     Benign essential hype: BP reading in general w/i acceptable range for this age group.     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   - Significant  Deficits requiring NH placement. Requiring extensive assistance from nursing. Up for meals only o/w spends the day resting in bed  - Body mass index is 24.44 kg/m . In frail elderly keep BMI b/lw 25-35 Kg(m2). On MV.       Order: See above, otherwise, continue the rest of the current POC.       Electronically signed by:  Yoan Camp MD

## 2019-09-22 VITALS
SYSTOLIC BLOOD PRESSURE: 158 MMHG | TEMPERATURE: 98.1 F | BODY MASS INDEX: 24.44 KG/M2 | OXYGEN SATURATION: 98 % | RESPIRATION RATE: 18 BRPM | DIASTOLIC BLOOD PRESSURE: 82 MMHG | WEIGHT: 121 LBS | HEART RATE: 85 BPM

## 2019-09-23 ENCOUNTER — NURSING HOME VISIT (OUTPATIENT)
Dept: GERIATRICS | Facility: CLINIC | Age: 84
End: 2019-09-23
Payer: COMMERCIAL

## 2019-09-23 DIAGNOSIS — R56.9 SEIZURES (H): ICD-10-CM

## 2019-09-23 DIAGNOSIS — E11.9 TYPE 2 DIABETES MELLITUS WITHOUT COMPLICATION, WITHOUT LONG-TERM CURRENT USE OF INSULIN (H): Primary | ICD-10-CM

## 2019-09-23 DIAGNOSIS — F90.9 HYPERKINETIC DISORDER: ICD-10-CM

## 2019-09-23 DIAGNOSIS — Z86.73 HISTORY OF CVA (CEREBROVASCULAR ACCIDENT): ICD-10-CM

## 2019-09-23 DIAGNOSIS — I10 BENIGN ESSENTIAL HYPERTENSION: ICD-10-CM

## 2019-09-23 DIAGNOSIS — R54 AGE-RELATED PHYSICAL DEBILITY: ICD-10-CM

## 2019-09-23 PROCEDURE — 99309 SBSQ NF CARE MODERATE MDM 30: CPT | Performed by: FAMILY MEDICINE

## 2019-09-23 NOTE — LETTER
9/23/2019        RE: Anya Gaines  [facility]  [address]  [address]  [place] [address]        [place] [facility]  PRIMARY CARE PROVIDER AND CLINIC:  TONYA Obando CNP, [address] / [place] [address]  Chief Complaint   Patient presents with     jail Regulatory     [place] Medical Record Number:  [id number]  Place of Service where encounter took place:  [facility]  (FGS) [[id number]]    Anya Gaines  is a 89 year old  (7/4/1920),admitted to this facility from next door [facility] for higher level of care. .    HPI:    HPI information obtained from: facility staff, patient report and [facility] chart review.     Today concern:reports   - Resident seen and examined today. DNP reports recently was discharged from Hospice care for stable condition.   - Denies fever, SOB, constipation, pain, fall, anorexia, loss of interest or feeling guilt.  ---------------------------------------------------------------------------------------------------  CODE STATUS/ADVANCE DIRECTIVES DISCUSSION:   DNR / DNI  Patient's living condition: used to live at [facility], now a resident of this facility  ALLERGIES: Patient has no known allergies.  PAST MEDICAL HISTORY:  has a past medical history of Cerebral artery occlusion with cerebral infarction (H), Cerebral infarction (H), Diabetes mellitus, type 2 (H), Hypertension, and Seizures (H).  PAST SURGICAL HISTORY:   has a past surgical history that includes surgical history of - .  FAMILY HISTORY: family history is not on file.  SOCIAL HISTORY:   reports that she has never smoked. She has never used smokeless tobacco. She reports that she does not drink alcohol or use drugs.    Post Discharge Medication Reconciliation Status: discharge medications reconciled, continue medications without change  Current Outpatient Medications   Medication Sig Dispense Refill     acetaminophen (TYLENOL) 325 MG tablet Take 2 tablets (650 mg) by  mouth 4 times daily       acetaminophen (TYLENOL) 325 MG tablet Take 2 tablets (650 mg) by mouth every 4 hours as needed for mild pain 100 tablet      amLODIPine (NORVASC) 5 MG tablet TAKE 1 TABLET BY MOUTH DAILY AT BEDTIME DX HYPERTENSION 31 tablet 11     clopidogrel (PLAVIX) 75 MG tablet TAKE 1 TABLET BY MOUTH EVERY DAY DX CEREBROVASCULAR ACCIDENT 30 tablet 11     gabapentin (NEURONTIN) 600 MG tablet TAKE 1 TABLET BY MOUTH TWICE DAILY FOR INVOLUNTARY MOVEMENTS 60 tablet 11     hydrochlorothiazide (HYDRODIURIL) 25 MG tablet TAKE 1 TABLET BY MOUTH EVERY MORNING DX HYPERTENSION 31 tablet 11     hypromellose-dextran 0.3-0.1% (ARTIFICIAL TEARS) opthalmic solution Place 1 drop into both eyes 3 times daily 15 mL 3     lisinopril (PRINIVIL/ZESTRIL) 40 MG tablet TAKE 1 TABLET BY MOUTH EVERY DAY DX HYPERTENSION 30 tablet 11     metFORMIN (GLUCOPHAGE-XR) 500 MG 24 hr tablet Take 2 tablets (1,000 mg) by mouth daily (with dinner) 120 tablet 11     morphine 2.5 MG solu-tab Take 1 tablet (2.5 mg) by mouth every 2 hours as needed for shortness of breath / dyspnea or moderate to severe pain  0     MULTIPLE VITAMINS-MINERALS PO Take 1 tablet by mouth daily       order for DME Safety lancets 100 strip 11     order for DME Equipment being ordered: Contour test strips 100 each 5     ROS:  10 point ROS of systems including Constitutional, Eyes, Respiratory, Cardiovascular, Gastroenterology, Genitourinary, Integumentary, Musculoskeletal, Psychiatric were all negative except for pertinent positives noted in my HPI.    Vitals:  BP (!) 158/82   Pulse 85   Temp 98.1  F (36.7  C)   Resp 18   Wt 54.9 kg (121 lb)   SpO2 98%   BMI 24.44 kg/m     Exam:  GENERAL APPEARANCE:  in no distress, cooperative  ENT:  Mouth and posterior oropharynx normal, moist mucous membranes, oral mucosa moist, no lesion noted.   EYES:  EOMI, Pupil rounded and equal.  RESP:  lungs clear to auscultation   CV:  S1S2 audible, rapid regular HR, no murmur  appreciated.   ABDOMEN:  soft, NT/ND, BS audible. no mass appreciated on palpation.   M/S:   no joint deformity noted on observation.   SKIN:  No rash.   NEURO:   No NFD appreciated on observation.   PSYCH:  Mood and affect appropriate      Lab/Diagnostic data: Reviewed in the chart and EHR.    -----------------------------------------------------------------------  ASSESSMENT/PLAN:    Type 2 diabetes mellitus without complication, without long-term current use of insulin (H):   A1C 7.4 04/03/2019   - on metformin.  In this frail elderly adult with a limited life expectancy, the goal of  the management is to address the hyperglycemia sx if any,  rather than the  BGs numbers per se.     Benign essential hype: BP reading in general w/i acceptable range for this age group.     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   - Significant  Deficits requiring NH placement. Requiring extensive assistance from nursing. Up for meals only o/w spends the day resting in bed  - Body mass index is 24.44 kg/m . In frail elderly keep BMI b/lw 25-35 Kg(m2). On MV.       Order: See above, otherwise, continue the rest of the current POC.       Electronically signed by:  Yoan Camp MD                         Sincerely,        Yoan Camp MD

## 2019-10-21 NOTE — PROGRESS NOTES
[place] [facility]  [place] Medical Record Number:  [id number]  Place of Service where encounter took place:  [facility]  (FGS) [[id number]]  Chief Complaint   Patient presents with     Nursing Home Acute   HPI: Anya Gaines  is a 89 year old (7/4/1920), who is being seen today for an episodic care visit.  HPI information obtained from: facility chart records, facility staff, patient report, [facility] chart review and Care Everywhere [facility] chart review. Today's concern is:    Loose stools  Fecal smearing  History of CVA (cerebrovascular accident)  Type 2 diabetes mellitus without complication, without long-term current use of insulin (H)  Benign essential hypertension  Nursing requesting medication review today as they have found patient in her bathroom numerous times covered in stool and smearing fecal matter everywhere. She was recently discharged from hospice services. There have not been recent medication changes. Noting no recent blood work given she was hospice status.    She denies pain, CP, SOB, dizziness, headache, constipation/diarrhea. We note she is not on any stool softeners, but is on Metformin. No know BG readings (secondary to hospice), and last a1c was 7.4% in April 2019. We note several antihypertensives needed and BP readings noted below:  10/21/2019 10:55 142 / 92 mmHg    10/20/2019 11:25 137 / 88 mmHg   10/19/2019 09:38 156 / 93 mmHg   10/18/2019 09:49 148 / 87 mmHg   10/17/2019 10:22 147 / 86 mmHg   10/16/2019 11:53 128 / 83 mmHg   10/15/2019 17:46 134 / 78 mmHg   10/15/2019 10:37 132 / 82 mmHg   10/14/2019 09:56 178 / 86 mmHg   10/13/2019 09:45 125 / 77 mmHg  10/12/2019 10:52 133 / 83 mmHg    Past Medical and Surgical History reviewed in Epic today.  REVIEW OF SYSTEMS: Limited secondary to cognitive impairment but today pt reports the above and 4 point ROS including Respiratory, CV, GI and , other than that noted in the HPI, is negative.    Objective:  /80  "  Pulse 80   Temp 97.9  F (36.6  C)   Resp 18   Ht 1.6 m (5' 3\")   Wt 53.4 kg (117 lb 12.8 oz)   SpO2 96%   BMI 20.87 kg/m    Exam:  GENERAL APPEARANCE: Alert, in no distress, cooperative.   ENT: Mouth/posterior oropharynx intact w/ moist mucous membranes, hearing acuity [place].  EYES: EOM, conjunctivae, lids, pupils and irises normal, PERRL2.   RESP: Respiratory effort good, no respiratory distress, Lung sounds clear. On RA.   CV: Auscultation of heart reveals S1, S2, rate and rhythm regular, no murmur, no rub or gallop, Edema 0+ BLE. Peripheral pulses are 2+.  ABDOMEN: Normal bowel sounds, soft, non-tender abdomen, and no masses palpated.  SKIN: Inspection/Palpation of skin and subcutaneous tissue baseline w/ fragility. No wounds/rashes noted.   NEURO: CN II-XII at patient's baseline, sensation baseline PPS.  PSYCH: Insight, judgement, and memory are baseline, affect and mood are happy/engaged.    Labs:   Recent labs in [employer] reviewed by me today.     ASSESSMENT/PLAN:  Loose stools  Fecal smearing  History of CVA (cerebrovascular accident)  Type 2 diabetes mellitus without complication, without long-term current use of insulin (H)  Benign essential hypertension  Acute-on-chronic. Noting Loose stools can be a SE of Metformin. Will trend BG readings, and obtain a1c to evaluate control. Will keeps HTN regimen as-is given BPs. Will GDR Tylenol to decrease medication burden. Follow-up w/in w/ results or as needed.    Orders written by provider at facility and transcribed by : Jeff Serrano  1. Decrease Tylenol(scheduled) from QID to TID Dx: Pain  2. Accu checks every day x14 days then Change to weekly Dx: Dm2  3. TSH, A1C, CBC, CMP x1 Dx: Loose stools  4. Discontinue PRN Tylenol.     Electronically signed by:  Dr. Lilian Streeter, APRN CNP DNP      "

## 2019-10-22 ENCOUNTER — NURSING HOME VISIT (OUTPATIENT)
Dept: GERIATRICS | Facility: CLINIC | Age: 84
End: 2019-10-22
Payer: COMMERCIAL

## 2019-10-22 VITALS
BODY MASS INDEX: 20.87 KG/M2 | HEIGHT: 63 IN | TEMPERATURE: 97.9 F | SYSTOLIC BLOOD PRESSURE: 121 MMHG | HEART RATE: 80 BPM | WEIGHT: 117.8 LBS | DIASTOLIC BLOOD PRESSURE: 80 MMHG | RESPIRATION RATE: 18 BRPM | OXYGEN SATURATION: 96 %

## 2019-10-22 DIAGNOSIS — F90.9 HYPERKINETIC DISORDER: ICD-10-CM

## 2019-10-22 DIAGNOSIS — R15.1 FECAL SMEARING: ICD-10-CM

## 2019-10-22 DIAGNOSIS — Z86.73 HISTORY OF CVA (CEREBROVASCULAR ACCIDENT): ICD-10-CM

## 2019-10-22 DIAGNOSIS — I10 BENIGN ESSENTIAL HYPERTENSION: ICD-10-CM

## 2019-10-22 DIAGNOSIS — R19.5 LOOSE STOOLS: Primary | ICD-10-CM

## 2019-10-22 DIAGNOSIS — E11.9 TYPE 2 DIABETES MELLITUS WITHOUT COMPLICATION, WITHOUT LONG-TERM CURRENT USE OF INSULIN (H): ICD-10-CM

## 2019-10-22 PROCEDURE — 99309 SBSQ NF CARE MODERATE MDM 30: CPT | Performed by: NURSE PRACTITIONER

## 2019-10-22 RX ORDER — VIT A/VIT C/VIT E/ZINC/COPPER 2148-113
1 TABLET ORAL 2 TIMES DAILY
COMMUNITY

## 2019-10-22 RX ORDER — ACETAMINOPHEN 325 MG/1
650 TABLET ORAL 3 TIMES DAILY
Refills: 0
Start: 2019-10-22 | End: 2019-11-04

## 2019-10-22 ASSESSMENT — MIFFLIN-ST. JEOR: SCORE: 878.47

## 2019-10-22 NOTE — LETTER
10/22/2019        RE: Anya Gaines  [facility]  [address] [facility] [address]  [place] [address]        [place] [facility]  [place] Medical Record Number:  [id number]  Place of Service where encounter took place:  [facility]  (FGS) [[id number]]  Chief Complaint   Patient presents with     Nursing Home Acute   HPI: Anya Gaines  is a 89 year old (7/4/1920), who is being seen today for an episodic care visit.  HPI information obtained from: facility chart records, facility staff, patient report, [facility] chart review and Care Everywhere Psychiatric chart review. Today's concern is:    Loose stools  Fecal smearing  History of CVA (cerebrovascular accident)  Type 2 diabetes mellitus without complication, without long-term current use of insulin (H)  Benign essential hypertension  Nursing requesting medication review today as they have found patient in her bathroom numerous times covered in stool and smearing fecal matter everywhere. She was recently discharged from hospice services. There have not been recent medication changes. Noting no recent blood work given she was hospice status.    She denies pain, CP, SOB, dizziness, headache, constipation/diarrhea. We note she is not on any stool softeners, but is on Metformin. No know BG readings (secondary to hospice), and last a1c was 7.4% in April 2019. We note several antihypertensives needed and BP readings noted below:  10/21/2019 10:55 142 / 92 mmHg    10/20/2019 11:25 137 / 88 mmHg   10/19/2019 09:38 156 / 93 mmHg   10/18/2019 09:49 148 / 87 mmHg   10/17/2019 10:22 147 / 86 mmHg   10/16/2019 11:53 128 / 83 mmHg   10/15/2019 17:46 134 / 78 mmHg   10/15/2019 10:37 132 / 82 mmHg   10/14/2019 09:56 178 / 86 mmHg   10/13/2019 09:45 125 / 77 mmHg  10/12/2019 10:52 133 / 83 mmHg    Past Medical and Surgical History reviewed in Psychiatric today.  REVIEW OF SYSTEMS: Limited secondary to cognitive impairment but today pt reports the above and 4  "point ROS including Respiratory, CV, GI and , other than that noted in the HPI, is negative.    Objective:  /80   Pulse 80   Temp 97.9  F (36.6  C)   Resp 18   Ht 1.6 m (5' 3\")   Wt 53.4 kg (117 lb 12.8 oz)   SpO2 96%   BMI 20.87 kg/m     Exam:  GENERAL APPEARANCE: Alert, in no distress, cooperative.   ENT: Mouth/posterior oropharynx intact w/ moist mucous membranes, hearing acuity [place].  EYES: EOM, conjunctivae, lids, pupils and irises normal, PERRL2.   RESP: Respiratory effort good, no respiratory distress, Lung sounds clear. On RA.   CV: Auscultation of heart reveals S1, S2, rate and rhythm regular, no murmur, no rub or gallop, Edema 0+ BLE. Peripheral pulses are 2+.  ABDOMEN: Normal bowel sounds, soft, non-tender abdomen, and no masses palpated.  SKIN: Inspection/Palpation of skin and subcutaneous tissue baseline w/ fragility. No wounds/rashes noted.   NEURO: CN II-XII at patient's baseline, sensation baseline PPS.  PSYCH: Insight, judgement, and memory are baseline, affect and mood are happy/engaged.    Labs:   Recent labs in [employer] reviewed by me today.     ASSESSMENT/PLAN:  Loose stools  Fecal smearing  History of CVA (cerebrovascular accident)  Type 2 diabetes mellitus without complication, without long-term current use of insulin (H)  Benign essential hypertension  Acute-on-chronic. Noting Loose stools can be a SE of Metformin. Will trend BG readings, and obtain a1c to evaluate control. Will keeps HTN regimen as-is given BPs. Will GDR Tylenol to decrease medication burden. Follow-up w/in w/ results or as needed.    Orders written by provider at facility and transcribed by : Jeff Serrano  1. Decrease Tylenol(scheduled) from QID to TID Dx: Pain  2. Accu checks every day x14 days then Change to weekly Dx: Dm2  3. TSH, A1C, CBC, CMP x1 Dx: Loose stools  4. Discontinue PRN Tylenol.     Electronically signed by:  Dr. iLlian Streeter, APRN CNP DNP          Sincerely,        Lilian RODRIGUEZ" TONYA Streeter CNP

## 2019-10-23 ENCOUNTER — TRANSFERRED RECORDS (OUTPATIENT)
Dept: HEALTH INFORMATION MANAGEMENT | Facility: CLINIC | Age: 84
End: 2019-10-23

## 2019-10-23 ENCOUNTER — RECORDS - HEALTHEAST (OUTPATIENT)
Dept: LAB | Facility: CLINIC | Age: 84
End: 2019-10-23

## 2019-10-23 LAB
ALBUMIN SERPL-MCNC: 3.5 G/DL (ref 3.5–5)
ALP SERPL-CCNC: 87 U/L (ref 45–120)
ALT SERPL W P-5'-P-CCNC: <9 U/L (ref 0–45)
ANION GAP SERPL CALCULATED.3IONS-SCNC: 6 MMOL/L (ref 5–18)
AST SERPL W P-5'-P-CCNC: 10 U/L (ref 0–40)
BILIRUB SERPL-MCNC: 0.4 MG/DL (ref 0–1)
BUN SERPL-MCNC: 18 MG/DL (ref 8–28)
CALCIUM SERPL-MCNC: 9.7 MG/DL (ref 8.5–10.5)
CHLORIDE BLD-SCNC: 104 MMOL/L (ref 98–107)
CO2 SERPL-SCNC: 32 MMOL/L (ref 22–31)
CREAT SERPL-MCNC: 0.64 MG/DL (ref 0.6–1.1)
ERYTHROCYTE [DISTWIDTH] IN BLOOD BY AUTOMATED COUNT: 13.3 % (ref 11–14.5)
GFR SERPL CREATININE-BSD FRML MDRD: >60 ML/MIN/1.73M2
GLUCOSE BLD-MCNC: 154 MG/DL (ref 70–125)
HCT VFR BLD AUTO: 41.6 % (ref 35–47)
HGB BLD-MCNC: 13.1 G/DL (ref 12–16)
MCH RBC QN AUTO: 29.2 PG (ref 27–34)
MCHC RBC AUTO-ENTMCNC: 31.5 G/DL (ref 32–36)
MCV RBC AUTO: 93 FL (ref 80–100)
PLATELET # BLD AUTO: 187 THOU/UL (ref 140–440)
PMV BLD AUTO: 10.8 FL (ref 8.5–12.5)
POTASSIUM BLD-SCNC: 3.6 MMOL/L (ref 3.5–5)
PROT SERPL-MCNC: 6.7 G/DL (ref 6–8)
RBC # BLD AUTO: 4.48 MILL/UL (ref 3.8–5.4)
SODIUM SERPL-SCNC: 142 MMOL/L (ref 136–145)
TSH SERPL DL<=0.005 MIU/L-ACNC: 1.13 UIU/ML (ref 0.3–5)
WBC: 6.1 THOU/UL (ref 4–11)

## 2019-10-24 LAB — HBA1C MFR BLD: 6.9 % (ref 4.2–6.1)

## 2019-11-04 ENCOUNTER — NURSING HOME VISIT (OUTPATIENT)
Dept: GERIATRICS | Facility: CLINIC | Age: 84
End: 2019-11-04
Payer: COMMERCIAL

## 2019-11-04 VITALS
TEMPERATURE: 98 F | HEART RATE: 88 BPM | OXYGEN SATURATION: 97 % | DIASTOLIC BLOOD PRESSURE: 98 MMHG | WEIGHT: 115.2 LBS | BODY MASS INDEX: 20.41 KG/M2 | SYSTOLIC BLOOD PRESSURE: 143 MMHG | RESPIRATION RATE: 18 BRPM

## 2019-11-04 DIAGNOSIS — I10 BENIGN ESSENTIAL HYPERTENSION: ICD-10-CM

## 2019-11-04 DIAGNOSIS — Z86.73 HISTORY OF CVA (CEREBROVASCULAR ACCIDENT): ICD-10-CM

## 2019-11-04 DIAGNOSIS — F90.9 HYPERKINETIC DISORDER: ICD-10-CM

## 2019-11-04 DIAGNOSIS — E11.9 TYPE 2 DIABETES MELLITUS WITHOUT COMPLICATION, WITHOUT LONG-TERM CURRENT USE OF INSULIN (H): ICD-10-CM

## 2019-11-04 DIAGNOSIS — R56.9 SEIZURES (H): ICD-10-CM

## 2019-11-04 DIAGNOSIS — Z00.00 ANNUAL PHYSICAL EXAM: Primary | ICD-10-CM

## 2019-11-04 PROCEDURE — 99318 ZZC ANNUAL NURSING FAC ASSESSMNT, STABLE: CPT | Performed by: NURSE PRACTITIONER

## 2019-11-04 RX ORDER — AMLODIPINE BESYLATE 5 MG/1
TABLET ORAL
Qty: 31 TABLET | Refills: 11
Start: 2019-11-04 | End: 2021-01-01

## 2019-11-04 RX ORDER — ACETAMINOPHEN 325 MG/1
650 TABLET ORAL 2 TIMES DAILY
Refills: 0
Start: 2019-11-04

## 2019-11-04 RX ORDER — METFORMIN HYDROCHLORIDE 750 MG/1
750 TABLET, EXTENDED RELEASE ORAL
Start: 2019-11-04 | End: 2020-03-03

## 2019-11-04 RX ORDER — LOPERAMIDE HCL 2 MG
2 CAPSULE ORAL DAILY PRN
COMMUNITY

## 2019-11-04 NOTE — LETTER
11/4/2019        RE: Anya Gaines  [facility]  [address]  [address]  [facility] [address]        [place] [facility]  Chief Complaint   Patient presents with     FDC Acute   [place] Medical Record Number:  [id number]  Place of Service where encounter took place:  [facility]  (S) [[id number]]  HPI: Anya Gaines  is a 89 year old  (7/4/1920), who is being seen today for an annual comprehensive visit. HPI information obtained from: facility chart records, facility staff, patient report, [facility] chart review and Care Everywhere [facility] chart review.  Today's concerns are:    Annual physical exam  History of CVA (cerebrovascular accident)  Benign essential hypertension  Type 2 diabetes mellitus without complication, without long-term current use of insulin (H)  Seizures (H)  Patient seen today as part of an annual physical exam. We note no mammo or colonoscopy in hx. Recent lab work from October 2019 are included below as part of evaluation for loose stools. We note TSH WDL, A1c shows good control, as this population/age can be anywhere from 7-9%. Electrolytes and renal function are stable w/ appropriate CBC. Hx of statin use with last lipid panel in 2017. Noting Lipitor was discontinued by hospice earlier this year. In September 2019, hospice discharged patient.     Today, patient denies pain, headache, dizziness, SOB, palpitations, constipation, bladder problems, and notes she still has loose stools on-and-off. She is [place], sees the in-house dentist and podiatry per nursing. Chart review shows revent VS are stable w/ BPs and BGs as noted below:  BPs:  11/3/2019 13:42 143 / 98 mmHg   11/2/2019 09:14 143 / 98 mmHg   11/1/2019 11:31 146 / 102 mmHg   10/31/2019 11:12 134 / 87 mmHg   10/30/2019 13:17 147 / 98 mmHg   10/29/2019 16:55 124 / 68 mmHg   10/29/2019 10:52 122 / 67 mmHg   10/28/2019 10:19 112 / 71 mmHg   10/27/2019 09:37 141 / 80 mmHg   10/26/2019 09:59 143 /  80 mmHg   10/25/2019 10:12 147 / 98 mmHg   10/24/2019 13:26 124 / 72 mmHg   10/23/2019 10:06 148 / 97 mmHg   10/22/2019 17:14 138 / 76 mmHg   BGs:  11/3/2019 13:41 198.0 mg/dL   11/1/2019 11:00 190.0 mg/dL   10/31/2019 11:12 172.0 mg/dL   10/30/2019 13:16 162.0 mg/dL   10/29/2019 10:03 183.0 mg/dL   10/28/2019 07:36 166.0 mg/dL   10/27/2019 09:37 188.0 mg/dL  10/26/2019 09:59 213.0 mg/dL  10/25/2019 10:12 171.0 mg/dL   10/24/2019 12:47 131.0 mg/dL   10/23/2019 10:06 156.0 mg/dL    ALLERGIES: Patient has no known allergies.. PAST MEDICAL HISTORY:  has a past medical history of Cerebral artery occlusion with cerebral infarction (H), Cerebral infarction (H), Diabetes mellitus, type 2 (H), Hypertension, and Seizures (H).PAST SURGICAL HISTORY:  has a past surgical history that includes surgical history of - .  IMMUNIZATIONS:  Immunization History   Administered Date(s) Administered     Influenza (High Dose) 3 valent vaccine 10/01/2014, 10/12/2015, 10/06/2017     Influenza (IIV3) PF 01/26/2010, 10/20/2011, 11/05/2012, 11/25/2013     Influenza (intradermal) 09/20/2018     Pneumo Conj 13-V (2010&after) 10/12/2015     Pneumococcal 23 valent 12/22/2007     TDAP Vaccine (Boostrix) 11/09/2016   Above immunizations pulled from [facility]. MIIC and facility records also reconciled. Outstanding information sent to  to update [facility].  Future immunizations are not needed at this point as all recommended immunizations are up to date.   Medication Sig     acetaminophen (TYLENOL) 325 MG tablet Take 2 tablets (650 mg) by mouth 2 times daily     amLODIPine (NORVASC) 5 MG tablet TAKE 1/2 TABLET BY MOUTH DAILY AT BEDTIME DX HYPERTENSION     clopidogrel (PLAVIX) 75 MG tablet TAKE 1 TABLET BY MOUTH EVERY DAY DX CEREBROVASCULAR ACCIDENT     gabapentin (NEURONTIN) 600 MG tablet TAKE 1 TABLET BY MOUTH TWICE DAILY FOR INVOLUNTARY MOVEMENTS     hydrochlorothiazide (HYDRODIURIL) 25 MG tablet TAKE 1 TABLET BY MOUTH EVERY  MORNING DX HYPERTENSION     hypromellose-dextran 0.3-0.1% (ARTIFICIAL TEARS) opthalmic solution Place 1 drop into both eyes 3 times daily     lisinopril (PRINIVIL/ZESTRIL) 40 MG tablet TAKE 1 TABLET BY MOUTH EVERY DAY DX HYPERTENSION     loperamide (IMODIUM) 2 MG capsule Take 2 mg by mouth every other day     metFORMIN (GLUCOPHAGE-XR) 750 MG 24 hr tablet Take 1 tablet (750 mg) by mouth daily (with dinner)     Multiple Vitamins-Minerals (PRESERVISION AREDS) TABS Take 1 Tablespoonful by mouth 2 times daily     Case Management: I have reviewed the facility/SNF care plan/MDS, including the falls risk, nutrition and pain screening. I also reviewed the current immunizations, and preventive care. .Future cancer screening is not clinically indicated secondary to age/goals of care Patient's desire to return to the community is not assessible due to cognitive impairment. Current Level of Care is appropriate.    Advance Directive Discussion: I reviewed the current advanced directives as reflected in EPIC, the POLST and the facility chart, and verified the congruency of orders. I contacted the first party Kimi (daughter) and discussed the plan of Care.  I did not due to cognitive impairment review the advance directives with the resident.     Team Discussion: I communicated with the appropriate disciplines involved with the Plan of Care: Nursing  . Patient's goal is pain control and comfort. Information reviewed: Medications, vital signs, orders, and nursing notes.    ROS: Limited secondary to cognitive impairment but today pt reports the above and 6 point ROS including Respiratory, CV, GI and , other than that noted in the HPI, is negative.    Vitals:  BP (!) 143/98   Pulse 88   Temp 98  F (36.7  C)   Resp 18   Wt 52.3 kg (115 lb 3.2 oz)   SpO2 97%   BMI 20.41 kg/m    Body mass index is 20.41 kg/m .  Exam:  GENERAL APPEARANCE: Alert, in no distress, cooperative.   ENT: Mouth/posterior oropharynx intact w/ moist mucous  membranes, hearing acuity [place].  EYES: EOM, conjunctivae, lids, pupils and irises normal, PERRL2.   RESP: Respiratory effort good, no respiratory distress, Lung sounds clear. On RA.   CV: Auscultation of heart reveals S1, S2, rate and rhythm regular, no murmur, no rub or gallop, Edema 0+ BLE. Peripheral pulses are 2+.  ABDOMEN: Normal bowel sounds, soft, non-tender abdomen, and no masses palpated.  SKIN: Inspection/Palpation of skin and subcutaneous tissue baseline w/ fragility. No wounds/rashes noted.   NEURO: CN II-XII at patient's baseline, sensation baseline PPS.  PSYCH: Insight, judgement, and memory are baseline, affect and mood are happy.    Lab/Diagnostic data:       ASSESSMENT/PLAN  Annual physical exam  History of CVA (cerebrovascular accident)  Benign essential hypertension  Type 2 diabetes mellitus without complication, without long-term current use of insulin (H)  Seizures (H)  Chronic. Ongoing and progressive. To reduce medication burden and given this patient's lab work and clinical presentation, we will reduce Metformin, Tylenol, and Norvasc. We will check Vitamin D and Mag levels given ongoing loose stools. We will screen for lipid disorder. We will defer to eye specilaist given hx of DM, Macular degen, and glaucoma. This POC discussed and confirmed w/ daughter Kimi via phone. Follow-up routinely or as needed.    Orders written by provider at facility and transcribed by : Jeff Serrano  1. Decrease metformin XR from 1000 mg to 750 mg PO every day Dx: Dm2  2. Decrease Amlodipine form 5 mg to 2.5 mg PO every day Dx: HTN  3. Decrease Scheduled tylenol form 650 mg TID to 650 mg PO BID Dx: Pain  4. Fasting lipid panel, Vitamin D level, Magnesium level x1 Dx: Loose stools, Fatigue  5. Update Code order, still DNR/DNI but no longer on hospice.    Electronically signed by:  TONYA Walton CNP DNP        Sincerely,        TONYA Obando CNP

## 2019-11-04 NOTE — PROGRESS NOTES
[place] [facility]  Chief Complaint   Patient presents with     detention Acute   [place] Medical Record Number:  [id number]  Place of Service where encounter took place:  [facility]  (S) [[id number]]  HPI: Anya Gaines  is a 89 year old  (7/4/1920), who is being seen today for an annual comprehensive visit. HPI information obtained from: facility chart records, facility staff, patient report, [facility] chart review and Care Everywhere [facility] chart review.  Today's concerns are:    Annual physical exam  History of CVA (cerebrovascular accident)  Benign essential hypertension  Type 2 diabetes mellitus without complication, without long-term current use of insulin (H)  Seizures (H)  Patient seen today as part of an annual physical exam. We note no mammo or colonoscopy in hx. Recent lab work from October 2019 are included below as part of evaluation for loose stools. We note TSH WDL, A1c shows good control, as this population/age can be anywhere from 7-9%. Electrolytes and renal function are stable w/ appropriate CBC. Hx of statin use with last lipid panel in 2017. Noting Lipitor was discontinued by hospice earlier this year. In September 2019, hospice discharged patient.     Today, patient denies pain, headache, dizziness, SOB, palpitations, constipation, bladder problems, and notes she still has loose stools on-and-off. She is [place], sees the in-house dentist and podiatry per nursing. Chart review shows revent VS are stable w/ BPs and BGs as noted below:  BPs:  11/3/2019 13:42 143 / 98 mmHg   11/2/2019 09:14 143 / 98 mmHg   11/1/2019 11:31 146 / 102 mmHg   10/31/2019 11:12 134 / 87 mmHg   10/30/2019 13:17 147 / 98 mmHg   10/29/2019 16:55 124 / 68 mmHg   10/29/2019 10:52 122 / 67 mmHg   10/28/2019 10:19 112 / 71 mmHg   10/27/2019 09:37 141 / 80 mmHg   10/26/2019 09:59 143 / 80 mmHg   10/25/2019 10:12 147 / 98 mmHg   10/24/2019 13:26 124 / 72 mmHg   10/23/2019 10:06 148 / 97 mmHg    10/22/2019 17:14 138 / 76 mmHg   BGs:  11/3/2019 13:41 198.0 mg/dL   11/1/2019 11:00 190.0 mg/dL   10/31/2019 11:12 172.0 mg/dL   10/30/2019 13:16 162.0 mg/dL   10/29/2019 10:03 183.0 mg/dL   10/28/2019 07:36 166.0 mg/dL   10/27/2019 09:37 188.0 mg/dL  10/26/2019 09:59 213.0 mg/dL  10/25/2019 10:12 171.0 mg/dL   10/24/2019 12:47 131.0 mg/dL   10/23/2019 10:06 156.0 mg/dL    ALLERGIES: Patient has no known allergies.. PAST MEDICAL HISTORY:  has a past medical history of Cerebral artery occlusion with cerebral infarction (H), Cerebral infarction (H), Diabetes mellitus, type 2 (H), Hypertension, and Seizures (H).PAST SURGICAL HISTORY:  has a past surgical history that includes surgical history of - .  IMMUNIZATIONS:  Immunization History   Administered Date(s) Administered     Influenza (High Dose) 3 valent vaccine 10/01/2014, 10/12/2015, 10/06/2017     Influenza (IIV3) PF 01/26/2010, 10/20/2011, 11/05/2012, 11/25/2013     Influenza (intradermal) 09/20/2018     Pneumo Conj 13-V (2010&after) 10/12/2015     Pneumococcal 23 valent 12/22/2007     TDAP Vaccine (Boostrix) 11/09/2016   Above immunizations pulled from [facility]. MIIC and facility records also reconciled. Outstanding information sent to  to update [facility].  Future immunizations are not needed at this point as all recommended immunizations are up to date.   Medication Sig     acetaminophen (TYLENOL) 325 MG tablet Take 2 tablets (650 mg) by mouth 2 times daily     amLODIPine (NORVASC) 5 MG tablet TAKE 1/2 TABLET BY MOUTH DAILY AT BEDTIME DX HYPERTENSION     clopidogrel (PLAVIX) 75 MG tablet TAKE 1 TABLET BY MOUTH EVERY DAY DX CEREBROVASCULAR ACCIDENT     gabapentin (NEURONTIN) 600 MG tablet TAKE 1 TABLET BY MOUTH TWICE DAILY FOR INVOLUNTARY MOVEMENTS     hydrochlorothiazide (HYDRODIURIL) 25 MG tablet TAKE 1 TABLET BY MOUTH EVERY MORNING DX HYPERTENSION     hypromellose-dextran 0.3-0.1% (ARTIFICIAL TEARS) opthalmic solution Place 1 drop  into both eyes 3 times daily     lisinopril (PRINIVIL/ZESTRIL) 40 MG tablet TAKE 1 TABLET BY MOUTH EVERY DAY DX HYPERTENSION     loperamide (IMODIUM) 2 MG capsule Take 2 mg by mouth every other day     metFORMIN (GLUCOPHAGE-XR) 750 MG 24 hr tablet Take 1 tablet (750 mg) by mouth daily (with dinner)     Multiple Vitamins-Minerals (PRESERVISION AREDS) TABS Take 1 Tablespoonful by mouth 2 times daily     Case Management: I have reviewed the facility/SNF care plan/MDS, including the falls risk, nutrition and pain screening. I also reviewed the current immunizations, and preventive care. .Future cancer screening is not clinically indicated secondary to age/goals of care Patient's desire to return to the community is not assessible due to cognitive impairment. Current Level of Care is appropriate.    Advance Directive Discussion: I reviewed the current advanced directives as reflected in EPIC, the POLST and the facility chart, and verified the congruency of orders. I contacted the first party Kimi (daughter) and discussed the plan of Care.  I did not due to cognitive impairment review the advance directives with the resident.     Team Discussion: I communicated with the appropriate disciplines involved with the Plan of Care: Nursing  . Patient's goal is pain control and comfort. Information reviewed: Medications, vital signs, orders, and nursing notes.    ROS: Limited secondary to cognitive impairment but today pt reports the above and 6 point ROS including Respiratory, CV, GI and , other than that noted in the HPI, is negative.    Vitals:  BP (!) 143/98   Pulse 88   Temp 98  F (36.7  C)   Resp 18   Wt 52.3 kg (115 lb 3.2 oz)   SpO2 97%   BMI 20.41 kg/m   Body mass index is 20.41 kg/m .  Exam:  GENERAL APPEARANCE: Alert, in no distress, cooperative.   ENT: Mouth/posterior oropharynx intact w/ moist mucous membranes, hearing acuity [place].  EYES: EOM, conjunctivae, lids, pupils and irises normal, PERRL2.   RESP:  Respiratory effort good, no respiratory distress, Lung sounds clear. On RA.   CV: Auscultation of heart reveals S1, S2, rate and rhythm regular, no murmur, no rub or gallop, Edema 0+ BLE. Peripheral pulses are 2+.  ABDOMEN: Normal bowel sounds, soft, non-tender abdomen, and no masses palpated.  SKIN: Inspection/Palpation of skin and subcutaneous tissue baseline w/ fragility. No wounds/rashes noted.   NEURO: CN II-XII at patient's baseline, sensation baseline PPS.  PSYCH: Insight, judgement, and memory are baseline, affect and mood are happy.    Lab/Diagnostic data:       ASSESSMENT/PLAN  Annual physical exam  History of CVA (cerebrovascular accident)  Benign essential hypertension  Type 2 diabetes mellitus without complication, without long-term current use of insulin (H)  Seizures (H)  Chronic. Ongoing and progressive. To reduce medication burden and given this patient's lab work and clinical presentation, we will reduce Metformin, Tylenol, and Norvasc. We will check Vitamin D and Mag levels given ongoing loose stools. We will screen for lipid disorder. We will defer to eye specilaist given hx of DM, Macular degen, and glaucoma. This POC discussed and confirmed w/ daughter Kimi via phone. Follow-up routinely or as needed.    Orders written by provider at facility and transcribed by : Jeff Serrano  1. Decrease metformin XR from 1000 mg to 750 mg PO every day Dx: Dm2  2. Decrease Amlodipine form 5 mg to 2.5 mg PO every day Dx: HTN  3. Decrease Scheduled tylenol form 650 mg TID to 650 mg PO BID Dx: Pain  4. Fasting lipid panel, Vitamin D level, Magnesium level x1 Dx: Loose stools, Fatigue  5. Update Code order, still DNR/DNI but no longer on hospice.    Electronically signed by:  Dr. Lilian Streeter, APRN CNP DNP

## 2019-11-05 ENCOUNTER — RECORDS - HEALTHEAST (OUTPATIENT)
Dept: LAB | Facility: CLINIC | Age: 84
End: 2019-11-05

## 2019-11-05 ENCOUNTER — TRANSFERRED RECORDS (OUTPATIENT)
Dept: HEALTH INFORMATION MANAGEMENT | Facility: CLINIC | Age: 84
End: 2019-11-05

## 2019-11-05 LAB
25(OH)D3 SERPL-MCNC: 36.8 NG/ML (ref 30–80)
CHOLEST SERPL-MCNC: 131 MG/DL
CHOLEST SERPL-MCNC: 131 MG/DL
FASTING STATUS PATIENT QL REPORTED: ABNORMAL
HDLC SERPL-MCNC: 43 MG/DL
HDLC SERPL-MCNC: 43 MG/DL
LDLC SERPL CALC-MCNC: 74 MG/DL
LDLC SERPL CALC-MCNC: 74 MG/DL
MAGNESIUM SERPL-MCNC: 1.7 MG/DL (ref 1.8–2.6)
TRIGL SERPL-MCNC: 71 MG/DL
TRIGL SERPL-MCNC: 71 MG/DL

## 2020-01-05 VITALS
SYSTOLIC BLOOD PRESSURE: 130 MMHG | BODY MASS INDEX: 21.33 KG/M2 | WEIGHT: 120.4 LBS | DIASTOLIC BLOOD PRESSURE: 68 MMHG | HEART RATE: 97 BPM | TEMPERATURE: 97.4 F | OXYGEN SATURATION: 91 % | RESPIRATION RATE: 16 BRPM

## 2020-01-05 NOTE — PROGRESS NOTES
[place] [facility]  Chief Complaint   Patient presents with     correction Regulatory     [place] Medical Record Number:  [id number]  Place of Service where encounter took place:  [facility]  ([facility]) [[id number]]    HPI:    Anya Gaines  is 89 year old (7/4/1920), who is being seen today for a federally mandated E/M visit.  HPI information obtained from: facility staff, patient report and [facility] chart review.     Today's concerns are:  - Resident seen and examined.  Reports feeling good. No CP, HA or fainting.   - Reports sleep, appetite and BM are fine.   - RN has no concern today.   - GNP has no concern  --------------------------------  - - Past Medical, social, family histories, medications, and allergies reviewed and updated  - Medications reviewed: in the chart and EHR.   - Case Management:   I have reviewed the care plan and MDS and do agree with the plan. Patient's desire to return to the community is not present.  Information reviewed:  Medications, vital signs, orders, and nursing notes.      MEDICATIONS:  Current Outpatient Medications   Medication Sig Dispense Refill     acetaminophen (TYLENOL) 325 MG tablet Take 2 tablets (650 mg) by mouth 2 times daily  0     amLODIPine (NORVASC) 5 MG tablet TAKE 1/2 TABLET BY MOUTH DAILY AT BEDTIME DX HYPERTENSION 31 tablet 11     clopidogrel (PLAVIX) 75 MG tablet TAKE 1 TABLET BY MOUTH EVERY DAY DX CEREBROVASCULAR ACCIDENT 30 tablet 11     gabapentin (NEURONTIN) 600 MG tablet TAKE 1 TABLET BY MOUTH TWICE DAILY FOR INVOLUNTARY MOVEMENTS 60 tablet 11     hydrochlorothiazide (HYDRODIURIL) 25 MG tablet TAKE 1 TABLET BY MOUTH EVERY MORNING DX HYPERTENSION 31 tablet 11     hypromellose-dextran 0.3-0.1% (ARTIFICIAL TEARS) opthalmic solution Place 1 drop into both eyes 3 times daily 15 mL 3     lisinopril (PRINIVIL/ZESTRIL) 40 MG tablet TAKE 1 TABLET BY MOUTH EVERY DAY DX HYPERTENSION 30 tablet 11     loperamide (IMODIUM) 2 MG capsule Take 2  mg by mouth every other day       metFORMIN (GLUCOPHAGE-XR) 750 MG 24 hr tablet Take 1 tablet (750 mg) by mouth daily (with dinner)       Multiple Vitamins-Minerals (PRESERVISION AREDS) TABS Take 1 Tablespoonful by mouth 2 times daily       ROS:  4 point ROS including Respiratory, CV, GI and , other than that noted in the HPI,  is negative    Vitals:  /68   Pulse 97   Temp 97.4  F (36.3  C)   Resp 16   Wt 54.6 kg (120 lb 6.4 oz)   SpO2 91%   BMI 21.33 kg/m    Body mass index is 21.33 kg/m .  Exam:  GENERAL APPEARANCE:  in no distress, cooperative  RESP:  lungs clear to auscultation   CV:  S1S2 audible, rapid regular HR, no murmur appreciated.   ABDOMEN:  soft, NT/ND, BS audible. no mass appreciated on palpation.   M/S:   no joint deformity noted on observation.   SKIN:  No rash.   NEURO:   No NFD appreciated on observation.   PSYCH:  AAOx3, Mood and affect appropriate       Lab/Diagnostic data:  Reviewed in the chart and EHR.      ASSESSMENT/PLAN  ------------------------------  Type 2 diabetes mellitus without complication, without long-term current use of insulin (H):    A1C 7.4 04/03/2019   - controlled. on metformin.  In this frail elderly adult with a limited life expectancy, the goal of  the management is to address the hyperglycemia sx if any,  rather than the  BGs numbers per se.      Benign essential hype:  BP Readings from Last 3 Encounters:   01/05/20 130/68   11/04/19 (!) 143/98   10/22/19 121/80   - over controlled in this frail elderly with limited life expectancy  - consider stopping amlodipine or reduce lisinopril  - consider repeating BMP (last one was Feb 2019).      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   - Significant  Deficits requiring NH placement. Requiring extensive assistance from nursing. Up for meals only o/w spends the day resting in bed  - Body mass index is 21.33 kg/m . from 24.44 kg/m . In frail elderly  keep BMI b/lw 25-35 Kg(m2). On MV.     Order: See above, otherwise, continue the rest of the current POC.     Electronically signed by:  Yoan Camp MD

## 2020-01-06 ENCOUNTER — NURSING HOME VISIT (OUTPATIENT)
Dept: GERIATRICS | Facility: CLINIC | Age: 85
End: 2020-01-06
Payer: COMMERCIAL

## 2020-01-06 DIAGNOSIS — R56.9 SEIZURES (H): ICD-10-CM

## 2020-01-06 DIAGNOSIS — Z86.73 HISTORY OF CVA (CEREBROVASCULAR ACCIDENT): ICD-10-CM

## 2020-01-06 DIAGNOSIS — F90.9 HYPERKINETIC DISORDER: ICD-10-CM

## 2020-01-06 DIAGNOSIS — R54 AGE-RELATED PHYSICAL DEBILITY: ICD-10-CM

## 2020-01-06 DIAGNOSIS — E11.9 TYPE 2 DIABETES MELLITUS WITHOUT COMPLICATION, WITHOUT LONG-TERM CURRENT USE OF INSULIN (H): Primary | ICD-10-CM

## 2020-01-06 DIAGNOSIS — I10 BENIGN ESSENTIAL HYPERTENSION: ICD-10-CM

## 2020-01-06 PROCEDURE — 99309 SBSQ NF CARE MODERATE MDM 30: CPT | Performed by: FAMILY MEDICINE

## 2020-01-06 NOTE — LETTER
1/6/2020        RE: Anya Gaines  [facility]  [address]  [address]  [facility] [address]        [place] [facility]  Chief Complaint   Patient presents with     long term Regulatory     [place] Medical Record Number:  [id number]  Place of Service where encounter took place:  [facility] [place]  (S) [[id number]]    HPI:    Anya Gaines  is 89 year old (7/4/1920), who is being seen today for a federally mandated E/M visit.  HPI information obtained from: facility staff, patient report and [facility] chart review.     Today's concerns are:  - Resident seen and examined.  Reports feeling good. No CP, HA or fainting.   - Reports sleep, appetite and BM are fine.   - RN has no concern today.   - GNP has no concern  --------------------------------  - - Past Medical, social, family histories, medications, and allergies reviewed and updated  - Medications reviewed: in the chart and EHR.   - Case Management:   I have reviewed the care plan and MDS and do agree with the plan. Patient's desire to return to the community is not present.  Information reviewed:  Medications, vital signs, orders, and nursing notes.      MEDICATIONS:  Current Outpatient Medications   Medication Sig Dispense Refill     acetaminophen (TYLENOL) 325 MG tablet Take 2 tablets (650 mg) by mouth 2 times daily  0     amLODIPine (NORVASC) 5 MG tablet TAKE 1/2 TABLET BY MOUTH DAILY AT BEDTIME DX HYPERTENSION 31 tablet 11     clopidogrel (PLAVIX) 75 MG tablet TAKE 1 TABLET BY MOUTH EVERY DAY DX CEREBROVASCULAR ACCIDENT 30 tablet 11     gabapentin (NEURONTIN) 600 MG tablet TAKE 1 TABLET BY MOUTH TWICE DAILY FOR INVOLUNTARY MOVEMENTS 60 tablet 11     hydrochlorothiazide (HYDRODIURIL) 25 MG tablet TAKE 1 TABLET BY MOUTH EVERY MORNING DX HYPERTENSION 31 tablet 11     hypromellose-dextran 0.3-0.1% (ARTIFICIAL TEARS) opthalmic solution Place 1 drop into both eyes 3 times daily 15 mL 3     lisinopril (PRINIVIL/ZESTRIL) 40 MG  tablet TAKE 1 TABLET BY MOUTH EVERY DAY DX HYPERTENSION 30 tablet 11     loperamide (IMODIUM) 2 MG capsule Take 2 mg by mouth every other day       metFORMIN (GLUCOPHAGE-XR) 750 MG 24 hr tablet Take 1 tablet (750 mg) by mouth daily (with dinner)       Multiple Vitamins-Minerals (PRESERVISION AREDS) TABS Take 1 Tablespoonful by mouth 2 times daily       ROS:  4 point ROS including Respiratory, CV, GI and , other than that noted in the HPI,  is negative    Vitals:  /68   Pulse 97   Temp 97.4  F (36.3  C)   Resp 16   Wt 54.6 kg (120 lb 6.4 oz)   SpO2 91%   BMI 21.33 kg/m     Body mass index is 21.33 kg/m .  Exam:  GENERAL APPEARANCE:  in no distress, cooperative  RESP:  lungs clear to auscultation   CV:  S1S2 audible, rapid regular HR, no murmur appreciated.   ABDOMEN:  soft, NT/ND, BS audible. no mass appreciated on palpation.   M/S:   no joint deformity noted on observation.   SKIN:  No rash.   NEURO:   No NFD appreciated on observation.   PSYCH:   AAOx3, Mood and affect appropriate       Lab/Diagnostic data:  Reviewed in the chart and EHR.      ASSESSMENT/PLAN  ------------------------------  Type 2 diabetes mellitus without complication, without long-term current use of insulin (H):    A1C 7.4 04/03/2019   - controlled. on metformin.  In this frail elderly adult with a limited life expectancy, the goal of  the management is to address the hyperglycemia sx if any,  rather than the  BGs numbers per se.      Benign essential hype:  BP Readings from Last 3 Encounters:   01/05/20 130/68   11/04/19 (!) 143/98   10/22/19 121/80   - over controlled in this frail elderly with limited life expectancy  - consider stopping amlodipine or reduce lisinopril  - consider repeating BMP (last one was Feb 2019).      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   - Significant  Deficits requiring NH placement. Requiring extensive assistance from nursing. Up for  meals only o/w spends the day resting in bed  - Body mass index is 21.33 kg/m . from 24.44 kg/m . In frail elderly keep BMI b/lw 25-35 Kg(m2). On MV.     Order: See above, otherwise, continue the rest of the current POC.     Electronically signed by:  Yoan Camp MD              Sincerely,        Yoan Camp MD

## 2020-03-01 NOTE — PROGRESS NOTES
[facility]  Chief Complaint   Patient presents with     USP Regulatory   [facility] Record Number:  [id number]. Place of Service where encounter took place:  [facility]  (S) [[id number]] HPI: Anya Gaines  is 89 year old (7/4/1920), who is being seen today for a federally mandated E/M visit.  HPI information obtained from: facility chart records, facility staff, patient report, [facility] Epic chart review and Care Everywhere [facility] chart review. Today's concerns are:    Type 2 diabetes mellitus without complication, without long-term current use of insulin (H)  Benign essential hypertension  History of CVA (cerebrovascular accident)  Loose stools  Frailty  Anya states everything is fine. She denies pain, SOB, constipation, diarrhea, and states she has a good appetite. Her last a1c was 7.4% nearly 1 year ago, and her BG trends are as noted below. Nursing reports that she often has loose stools, and we note her use of Metformin.  BGs:  2/24/2020 08:21 170.0 mg/dL   2/17/2020 10:42 149.0 mg/dL   2/10/2020 11:06 212.0 mg/dL   2/3/2020 11:47 168.0 mg/dL   1/27/2020 10:20 164.0 mg/dL   1/20/2020 10:37 179.0 mg/dL   1/13/2020 10:54 160.0 mg/dL   1/6/2020 09:38 140.0 mg/dL    ALLERGIES:Patient has no known allergies. PAST MEDICAL HISTORY:   has a past medical history of Cerebral artery occlusion with cerebral infarction (H), Cerebral infarction (H), Diabetes mellitus, type 2 (H), Hypertension, and Seizures (H). PAST SURGICAL HISTORY:   has a past surgical history that includes surgical history of - . FAMILY HISTORY: family history is not on file. SOCIAL HISTORY:  reports that she has never smoked. She has never used smokeless tobacco. She reports that she does not drink alcohol or use drugs.   MEDICATIONS:  Medication Sig     (TYLENOL) 325 MG tablet Take 2 tablets (650 mg) by mouth 2 times daily     (NORVASC) 5 MG tablet TAKE 1/2 TABLET BY MOUTH DAILY AT BEDTIME     (PLAVIX)  75 MG tablet TAKE 1 TABLET BY MOUTH EVERY DAY      (NEURONTIN) 600 MG tablet TAKE 1 TABLET BY MOUTH TWICE DAILY     (HYDRODIURIL) 25 MG tablet TAKE 1 TABLET BY MOUTH EVERY MORNING DX      hypromellose-dextran 0.3-0.1% opth Place 1 drop into both eyes 3 times daily     lisinopril 40 MG tablet TAKE 1 TABLET BY MOUTH EVERY DAY DX      (IMODIUM) 2 MG capsule Take 2 mg by mouth every other day     (GLUCOPHAGE- MG tablet Take 1 tablet (500 mg) by mouth daily (with dinner)     Multiple Vitamins-Minerals TABS Take 1 Tablespoonful by mouth 2 times daily     Case Management:  I have reviewed the care plan and MDS and do agree with the plan. Patient's desire to return to the community is not assessible due to cognitive impairment. Information reviewed:  Medications, vital signs, orders, and nursing notes.    ROS: Limited secondary to cognitive impairment but today pt reports the above and 4 point ROS including Respiratory, CV, GI and , other than that noted in the HPI, is negative.    Vitals:  BP (!) 164/70   Pulse 95   Temp 96.7  F (35.9  C)   Resp 18   Wt 56.2 kg (124 lb)   SpO2 97%   BMI 21.97 kg/m    Body mass index is 21.97 kg/m .  Exam:  GENERAL APPEARANCE: Alert, in no distress, cooperative.   ENT: Mouth/posterior oropharynx intact w/ moist mucous membranes, hearing acuity [place].  EYES: EOM, conjunctivae, lids, pupils and irises normal, PERRL2.   RESP: Respiratory effort good, no respiratory distress, Lung sounds clear. On RA.   CV: Auscultation of heart reveals S1, S2, rate and rhythm regular, no murmur, no rub or gallop, Edema 0+ BLE. Peripheral pulses are 2+.  ABDOMEN: Normal bowel sounds, soft, non-tender abdomen, and no masses palpated.  SKIN: Inspection/Palpation of skin and subcutaneous tissue baseline w/ fragility. No wounds/rashes noted.   NEURO: CN II-XII at patient's baseline, sensation baseline PPS.  PSYCH: Insight, judgement, and memory are baseline/confused, affect and mood are  happy/engaged.    Lab/Diagnostic data:   Recent labs in [facility] reviewed by me today.     ASSESSMENT/PLAN  Type 2 diabetes mellitus without complication, without long-term current use of insulin (H)  Benign essential hypertension  History of CVA (cerebrovascular accident)  Loose stools  Frailty  Chronic. Ongoing. Will attempt to help loose stools w/ Metformin reduction. Will recheck a1c given this reduction to determine overall control. May consider complete GDR of Metformin at future date. Follow-up routinely or as needed.    Orders written by provider at facility and transcribed by : Jeff Serrano  1. Recheck A1C x1 on 3/4. Dx: DM II.   2. Change Metformin to Metformin  mg PO daily. Dx: DM II.     Electronically signed by:  Dr. Lilian Streeter, APRN CNP DNP

## 2020-03-03 ENCOUNTER — NURSING HOME VISIT (OUTPATIENT)
Dept: GERIATRICS | Facility: CLINIC | Age: 85
End: 2020-03-03
Payer: COMMERCIAL

## 2020-03-03 VITALS
RESPIRATION RATE: 18 BRPM | DIASTOLIC BLOOD PRESSURE: 70 MMHG | HEART RATE: 95 BPM | TEMPERATURE: 96.7 F | WEIGHT: 124 LBS | BODY MASS INDEX: 21.97 KG/M2 | SYSTOLIC BLOOD PRESSURE: 164 MMHG | OXYGEN SATURATION: 97 %

## 2020-03-03 DIAGNOSIS — I10 BENIGN ESSENTIAL HYPERTENSION: ICD-10-CM

## 2020-03-03 DIAGNOSIS — R54 FRAILTY: ICD-10-CM

## 2020-03-03 DIAGNOSIS — Z86.73 HISTORY OF CVA (CEREBROVASCULAR ACCIDENT): ICD-10-CM

## 2020-03-03 DIAGNOSIS — R19.5 LOOSE STOOLS: ICD-10-CM

## 2020-03-03 DIAGNOSIS — E11.9 TYPE 2 DIABETES MELLITUS WITHOUT COMPLICATION, WITHOUT LONG-TERM CURRENT USE OF INSULIN (H): Primary | ICD-10-CM

## 2020-03-03 PROCEDURE — 99309 SBSQ NF CARE MODERATE MDM 30: CPT | Performed by: NURSE PRACTITIONER

## 2020-03-03 RX ORDER — METFORMIN HCL 500 MG
500 TABLET, EXTENDED RELEASE 24 HR ORAL
Start: 2020-03-03 | End: 2020-04-14

## 2020-03-03 NOTE — LETTER
3/3/2020        RE: Anya Gaines  [facility]  [address]  [address]  [facility] [address]        [facility]  Chief Complaint   Patient presents with     [facility] Regulatory   [place] [facility] Record Number:  [id number]. Place of Service where encounter took place:  [facility]  ([facility]) [[id number]] HPI: Anya Gaines  is 89 year old (7/4/1920), who is being seen today for a federally mandated E/M visit.  HPI information obtained from: facility chart records, facility staff, patient report, [facility] chart review and Care Everywhere [facility] chart review. Today's concerns are:    Type 2 diabetes mellitus without complication, without long-term current use of insulin (H)  Benign essential hypertension  History of CVA (cerebrovascular accident)  Loose stools  Frailty  Anya states everything is fine. She denies pain, SOB, constipation, diarrhea, and states she has a good appetite. Her last a1c was 7.4% nearly 1 year ago, and her BG trends are as noted below. Nursing reports that she often has loose stools, and we note her use of Metformin.  BGs:  2/24/2020 08:21 170.0 mg/dL   2/17/2020 10:42 149.0 mg/dL   2/10/2020 11:06 212.0 mg/dL   2/3/2020 11:47 168.0 mg/dL   1/27/2020 10:20 164.0 mg/dL   1/20/2020 10:37 179.0 mg/dL   1/13/2020 10:54 160.0 mg/dL   1/6/2020 09:38 140.0 mg/dL    ALLERGIES:Patient has no known allergies. PAST MEDICAL HISTORY:   has a past medical history of Cerebral artery occlusion with cerebral infarction (H), Cerebral infarction (H), Diabetes mellitus, type 2 (H), Hypertension, and Seizures (H). PAST SURGICAL HISTORY:   has a past surgical history that includes surgical history of - . FAMILY HISTORY: family history is not on file. SOCIAL HISTORY:  reports that she has never smoked. She has never used smokeless tobacco. She reports that she does not drink alcohol or use drugs.   MEDICATIONS:  Medication Sig     (TYLENOL) 325 MG tablet Take 2 tablets  (650 mg) by mouth 2 times daily     (NORVASC) 5 MG tablet TAKE 1/2 TABLET BY MOUTH DAILY AT BEDTIME     (PLAVIX) 75 MG tablet TAKE 1 TABLET BY MOUTH EVERY DAY      (NEURONTIN) 600 MG tablet TAKE 1 TABLET BY MOUTH TWICE DAILY     (HYDRODIURIL) 25 MG tablet TAKE 1 TABLET BY MOUTH EVERY MORNING DX      hypromellose-dextran 0.3-0.1% opth Place 1 drop into both eyes 3 times daily     lisinopril 40 MG tablet TAKE 1 TABLET BY MOUTH EVERY DAY DX      (IMODIUM) 2 MG capsule Take 2 mg by mouth every other day     (GLUCOPHAGE- MG tablet Take 1 tablet (500 mg) by mouth daily (with dinner)     Multiple Vitamins-Minerals TABS Take 1 Tablespoonful by mouth 2 times daily     Case Management:  I have reviewed the care plan and MDS and do agree with the plan. Patient's desire to return to the community is not assessible due to cognitive impairment. Information reviewed:  Medications, vital signs, orders, and nursing notes.    ROS: Limited secondary to cognitive impairment but today pt reports the above and 4 point ROS including Respiratory, CV, GI and , other than that noted in the HPI, is negative.    Vitals:  BP (!) 164/70   Pulse 95   Temp 96.7  F (35.9  C)   Resp 18   Wt 56.2 kg (124 lb)   SpO2 97%   BMI 21.97 kg/m     Body mass index is 21.97 kg/m .  Exam:  GENERAL APPEARANCE: Alert, in no distress, cooperative.   ENT: Mouth/posterior oropharynx intact w/ moist mucous membranes, hearing acuity [place].  EYES: EOM, conjunctivae, lids, pupils and irises normal, PERRL2.   RESP: Respiratory effort good, no respiratory distress, Lung sounds clear. On RA.   CV: Auscultation of heart reveals S1, S2, rate and rhythm regular, no murmur, no rub or gallop, Edema 0+ BLE. Peripheral pulses are 2+.  ABDOMEN: Normal bowel sounds, soft, non-tender abdomen, and no masses palpated.  SKIN: Inspection/Palpation of skin and subcutaneous tissue baseline w/ fragility. No wounds/rashes noted.   NEURO: CN II-XII at patient's baseline,  sensation baseline PPS.  PSYCH: Insight, judgement, and memory are baseline/confused, affect and mood are happy/engaged.    Lab/Diagnostic data:   Recent labs in [facility] reviewed by me today.     ASSESSMENT/PLAN  Type 2 diabetes mellitus without complication, without long-term current use of insulin (H)  Benign essential hypertension  History of CVA (cerebrovascular accident)  Loose stools  Frailty  Chronic. Ongoing. Will attempt to help loose stools w/ Metformin reduction. Will recheck a1c given this reduction to determine overall control. May consider complete GDR of Metformin at future date. Follow-up routinely or as needed.    Orders written by provider at facility and transcribed by : Jeff Serrano  1. Recheck A1C x1 on 3/4. Dx: DM II.   2. Change Metformin to Metformin  mg PO daily. Dx: DM II.     Electronically signed by:  TONYA Walton CNP DNP        Sincerely,        TONYA Obando CNP

## 2020-03-04 ENCOUNTER — RECORDS - HEALTHEAST (OUTPATIENT)
Dept: LAB | Facility: CLINIC | Age: 85
End: 2020-03-04

## 2020-03-04 LAB — HBA1C MFR BLD: 7.9 %

## 2020-04-08 ENCOUNTER — CLINICAL UPDATE (OUTPATIENT)
Dept: PHARMACY | Facility: CLINIC | Age: 85
End: 2020-04-08
Payer: COMMERCIAL

## 2020-04-08 DIAGNOSIS — R56.9 SEIZURES (H): ICD-10-CM

## 2020-04-08 DIAGNOSIS — R19.7 DIARRHEA, UNSPECIFIED TYPE: ICD-10-CM

## 2020-04-08 DIAGNOSIS — Z86.73 HISTORY OF TIA (TRANSIENT ISCHEMIC ATTACK) AND STROKE: ICD-10-CM

## 2020-04-08 DIAGNOSIS — F90.9 HYPERKINETIC DISORDER: Primary | ICD-10-CM

## 2020-04-08 DIAGNOSIS — E11.9 TYPE 2 DIABETES, HBA1C GOAL < 8% (H): ICD-10-CM

## 2020-04-08 DIAGNOSIS — I10 HYPERTENSION GOAL BP (BLOOD PRESSURE) < 140/90: ICD-10-CM

## 2020-04-08 PROCEDURE — 99207 ZZC NO CHARGE LOS: CPT | Performed by: PHARMACIST

## 2020-04-08 NOTE — PROGRESS NOTES
This patient's medication list and chart were reviewed as part of the service provided by [facility].    Assessment/Recommendations:  1. (Diabetes):  Noted reduction in Metformin XR to 500mg daily in early March d/t loose stools and blood sugars controlled tighter than necessary.  Goal a1c in this 89yo with limited life expectancy is reasonable at <8.5-9%, avoiding signs/sx of hypo or hyperglycemia.  Therefore, if no symptoms of hyperglycemia and most BG <220-250mg/dL, may benefit from d'c altogether of Metformin XR. (last HgA1c = 6.9% on 10/23/19)  2. (Involuntary movements):  On Gabapentin 600mg bid.  Most recent calculated CrCl ([place]ro[place]-Gault) = 40ml/min (based on IBW 52.4kg and Scr 0.64mg/dL).  Due to CrCl b/t 30-49ml/min, recommended total daily dose of Gabapentin is 900mg daily to avoid accumulation of med and increased risk of side effects such as edema, confusion, sedation, dizziness, falls, etc.  May benefit from reduction to 300mg qam and 600mg qpm and monitor.  3. (Diarrhea):  Pt on scheduled Loperamide every other day, and Metformin XR may be contributing to loose stools.  Following potential d'c of Metformin, consider changing Loperamide to prn and monitor.      Kaleigh Camacho, Pharm.D.,[facility]  Board Certified Geriatric Pharmacist  Medication Therapy Management Pharmacist  [phone]

## 2020-04-14 ENCOUNTER — VIRTUAL VISIT (OUTPATIENT)
Dept: GERIATRICS | Facility: CLINIC | Age: 85
End: 2020-04-14
Payer: COMMERCIAL

## 2020-04-14 VITALS
SYSTOLIC BLOOD PRESSURE: 161 MMHG | HEART RATE: 80 BPM | DIASTOLIC BLOOD PRESSURE: 83 MMHG | WEIGHT: 124 LBS | TEMPERATURE: 97.6 F | OXYGEN SATURATION: 96 % | RESPIRATION RATE: 18 BRPM | BODY MASS INDEX: 21.97 KG/M2

## 2020-04-14 DIAGNOSIS — I10 BENIGN ESSENTIAL HYPERTENSION: ICD-10-CM

## 2020-04-14 DIAGNOSIS — R19.5 LOOSE STOOLS: ICD-10-CM

## 2020-04-14 DIAGNOSIS — F90.9 HYPERKINESIS: ICD-10-CM

## 2020-04-14 DIAGNOSIS — E11.9 TYPE 2 DIABETES MELLITUS WITHOUT COMPLICATION, WITHOUT LONG-TERM CURRENT USE OF INSULIN (H): Primary | ICD-10-CM

## 2020-04-14 DIAGNOSIS — R56.9 SEIZURES (H): ICD-10-CM

## 2020-04-14 DIAGNOSIS — F90.9 HYPERKINETIC DISORDER: ICD-10-CM

## 2020-04-14 PROCEDURE — 99309 SBSQ NF CARE MODERATE MDM 30: CPT | Mod: 95 | Performed by: NURSE PRACTITIONER

## 2020-04-14 RX ORDER — GABAPENTIN 600 MG/1
TABLET ORAL
Refills: 0
Start: 2020-04-14

## 2020-04-14 NOTE — PROGRESS NOTES
"[facility] Video Visit  Anya Gaines is a 89 year old female who is being evaluated via a billable video visit. The patient has been notified of following: \"This video visit will be conducted via a call between you and your provider. We have found that certain health care needs can be provided without the need for an in-person physical exam.  This service lets us provide the care you need with a video conversation.  If a prescription is necessary we can send it directly to your pharmacy.  If lab work is needed we can place an order for that and you can then stop by our lab to have the test done at a later time. If during the course of the call the provider feels a video visit is not appropriate, you will not be charged for this service.\"     Proivder has received verbal consent for a Video Visit from the patient? Yes.    Patient/facility would like the video invitation sent by: Send to e-mail at: [email or web address]    This visit took place virtually, with the patient located at [facility].  ________________________________________________  Video Start Time: 1039  Anya Gaines complains of    Chief Complaint   Patient presents with     Video Visit   HPI/Subjective: Anya is seen today secondary to PharmD expert review w/ recommendations. We had noted patient's loose stools, poor bowel control, and trouble w/ cleanliness secondary to these issues. This was thought to be partially secondary to Metformin, and we started a GDR w/ last a1c was 6.9% in October 2019. PharmD making several recommendations in recent review.     Today, Anya denies pain, SOB, fever, nausea.She states she has a good appetite and is still having loose stools on-and-off. Her pertinent VS trends are noted below:  BPs:  4/12/2020 08:09 103/65 mmHg   4/11/2020 08:33 103/73 mmHg  4/11/2020 08:33 108/73 mmHg  4/10/2020 07:17 112/63 mmHg   4/9/2020 08:16 135/84 mmHg  4/8/2020 09:00 164/80 mmHg "   4/7/2020 12:13 156/76 mmHg   4/6/2020 08:38 151/74 mmHg   4/5/2020 11:58 138/88 mmHg   BGs:  4/13/2020 07:46 190.0 mg/dL   4/6/2020 08:37 180.0 mg/dL   3/30/2020 08:14 185.0 mg/dL   3/23/2020 07:42 230.0 mg/dL   3/16/2020 11:08 239.0 mg/dL   3/9/2020 11:14 199.0 mg/dL   3/2/2020 10:27 176.0 mg/dL   2/24/2020 08:21 170.0 mg/dL   2/17/2020 10:42 149.0 mg/dL   2/10/2020 11:06 212.0 mg/dL   2/3/2020 11:47 168.0 mg/dL   1/27/2020 10:20 164.0 mg/dL   1/20/2020 10:37 179.0 mg/dL   1/13/2020 10:54 160.0 mg/dL    History: I have reviewed and updated the patient's Past Medical History, Social History, Family History and Medication List.  ALLERGIES  Patient has no known allergies.  ROS: 6 point ROS neg other than the symptoms noted above in the HPI.    Objective:  BP (!) 161/83   Pulse 80   Temp 97.6  F (36.4  C)   Resp 18   Wt 56.2 kg (124 lb)   SpO2 96%   BMI 21.97 kg/m    GENERAL APPEARANCE: Alert, in no distress, cooperative.   EYES/ENT: EOM normal on camera, hearing acuity [place].  RESP: Respiratory effort appears unlabored over video screen, no respiratory distress apparent on video, On RA.  SKIN: Skin appears baseline w/ video inspection. No wounds/rashes noted.   NEURO: CN II-XII at patient's baseline, TORRES at baseline on video. Sensation baseline PPS.  PSYCH: Insight, judgement, and memory are baseline, affect and mood are happy/engaged.    Laboratory/Diagnostics: Reviewed in Epic by provider today.     Assessment/Plan:  Type 2 diabetes mellitus without complication, without long-term current use of insulin (H)  Loose stools  Benign essential hypertension  Hyperkinetic disorder  Seizures (H)  Chronic. Ongoing. Based on PharmD recommendations, HPI, and virtual exam, we will stop Metformin. We hope this will help w/ loose stools and will change Loperamide to PRN. We will trend a1c, as it has been some time. We will also decrease Gabapentin slightly given no pain, movements controlled, and PharmD rec (based on renal  clearance. Pending a1c and BG trend, we may consider reducing BG checks at future visit. Follow-up routinely or as needed.    Orders:  1. Discontinue Metformin.  2. A1c recheck in 1 week on 4/20. Dx: DM II.   3. Decrease Gabapentin to 300mg QAM and 600mg QPM. Dx: hyperkinetic disorder.  4. Change Loperamide from every other day to daily PRN. Dx: Diarrhea.   5. FYI: At next visit, decrease accu-checks based on results of a1c and BG monitoring.     Video-Visit Details  Type of service:  Video Visit  Video Start Time: 1039  Video End Time (time video stopped): 1043  Originating Location (pt. Location): Long term [facility]  Distant Location (provider location):  [facility]   Mode of Communication:  Video Conference.    Electronically signed by:  Dr. Lilian Streeter, APRN CNP DNP

## 2020-04-14 NOTE — LETTER
"    4/14/2020        RE: Anya Gaines  [facility]  [address]  [address]  [facility] [address]        [facility] Video Visit  Anya Gaines is a 89 year old female who is being evaluated via a billable video visit. The patient has been notified of following: \"This video visit will be conducted via a call between you and your provider. We have found that certain health care needs can be provided without the need for an in-person physical exam.  This service lets us provide the care you need with a video conversation.  If a prescription is necessary we can send it directly to your pharmacy.  If lab work is needed we can place an order for that and you can then stop by our lab to have the test done at a later time. If during the course of the call the provider feels a video visit is not appropriate, you will not be charged for this service.\"     Proivder has received verbal consent for a Video Visit from the patient? Yes.    Patient/facility would like the video invitation sent by: Send to e-mail at: [email or web address]  ________________________________________________  Video Start Time: 1039  Anya Gaines complains of    Chief Complaint   Patient presents with     Video Visit   HPI/Subjective: Anya is seen today secondary to PharmD expert review w/ recommendations. We had noted patient's loose stools, poor bowel control, and trouble w/ cleanliness secondary to these issues. This was thought to be partially secondary to Metformin, and we started a GDR w/ last a1c was 6.9% in October 2019. PharmD making several recommendations in recent review.     Today, Anya denies pain, SOB, fever, nausea.She states she has a good appetite and is still having loose stools on-and-off. Her pertinent VS trends are noted below:  BPs:  4/12/2020 08:09 103/65 mmHg   4/11/2020 08:33 103/73 mmHg  4/11/2020 08:33 108/73 mmHg  4/10/2020 07:17 112/63 mmHg   4/9/2020 08:16 135/84 mmHg  4/8/2020 " 09:00 164/80 mmHg   4/7/2020 12:13 156/76 mmHg   4/6/2020 08:38 151/74 mmHg   4/5/2020 11:58 138/88 mmHg   BGs:  4/13/2020 07:46 190.0 mg/dL   4/6/2020 08:37 180.0 mg/dL   3/30/2020 08:14 185.0 mg/dL   3/23/2020 07:42 230.0 mg/dL   3/16/2020 11:08 239.0 mg/dL   3/9/2020 11:14 199.0 mg/dL   3/2/2020 10:27 176.0 mg/dL   2/24/2020 08:21 170.0 mg/dL   2/17/2020 10:42 149.0 mg/dL   2/10/2020 11:06 212.0 mg/dL   2/3/2020 11:47 168.0 mg/dL   1/27/2020 10:20 164.0 mg/dL   1/20/2020 10:37 179.0 mg/dL   1/13/2020 10:54 160.0 mg/dL    History: I have reviewed and updated the patient's Past Medical History, Social History, Family History and Medication List.  ALLERGIES  Patient has no known allergies.  ROS: 6 point ROS neg other than the symptoms noted above in the HPI.    Objective:  BP (!) 161/83   Pulse 80   Temp 97.6  F (36.4  C)   Resp 18   Wt 56.2 kg (124 lb)   SpO2 96%   BMI 21.97 kg/m    GENERAL APPEARANCE: Alert, in no distress, cooperative.   EYES/ENT: EOM normal on camera, hearing acuity [place].  RESP: Respiratory effort appears unlabored over video screen, no respiratory distress apparent on video, On RA.  SKIN: Skin appears baseline w/ video inspection. No wounds/rashes noted.   NEURO: CN II-XII at patient's baseline, TORRES at baseline on video. Sensation baseline PPS.  PSYCH: Insight, judgement, and memory are baseline, affect and mood are happy/engaged.    Laboratory/Diagnostics: Reviewed in Epic by provider today.     Assessment/Plan:  Type 2 diabetes mellitus without complication, without long-term current use of insulin (H)  Loose stools  Benign essential hypertension  Hyperkinetic disorder  Seizures (H)  Chronic. Ongoing. Based on PharmD recommendations, HPI, and virtual exam, we will stop Metformin. We hope this will help w/ loose stools and will change Loperamide to PRN. We will trend a1c, as it has been some time. We will also decrease Gabapentin slightly given no pain, movements controlled, and PharmD  rec (based on renal clearance. Pending a1c and BG trend, we may consider reducing BG checks at future visit. Follow-up routinely or as needed.    Orders:  1. Discontinue Metformin.  2. A1c recheck in 1 week on 4/20. Dx: DM II.   3. Decrease Gabapentin to 300mg QAM and 600mg QPM. Dx: hyperkinetic disorder.  4. Change Loperamide from every other day to daily PRN. Dx: Diarrhea.   5. FYI: At next visit, decrease accu-checks based on results of a1c and BG monitoring.     Video-Visit Details  Type of service:  Video Visit  Video Start Time: 1039  Video End Time (time video stopped): 1043  Originating Location (pt. Location): Long term Care  Distant Location (provider location):  [facility]   Mode of Communication:  Video Conference.    Electronically signed by:  TONYA Walton CNP DNP            Sincerely,        TONYA Obando CNP

## 2020-04-20 ENCOUNTER — RECORDS - HEALTHEAST (OUTPATIENT)
Dept: LAB | Facility: CLINIC | Age: 85
End: 2020-04-20

## 2020-04-20 LAB — HBA1C MFR BLD: 8.8 %

## 2020-05-01 VITALS
SYSTOLIC BLOOD PRESSURE: 117 MMHG | WEIGHT: 124 LBS | OXYGEN SATURATION: 93 % | HEART RATE: 85 BPM | RESPIRATION RATE: 16 BRPM | DIASTOLIC BLOOD PRESSURE: 77 MMHG | TEMPERATURE: 97.4 F | BODY MASS INDEX: 21.97 KG/M2

## 2020-05-01 NOTE — PROGRESS NOTES
"[place] [facility] Regulatory   Anya Gaines is being evaluated via a billable video visit due to the restrictions of the Covid-19 pandemic.   The patient has been notified of following:  \"This video visit will be conducted via a call between you and your provider. We have found that certain health care needs can be provided without the need for an in-person physical exam.  This service lets us provide the care you need with a video conversation. If during the course of the call the provider feels a video visit is not appropriate, you will not be charged for this service.\"   The provider has received verbal consent for a Video Visit from the patient or first contact? Yes  Patient or facility staff would like the video invitation sent by: N/A   Video Start Time: 10:12  [place] Medical Record Number:  [id number]  Place of Location at the time of visit: [facility]  Chief Complaint   Patient presents with     Video Visit     Regulatory     HPI:    Anya Gaines  is 89 year old (7/4/1920), who is being seen today for a federally mandated E/M visit.  HPI information obtained from: facility staff, patient report and [facility] chart review.     Today's concerns are:  - Resident  Reports doing fine, and has no concern.   - DNP reports metformin recently stopped given HbA1C w/i acceptable range, and Neurontin GDR initiated.   - RN has no concern   --------------------------------  - - Past Medical, social, family histories, medications, and allergies reviewed and updated  - Medications reviewed: in the chart and EHR.   - Case Management:   I have reviewed the care plan and MDS and do agree with the plan. Patient's desire to return to the community is not present.  Information reviewed:  Medications, vital signs, orders, and nursing notes.      MEDICATIONS:  Current Outpatient Medications   Medication Sig Dispense Refill     acetaminophen (TYLENOL) 325 MG tablet Take 2 tablets (650 mg) by mouth 2 times " daily  0     amLODIPine (NORVASC) 5 MG tablet TAKE 1/2 TABLET BY MOUTH DAILY AT BEDTIME DX HYPERTENSION 31 tablet 11     clopidogrel (PLAVIX) 75 MG tablet TAKE 1 TABLET BY MOUTH EVERY DAY DX CEREBROVASCULAR ACCIDENT 30 tablet 11     gabapentin (NEURONTIN) 600 MG tablet Take 0.5 tablets (300 mg) by mouth every morning AND 1 tablet (600 mg) At Bedtime.  0     hydrochlorothiazide (HYDRODIURIL) 25 MG tablet TAKE 1 TABLET BY MOUTH EVERY MORNING DX HYPERTENSION 31 tablet 11     lisinopril (PRINIVIL/ZESTRIL) 40 MG tablet TAKE 1 TABLET BY MOUTH EVERY DAY DX HYPERTENSION 30 tablet 11     loperamide (IMODIUM) 2 MG capsule Take 2 mg by mouth daily as needed       Multiple Vitamins-Minerals (PRESERVISION AREDS) TABS Take 1 tablet by mouth 2 times daily       ROS:  4 point ROS including Respiratory, CV, GI and , other than that noted in the HPI,  is negative    Vitals:  /77   Pulse 85   Temp 97.4  F (36.3  C)   Resp 16   Wt 56.2 kg (124 lb)   SpO2 93%   BMI 21.97 kg/m    Body mass index is 21.97 kg/m .  Limited visit exam done given COVID-19 precautions.   GENERAL APPEARANCE:  in no distress  RESP:  unlabored breathing  M/S:   no joint deformity noted  SKIN:  no rash noted  NEURO:   no purposeful movement in upper and lower extremities  PSYCH:  affect and mood normal      Lab/Diagnostic data:  Reviewed in the chart and EHR.      ASSESSMENT/PLAN  ------------------------------  Type 2 diabetes mellitus without complication, without long-term current use of insulin (H):  - HbA1C 8.8 (Apr 2020).   - controlled. Small dose of metformin recently stopped. .  In this frail elderly adult with a limited life expectancy, the goal of  the management is to address the hyperglycemia sx if any,  rather than the  BGs numbers per se.        Mild PCM (H)  Wt Readings from Last 5 Encounters:   05/01/20 56.2 kg (124 lb)   04/14/20 56.2 kg (124 lb)   03/03/20 56.2 kg (124 lb)   01/05/20 54.6 kg (120 lb 6.4 oz)   11/04/19 52.3 kg (115  lb 3.2 oz)   - Body mass index is 21.97 kg/m . improving.       Benign essential hype: controlled.     Hx of Seizures d/o   Hyperkinetic disorder  Debility  History of CVA (cerebrovascular accident)  - at baseline.   - Significant  Deficits requiring NH placement. Requiring extensive assistance from nursing. Up for meals only o/w spends the day resting in bed      Order: See above, otherwise, continue the rest of the current POC.     Electronically signed by:  Yoan Camp MD    Video-Visit Details  Type of service:  Video Visit  Video End Time (time video stopped): 10:13  Distant Location (provider location):  [place] [facility]

## 2020-05-04 ENCOUNTER — VIRTUAL VISIT (OUTPATIENT)
Dept: GERIATRICS | Facility: CLINIC | Age: 85
End: 2020-05-04
Payer: COMMERCIAL

## 2020-05-04 DIAGNOSIS — R54 AGE-RELATED PHYSICAL DEBILITY: ICD-10-CM

## 2020-05-04 DIAGNOSIS — E11.9 TYPE 2 DIABETES MELLITUS WITHOUT COMPLICATION, WITHOUT LONG-TERM CURRENT USE OF INSULIN (H): Primary | ICD-10-CM

## 2020-05-04 DIAGNOSIS — E44.1 MILD PROTEIN-CALORIE MALNUTRITION (H): ICD-10-CM

## 2020-05-04 DIAGNOSIS — Z86.73 HISTORY OF CVA (CEREBROVASCULAR ACCIDENT): ICD-10-CM

## 2020-05-04 DIAGNOSIS — F90.9 HYPERKINETIC DISORDER: ICD-10-CM

## 2020-05-04 DIAGNOSIS — R56.9 SEIZURES (H): ICD-10-CM

## 2020-05-04 DIAGNOSIS — I10 BENIGN ESSENTIAL HYPERTENSION: ICD-10-CM

## 2020-05-04 PROCEDURE — 99207 ZZC CDG-MDM COMPONENT: MEETS LOW - DOWN CODED: CPT | Performed by: FAMILY MEDICINE

## 2020-05-04 PROCEDURE — 99308 SBSQ NF CARE LOW MDM 20: CPT | Mod: GT | Performed by: FAMILY MEDICINE

## 2020-05-04 NOTE — LETTER
"    5/4/2020        RE: Anya Gaines  [facility]  [address]  [address]  [facility] [address]        [place] [facility] Regulatory   Anya Gaines is being evaluated via a billable video visit due to the restrictions of the Covid-19 pandemic.   The patient has been notified of following:  \"This video visit will be conducted via a call between you and your provider. We have found that certain health care needs can be provided without the need for an in-person physical exam.  This service lets us provide the care you need with a video conversation. If during the course of the call the provider feels a video visit is not appropriate, you will not be charged for this service.\"   The provider has received verbal consent for a Video Visit from the patient or first contact? Yes  Patient or facility staff would like the video invitation sent by: N/A   Video Start Time: 10:12  [place] Medical Record Number:  [id number]  Place of Location at the time of visit: [facility]  Chief Complaint   Patient presents with     Video Visit     Regulatory     HPI:    Anya Gaines  is 89 year old (7/4/1920), who is being seen today for a federally mandated E/M visit.  HPI information obtained from: facility staff, patient report and [facility] chart review.     Today's concerns are:  - Resident  Reports doing fine, and has no concern.   - DNP reports metformin recently stopped given HbA1C w/i acceptable range, and Neurontin GDR initiated.   - RN has no concern   --------------------------------  - - Past Medical, social, family histories, medications, and allergies reviewed and updated  - Medications reviewed: in the chart and EHR.   - Case Management:   I have reviewed the care plan and MDS and do agree with the plan. Patient's desire to return to the community is not present.  Information reviewed:  Medications, vital signs, orders, and nursing notes.      MEDICATIONS:  Current Outpatient Medications "   Medication Sig Dispense Refill     acetaminophen (TYLENOL) 325 MG tablet Take 2 tablets (650 mg) by mouth 2 times daily  0     amLODIPine (NORVASC) 5 MG tablet TAKE 1/2 TABLET BY MOUTH DAILY AT BEDTIME DX HYPERTENSION 31 tablet 11     clopidogrel (PLAVIX) 75 MG tablet TAKE 1 TABLET BY MOUTH EVERY DAY DX CEREBROVASCULAR ACCIDENT 30 tablet 11     gabapentin (NEURONTIN) 600 MG tablet Take 0.5 tablets (300 mg) by mouth every morning AND 1 tablet (600 mg) At Bedtime.  0     hydrochlorothiazide (HYDRODIURIL) 25 MG tablet TAKE 1 TABLET BY MOUTH EVERY MORNING DX HYPERTENSION 31 tablet 11     lisinopril (PRINIVIL/ZESTRIL) 40 MG tablet TAKE 1 TABLET BY MOUTH EVERY DAY DX HYPERTENSION 30 tablet 11     loperamide (IMODIUM) 2 MG capsule Take 2 mg by mouth daily as needed       Multiple Vitamins-Minerals (PRESERVISION AREDS) TABS Take 1 tablet by mouth 2 times daily       ROS:  4 point ROS including Respiratory, CV, GI and , other than that noted in the HPI,  is negative    Vitals:  /77   Pulse 85   Temp 97.4  F (36.3  C)   Resp 16   Wt 56.2 kg (124 lb)   SpO2 93%   BMI 21.97 kg/m    Body mass index is 21.97 kg/m .  Limited visit exam done given COVID-19 precautions.   GENERAL APPEARANCE:  in no distress  RESP:  unlabored breathing  M/S:   no joint deformity noted  SKIN:  no rash noted  NEURO:   no purposeful movement in upper and lower extremities  PSYCH:  affect and mood normal      Lab/Diagnostic data:  Reviewed in the chart and EHR.      ASSESSMENT/PLAN  ------------------------------  Type 2 diabetes mellitus without complication, without long-term current use of insulin (H):  - HbA1C 8.8 (Apr 2020).   - controlled. Small dose of metformin recently stopped. .  In this frail elderly adult with a limited life expectancy, the goal of  the management is to address the hyperglycemia sx if any,  rather than the  BGs numbers per se.        Mild PCM (H)  Wt Readings from Last 5 Encounters:   05/01/20 56.2 kg (124 lb)    04/14/20 56.2 kg (124 lb)   03/03/20 56.2 kg (124 lb)   01/05/20 54.6 kg (120 lb 6.4 oz)   11/04/19 52.3 kg (115 lb 3.2 oz)   - Body mass index is 21.97 kg/m . improving.       Benign essential hype: controlled.     Hx of Seizures d/o   Hyperkinetic disorder  Debility  History of CVA (cerebrovascular accident)  - at baseline.   - Significant  Deficits requiring NH placement. Requiring extensive assistance from nursing. Up for meals only o/w spends the day resting in bed      Order: See above, otherwise, continue the rest of the current POC.     Electronically signed by:  Yoan Camp MD    Video-Visit Details  Type of service:  Video Visit  Video End Time (time video stopped): 10:13  Distant Location (provider location):  [place] [facility]             Sincerely,        Yoan Camp MD

## 2020-07-01 ENCOUNTER — VIRTUAL VISIT (OUTPATIENT)
Dept: GERIATRICS | Facility: CLINIC | Age: 85
End: 2020-07-01
Payer: COMMERCIAL

## 2020-07-01 VITALS
WEIGHT: 124 LBS | TEMPERATURE: 97.5 F | OXYGEN SATURATION: 92 % | RESPIRATION RATE: 18 BRPM | DIASTOLIC BLOOD PRESSURE: 66 MMHG | SYSTOLIC BLOOD PRESSURE: 133 MMHG | HEART RATE: 76 BPM | BODY MASS INDEX: 21.97 KG/M2

## 2020-07-01 DIAGNOSIS — E11.9 TYPE 2 DIABETES MELLITUS WITHOUT COMPLICATION, WITHOUT LONG-TERM CURRENT USE OF INSULIN (H): Primary | ICD-10-CM

## 2020-07-01 DIAGNOSIS — R54 FRAILTY: ICD-10-CM

## 2020-07-01 DIAGNOSIS — R54 AGE-RELATED PHYSICAL DEBILITY: ICD-10-CM

## 2020-07-01 DIAGNOSIS — G62.9 NEUROPATHY: ICD-10-CM

## 2020-07-01 DIAGNOSIS — M25.532 LEFT WRIST PAIN: ICD-10-CM

## 2020-07-01 PROCEDURE — 99309 SBSQ NF CARE MODERATE MDM 30: CPT | Mod: GT | Performed by: NURSE PRACTITIONER

## 2020-07-01 NOTE — PROGRESS NOTES
"[facility] Video Visit  Anya Gaines is a 89 year old female who is being evaluated via a billable video visit due to the restrictions of the COVID19 pandemic.The patient has been notified of following: \"This video visit will be conducted via a call between you and your provider. We have found that certain health care needs can be provided without the need for an in-person physical exam.  This service lets us provide the care you need with a video conversation.  If a prescription is necessary we can send it directly to your pharmacy.  If lab work is needed we can place an order for that and you can then stop by our lab to have the test done at a later time. If during the course of the call the provider feels a video visit is not appropriate, you will not be charged for this service.\"     Proivder has received verbal consent for a Video Visit from the patient? Yes    Patient/facility would like the video invitation sent by: Send to e-mail at: [email or web address]    This visit took place virtually, with the patient located at [facility].  ________________________________________________  Video Start Time: 1217  Anya Gaines complains of    Chief Complaint   Patient presents with     Video Visit     Regulatory   [place] Medical Record Number:  [id number]. HPI: Anya Gaines  is 89 year old (7/4/1920), who is being seen today for a federally mandated E/M visit.  HPI information obtained from: facility chart records, facility staff, patient report, [facility] chart review and Care Everywhere [facility] chart review. Today's concerns are:  Anya is seen today, justy shy of her 89th birthday, which is this coming Saturday. She is happy and proud of her family, and cannot believe that she is turning 89. She states her left wrist is causing some pain, and associated numbness/tingling. She denies injury/trauma. She denies SOB, bowel problems, cough, fever. She states she " has a good appetite and is sleeping well. Chart review shows VS are stable, last a1c was 8.8% and Metformin was stopped several months ago.    ALLERGIES:Patient has no known allergies. PAST MEDICAL HISTORY:   has a past medical history of Cerebral artery occlusion with cerebral infarction (H), Cerebral infarction (H), Diabetes mellitus, type 2 (H), Hypertension, and Seizures (H). PAST SURGICAL HISTORY:   has a past surgical history that includes surgical history of - . FAMILY HISTORY: family history is not on file. SOCIAL HISTORY:  reports that she has never smoked. She has never used smokeless tobacco. She reports that she does not drink alcohol or use drugs.  MEDICATIONS:  Current Outpatient Medications   Medication Sig Dispense Refill     acetaminophen (TYLENOL) 325 MG tablet Take 2 tablets (650 mg) by mouth 2 times daily  0     amLODIPine (NORVASC) 5 MG tablet TAKE 1/2 TABLET BY MOUTH DAILY AT BEDTIME DX HYPERTENSION 31 tablet 11     clopidogrel (PLAVIX) 75 MG tablet TAKE 1 TABLET BY MOUTH EVERY DAY DX CEREBROVASCULAR ACCIDENT 30 tablet 11     gabapentin (NEURONTIN) 600 MG tablet Take 0.5 tablets (300 mg) by mouth every morning AND 1 tablet (600 mg) At Bedtime.  0     hydrochlorothiazide (HYDRODIURIL) 25 MG tablet TAKE 1 TABLET BY MOUTH EVERY MORNING DX HYPERTENSION 31 tablet 11     lisinopril (PRINIVIL/ZESTRIL) 40 MG tablet TAKE 1 TABLET BY MOUTH EVERY DAY DX HYPERTENSION 30 tablet 11     loperamide (IMODIUM) 2 MG capsule Take 2 mg by mouth daily as needed       Multiple Vitamins-Minerals (PRESERVISION AREDS) TABS Take 1 tablet by mouth 2 times daily       Case Management:  I have reviewed the care plan and MDS and do agree with the plan. Patient's desire to return to the community is not assessible due to cognitive impairment. Information reviewed:  Medications, vital signs, orders, and nursing notes.    ROS: Limited secondary to cognitive impairment but today pt reports the above and 4 point ROS including  Respiratory, CV, GI and , other than that noted in the HPI, is negative.    Objective:  /66   Pulse 76   Temp 97.5  F (36.4  C)   Resp 18   Wt 56.2 kg (124 lb)   SpO2 92%   BMI 21.97 kg/m    GENERAL APPEARANCE: Alert, in no distress, cooperative.   EYES/ENT: EOM normal on camera, hearing acuity [place].  RESP: Respiratory effort appears unlabored over video screen, no respiratory distress apparent on video, On RA.   SKIN: Skin appears baseline w/ video inspection. No wounds/rashes noted.    NEURO: CN II-XII at patient's baseline, TORRES at baseline on video. Sensation baseline PPS.  PSYCH: Insight, judgement, and memory are baseline, affect and mood are happy/engaged.    Laboratory/Diagnostics: Reviewed in Epic by provider today.     Assessment/Plan:  Type 2 diabetes mellitus without complication, without long-term current use of insulin (H)  Neuropathy  Left wrist pain  Frailty  Age-related physical debility  Acute-on-chronic. We were considering reduction in gabapentin during this visit, but we will keep as is for now given reports of upper extremity numbness/tingling. Given the location of discomfort and description, we suspect carpal tunnel, but will ask OT for eval for more detailed information. We will recheck a1c given discontinuation of Metformin several months ago. Follow-up routinely or as needed.     Orders:  1. Recheck a1c x1 on 7/3. Dx: DM II.  2. OT eval x1. Dx: wrist pain/neuropathy.     Video-Visit Details  Type of service:  Video Visit  Video Start Time: 1217  Video End Time (time video stopped): 1222  Originating Location (pt. Location): Long term Care  Distant Location (provider location):  [facility]   Mode of Communication:  Video Conference.    Electronically signed by:  Dr. Lilian Streeter, APRN CNP DNP

## 2020-07-01 NOTE — LETTER
"    7/1/2020        RE: Anya Gaines  [facility]  [address]  [address]  [facility] [address]        [facility] Video Visit  Anya Gaines is a 89 year old female who is being evaluated via a billable video visit due to the restrictions of the COVID19 pandemic.The patient has been notified of following: \"This video visit will be conducted via a call between you and your provider. We have found that certain health care needs can be provided without the need for an in-person physical exam.  This service lets us provide the care you need with a video conversation.  If a prescription is necessary we can send it directly to your pharmacy.  If lab work is needed we can place an order for that and you can then stop by our lab to have the test done at a later time. If during the course of the call the provider feels a video visit is not appropriate, you will not be charged for this service.\"     Proivder has received verbal consent for a Video Visit from the patient? Yes    Patient/facility would like the video invitation sent by: Send to e-mail at: [email or web address]    This visit took place virtually, with the patient located at [facility].  ________________________________________________  Video Start Time: 1217  Anya Gaines complains of    Chief Complaint   Patient presents with     Video Visit     Regulatory   [place] Medical Record Number:  [id number]. HPI: Anya Gaines  is 89 year old (7/4/1920), who is being seen today for a federally mandated E/M visit.  HPI information obtained from: facility chart records, facility staff, patient report, [facility] chart review and Care Everywhere [facility] chart review. Today's concerns are:  Anya is seen today, justy shy of her 89th birthday, which is this coming Saturday. She is happy and proud of her family, and cannot believe that she is turning 89. She states her left wrist is causing some pain, and " associated numbness/tingling. She denies injury/trauma. She denies SOB, bowel problems, cough, fever. She states she has a good appetite and is sleeping well. Chart review shows VS are stable, last a1c was 8.8% and Metformin was stopped several months ago.    ALLERGIES:Patient has no known allergies. PAST MEDICAL HISTORY:   has a past medical history of Cerebral artery occlusion with cerebral infarction (H), Cerebral infarction (H), Diabetes mellitus, type 2 (H), Hypertension, and Seizures (H). PAST SURGICAL HISTORY:   has a past surgical history that includes surgical history of - . FAMILY HISTORY: family history is not on file. SOCIAL HISTORY:  reports that she has never smoked. She has never used smokeless tobacco. She reports that she does not drink alcohol or use drugs.  MEDICATIONS:  Current Outpatient Medications   Medication Sig Dispense Refill     acetaminophen (TYLENOL) 325 MG tablet Take 2 tablets (650 mg) by mouth 2 times daily  0     amLODIPine (NORVASC) 5 MG tablet TAKE 1/2 TABLET BY MOUTH DAILY AT BEDTIME DX HYPERTENSION 31 tablet 11     clopidogrel (PLAVIX) 75 MG tablet TAKE 1 TABLET BY MOUTH EVERY DAY DX CEREBROVASCULAR ACCIDENT 30 tablet 11     gabapentin (NEURONTIN) 600 MG tablet Take 0.5 tablets (300 mg) by mouth every morning AND 1 tablet (600 mg) At Bedtime.  0     hydrochlorothiazide (HYDRODIURIL) 25 MG tablet TAKE 1 TABLET BY MOUTH EVERY MORNING DX HYPERTENSION 31 tablet 11     lisinopril (PRINIVIL/ZESTRIL) 40 MG tablet TAKE 1 TABLET BY MOUTH EVERY DAY DX HYPERTENSION 30 tablet 11     loperamide (IMODIUM) 2 MG capsule Take 2 mg by mouth daily as needed       Multiple Vitamins-Minerals (PRESERVISION AREDS) TABS Take 1 tablet by mouth 2 times daily       Case Management:  I have reviewed the care plan and MDS and do agree with the plan. Patient's desire to return to the community is not assessible due to cognitive impairment. Information reviewed:  Medications, vital signs, orders, and nursing  notes.    ROS: Limited secondary to cognitive impairment but today pt reports the above and 4 point ROS including Respiratory, CV, GI and , other than that noted in the HPI, is negative.    Objective:  /66   Pulse 76   Temp 97.5  F (36.4  C)   Resp 18   Wt 56.2 kg (124 lb)   SpO2 92%   BMI 21.97 kg/m    GENERAL APPEARANCE: Alert, in no distress, cooperative.   EYES/ENT: EOM normal on camera, hearing acuity [place].  RESP: Respiratory effort appears unlabored over video screen, no respiratory distress apparent on video, On RA.   SKIN: Skin appears baseline w/ video inspection. No wounds/rashes noted.    NEURO: CN II-XII at patient's baseline, TORRES at baseline on video. Sensation baseline PPS.  PSYCH: Insight, judgement, and memory are baseline, affect and mood are happy/engaged.    Laboratory/Diagnostics: Reviewed in Epic by provider today.     Assessment/Plan:  Type 2 diabetes mellitus without complication, without long-term current use of insulin (H)  Neuropathy  Left wrist pain  Frailty  Age-related physical debility  Acute-on-chronic. We were considering reduction in gabapentin during this visit, but we will keep as is for now given reports of upper extremity numbness/tingling. Given the location of discomfort and description, we suspect carpal tunnel, but will ask OT for eval for more detailed information. We will recheck a1c given discontinuation of Metformin several months ago. Follow-up routinely or as needed.     Orders:  1. Recheck a1c x1 on 7/3. Dx: DM II.  2. OT eval x1. Dx: wrist pain/neuropathy.     Video-Visit Details  Type of service:  Video Visit  Video Start Time: 1217  Video End Time (time video stopped): 1222  Originating Location (pt. Location): Long term Care  Distant Location (provider location):  [facility]   Mode of Communication:  Video Conference.    Electronically signed by:  TONYA Walton CNP DNP          Sincerely,        TONYA Obando  CNP

## 2020-07-02 ENCOUNTER — RECORDS - HEALTHEAST (OUTPATIENT)
Dept: LAB | Facility: CLINIC | Age: 85
End: 2020-07-02

## 2020-07-03 ENCOUNTER — TRANSFERRED RECORDS (OUTPATIENT)
Dept: HEALTH INFORMATION MANAGEMENT | Facility: CLINIC | Age: 85
End: 2020-07-03

## 2020-07-03 LAB
HBA1C MFR BLD: 9.9 %
HBA1C MFR BLD: 9.9 % (ref 0–5.7)

## 2020-08-26 ENCOUNTER — VIRTUAL VISIT (OUTPATIENT)
Dept: GERIATRICS | Facility: CLINIC | Age: 85
End: 2020-08-26
Payer: COMMERCIAL

## 2020-08-26 VITALS
HEART RATE: 88 BPM | WEIGHT: 123.4 LBS | TEMPERATURE: 97.9 F | BODY MASS INDEX: 21.86 KG/M2 | DIASTOLIC BLOOD PRESSURE: 74 MMHG | SYSTOLIC BLOOD PRESSURE: 152 MMHG | RESPIRATION RATE: 16 BRPM | OXYGEN SATURATION: 95 %

## 2020-08-26 DIAGNOSIS — R54 FRAILTY: ICD-10-CM

## 2020-08-26 DIAGNOSIS — R54 AGE-RELATED PHYSICAL DEBILITY: Primary | ICD-10-CM

## 2020-08-26 DIAGNOSIS — R41.0 CONFUSION: ICD-10-CM

## 2020-08-26 DIAGNOSIS — R52 GENERALIZED PAIN: ICD-10-CM

## 2020-08-26 DIAGNOSIS — F41.9 ANXIETY: ICD-10-CM

## 2020-08-26 PROCEDURE — 99309 SBSQ NF CARE MODERATE MDM 30: CPT | Mod: GT | Performed by: NURSE PRACTITIONER

## 2020-08-26 NOTE — PROGRESS NOTES
"[facility] Video Visit  Anya Gaines is a 89 year old female who is being evaluated via a billable video visit due to the restrictions of the COVID19 pandemic.The patient has been notified of following: \"This video visit will be conducted via a call between you and your provider. We have found that certain health care needs can be provided without the need for an in-person physical exam.  This service lets us provide the care you need with a video conversation.  If a prescription is necessary we can send it directly to your pharmacy.  If lab work is needed we can place an order for that and you can then stop by our lab to have the test done at a later time. If during the course of the call the provider feels a video visit is not appropriate, you will not be charged for this service.\"     Proivder has received verbal consent for a Video Visit from the patient? Yes    Patient/facility would like the video invitation sent by: Send to e-mail at: [email or web address]    This visit took place virtually, with the patient located at [facility].  ________________________________________________  Video Start Time: 1517  Anya Gaines complains of    Chief Complaint   Patient presents with     Video Visit   [place] Medical Record Number:  [id number]. HPI: Anya Gaines  is 89 year old (7/4/1920), who is being seen today for an episodic  visit.  HPI information obtained from: facility chart records, facility staff, patient report, [facility] chart review and Care Everywhere Psychiatric chart review. Today's concerns are:    Nursing requests provider see Anya today as her daughter called saying \"something is wrong with mom.\"  Anya is seen today in bed, but then when we enter the room she anxiously states she needs to get up. She \"doesn't feel right\" and is visibly anxious. She cannot verbalize any causative factors.  She states that she is sleeping and eating very well. She " denies SOB, CP, headache. She says sometimes she gets light-headed first thing in the morning. She states that sometimes, she can't help but be sad for having outlived so many of her beloved family members. Daughter stated patient sometimes has pain and [place] states that she feels overall weakness. Last PHQ9 was 1/27 in early August. Chart review shows VS are stable.     PMH/PSH reviewed in [facility] today.  MEDICATIONS:  Current Outpatient Medications   Medication Sig Dispense Refill     acetaminophen (TYLENOL) 325 MG tablet Take 2 tablets (650 mg) by mouth 2 times daily  0     amLODIPine (NORVASC) 5 MG tablet TAKE 1/2 TABLET BY MOUTH DAILY AT BEDTIME DX HYPERTENSION 31 tablet 11     clopidogrel (PLAVIX) 75 MG tablet TAKE 1 TABLET BY MOUTH EVERY DAY DX CEREBROVASCULAR ACCIDENT 30 tablet 11     gabapentin (NEURONTIN) 600 MG tablet Take 0.5 tablets (300 mg) by mouth every morning AND 1 tablet (600 mg) At Bedtime.  0     hydrochlorothiazide (HYDRODIURIL) 25 MG tablet TAKE 1 TABLET BY MOUTH EVERY MORNING DX HYPERTENSION 31 tablet 11     lisinopril (PRINIVIL/ZESTRIL) 40 MG tablet TAKE 1 TABLET BY MOUTH EVERY DAY DX HYPERTENSION 30 tablet 11     loperamide (IMODIUM) 2 MG capsule Take 2 mg by mouth daily as needed       Multiple Vitamins-Minerals (PRESERVISION AREDS) TABS Take 1 tablet by mouth 2 times daily       ROS: Limited secondary to cognitive impairment but today pt reports the above and 4 point ROS including Respiratory, CV, GI and , other than that noted in the HPI, is negative.    Objective:  BP (!) 152/74   Pulse 88   Temp 97.9  F (36.6  C)   Resp 16   Wt 56 kg (123 lb 6.4 oz)   SpO2 95%   BMI 21.86 kg/m    GENERAL APPEARANCE: Alert, in no distress, cooperative.   EYES/ENT: EOM normal on camera, hearing acuity [place].  RESP: Respiratory effort appears unlabored over video screen, no respiratory distress apparent on video, On RA.   SKIN: Skin appears baseline w/ video inspection. No wounds/rashes noted.     NEURO: CN II-XII at patient's baseline, TORRES at baseline on video. Sensation baseline PPS.  PSYCH: Insight, judgement, and memory are baseline, affect and mood are happy/engaged.    Laboratory/Diagnostics: Reviewed in Epic by provider today.     Assessment/Plan:  (R54) Age-related physical debility  (primary encounter diagnosis)  (R54) Frailty  (R52) Generalized pain  (R41.0) Confusion  (F41.9) Anxiety  Acute-on-chronic. Most of [place]'s complaints are very non-specific. We will obtain electrolytes and Hgb to work-up her weakness and anxiety. This may also help evaluate for confusion. We will also consult PT/OT to assist w/ strength/conditioning, and pain/comfort modalities. Follow-up w/ blood work or as needed.    Orders:  1. BMP, Hgb x1 on 8/28. Dx: anxiety.  2. PT/OT eval & treat for Dx: pain/anxiety/weakness.     Video-Visit Details  Type of service:  Video Visit  Video Start Time: 1517  Video End Time (time video stopped): 1524  Originating Location (pt. Location): Long term Care  Distant Location (provider location):  [place] [facility]   Mode of Communication:  Video Conference.    Electronically signed by:  Dr. Lilian Streeter, APRN CNP DNP

## 2020-08-26 NOTE — LETTER
"    8/26/2020        RE: Anya Gaines  [facility]  [address]  [place] [address]        [facility] Video Visit  Anya Gaines is a 89 year old female who is being evaluated via a billable video visit due to the restrictions of the COVID19 pandemic.The patient has been notified of following: \"This video visit will be conducted via a call between you and your provider. We have found that certain health care needs can be provided without the need for an in-person physical exam.  This service lets us provide the care you need with a video conversation.  If a prescription is necessary we can send it directly to your pharmacy.  If lab work is needed we can place an order for that and you can then stop by our lab to have the test done at a later time. If during the course of the call the provider feels a video visit is not appropriate, you will not be charged for this service.\"     Proivder has received verbal consent for a Video Visit from the patient? Yes    Patient/facility would like the video invitation sent by: Send to e-mail at: [email or web address]    This visit took place virtually, with the patient located at [facility].  ________________________________________________  Video Start Time: 1517  Anya Gaines complains of    Chief Complaint   Patient presents with     Video Visit   [facility] Medical Record Number:  [id number]. HPI: Anya Gaines  is 89 year old (7/4/1920), who is being seen today for an episodic  visit.  HPI information obtained from: facility chart records, facility staff, patient report, [facility] chart review and Care Everywhere [facility] chart review. Today's concerns are:    Nursing requests provider see Anya today as her daughter called saying \"something is wrong with mom.\"  Anya is seen today in bed, but then when we enter the room she anxiously states she needs to get up. She \"doesn't feel right\" and is visibly " anxious. She cannot verbalize any causative factors.  She states that she is sleeping and eating very well. She denies SOB, CP, headache. She says sometimes she gets light-headed first thing in the morning. She states that sometimes, she can't help but be sad for having outlived so many of her beloved family members. Daughter stated patient sometimes has pain and [place] states that she feels overall weakness. Last PHQ9 was 1/27 in early August. Chart review shows VS are stable.     PMH/PSH reviewed in [facility] today.  MEDICATIONS:  Current Outpatient Medications   Medication Sig Dispense Refill     acetaminophen (TYLENOL) 325 MG tablet Take 2 tablets (650 mg) by mouth 2 times daily  0     amLODIPine (NORVASC) 5 MG tablet TAKE 1/2 TABLET BY MOUTH DAILY AT BEDTIME DX HYPERTENSION 31 tablet 11     clopidogrel (PLAVIX) 75 MG tablet TAKE 1 TABLET BY MOUTH EVERY DAY DX CEREBROVASCULAR ACCIDENT 30 tablet 11     gabapentin (NEURONTIN) 600 MG tablet Take 0.5 tablets (300 mg) by mouth every morning AND 1 tablet (600 mg) At Bedtime.  0     hydrochlorothiazide (HYDRODIURIL) 25 MG tablet TAKE 1 TABLET BY MOUTH EVERY MORNING DX HYPERTENSION 31 tablet 11     lisinopril (PRINIVIL/ZESTRIL) 40 MG tablet TAKE 1 TABLET BY MOUTH EVERY DAY DX HYPERTENSION 30 tablet 11     loperamide (IMODIUM) 2 MG capsule Take 2 mg by mouth daily as needed       Multiple Vitamins-Minerals (PRESERVISION AREDS) TABS Take 1 tablet by mouth 2 times daily       ROS: Limited secondary to cognitive impairment but today pt reports the above and 4 point ROS including Respiratory, CV, GI and , other than that noted in the HPI, is negative.    Objective:  BP (!) 152/74   Pulse 88   Temp 97.9  F (36.6  C)   Resp 16   Wt 56 kg (123 lb 6.4 oz)   SpO2 95%   BMI 21.86 kg/m    GENERAL APPEARANCE: Alert, in no distress, cooperative.   EYES/ENT: EOM normal on camera, hearing acuity [place].  RESP: Respiratory effort appears unlabored over video screen, no respiratory  distress apparent on video, On RA.   SKIN: Skin appears baseline w/ video inspection. No wounds/rashes noted.    NEURO: CN II-XII at patient's baseline, TORRES at baseline on video. Sensation baseline PPS.  PSYCH: Insight, judgement, and memory are baseline, affect and mood are happy/engaged.    Laboratory/Diagnostics: Reviewed in Epic by provider today.     Assessment/Plan:  (R54) Age-related physical debility  (primary encounter diagnosis)  (R54) Frailty  (R52) Generalized pain  (R41.0) Confusion  (F41.9) Anxiety  Acute-on-chronic. Most of [place]'s complaints are very non-specific. We will obtain electrolytes and Hgb to work-up her weakness and anxiety. This may also help evaluate for confusion. We will also consult PT/OT to assist w/ strength/conditioning, and pain/comfort modalities. Follow-up w/ blood work or as needed.    Orders:  1. BMP, Hgb x1 on 8/28. Dx: anxiety.  2. PT/OT eval & treat for Dx: pain/anxiety/weakness.     Video-Visit Details  Type of service:  Video Visit  Video Start Time: 1517  Video End Time (time video stopped): 1524  Originating Location (pt. Location): Long term Care  Distant Location (provider location):  [facility] SERVICES   Mode of Communication:  Video Conference.    Electronically signed by:  TONYA Walton CNP DNP        Sincerely,        TONYA Obando CNP

## 2020-08-27 ENCOUNTER — RECORDS - HEALTHEAST (OUTPATIENT)
Dept: LAB | Facility: CLINIC | Age: 85
End: 2020-08-27

## 2020-08-28 ENCOUNTER — RECORDS - HEALTHEAST (OUTPATIENT)
Dept: LAB | Facility: CLINIC | Age: 85
End: 2020-08-28

## 2020-08-28 ENCOUNTER — TELEPHONE (OUTPATIENT)
Dept: GERIATRICS | Facility: CLINIC | Age: 85
End: 2020-08-28

## 2020-08-28 LAB
ALBUMIN UR-MCNC: NEGATIVE MG/DL
ANION GAP SERPL CALCULATED.3IONS-SCNC: 8 MMOL/L (ref 5–18)
APPEARANCE UR: ABNORMAL
BACTERIA #/AREA URNS HPF: ABNORMAL HPF
BILIRUB UR QL STRIP: NEGATIVE
BUN SERPL-MCNC: 14 MG/DL (ref 8–28)
CALCIUM SERPL-MCNC: 9.4 MG/DL (ref 8.5–10.5)
CHLORIDE BLD-SCNC: 97 MMOL/L (ref 98–107)
CO2 SERPL-SCNC: 30 MMOL/L (ref 22–31)
COLOR UR AUTO: YELLOW
CREAT SERPL-MCNC: 0.63 MG/DL (ref 0.6–1.1)
GFR SERPL CREATININE-BSD FRML MDRD: >60 ML/MIN/1.73M2
GLUCOSE BLD-MCNC: 165 MG/DL (ref 70–125)
GLUCOSE UR STRIP-MCNC: NEGATIVE MG/DL
HGB BLD-MCNC: 12.2 G/DL (ref 12–16)
HGB UR QL STRIP: NEGATIVE
KETONES UR STRIP-MCNC: NEGATIVE MG/DL
LEUKOCYTE ESTERASE UR QL STRIP: ABNORMAL
MUCOUS THREADS #/AREA URNS LPF: ABNORMAL LPF
NITRATE UR QL: NEGATIVE
PH UR STRIP: 5.5 [PH] (ref 4.5–8)
POTASSIUM BLD-SCNC: 3.3 MMOL/L (ref 3.5–5)
RBC #/AREA URNS AUTO: ABNORMAL HPF
SODIUM SERPL-SCNC: 135 MMOL/L (ref 136–145)
SP GR UR STRIP: 1.01 (ref 1–1.03)
SQUAMOUS #/AREA URNS AUTO: ABNORMAL LPF
UROBILINOGEN UR STRIP-ACNC: ABNORMAL
WBC #/AREA URNS AUTO: >100 HPF
WBC CLUMPS #/AREA URNS HPF: PRESENT /[HPF]

## 2020-08-28 RX ORDER — POTASSIUM CHLORIDE 1.5 G/1.58G
20 POWDER, FOR SOLUTION ORAL DAILY
COMMUNITY

## 2020-08-28 NOTE — TELEPHONE ENCOUNTER
[place] [facility] TELEPHONE ENCOUNTER    Chief Complaint   Patient presents with     Results       Anya Gaines is a 89 year old  (7/4/1920), Nurse called today to report: lab results, behaviors with increased confusion, which is outside of her normal.  She has been anxious, with increased confusion.  She reports no dysuria, no dyspnea, no cough, COVID neg, vital signs reported as being within normal limits for her.  This behavior is not customary for her.    Date:  8/28/2020   Na 135, K+ 3.3-others within normal limits     ASSESSMENT/PLAN  Unclear etiology for increased behaviors, not normal labs as noted above.  She does not have complaints of dysuria, but certainly could be a UTI    UA/cond UC to rule out UTI    Potassium 20 meq daily po    recheck BMP on 8/31/20     Monitor sleep, behaviors     Electronically signed by:   TONYA Tran CNP

## 2020-08-29 ENCOUNTER — TELEPHONE (OUTPATIENT)
Dept: GERIATRICS | Facility: CLINIC | Age: 85
End: 2020-08-29

## 2020-08-29 LAB — BACTERIA SPEC CULT: ABNORMAL

## 2020-08-29 NOTE — TELEPHONE ENCOUNTER
Patient status change, still confused although afebrile, blood work/urine checked.  UC back today with <100k group B strep  -bactrim DS BID x3d

## 2020-08-31 ENCOUNTER — RECORDS - HEALTHEAST (OUTPATIENT)
Dept: LAB | Facility: CLINIC | Age: 85
End: 2020-08-31

## 2020-08-31 LAB — POTASSIUM BLD-SCNC: 3.6 MMOL/L (ref 3.5–5)

## 2020-09-01 ENCOUNTER — TELEPHONE (OUTPATIENT)
Dept: GERIATRICS | Facility: CLINIC | Age: 85
End: 2020-09-01

## 2020-09-01 NOTE — TELEPHONE ENCOUNTER
"[facility] Geriatrics's Telephone Encounter  Chief Complaint   Patient presents with     Lab Result Notice   Anya Gaines is a 89 year old (7/4/1920). She has been amidst a work-up for restlessness and \"goofiness\" (see prior notes). Given this, a UA/UC was collected (though she was otherwise asymptomatic). UA/UC showed Group B Strep, and a covering provider started Bactrim.     We note that GBS is a common colonized bacterium located in urine/vaginal etc, in >30% of the female population. In addition, it is known that GBS exists in up to 15% of all nursing facility residents and staff. Colonization does not equal infection (See UptoDate). We also note that Bactrim does not cover GBS.     ASSESSMENT/PLAN  Acute-on-chronic.     Nursing reports Anya's sample was via clean catch, which was collected with difficulty.     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     Her VS are stable, electrolytes were replaced, and she has no sxs of infection. Therefore, we will discontinue Bactrim.     We note that GBS is traditionally treated w/ Penicillins or Cephalosporins. We will consider this should Anya become symptomatic or show other sxs of infection (confusion, lethargy, agitation). It was noted by nursing that she sundowns and is forgetful at baseline.  We will evaluate her ongoing and continually communicate with nursing staff for best care. Follow-up routinely or as needed.     Orders:  1. Discontinue Bactrim.     Electronically signed by:  Dr. Lilian Streeter, APRN CNP DNP        "

## 2020-09-10 NOTE — PROGRESS NOTES
"-   [place] Regulatory   Anya Gaines is being evaluated via a billable video visit due to the restrictions of the Covid-19 pandemic.   The patient has been notified of following:  \"This video visit will be conducted via a call between you and your provider. We have found that certain health care needs can be provided without the need for an in-person physical exam.  This service lets us provide the care you need with a video conversation. If during the course of the call the provider feels a video visit is not appropriate, you will not be charged for this service.\"   The provider has received verbal consent for a Video Visit from the patient or first contact? Yes  Patient or facility staff would like the video invitation sent by: N/A   Video Start Time: 09:18  [place] Medical Record Number:  [id number]  Place of Location at the time of visit: [facility]  Chief Complaint   Patient presents with     [facility] Regulatory     Video Visit     HPI:    Anya Gaines  is 89 year old (7/4/1920), who is being seen today for a federally mandated E/M visit.  HPI information obtained from: facility staff, patient report and [facility] chart review.     Today's concerns are:  - Pt seen in the presence of RN who graciously assisted with the virtual visit  - \" I am feeling cold, always cold.\"   - \" I have been doing pretty good.\"  - \" I have been eating well, sleep good when I can\"  - RN reports sundowning and confusion at times, but no behaviors affecting others.   --------------------------------------------------------------  - - Past Medical, social, family histories, medications, and allergies reviewed and updated  - Medications reviewed: in the chart and EHR.   - Case Management:   I have reviewed the care plan and MDS and do agree with the plan. Patient's desire to return to the community is not present.  Information reviewed:  Medications, vital signs, orders, and nursing " "notes.      MEDICATIONS:  Current Outpatient Medications   Medication Sig Dispense Refill     acetaminophen (TYLENOL) 325 MG tablet Take 2 tablets (650 mg) by mouth 2 times daily  0     amLODIPine (NORVASC) 5 MG tablet TAKE 1/2 TABLET BY MOUTH DAILY AT BEDTIME DX HYPERTENSION 31 tablet 11     clopidogrel (PLAVIX) 75 MG tablet TAKE 1 TABLET BY MOUTH EVERY DAY DX CEREBROVASCULAR ACCIDENT 30 tablet 11     gabapentin (NEURONTIN) 600 MG tablet Take 0.5 tablets (300 mg) by mouth every morning AND 1 tablet (600 mg) At Bedtime.  0     hydrochlorothiazide (HYDRODIURIL) 25 MG tablet TAKE 1 TABLET BY MOUTH EVERY MORNING DX HYPERTENSION 31 tablet 11     lisinopril (PRINIVIL/ZESTRIL) 40 MG tablet TAKE 1 TABLET BY MOUTH EVERY DAY DX HYPERTENSION 30 tablet 11     loperamide (IMODIUM) 2 MG capsule Take 2 mg by mouth daily as needed       Multiple Vitamins-Minerals (PRESERVISION AREDS) TABS Take 1 tablet by mouth 2 times daily       potassium chloride (KLOR-CON) 20 MEQ packet Take 20 mEq by mouth daily       ROS:  Limited due to the Resident's cognitive impairment, otherwise negative except as in the HPI    Vitals:  BP (!) 147/77   Pulse 62   Temp 98.5  F (36.9  C)   Resp 18   Ht 1.6 m (5' 3\")   Wt 58.2 kg (128 lb 6.4 oz)   SpO2 95%   BMI 22.75 kg/m    Body mass index is 22.75 kg/m .  Limited visit exam done given COVID-19 precautions.   GENERAL APPEARANCE:  in no distress  RESP:  unlabored breathing  M/S:   no joint deformity noted  SKIN:  no rash noted  NEURO:   no purposeful movement in upper and lower extremities  PSYCH:  affect and mood normal      Lab/Diagnostic data:  Reviewed in the chart and EHR.      ASSESSMENT/PLAN  ------------------------------  Type 2 diabetes mellitus without complication, without long-term current use of insulin (H):   A1C 9.9 07/03/2020    A1C 7.4 04/03/2019   - under controlled. In this frail elderly adult with a limited life expectancy, the goal of  the management is to address the " hyperglycemia sx if any,  rather than the  BGs numbers per se.        Mild PCM (H): improving.   Body mass index is 22.75 kg/m . from 21.97, improving.    Wt Readings from Last 5 Encounters:   09/11/20 58.2 kg (128 lb 6.4 oz)   08/26/20 56 kg (123 lb 6.4 oz)   07/01/20 56.2 kg (124 lb)   05/01/20 56.2 kg (124 lb)   04/14/20 56.2 kg (124 lb)       Benign essential hype: controlled.     Hx of Seizures d/o   Hyperkinetic disorder  Debility  History of CVA (cerebrovascular accident)  - at baseline.   - Significant  Deficits requiring NH placement. Requiring extensive assistance from nursing. Up for meals only o/w spends the day resting in bed      Cognitive Impairment, query vascular dementia/vs AD (H)  - with sundowning and forgetfulness. Consider performing SLUM. Continue to anticipate needs. Chronic condition, ongoing decline expected. Continue to provide redirection and reassurance as needed. Maintain safe living situation with goals focused on comfort.    Order: See above, otherwise, continue the rest of the current POC.     Electronically signed by:  Yoan Camp MD    Video-Visit Details  Type of service:  Video Visit  Video End Time (time video stopped): 09:20  Distant Location (provider location):  [place] [facility]

## 2020-09-11 ENCOUNTER — VIRTUAL VISIT (OUTPATIENT)
Dept: GERIATRICS | Facility: CLINIC | Age: 85
End: 2020-09-11
Payer: COMMERCIAL

## 2020-09-11 VITALS
WEIGHT: 128.4 LBS | HEART RATE: 62 BPM | DIASTOLIC BLOOD PRESSURE: 77 MMHG | RESPIRATION RATE: 18 BRPM | HEIGHT: 63 IN | TEMPERATURE: 98.5 F | OXYGEN SATURATION: 95 % | SYSTOLIC BLOOD PRESSURE: 147 MMHG | BODY MASS INDEX: 22.75 KG/M2

## 2020-09-11 DIAGNOSIS — E44.1 MILD PROTEIN-CALORIE MALNUTRITION (H): ICD-10-CM

## 2020-09-11 DIAGNOSIS — I10 BENIGN ESSENTIAL HYPERTENSION: ICD-10-CM

## 2020-09-11 DIAGNOSIS — I69.919 COGNITIVE DEFICITS AS LATE EFFECT OF CEREBROVASCULAR DISEASE: ICD-10-CM

## 2020-09-11 DIAGNOSIS — Z86.73 HISTORY OF CVA (CEREBROVASCULAR ACCIDENT): ICD-10-CM

## 2020-09-11 DIAGNOSIS — E11.9 TYPE 2 DIABETES MELLITUS WITHOUT COMPLICATION, WITHOUT LONG-TERM CURRENT USE OF INSULIN (H): Primary | ICD-10-CM

## 2020-09-11 DIAGNOSIS — R56.9 SEIZURES (H): ICD-10-CM

## 2020-09-11 DIAGNOSIS — F90.9 HYPERKINETIC DISORDER: ICD-10-CM

## 2020-09-11 PROCEDURE — 99309 SBSQ NF CARE MODERATE MDM 30: CPT | Mod: GT | Performed by: FAMILY MEDICINE

## 2020-09-11 ASSESSMENT — MIFFLIN-ST. JEOR: SCORE: 921.55

## 2020-09-11 NOTE — LETTER
"    9/11/2020        RE: Anya Gaines  [facility]  [address]  [address]  [place] [facility] [place] [facility] [address]        -   [place] [facility] Regulatory   Anya Gaines is being evaluated via a billable video visit due to the restrictions of the Covid-19 pandemic.   The patient has been notified of following:  \"This video visit will be conducted via a call between you and your provider. We have found that certain health care needs can be provided without the need for an in-person physical exam.  This service lets us provide the care you need with a video conversation. If during the course of the call the provider feels a video visit is not appropriate, you will not be charged for this service.\"   The provider has received verbal consent for a Video Visit from the patient or first contact? Yes  Patient or facility staff would like the video invitation sent by: N/A   Video Start Time: 09:18  [place] Medical Record Number:  [id number]  Place of Location at the time of visit: [facility]  Chief Complaint   Patient presents with     prison Regulatory     Video Visit     HPI:    Anya Gaines  is 89 year old (7/4/1920), who is being seen today for a federally mandated E/M visit.  HPI information obtained from: facility staff, patient report and [facility] chart review.     Today's concerns are:  - Pt seen in the presence of RN who graciously assisted with the virtual visit  - \" I am feeling cold, always cold.\"   - \" I have been doing pretty good.\"  - \" I have been eating well, sleep good when I can\"  - RN reports sundowning and confusion at times, but no behaviors affecting others.   --------------------------------------------------------------  - - Past Medical, social, family histories, medications, and allergies reviewed and updated  - Medications reviewed: in the chart and EHR.   - Case Management:   I have reviewed the care plan and MDS and do agree with the plan. Patient's desire to return to " "the community is not present.  Information reviewed:  Medications, vital signs, orders, and nursing notes.      MEDICATIONS:  Current Outpatient Medications   Medication Sig Dispense Refill     acetaminophen (TYLENOL) 325 MG tablet Take 2 tablets (650 mg) by mouth 2 times daily  0     amLODIPine (NORVASC) 5 MG tablet TAKE 1/2 TABLET BY MOUTH DAILY AT BEDTIME DX HYPERTENSION 31 tablet 11     clopidogrel (PLAVIX) 75 MG tablet TAKE 1 TABLET BY MOUTH EVERY DAY DX CEREBROVASCULAR ACCIDENT 30 tablet 11     gabapentin (NEURONTIN) 600 MG tablet Take 0.5 tablets (300 mg) by mouth every morning AND 1 tablet (600 mg) At Bedtime.  0     hydrochlorothiazide (HYDRODIURIL) 25 MG tablet TAKE 1 TABLET BY MOUTH EVERY MORNING DX HYPERTENSION 31 tablet 11     lisinopril (PRINIVIL/ZESTRIL) 40 MG tablet TAKE 1 TABLET BY MOUTH EVERY DAY DX HYPERTENSION 30 tablet 11     loperamide (IMODIUM) 2 MG capsule Take 2 mg by mouth daily as needed       Multiple Vitamins-Minerals (PRESERVISION AREDS) TABS Take 1 tablet by mouth 2 times daily       potassium chloride (KLOR-CON) 20 MEQ packet Take 20 mEq by mouth daily       ROS:  Limited due to the Resident's cognitive impairment, otherwise negative except as in the HPI    Vitals:  BP (!) 147/77   Pulse 62   Temp 98.5  F (36.9  C)   Resp 18   Ht 1.6 m (5' 3\")   Wt 58.2 kg (128 lb 6.4 oz)   SpO2 95%   BMI 22.75 kg/m    Body mass index is 22.75 kg/m .  Limited visit exam done given COVID-19 precautions.   GENERAL APPEARANCE:  in no distress  RESP:  unlabored breathing  M/S:   no joint deformity noted  SKIN:  no rash noted  NEURO:   no purposeful movement in upper and lower extremities  PSYCH:  affect and mood normal      Lab/Diagnostic data:  Reviewed in the chart and EHR.      ASSESSMENT/PLAN  ------------------------------  Type 2 diabetes mellitus without complication, without long-term current use of insulin (H):   A1C 9.9 07/03/2020    A1C 7.4 04/03/2019   - under controlled. In this frail " elderly adult with a limited life expectancy, the goal of  the management is to address the hyperglycemia sx if any,  rather than the  BGs numbers per se.        Mild PCM (H): improving.   Body mass index is 22.75 kg/m . from 21.97, improving.    Wt Readings from Last 5 Encounters:   09/11/20 58.2 kg (128 lb 6.4 oz)   08/26/20 56 kg (123 lb 6.4 oz)   07/01/20 56.2 kg (124 lb)   05/01/20 56.2 kg (124 lb)   04/14/20 56.2 kg (124 lb)       Benign essential hype: controlled.     Hx of Seizures d/o   Hyperkinetic disorder  Debility  History of CVA (cerebrovascular accident)  - at baseline.   - Significant  Deficits requiring NH placement. Requiring extensive assistance from nursing. Up for meals only o/w spends the day resting in bed      Cognitive Impairment, query vascular dementia/vs AD (H)  - with sundowning and forgetfulness. Consider performing SLUM. Continue to anticipate needs. Chronic condition, ongoing decline expected. Continue to provide redirection and reassurance as needed. Maintain safe living situation with goals focused on comfort.    Order: See above, otherwise, continue the rest of the current POC.     Electronically signed by:  Yoan Camp MD    Video-Visit Details  Type of service:  Video Visit  Video End Time (time video stopped): 09:20  Distant Location (provider location):  [place] [facility]             Sincerely,        Yoan Camp MD

## 2020-10-29 VITALS
WEIGHT: 126 LBS | SYSTOLIC BLOOD PRESSURE: 116 MMHG | BODY MASS INDEX: 22.32 KG/M2 | HEIGHT: 63 IN | DIASTOLIC BLOOD PRESSURE: 78 MMHG | RESPIRATION RATE: 16 BRPM | OXYGEN SATURATION: 94 % | HEART RATE: 84 BPM | TEMPERATURE: 97.4 F

## 2020-10-29 ASSESSMENT — MIFFLIN-ST. JEOR: SCORE: 910.66

## 2020-10-29 NOTE — PROGRESS NOTES
"[facility] Geriatrics Video Visit  Anya Gaines is a 89 year old female who is being evaluated via a billable video visit due to the restrictions of the COVID19 pandemic.The patient has been notified of following: \"This video visit will be conducted via a call between you and your provider. We have found that certain health care needs can be provided without the need for an in-person physical exam.  This service lets us provide the care you need with a video conversation.  If a prescription is necessary we can send it directly to your pharmacy.  If lab work is needed we can place an order for that and you can then stop by our lab to have the test done at a later time. If during the course of the call the provider feels a video visit is not appropriate, you will not be charged for this service.\"     Proivder has received verbal consent for a Video Visit from the patient? Yes    Patient/facility would like the video invitation sent by: Send to e-mail at: [email or web address]    This visit took place virtually, with the patient located at [facility].  ________________________________________________  Video Start Time: 1200  Annual Physical Exam  Chief Complaint   Patient presents with     Annual Comprehensive Nursing Home   [place] Medical Record Number:  [id number].HPI: Anya Gaines  is a 89 year old  (7/4/1920), who is being seen today for an annual comprehensive visit. HPI information obtained from: facility chart records, facility staff, patient report, [facility] chart review and Care Everywhere [facility] chart review. Today's concerns are:    Anya seen today for a routine annual physical exam. She turned 89 years old this year and she is very proud. She tells provider that she wishes she could \"go home and be with her mom and dad\" but she acknowledges that \"they are getting too old to help me now.\" She denies pain, SOB, headache, and continually states that \"nothing is " "wrong\" and \"everything is fine.\" Chart review shows she is eligible for Shingrix, and is otherwise UTD on vaccinations. We would not recommend cancer screening given her age.     ALLERGIES: Patient has no known allergies.PAST MEDICAL HISTORY:  has a past medical history of Cerebral artery occlusion with cerebral infarction (H), Cerebral infarction (H), Diabetes mellitus, type 2 (H), Hypertension, and Seizures (H).PAST SURGICAL HISTORY:  has a past surgical history that includes surgical history of - .  IMMUNIZATIONS:  Immunization History   Administered Date(s) Administered     Influenza (High Dose) 3 valent vaccine 10/01/2014, 10/12/2015, 10/06/2017     Influenza (IIV3) PF 01/26/2010, 10/20/2011, 11/05/2012, 11/25/2013     Influenza (intradermal) 09/20/2018     Pneumo Conj 13-V (2010&after) 10/12/2015     Pneumococcal 23 valent 12/22/2007     TDAP Vaccine (Boostrix) 11/09/2016   Above immunizations pulled from [facility]. MIIC and facility records also reconciled. Outstanding information sent to  to update [facility].  Future immunizations None, UTD. Shingrix optional.   Current Outpatient Medications   Medication Sig Dispense Refill     acetaminophen (TYLENOL) 325 MG tablet Take 2 tablets (650 mg) by mouth 2 times daily  0     amLODIPine (NORVASC) 5 MG tablet TAKE 1/2 TABLET BY MOUTH DAILY AT BEDTIME DX HYPERTENSION 31 tablet 11     clopidogrel (PLAVIX) 75 MG tablet TAKE 1 TABLET BY MOUTH EVERY DAY DX CEREBROVASCULAR ACCIDENT 30 tablet 11     gabapentin (NEURONTIN) 600 MG tablet Take 0.5 tablets (300 mg) by mouth every morning AND 1 tablet (600 mg) At Bedtime.  0     hydrochlorothiazide (HYDRODIURIL) 25 MG tablet TAKE 1 TABLET BY MOUTH EVERY MORNING DX HYPERTENSION 31 tablet 11     lisinopril (PRINIVIL/ZESTRIL) 40 MG tablet TAKE 1 TABLET BY MOUTH EVERY DAY DX HYPERTENSION 30 tablet 11     loperamide (IMODIUM) 2 MG capsule Take 2 mg by mouth daily as needed       Multiple Vitamins-Minerals " "(PRESERVISION AREDS) TABS Take 1 tablet by mouth 2 times daily       potassium chloride (KLOR-CON) 20 MEQ packet Take 20 mEq by mouth daily       Case Management: I have reviewed the facility/SNF care plan/MDS, including the falls risk, nutrition and pain screening. I also reviewed the current immunizations, and preventive care. .Future cancer screening is not clinically indicated secondary to age/goals of care Patient's desire to return to the community is not present. Current Level of Care is appropriate.    Advance Directive Discussion: I reviewed the current advanced directives as reflected in EPIC, the POLST and the facility chart, and verified the congruency of orders. I contacted the first party Kimi and discussed the plan of Care.  I did not due to cognitive impairment review the advance directives with the resident.     Team Discussion: I communicated with the appropriate disciplines involved with the Plan of Care: Nursing  .  Patient's goal is pain control and comfort. Information reviewed:  Medications, vital signs, orders, and nursing notes.    ROS: Limited secondary to cognitive impairment but today pt reports the above and 10 point ROS of systems including Constitutional, Eyes, Respiratory, Cardiovascular, Gastroenterology, Genitourinary, Integumentary, Musculoskeletal, Psychiatric were all negative except for pertinent positives noted in my HPI.    Vitals:  /78   Pulse 84   Temp 97.4  F (36.3  C)   Resp 16   Ht 1.6 m (5' 3\")   Wt 57.2 kg (126 lb)   SpO2 94%   BMI 22.32 kg/m   Body mass index is 22.32 kg/m .  Exam:  GENERAL APPEARANCE: Alert, in no distress, cooperative.   EYES/ENT: EOM normal on camera, hearing acuity [place].  RESP: Respiratory effort appears unlabored over video screen, no respiratory distress apparent on video, On RA.   CV: Edema appears 1+ BLE (ankles) via video. Color appears pale.  ABDOMEN: Appears non-distended, rounded.  SKIN: Skin appears baseline w/ video " inspection. No wounds/rashes noted.   NEURO: CN II-XII at patient's baseline, TORRES at baseline on video. Sensation baseline PPS.  PSYCH: Insight, judgement, and memory are baseline impaired, affect and mood are happy/engaged.    Lab/Diagnostic data:          ASSESSMENT/PLAN  Annual physical exam  Type 2 diabetes mellitus without complication, without long-term current use of insulin (H)  History of TIA (transient ischemic attack) and stroke  Benign essential hypertension  History of CVA (cerebrovascular accident)  Mild protein-calorie malnutrition (H)  Cognitive deficits as late effect of cerebrovascular disease  Frailty  Chronic. Ongoing.     Anya is doing well today, appears comfortable. We will continue POC as ordered.    We note Anya is eligible for Shingrix, provider counseled family on this vaccination. Daughter Kimi will consider this and follow-up w/ staff.    Daughter Kimi confirmed no need for Dexa scan, as treatment for Anya would not change.    We do not recommend cancer screening at Anya's age.     We will screen Anya for anemia, DM, HLD, thyroid dysfunction, electrolyte imbalance, and vitamin D deficiency.     After discussion with daughter, we will maintain Anya's code status at DNR/DNI.    Provider coordinated care with nursing via phone, video, and email for visit initiation and vaccination discussion.   Follow-up routinely or as needed.    Total time spent with patient visit was 35 min including patient visit, review of past records and phone call to patient contact. Greater than 50% of total time spent with counseling and coordinating care with edison Bolden and nursing as noted above.     Orders:  1. CBC, CMP, TSH, a1c, lipids, vitamin D x1 on 11/2. Dx: annual physical.     Video-Visit Details  Type of service:  Video Visit  Video Start Time: 1200  Video End Time (time video stopped): 1205  Originating Location (pt. Location): Long term Care  Distant Location (provider location):    [facility] GERIATRIC SERVICES   Mode of Communication:  Video Conference.    Electronically signed by:  Dr. Lilian Streeter, APRN CNP DNP

## 2020-10-30 ENCOUNTER — VIRTUAL VISIT (OUTPATIENT)
Dept: GERIATRICS | Facility: CLINIC | Age: 85
End: 2020-10-30
Payer: COMMERCIAL

## 2020-10-30 DIAGNOSIS — Z86.73 HISTORY OF TIA (TRANSIENT ISCHEMIC ATTACK) AND STROKE: ICD-10-CM

## 2020-10-30 DIAGNOSIS — E11.9 TYPE 2 DIABETES MELLITUS WITHOUT COMPLICATION, WITHOUT LONG-TERM CURRENT USE OF INSULIN (H): ICD-10-CM

## 2020-10-30 DIAGNOSIS — Z86.73 HISTORY OF CVA (CEREBROVASCULAR ACCIDENT): ICD-10-CM

## 2020-10-30 DIAGNOSIS — R54 FRAILTY: ICD-10-CM

## 2020-10-30 DIAGNOSIS — E44.1 MILD PROTEIN-CALORIE MALNUTRITION (H): ICD-10-CM

## 2020-10-30 DIAGNOSIS — I10 BENIGN ESSENTIAL HYPERTENSION: ICD-10-CM

## 2020-10-30 DIAGNOSIS — Z00.00 ANNUAL PHYSICAL EXAM: Primary | ICD-10-CM

## 2020-10-30 DIAGNOSIS — I69.919 COGNITIVE DEFICITS AS LATE EFFECT OF CEREBROVASCULAR DISEASE: ICD-10-CM

## 2020-10-30 PROCEDURE — 99310 SBSQ NF CARE HIGH MDM 45: CPT | Mod: GT | Performed by: NURSE PRACTITIONER

## 2020-10-30 NOTE — LETTER
"    10/30/2020        RE: Anya Gaines  [facility]  [address]  [place] [address]        [facility] Geriatrics Video Visit  Anya Gaines is a 89 year old female who is being evaluated via a billable video visit due to the restrictions of the COVID19 pandemic.The patient has been notified of following: \"This video visit will be conducted via a call between you and your provider. We have found that certain health care needs can be provided without the need for an in-person physical exam.  This service lets us provide the care you need with a video conversation.  If a prescription is necessary we can send it directly to your pharmacy.  If lab work is needed we can place an order for that and you can then stop by our lab to have the test done at a later time. If during the course of the call the provider feels a video visit is not appropriate, you will not be charged for this service.\"     Proivder has received verbal consent for a Video Visit from the patient? Yes    Patient/facility would like the video invitation sent by: Send to e-mail at: [email or web address]    This visit took place virtually, with the patient located at [facility].  ________________________________________________  Video Start Time: 1200  Annual Physical Exam  Chief Complaint   Patient presents with     Annual Comprehensive Nursing Home   [place] Medical Record Number:  [id number].HPI: Anya Gaines  is a 89 year old  (7/4/1920), who is being seen today for an annual comprehensive visit. HPI information obtained from: facility chart records, facility staff, patient report, [facility] chart review and Care Everywhere [facility] chart review. Today's concerns are:    Anya seen today for a routine annual physical exam. She turned 89 years old this year and she is very proud. She tells provider that she wishes she could \"go home and be with her mom and dad\" but she acknowledges that " "\"they are getting too old to help me now.\" She denies pain, SOB, headache, and continually states that \"nothing is wrong\" and \"everything is fine.\" Chart review shows she is eligible for Shingrix, and is otherwise UTD on vaccinations. We would not recommend cancer screening given her age.     ALLERGIES: Patient has no known allergies.PAST MEDICAL HISTORY:  has a past medical history of Cerebral artery occlusion with cerebral infarction (H), Cerebral infarction (H), Diabetes mellitus, type 2 (H), Hypertension, and Seizures (H).PAST SURGICAL HISTORY:  has a past surgical history that includes surgical history of - .  IMMUNIZATIONS:  Immunization History   Administered Date(s) Administered     Influenza (High Dose) 3 valent vaccine 10/01/2014, 10/12/2015, 10/06/2017     Influenza (IIV3) PF 01/26/2010, 10/20/2011, 11/05/2012, 11/25/2013     Influenza (intradermal) 09/20/2018     Pneumo Conj 13-V (2010&after) 10/12/2015     Pneumococcal 23 valent 12/22/2007     TDAP Vaccine (Boostrix) 11/09/2016   Above immunizations pulled from [facility]. MI[facility] and facility records also reconciled. Outstanding information sent to  to update [facility].  Future immunizations None, UTD. Shingrix optional.   Current Outpatient Medications   Medication Sig Dispense Refill     acetaminophen (TYLENOL) 325 MG tablet Take 2 tablets (650 mg) by mouth 2 times daily  0     amLODIPine (NORVASC) 5 MG tablet TAKE 1/2 TABLET BY MOUTH DAILY AT BEDTIME DX HYPERTENSION 31 tablet 11     clopidogrel (PLAVIX) 75 MG tablet TAKE 1 TABLET BY MOUTH EVERY DAY DX CEREBROVASCULAR ACCIDENT 30 tablet 11     gabapentin (NEURONTIN) 600 MG tablet Take 0.5 tablets (300 mg) by mouth every morning AND 1 tablet (600 mg) At Bedtime.  0     hydrochlorothiazide (HYDRODIURIL) 25 MG tablet TAKE 1 TABLET BY MOUTH EVERY MORNING DX HYPERTENSION 31 tablet 11     lisinopril (PRINIVIL/ZESTRIL) 40 MG tablet TAKE 1 TABLET BY MOUTH EVERY DAY DX HYPERTENSION 30 " "tablet 11     loperamide (IMODIUM) 2 MG capsule Take 2 mg by mouth daily as needed       Multiple Vitamins-Minerals (PRESERVISION AREDS) TABS Take 1 tablet by mouth 2 times daily       potassium chloride (KLOR-CON) 20 MEQ packet Take 20 mEq by mouth daily       Case Management: I have reviewed the facility/SNF care plan/MDS, including the falls risk, nutrition and pain screening. I also reviewed the current immunizations, and preventive care. .Future cancer screening is not clinically indicated secondary to age/goals of care Patient's desire to return to the community is not present. Current Level of Care is appropriate.    Advance Directive Discussion: I reviewed the current advanced directives as reflected in EPIC, the POLST and the facility chart, and verified the congruency of orders. I contacted the first party Kimi and discussed the plan of Care.  I did not due to cognitive impairment review the advance directives with the resident.     Team Discussion: I communicated with the appropriate disciplines involved with the Plan of Care: Nursing  .  Patient's goal is pain control and comfort. Information reviewed:  Medications, vital signs, orders, and nursing notes.    ROS: Limited secondary to cognitive impairment but today pt reports the above and 10 point ROS of systems including Constitutional, Eyes, Respiratory, Cardiovascular, Gastroenterology, Genitourinary, Integumentary, Musculoskeletal, Psychiatric were all negative except for pertinent positives noted in my HPI.    Vitals:  /78   Pulse 84   Temp 97.4  F (36.3  C)   Resp 16   Ht 1.6 m (5' 3\")   Wt 57.2 kg (126 lb)   SpO2 94%   BMI 22.32 kg/m   Body mass index is 22.32 kg/m .  Exam:  GENERAL APPEARANCE: Alert, in no distress, cooperative.   EYES/ENT: EOM normal on camera, hearing acuity [place].  RESP: Respiratory effort appears unlabored over video screen, no respiratory distress apparent on video, On RA.   CV: Edema appears 1+ BLE (ankles) via " video. Color appears pale.  ABDOMEN: Appears non-distended, rounded.  SKIN: Skin appears baseline w/ video inspection. No wounds/rashes noted.   NEURO: CN II-XII at patient's baseline, TORRES at baseline on video. Sensation baseline PPS.  PSYCH: Insight, judgement, and memory are baseline impaired, affect and mood are happy/engaged.    Lab/Diagnostic data:          ASSESSMENT/PLAN  Annual physical exam  Type 2 diabetes mellitus without complication, without long-term current use of insulin (H)  History of TIA (transient ischemic attack) and stroke  Benign essential hypertension  History of CVA (cerebrovascular accident)  Mild protein-calorie malnutrition (H)  Cognitive deficits as late effect of cerebrovascular disease  Frailty  Chronic. Ongoing.     Anya is doing well today, appears comfortable. We will continue POC as ordered.    We note Anya is eligible for Shingrix, provider counseled family on this vaccination. Daughter Kimi will consider this and follow-up w/ staff.    Edison Bolden confirmed no need for Dexa scan, as treatment for Anya would not change.    We do not recommend cancer screening at Anya's age.     We will screen Anya for anemia, DM, HLD, thyroid dysfunction, electrolyte imbalance, and vitamin D deficiency.     After discussion with daughter, we will maintain Anya's code status at DNR/DNI.    Provider coordinated care with nursing via phone, video, and email for visit initiation and vaccination discussion.   Follow-up routinely or as needed.    Total time spent with patient visit was 35 min including patient visit, review of past records and phone call to patient contact. Greater than 50% of total time spent with counseling and coordinating care with edison Bolden and nursing as noted above.     Orders:  1. CBC, CMP, TSH, a1c, lipids, vitamin D x1 on 11/2. Dx: annual physical.     Video-Visit Details  Type of service:  Video Visit  Video Start Time: 1200  Video End Time (time video  stopped): 1205  Originating Location (pt. Location): Long term Care  Distant Location (provider location):  [facility] GERIATRIC SERVICES   Mode of Communication:  Video Conference.    Electronically signed by:  TONYA Walton CNP DNP            Sincerely,        TONYA Obando CNP

## 2020-11-01 ENCOUNTER — RECORDS - HEALTHEAST (OUTPATIENT)
Dept: LAB | Facility: CLINIC | Age: 85
End: 2020-11-01

## 2020-11-02 ENCOUNTER — TRANSFERRED RECORDS (OUTPATIENT)
Dept: HEALTH INFORMATION MANAGEMENT | Facility: CLINIC | Age: 85
End: 2020-11-02

## 2020-11-02 LAB
25(OH)D3 SERPL-MCNC: 26.6 NG/ML (ref 30–80)
ALBUMIN SERPL-MCNC: 3.4 G/DL (ref 3.5–5)
ALBUMIN SERPL-MCNC: 3.4 G/DL (ref 3.5–5)
ALP SERPL-CCNC: 79 U/L (ref 45–120)
ALP SERPL-CCNC: 79 U/L (ref 45–120)
ALT SERPL W P-5'-P-CCNC: 11 U/L (ref 0–45)
ALT SERPL-CCNC: 11 U/L (ref 0–45)
ANION GAP SERPL CALCULATED.3IONS-SCNC: 8 MMOL/L (ref 5–18)
ANION GAP SERPL CALCULATED.3IONS-SCNC: 8 MMOL/L (ref 5–18)
AST SERPL W P-5'-P-CCNC: 10 U/L (ref 0–40)
AST SERPL-CCNC: 10 U/L (ref 0–40)
BASOPHILS # BLD AUTO: 0 THOU/UL (ref 0–0.2)
BASOPHILS NFR BLD AUTO: 1 % (ref 0–2)
BILIRUB SERPL-MCNC: 0.4 MG/DL (ref 0–1)
BILIRUB SERPL-MCNC: 0.4 MG/DL (ref 0–1)
BUN SERPL-MCNC: 12 MG/DL (ref 8–28)
BUN SERPL-MCNC: 12 MG/DL (ref 8–28)
CALCIUM SERPL-MCNC: 9.5 MG/DL (ref 8.5–10.5)
CALCIUM SERPL-MCNC: 9.5 MG/DL (ref 8.5–10.5)
CHLORIDE BLD-SCNC: 103 MMOL/L (ref 98–107)
CHLORIDE SERPLBLD-SCNC: 103 MMOL/L (ref 98–107)
CHOLEST SERPL-MCNC: 145 MG/DL
CHOLEST SERPL-MCNC: 145 MG/DL
CO2 SERPL-SCNC: 28 MMOL/L (ref 22–31)
CO2 SERPL-SCNC: 28 MMOL/L (ref 22–31)
CREAT SERPL-MCNC: 0.58 MG/DL (ref 0.6–1.1)
CREAT SERPL-MCNC: 0.58 MG/DL (ref 0.6–1.1)
EOSINOPHIL # BLD AUTO: 0.2 THOU/UL (ref 0–0.4)
EOSINOPHIL NFR BLD AUTO: 4 % (ref 0–6)
ERYTHROCYTE [DISTWIDTH] IN BLOOD BY AUTOMATED COUNT: 13.3 % (ref 11–14.5)
FASTING STATUS PATIENT QL REPORTED: ABNORMAL
GFR SERPL CREATININE-BSD FRML MDRD: >60 ML/MIN/1.73M2
GFR SERPL CREATININE-BSD FRML MDRD: >60 ML/MIN/1.73M2
GLUCOSE BLD-MCNC: 168 MG/DL (ref 70–125)
GLUCOSE SERPL-MCNC: 168 MG/DL (ref 70–125)
HBA1C MFR BLD: 8.6 %
HBA1C MFR BLD: 8.6 % (ref 0–5.7)
HCT VFR BLD AUTO: 36.9 % (ref 35–47)
HDLC SERPL-MCNC: 40 MG/DL
HDLC SERPL-MCNC: 40 MG/DL
HGB BLD-MCNC: 12.1 G/DL (ref 12–16)
IMM GRANULOCYTES # BLD: 0 THOU/UL
IMM GRANULOCYTES NFR BLD: 0 %
LDLC SERPL CALC-MCNC: 84 MG/DL
LDLC SERPL CALC-MCNC: 84 MG/DL
LYMPHOCYTES # BLD AUTO: 1.2 THOU/UL (ref 0.8–4.4)
LYMPHOCYTES NFR BLD AUTO: 27 % (ref 20–40)
MCH RBC QN AUTO: 29.5 PG (ref 27–34)
MCHC RBC AUTO-ENTMCNC: 32.8 G/DL (ref 32–36)
MCV RBC AUTO: 90 FL (ref 80–100)
MONOCYTES # BLD AUTO: 0.3 THOU/UL (ref 0–0.9)
MONOCYTES NFR BLD AUTO: 7 % (ref 2–10)
NEUTROPHILS # BLD AUTO: 2.8 THOU/UL (ref 2–7.7)
NEUTROPHILS NFR BLD AUTO: 61 % (ref 50–70)
PLATELET # BLD AUTO: 182 THOU/UL (ref 140–440)
PMV BLD AUTO: 10.2 FL (ref 8.5–12.5)
POTASSIUM BLD-SCNC: 3.6 MMOL/L (ref 3.5–5)
POTASSIUM SERPL-SCNC: 3.6 MMOL/L (ref 3.5–5)
PROT SERPL-MCNC: 6 G/DL (ref 6–8)
PROT SERPL-MCNC: 6 G/DL (ref 6–8)
RBC # BLD AUTO: 4.1 MILL/UL (ref 3.8–5.4)
SODIUM SERPL-SCNC: 139 MMOL/L (ref 136–145)
SODIUM SERPL-SCNC: 139 MMOL/L (ref 136–145)
TRIGL SERPL-MCNC: 103 MG/DL
TRIGL SERPL-MCNC: 103 MG/DL
TSH SERPL DL<=0.005 MIU/L-ACNC: 0.88 UIU/ML (ref 0.3–5)
TSH SERPL-ACNC: 0.88 UIU/ML (ref 0.3–5)
WBC: 4.6 THOU/UL (ref 4–11)

## 2021-01-01 ENCOUNTER — RECORDS - HEALTHEAST (OUTPATIENT)
Dept: LAB | Facility: CLINIC | Age: 86
End: 2021-01-01

## 2021-01-01 ENCOUNTER — NURSING HOME VISIT (OUTPATIENT)
Dept: GERIATRICS | Facility: CLINIC | Age: 86
End: 2021-01-01
Payer: COMMERCIAL

## 2021-01-01 ENCOUNTER — LAB REQUISITION (OUTPATIENT)
Dept: LAB | Facility: CLINIC | Age: 86
End: 2021-01-01
Payer: COMMERCIAL

## 2021-01-01 ENCOUNTER — VIRTUAL VISIT (OUTPATIENT)
Dept: GERIATRICS | Facility: CLINIC | Age: 86
End: 2021-01-01
Payer: COMMERCIAL

## 2021-01-01 ENCOUNTER — CLINICAL UPDATE (OUTPATIENT)
Dept: PHARMACY | Facility: CLINIC | Age: 86
End: 2021-01-01
Payer: COMMERCIAL

## 2021-01-01 ENCOUNTER — TELEPHONE (OUTPATIENT)
Dept: GERIATRICS | Facility: CLINIC | Age: 86
End: 2021-01-01

## 2021-01-01 ENCOUNTER — DOCUMENTATION ONLY (OUTPATIENT)
Dept: OTHER | Facility: CLINIC | Age: 86
End: 2021-01-01
Payer: COMMERCIAL

## 2021-01-01 VITALS
BODY MASS INDEX: 22.41 KG/M2 | TEMPERATURE: 96.9 F | RESPIRATION RATE: 16 BRPM | OXYGEN SATURATION: 92 % | HEIGHT: 62 IN | DIASTOLIC BLOOD PRESSURE: 69 MMHG | HEART RATE: 92 BPM | SYSTOLIC BLOOD PRESSURE: 123 MMHG | WEIGHT: 121.8 LBS

## 2021-01-01 VITALS
BODY MASS INDEX: 21.9 KG/M2 | WEIGHT: 119 LBS | HEART RATE: 74 BPM | SYSTOLIC BLOOD PRESSURE: 146 MMHG | OXYGEN SATURATION: 98 % | DIASTOLIC BLOOD PRESSURE: 80 MMHG | TEMPERATURE: 96.7 F | HEIGHT: 62 IN | RESPIRATION RATE: 16 BRPM

## 2021-01-01 VITALS
TEMPERATURE: 97.7 F | DIASTOLIC BLOOD PRESSURE: 47 MMHG | HEART RATE: 72 BPM | SYSTOLIC BLOOD PRESSURE: 145 MMHG | HEIGHT: 63 IN | OXYGEN SATURATION: 97 % | BODY MASS INDEX: 22.25 KG/M2 | RESPIRATION RATE: 16 BRPM | WEIGHT: 125.6 LBS

## 2021-01-01 VITALS
OXYGEN SATURATION: 97 % | WEIGHT: 121.6 LBS | BODY MASS INDEX: 22.38 KG/M2 | HEIGHT: 62 IN | TEMPERATURE: 97.5 F | SYSTOLIC BLOOD PRESSURE: 115 MMHG | HEART RATE: 81 BPM | DIASTOLIC BLOOD PRESSURE: 68 MMHG | RESPIRATION RATE: 16 BRPM

## 2021-01-01 VITALS
DIASTOLIC BLOOD PRESSURE: 79 MMHG | WEIGHT: 124.8 LBS | TEMPERATURE: 97.4 F | SYSTOLIC BLOOD PRESSURE: 121 MMHG | RESPIRATION RATE: 16 BRPM | HEART RATE: 101 BPM | OXYGEN SATURATION: 95 % | BODY MASS INDEX: 22.97 KG/M2 | HEIGHT: 62 IN

## 2021-01-01 VITALS
RESPIRATION RATE: 16 BRPM | DIASTOLIC BLOOD PRESSURE: 76 MMHG | WEIGHT: 125.4 LBS | SYSTOLIC BLOOD PRESSURE: 134 MMHG | OXYGEN SATURATION: 97 % | BODY MASS INDEX: 22.21 KG/M2 | HEART RATE: 66 BPM | TEMPERATURE: 97.6 F

## 2021-01-01 VITALS
HEIGHT: 63 IN | SYSTOLIC BLOOD PRESSURE: 126 MMHG | BODY MASS INDEX: 22.11 KG/M2 | TEMPERATURE: 88 F | RESPIRATION RATE: 16 BRPM | OXYGEN SATURATION: 94 % | WEIGHT: 124.8 LBS | HEART RATE: 97 BPM | DIASTOLIC BLOOD PRESSURE: 70 MMHG

## 2021-01-01 VITALS
DIASTOLIC BLOOD PRESSURE: 93 MMHG | BODY MASS INDEX: 22.97 KG/M2 | WEIGHT: 124.8 LBS | OXYGEN SATURATION: 91 % | SYSTOLIC BLOOD PRESSURE: 139 MMHG | HEART RATE: 89 BPM | TEMPERATURE: 97.9 F | HEIGHT: 62 IN | RESPIRATION RATE: 18 BRPM

## 2021-01-01 VITALS
RESPIRATION RATE: 16 BRPM | WEIGHT: 126.6 LBS | BODY MASS INDEX: 23.3 KG/M2 | SYSTOLIC BLOOD PRESSURE: 109 MMHG | HEIGHT: 62 IN | OXYGEN SATURATION: 97 % | TEMPERATURE: 97.3 F | DIASTOLIC BLOOD PRESSURE: 58 MMHG | HEART RATE: 79 BPM

## 2021-01-01 VITALS
SYSTOLIC BLOOD PRESSURE: 93 MMHG | WEIGHT: 125.2 LBS | TEMPERATURE: 97.8 F | RESPIRATION RATE: 16 BRPM | BODY MASS INDEX: 23.04 KG/M2 | HEIGHT: 62 IN | DIASTOLIC BLOOD PRESSURE: 64 MMHG | HEART RATE: 84 BPM | OXYGEN SATURATION: 94 %

## 2021-01-01 VITALS
DIASTOLIC BLOOD PRESSURE: 93 MMHG | SYSTOLIC BLOOD PRESSURE: 149 MMHG | HEIGHT: 63 IN | WEIGHT: 127.6 LBS | TEMPERATURE: 97.5 F | OXYGEN SATURATION: 95 % | HEART RATE: 81 BPM | BODY MASS INDEX: 22.61 KG/M2 | RESPIRATION RATE: 16 BRPM

## 2021-01-01 VITALS
TEMPERATURE: 97.6 F | RESPIRATION RATE: 18 BRPM | HEART RATE: 93 BPM | SYSTOLIC BLOOD PRESSURE: 106 MMHG | DIASTOLIC BLOOD PRESSURE: 66 MMHG | BODY MASS INDEX: 23.7 KG/M2 | HEIGHT: 62 IN | WEIGHT: 128.8 LBS | OXYGEN SATURATION: 95 %

## 2021-01-01 DIAGNOSIS — R19.7 DIARRHEA, UNSPECIFIED TYPE: Primary | ICD-10-CM

## 2021-01-01 DIAGNOSIS — R56.9 SEIZURES (H): ICD-10-CM

## 2021-01-01 DIAGNOSIS — R54 AGE-RELATED PHYSICAL DEBILITY: ICD-10-CM

## 2021-01-01 DIAGNOSIS — Z86.73 HISTORY OF CVA (CEREBROVASCULAR ACCIDENT): Primary | ICD-10-CM

## 2021-01-01 DIAGNOSIS — I10 BENIGN ESSENTIAL HYPERTENSION: ICD-10-CM

## 2021-01-01 DIAGNOSIS — Z86.73 HISTORY OF CVA (CEREBROVASCULAR ACCIDENT): ICD-10-CM

## 2021-01-01 DIAGNOSIS — E11.9 TYPE 2 DIABETES MELLITUS WITHOUT COMPLICATION, WITHOUT LONG-TERM CURRENT USE OF INSULIN (H): ICD-10-CM

## 2021-01-01 DIAGNOSIS — E11.9 TYPE 2 DIABETES MELLITUS WITHOUT COMPLICATION, WITHOUT LONG-TERM CURRENT USE OF INSULIN (H): Primary | ICD-10-CM

## 2021-01-01 DIAGNOSIS — E44.1 MILD PROTEIN-CALORIE MALNUTRITION (H): ICD-10-CM

## 2021-01-01 DIAGNOSIS — Z86.16 HISTORY OF 2019 NOVEL CORONAVIRUS DISEASE (COVID-19): ICD-10-CM

## 2021-01-01 DIAGNOSIS — R63.0 POOR APPETITE: ICD-10-CM

## 2021-01-01 DIAGNOSIS — M15.0 PRIMARY OSTEOARTHRITIS INVOLVING MULTIPLE JOINTS: Primary | ICD-10-CM

## 2021-01-01 DIAGNOSIS — I10 BENIGN ESSENTIAL HYPERTENSION: Primary | ICD-10-CM

## 2021-01-01 DIAGNOSIS — F01.50 VASCULAR DEMENTIA WITHOUT BEHAVIORAL DISTURBANCE (H): ICD-10-CM

## 2021-01-01 DIAGNOSIS — E03.9 HYPOTHYROIDISM, UNSPECIFIED: ICD-10-CM

## 2021-01-01 DIAGNOSIS — U07.1 COVID-19: ICD-10-CM

## 2021-01-01 DIAGNOSIS — L98.9 FINGER LESION: Primary | ICD-10-CM

## 2021-01-01 DIAGNOSIS — F90.9 HYPERKINETIC DISORDER: ICD-10-CM

## 2021-01-01 DIAGNOSIS — R54 FRAILTY: ICD-10-CM

## 2021-01-01 DIAGNOSIS — N30.01 ACUTE CYSTITIS WITH HEMATURIA: Primary | ICD-10-CM

## 2021-01-01 DIAGNOSIS — M15.0 PRIMARY OSTEOARTHRITIS INVOLVING MULTIPLE JOINTS: ICD-10-CM

## 2021-01-01 DIAGNOSIS — N39.0 URINARY TRACT INFECTION, SITE NOT SPECIFIED: ICD-10-CM

## 2021-01-01 DIAGNOSIS — E11.9 TYPE 2 DIABETES MELLITUS WITHOUT COMPLICATIONS (H): ICD-10-CM

## 2021-01-01 DIAGNOSIS — I69.919 COGNITIVE DEFICITS AS LATE EFFECT OF CEREBROVASCULAR DISEASE: ICD-10-CM

## 2021-01-01 DIAGNOSIS — E11.9 TYPE 2 DIABETES, HBA1C GOAL < 8% (H): ICD-10-CM

## 2021-01-01 DIAGNOSIS — F90.9 HYPERKINETIC DISORDER: Primary | ICD-10-CM

## 2021-01-01 DIAGNOSIS — R19.7 DIARRHEA, UNSPECIFIED: ICD-10-CM

## 2021-01-01 DIAGNOSIS — I10 HYPERTENSION GOAL BP (BLOOD PRESSURE) < 140/90: ICD-10-CM

## 2021-01-01 DIAGNOSIS — R19.5 LOOSE STOOLS: ICD-10-CM

## 2021-01-01 DIAGNOSIS — Z86.73 HISTORY OF TIA (TRANSIENT ISCHEMIC ATTACK) AND STROKE: ICD-10-CM

## 2021-01-01 DIAGNOSIS — E78.5 HYPERLIPIDEMIA, UNSPECIFIED: ICD-10-CM

## 2021-01-01 DIAGNOSIS — D64.9 ANEMIA, UNSPECIFIED: ICD-10-CM

## 2021-01-01 DIAGNOSIS — R19.7 DIARRHEA, UNSPECIFIED TYPE: ICD-10-CM

## 2021-01-01 DIAGNOSIS — I12.9 HYPERTENSIVE CHRONIC KIDNEY DISEASE WITH STAGE 1 THROUGH STAGE 4 CHRONIC KIDNEY DISEASE, OR UNSPECIFIED CHRONIC KIDNEY DISEASE: ICD-10-CM

## 2021-01-01 LAB
ALBUMIN SERPL-MCNC: 3.1 G/DL (ref 3.5–5)
ALBUMIN SERPL-MCNC: 3.4 G/DL (ref 3.5–5)
ALBUMIN UR-MCNC: 100 MG/DL
ALP SERPL-CCNC: 65 U/L (ref 45–120)
ALP SERPL-CCNC: 68 U/L (ref 45–120)
ALT SERPL W P-5'-P-CCNC: <9 U/L (ref 0–45)
ALT SERPL W P-5'-P-CCNC: <9 U/L (ref 0–45)
ANION GAP SERPL CALCULATED.3IONS-SCNC: 11 MMOL/L (ref 5–18)
ANION GAP SERPL CALCULATED.3IONS-SCNC: 12 MMOL/L (ref 5–18)
ANION GAP SERPL CALCULATED.3IONS-SCNC: 9 MMOL/L (ref 5–18)
APPEARANCE UR: ABNORMAL
AST SERPL W P-5'-P-CCNC: 10 U/L (ref 0–40)
AST SERPL W P-5'-P-CCNC: 12 U/L (ref 0–40)
BACTERIA #/AREA URNS HPF: ABNORMAL /HPF
BACTERIA UR CULT: ABNORMAL
BASOPHILS # BLD AUTO: 0.1 10E3/UL (ref 0–0.2)
BASOPHILS NFR BLD AUTO: 1 %
BILIRUB SERPL-MCNC: 0.3 MG/DL (ref 0–1)
BILIRUB SERPL-MCNC: 0.4 MG/DL (ref 0–1)
BILIRUB UR QL STRIP: NEGATIVE
BUN SERPL-MCNC: 15 MG/DL (ref 8–28)
BUN SERPL-MCNC: 18 MG/DL (ref 8–28)
BUN SERPL-MCNC: 18 MG/DL (ref 8–28)
C DIFF TOX B STL QL: NEGATIVE
CALCIUM SERPL-MCNC: 9.5 MG/DL (ref 8.5–10.5)
CALCIUM SERPL-MCNC: 9.7 MG/DL (ref 8.5–10.5)
CALCIUM SERPL-MCNC: 9.9 MG/DL (ref 8.5–10.5)
CHLORIDE BLD-SCNC: 104 MMOL/L (ref 98–107)
CHLORIDE BLD-SCNC: 104 MMOL/L (ref 98–107)
CHLORIDE BLD-SCNC: 111 MMOL/L (ref 98–107)
CHOLEST SERPL-MCNC: 141 MG/DL
CO2 SERPL-SCNC: 17 MMOL/L (ref 22–31)
CO2 SERPL-SCNC: 24 MMOL/L (ref 22–31)
CO2 SERPL-SCNC: 27 MMOL/L (ref 22–31)
COLOR UR AUTO: ABNORMAL
CREAT SERPL-MCNC: 0.56 MG/DL (ref 0.6–1.1)
CREAT SERPL-MCNC: 0.61 MG/DL (ref 0.6–1.1)
CREAT SERPL-MCNC: 0.64 MG/DL (ref 0.6–1.1)
EOSINOPHIL # BLD AUTO: 0.4 10E3/UL (ref 0–0.7)
EOSINOPHIL NFR BLD AUTO: 4 %
ERYTHROCYTE [DISTWIDTH] IN BLOOD BY AUTOMATED COUNT: 14.1 % (ref 10–15)
ERYTHROCYTE [DISTWIDTH] IN BLOOD BY AUTOMATED COUNT: 14.4 % (ref 10–15)
FASTING STATUS PATIENT QL REPORTED: ABNORMAL
GFR SERPL CREATININE-BSD FRML MDRD: 73 ML/MIN/1.73M2
GFR SERPL CREATININE-BSD FRML MDRD: 76 ML/MIN/1.73M2
GFR SERPL CREATININE-BSD FRML MDRD: >60 ML/MIN/1.73M2
GLUCOSE BLD-MCNC: 131 MG/DL (ref 70–125)
GLUCOSE BLD-MCNC: 212 MG/DL (ref 70–125)
GLUCOSE BLD-MCNC: 256 MG/DL (ref 70–125)
GLUCOSE UR STRIP-MCNC: NEGATIVE MG/DL
HBA1C MFR BLD: 10.9 %
HBA1C MFR BLD: 11.8 %
HBA1C MFR BLD: 7.6 %
HCT VFR BLD AUTO: 38.9 % (ref 35–47)
HCT VFR BLD AUTO: 39 % (ref 35–47)
HDLC SERPL-MCNC: 41 MG/DL
HGB BLD-MCNC: 11.8 G/DL (ref 11.7–15.7)
HGB BLD-MCNC: 11.9 G/DL (ref 11.7–15.7)
HGB UR QL STRIP: ABNORMAL
IMM GRANULOCYTES # BLD: 0.1 10E3/UL
IMM GRANULOCYTES NFR BLD: 1 %
KETONES UR STRIP-MCNC: NEGATIVE MG/DL
LDLC SERPL CALC-MCNC: 76 MG/DL
LEUKOCYTE ESTERASE UR QL STRIP: ABNORMAL
LYMPHOCYTES # BLD AUTO: 1.3 10E3/UL (ref 0.8–5.3)
LYMPHOCYTES NFR BLD AUTO: 14 %
MCH RBC QN AUTO: 27.5 PG (ref 26.5–33)
MCH RBC QN AUTO: 27.9 PG (ref 26.5–33)
MCHC RBC AUTO-ENTMCNC: 30.3 G/DL (ref 31.5–36.5)
MCHC RBC AUTO-ENTMCNC: 30.5 G/DL (ref 31.5–36.5)
MCV RBC AUTO: 90 FL (ref 78–100)
MCV RBC AUTO: 92 FL (ref 78–100)
MONOCYTES # BLD AUTO: 0.5 10E3/UL (ref 0–1.3)
MONOCYTES NFR BLD AUTO: 5 %
MUCOUS THREADS #/AREA URNS LPF: PRESENT /LPF
NEUTROPHILS # BLD AUTO: 7.1 10E3/UL (ref 1.6–8.3)
NEUTROPHILS NFR BLD AUTO: 75 %
NITRATE UR QL: POSITIVE
NRBC # BLD AUTO: 0 10E3/UL
NRBC BLD AUTO-RTO: 0 /100
PH UR STRIP: 5.5 [PH] (ref 5–7)
PLATELET # BLD AUTO: 221 10E3/UL (ref 150–450)
PLATELET # BLD AUTO: 273 10E3/UL (ref 150–450)
POTASSIUM BLD-SCNC: 3.8 MMOL/L (ref 3.5–5)
POTASSIUM BLD-SCNC: 3.9 MMOL/L (ref 3.5–5)
POTASSIUM BLD-SCNC: 4.1 MMOL/L (ref 3.5–5)
PROT SERPL-MCNC: 5.9 G/DL (ref 6–8)
PROT SERPL-MCNC: 6.2 G/DL (ref 6–8)
RBC # BLD AUTO: 4.23 10E6/UL (ref 3.8–5.2)
RBC # BLD AUTO: 4.33 10E6/UL (ref 3.8–5.2)
RBC URINE: >182 /HPF
SARS-COV-2 PCR COMMENT: NORMAL
SARS-COV-2 RNA RESP QL NAA+PROBE: NEGATIVE
SARS-COV-2 RNA SPEC QL NAA+PROBE: NEGATIVE
SARS-COV-2 VIRUS SPECIMEN SOURCE: NORMAL
SODIUM SERPL-SCNC: 139 MMOL/L (ref 136–145)
SODIUM SERPL-SCNC: 140 MMOL/L (ref 136–145)
SODIUM SERPL-SCNC: 140 MMOL/L (ref 136–145)
SP GR UR STRIP: 1.02 (ref 1–1.03)
TRIGL SERPL-MCNC: 120 MG/DL
TSH SERPL DL<=0.005 MIU/L-ACNC: 0.86 UIU/ML (ref 0.3–5)
UROBILINOGEN UR STRIP-MCNC: <2 MG/DL
WBC # BLD AUTO: 6.9 10E3/UL (ref 4–11)
WBC # BLD AUTO: 9.4 10E3/UL (ref 4–11)
WBC CLUMPS #/AREA URNS HPF: PRESENT /HPF
WBC URINE: >182 /HPF

## 2021-01-01 PROCEDURE — P9603 ONE-WAY ALLOW PRORATED MILES: HCPCS | Mod: ORL | Performed by: FAMILY MEDICINE

## 2021-01-01 PROCEDURE — 80061 LIPID PANEL: CPT | Mod: ORL | Performed by: FAMILY MEDICINE

## 2021-01-01 PROCEDURE — 99309 SBSQ NF CARE MODERATE MDM 30: CPT | Performed by: FAMILY MEDICINE

## 2021-01-01 PROCEDURE — 87493 C DIFF AMPLIFIED PROBE: CPT | Mod: ORL | Performed by: NURSE PRACTITIONER

## 2021-01-01 PROCEDURE — 80053 COMPREHEN METABOLIC PANEL: CPT | Mod: ORL | Performed by: NURSE PRACTITIONER

## 2021-01-01 PROCEDURE — 99309 SBSQ NF CARE MODERATE MDM 30: CPT | Performed by: NURSE PRACTITIONER

## 2021-01-01 PROCEDURE — 85027 COMPLETE CBC AUTOMATED: CPT | Mod: ORL | Performed by: NURSE PRACTITIONER

## 2021-01-01 PROCEDURE — P9603 ONE-WAY ALLOW PRORATED MILES: HCPCS | Mod: ORL | Performed by: NURSE PRACTITIONER

## 2021-01-01 PROCEDURE — 84443 ASSAY THYROID STIM HORMONE: CPT | Mod: ORL | Performed by: FAMILY MEDICINE

## 2021-01-01 PROCEDURE — 85025 COMPLETE CBC W/AUTO DIFF WBC: CPT | Mod: ORL | Performed by: FAMILY MEDICINE

## 2021-01-01 PROCEDURE — 87086 URINE CULTURE/COLONY COUNT: CPT | Mod: ORL | Performed by: FAMILY MEDICINE

## 2021-01-01 PROCEDURE — U0005 INFEC AGEN DETEC AMPLI PROBE: HCPCS | Mod: ORL | Performed by: FAMILY MEDICINE

## 2021-01-01 PROCEDURE — 99207 PR NO CHARGE LOS: CPT | Performed by: PHARMACIST

## 2021-01-01 PROCEDURE — 81001 URINALYSIS AUTO W/SCOPE: CPT | Mod: ORL | Performed by: FAMILY MEDICINE

## 2021-01-01 PROCEDURE — 36415 COLL VENOUS BLD VENIPUNCTURE: CPT | Mod: ORL | Performed by: FAMILY MEDICINE

## 2021-01-01 PROCEDURE — 99309 SBSQ NF CARE MODERATE MDM 30: CPT | Mod: 95 | Performed by: NURSE PRACTITIONER

## 2021-01-01 PROCEDURE — 36415 COLL VENOUS BLD VENIPUNCTURE: CPT | Mod: ORL | Performed by: NURSE PRACTITIONER

## 2021-01-01 PROCEDURE — 80053 COMPREHEN METABOLIC PANEL: CPT | Mod: ORL | Performed by: FAMILY MEDICINE

## 2021-01-01 PROCEDURE — 83036 HEMOGLOBIN GLYCOSYLATED A1C: CPT | Mod: ORL | Performed by: FAMILY MEDICINE

## 2021-01-01 PROCEDURE — 99318 PR ANNUAL NURSING FAC ASSESSMNT, STABLE: CPT | Performed by: NURSE PRACTITIONER

## 2021-01-01 RX ORDER — METFORMIN HCL 500 MG
2000 TABLET, EXTENDED RELEASE 24 HR ORAL EVERY MORNING
COMMUNITY
Start: 2021-01-01

## 2021-01-01 RX ORDER — HYDROCHLOROTHIAZIDE 12.5 MG/1
12.5 TABLET ORAL DAILY
COMMUNITY
Start: 2021-01-01 | End: 2021-01-01

## 2021-01-01 ASSESSMENT — MIFFLIN-ST. JEOR
SCORE: 869.82
SCORE: 884.34
SCORE: 891.15
SCORE: 858.03
SCORE: 897.5
SCORE: 884.34
SCORE: 907.48
SCORE: 908.85
SCORE: 875.73
SCORE: 905.22
SCORE: 917.92

## 2021-01-04 ENCOUNTER — VIRTUAL VISIT (OUTPATIENT)
Dept: GERIATRICS | Facility: CLINIC | Age: 86
End: 2021-01-04
Payer: COMMERCIAL

## 2021-01-04 VITALS
RESPIRATION RATE: 16 BRPM | BODY MASS INDEX: 22.08 KG/M2 | TEMPERATURE: 97.9 F | HEART RATE: 63 BPM | DIASTOLIC BLOOD PRESSURE: 85 MMHG | WEIGHT: 124.6 LBS | OXYGEN SATURATION: 96 % | HEIGHT: 63 IN | SYSTOLIC BLOOD PRESSURE: 124 MMHG

## 2021-01-04 DIAGNOSIS — E11.9 TYPE 2 DIABETES MELLITUS WITHOUT COMPLICATION, WITHOUT LONG-TERM CURRENT USE OF INSULIN (H): ICD-10-CM

## 2021-01-04 DIAGNOSIS — I10 BENIGN ESSENTIAL HYPERTENSION: Primary | ICD-10-CM

## 2021-01-04 DIAGNOSIS — E44.1 MILD PROTEIN-CALORIE MALNUTRITION (H): ICD-10-CM

## 2021-01-04 DIAGNOSIS — R56.9 SEIZURES (H): ICD-10-CM

## 2021-01-04 DIAGNOSIS — Z86.73 HISTORY OF CVA (CEREBROVASCULAR ACCIDENT): ICD-10-CM

## 2021-01-04 DIAGNOSIS — I69.919 COGNITIVE DEFICITS AS LATE EFFECT OF CEREBROVASCULAR DISEASE: ICD-10-CM

## 2021-01-04 PROCEDURE — 99309 SBSQ NF CARE MODERATE MDM 30: CPT | Mod: GT | Performed by: FAMILY MEDICINE

## 2021-01-04 ASSESSMENT — MIFFLIN-ST. JEOR: SCORE: 904.31

## 2021-01-04 NOTE — PROGRESS NOTES
"[place] GERIATRIC SERVICES Regulatory   Anya Gaines is being evaluated via a billable video visit due to the restrictions of the Covid-19 pandemic.   The patient has been notified of following:  \"This video visit will be conducted via a call between you and your provider. We have found that certain health care needs can be provided without the need for an in-person physical exam.  This service lets us provide the care you need with a video conversation. If during the course of the call the provider feels a video visit is not appropriate, you will not be charged for this service.\"   The provider has received verbal consent for a Video Visit from the patient or first contact? Yes  Patient or facility staff would like the video invitation sent by: N/A   Video Start Time: 12:24  [place] Medical Record Number:  [id number]  Place of Location at the time of visit: [facility]  Chief Complaint   Patient presents with     skilled nursing Regulatory     Video Visit     HPI:    Anya Gaines  is 89 year old (7/4/1920), who is being seen today for a federally mandated E/M visit.  HPI information obtained from: facility staff, patient report and [facility] chart review.     Today's concerns are:  - Pt seen in the presence of RN who graciously assisted with the virtual visit  - Diabetes: \"I have been doing good I think\".   - PCM: reports appetite is good  - Sz: no recent sz activities.   - Dementia: RN reports no behaviors.   --------------------------------------------------------------  - - Past Medical, social, family histories, medications, and allergies reviewed and updated  - Medications reviewed: in the chart and EHR.   - Case Management:   I have reviewed the care plan and MDS and do agree with the plan. Patient's desire to return to the community is not present.  Information reviewed:  Medications, vital signs, orders, and nursing notes.      MEDICATIONS:  Current Outpatient Medications   Medication Sig " "Dispense Refill     acetaminophen (TYLENOL) 325 MG tablet Take 2 tablets (650 mg) by mouth 2 times daily  0     amLODIPine (NORVASC) 5 MG tablet TAKE 1/2 TABLET BY MOUTH DAILY AT BEDTIME DX HYPERTENSION 31 tablet 11     clopidogrel (PLAVIX) 75 MG tablet TAKE 1 TABLET BY MOUTH EVERY DAY DX CEREBROVASCULAR ACCIDENT 30 tablet 11     gabapentin (NEURONTIN) 600 MG tablet Take 0.5 tablets (300 mg) by mouth every morning AND 1 tablet (600 mg) At Bedtime.  0     hydrochlorothiazide (HYDRODIURIL) 25 MG tablet TAKE 1 TABLET BY MOUTH EVERY MORNING DX HYPERTENSION 31 tablet 11     lisinopril (PRINIVIL/ZESTRIL) 40 MG tablet TAKE 1 TABLET BY MOUTH EVERY DAY DX HYPERTENSION 30 tablet 11     loperamide (IMODIUM) 2 MG capsule Take 2 mg by mouth daily as needed       Multiple Vitamins-Minerals (PRESERVISION AREDS) TABS Take 1 tablet by mouth 2 times daily       potassium chloride (KLOR-CON) 20 MEQ packet Take 20 mEq by mouth daily       ROS:  Limited due to the Resident's cognitive impairment, otherwise negative except as in the HPI    Vitals:  /85   Pulse 63   Temp 97.9  F (36.6  C)   Resp 16   Ht 1.6 m (5' 3\")   Wt 56.5 kg (124 lb 9.6 oz)   SpO2 96%   BMI 22.07 kg/m    Body mass index is 22.07 kg/m .  Limited visit exam done given COVID-19 precautions.   GENERAL APPEARANCE:  in no distress  RESP:  unlabored breathing  M/S:   no joint deformity noted  SKIN:  no rash noted  NEURO:   no purposeful movement in upper and lower extremities  PSYCH:  affect and mood normal      Lab/Diagnostic data:  Reviewed in the chart and EHR.      ASSESSMENT/PLAN  ------------------------------  Type 2 diabetes mellitus without complication, without long-term current use of insulin (H):  - off medications.    A1C 8.6 11/02/2020    A1C 9.9 07/03/2020   - W/i acceptable range, however, in this frail elderly adult with a limited life expectancy, the goal of  the management is to address the hyperglycemia sx if any,  rather than the  BGs " numbers per se.        Mild PCM (H): improving.   Body mass index is 22.07 kg/m . from 22.75, stable. .    Wt Readings from Last 5 Encounters:   01/04/21 56.5 kg (124 lb 9.6 oz)   10/29/20 57.2 kg (126 lb)   09/11/20 58.2 kg (128 lb 6.4 oz)   08/26/20 56 kg (123 lb 6.4 oz)   07/01/20 56.2 kg (124 lb)       Benign essential hype: over controlled in this age group. On multiple agents.     Hx of Seizures d/o; not on meds  Hyperkinetic disorder  Debility  History of CVA (cerebrovascular accident)  - at baseline.   - Significant  Deficits requiring NH placement. Requiring extensive assistance from nursing. Up for meals only o/w spends the day resting in bed      Cognitive Impairment, query vascular dementia/vs AD (H)  - Continue to anticipate needs. Chronic condition, ongoing decline expected. Continue to provide redirection and reassurance as needed. Maintain safe living situation with goals focused on comfort.    Order: See above, otherwise, continue the rest of the current POC.       Electronically signed by:  Yoan Camp MD    Video-Visit Details  Type of service:  Video Visit  Video End Time (time video stopped): 12:25  Distant Location (provider location):  [place] GERIATRIC SERVICES

## 2021-01-04 NOTE — LETTER
"    1/4/2021        RE: Anya Gaines  [facility]  [address]  [address]  [facility] [address]        [place] [facility] Regulatory   Anya Gaines is being evaluated via a billable video visit due to the restrictions of the Covid-19 pandemic.   The patient has been notified of following:  \"This video visit will be conducted via a call between you and your provider. We have found that certain health care needs can be provided without the need for an in-person physical exam.  This service lets us provide the care you need with a video conversation. If during the course of the call the provider feels a video visit is not appropriate, you will not be charged for this service.\"   The provider has received verbal consent for a Video Visit from the patient or first contact? Yes  Patient or facility staff would like the video invitation sent by: N/A   Video Start Time: 12:24  [place] Medical Record Number:  [id number]  Place of Location at the time of visit: [facility]  Chief Complaint   Patient presents with     [facility] Regulatory     Video Visit     HPI:    Anya Gaines  is 89 year old (7/4/1920), who is being seen today for a federally mandated E/M visit.  HPI information obtained from: facility staff, patient report and [facility] chart review.     Today's concerns are:  - Pt seen in the presence of RN who graciously assisted with the virtual visit  - Diabetes: \"I have been doing good I think\".   - PCM: reports appetite is good  - Sz: no recent sz activities.   - Dementia: RN reports no behaviors.   --------------------------------------------------------------  - - Past Medical, social, family histories, medications, and allergies reviewed and updated  - Medications reviewed: in the chart and EHR.   - Case Management:   I have reviewed the care plan and MDS and do agree with the plan. Patient's desire to return to the community is not present.  Information reviewed:  Medications, " "vital signs, orders, and nursing notes.      MEDICATIONS:  Current Outpatient Medications   Medication Sig Dispense Refill     acetaminophen (TYLENOL) 325 MG tablet Take 2 tablets (650 mg) by mouth 2 times daily  0     amLODIPine (NORVASC) 5 MG tablet TAKE 1/2 TABLET BY MOUTH DAILY AT BEDTIME DX HYPERTENSION 31 tablet 11     clopidogrel (PLAVIX) 75 MG tablet TAKE 1 TABLET BY MOUTH EVERY DAY DX CEREBROVASCULAR ACCIDENT 30 tablet 11     gabapentin (NEURONTIN) 600 MG tablet Take 0.5 tablets (300 mg) by mouth every morning AND 1 tablet (600 mg) At Bedtime.  0     hydrochlorothiazide (HYDRODIURIL) 25 MG tablet TAKE 1 TABLET BY MOUTH EVERY MORNING DX HYPERTENSION 31 tablet 11     lisinopril (PRINIVIL/ZESTRIL) 40 MG tablet TAKE 1 TABLET BY MOUTH EVERY DAY DX HYPERTENSION 30 tablet 11     loperamide (IMODIUM) 2 MG capsule Take 2 mg by mouth daily as needed       Multiple Vitamins-Minerals (PRESERVISION AREDS) TABS Take 1 tablet by mouth 2 times daily       potassium chloride (KLOR-CON) 20 MEQ packet Take 20 mEq by mouth daily       ROS:  Limited due to the Resident's cognitive impairment, otherwise negative except as in the HPI    Vitals:  /85   Pulse 63   Temp 97.9  F (36.6  C)   Resp 16   Ht 1.6 m (5' 3\")   Wt 56.5 kg (124 lb 9.6 oz)   SpO2 96%   BMI 22.07 kg/m    Body mass index is 22.07 kg/m .  Limited visit exam done given COVID-19 precautions.   GENERAL APPEARANCE:  in no distress  RESP:  unlabored breathing  M/S:   no joint deformity noted  SKIN:  no rash noted  NEURO:   no purposeful movement in upper and lower extremities  PSYCH:  affect and mood normal      Lab/Diagnostic data:  Reviewed in the chart and EHR.      ASSESSMENT/PLAN  ------------------------------  Type 2 diabetes mellitus without complication, without long-term current use of insulin (H):  - off medications.    A1C 8.6 11/02/2020    A1C 9.9 07/03/2020   - W/i acceptable range, however, in this frail elderly adult with a limited life " expectancy, the goal of  the management is to address the hyperglycemia sx if any,  rather than the  BGs numbers per se.        Mild PCM (H): improving.   Body mass index is 22.07 kg/m . from 22.75, stable. .    Wt Readings from Last 5 Encounters:   01/04/21 56.5 kg (124 lb 9.6 oz)   10/29/20 57.2 kg (126 lb)   09/11/20 58.2 kg (128 lb 6.4 oz)   08/26/20 56 kg (123 lb 6.4 oz)   07/01/20 56.2 kg (124 lb)       Benign essential hype: over controlled in this age group. On multiple agents.     Hx of Seizures d/o; not on meds  Hyperkinetic disorder  Debility  History of CVA (cerebrovascular accident)  - at baseline.   - Significant  Deficits requiring NH placement. Requiring extensive assistance from nursing. Up for meals only o/w spends the day resting in bed      Cognitive Impairment, query vascular dementia/vs AD (H)  - Continue to anticipate needs. Chronic condition, ongoing decline expected. Continue to provide redirection and reassurance as needed. Maintain safe living situation with goals focused on comfort.    Order: See above, otherwise, continue the rest of the current POC.       Electronically signed by:  Yoan Camp MD    Video-Visit Details  Type of service:  Video Visit  Video End Time (time video stopped): 12:25  Distant Location (provider location):  [place] [facility]               Sincerely,        Yoan Camp MD

## 2021-01-10 PROBLEM — E44.1 MILD PROTEIN-CALORIE MALNUTRITION (H): Status: ACTIVE | Noted: 2021-01-01

## 2021-01-20 NOTE — LETTER
"    1/20/2021        RE: Anya Gaines  [facility]  [address] [facility] [address]  [facility] [address]        [facility] Video Visit  Anya Gaines is a 89 year old female who is being evaluated via a billable video visit due to the restrictions of the COVID19 pandemic.The patient has been notified of following: \"This video visit will be conducted via a call between you and your provider. We have found that certain health care needs can be provided without the need for an in-person physical exam.  This service lets us provide the care you need with a video conversation.  If a prescription is necessary we can send it directly to your pharmacy.  If lab work is needed we can place an order for that and you can then stop by our lab to have the test done at a later time. If during the course of the call the provider feels a video visit is not appropriate, you will not be charged for this service.\"     Proivder has received verbal consent for a Video Visit from the patient? Yes    Patient/facility would like the video invitation sent by: Send to e-mail at: [email or web address]     This visit took place virtually, with the patient located at [facility]   ________________________________________________  Video Start Time: 1104  Anya Gaines complains of    Chief Complaint   Patient presents with     Nursing Home Acute   HPI/Subjective:  Anya seen today on request of nursing as they have noted a strange lesion on her left index finger. She does not know when it showed up. She denies pain unless it is touched/manipulated. She denies fever/chills, she shows  That she has full ROM. She denies trauma, numbness/tingling. She believes this is from \"that vaccine,\" referencing her COVID vax. Chart review shows VS are stable.     History: I have reviewed and updated the patient's Past Medical History, Social History, Family History and Medication List. ALLERGIES: Patient has " no known allergies.  MEDICATIONS:  Current Outpatient Medications   Medication Sig Dispense Refill     acetaminophen (TYLENOL) 325 MG tablet Take 2 tablets (650 mg) by mouth 2 times daily  0     amLODIPine (NORVASC) 5 MG tablet TAKE 1/2 TABLET BY MOUTH DAILY AT BEDTIME DX HYPERTENSION 31 tablet 11     clopidogrel (PLAVIX) 75 MG tablet TAKE 1 TABLET BY MOUTH EVERY DAY DX CEREBROVASCULAR ACCIDENT 30 tablet 11     gabapentin (NEURONTIN) 600 MG tablet Take 0.5 tablets (300 mg) by mouth every morning AND 1 tablet (600 mg) At Bedtime.  0     hydrochlorothiazide (HYDRODIURIL) 25 MG tablet TAKE 1 TABLET BY MOUTH EVERY MORNING DX HYPERTENSION 31 tablet 11     lisinopril (PRINIVIL/ZESTRIL) 40 MG tablet TAKE 1 TABLET BY MOUTH EVERY DAY DX HYPERTENSION 30 tablet 11     loperamide (IMODIUM) 2 MG capsule Take 2 mg by mouth daily as needed       Multiple Vitamins-Minerals (PRESERVISION AREDS) TABS Take 1 tablet by mouth 2 times daily       potassium chloride (KLOR-CON) 20 MEQ packet Take 20 mEq by mouth daily       REVIEW OF SYSTEMS: Limited secondary to cognitive impairment but today pt reports the above and 4 point ROS including Respiratory, CV, GI and , other than that noted in the HPI, is negative.  Objective:  /76   Pulse 66   Temp 97.6  F (36.4  C)   Resp 16   Wt 56.9 kg (125 lb 6.4 oz)   SpO2 97%   BMI 22.21 kg/m    GENERAL APPEARANCE: Alert, in no distress, cooperative.   EYES/ENT: EOM normal on camera, hearing acuity [place].  RESP: Respiratory effort appears unlabored over video screen, no respiratory distress apparent on video, On RA.   CV: Edema appears 0+ BLE via video. Color appears pale.  ABDOMEN: Appears non-distended, flat.  SKIN: Skin appears baseline w/ video inspection.   Left index finger:    NEURO: CN II-XII at patient's baseline, TORRES at baseline on video. Sensation baseline PPS.  PSYCH: Insight, judgement, and memory are baseline impaired, affect and mood are  happy/engaged.    Laboratory/Diagnostics: Reviewed in Epic by provider today.     Assessment/Plan:  Finger lesion  Primary osteoarthritis involving multiple joints  History of 2019 novel coronavirus disease (COVID-19)  Age-related physical debility  Cognitive deficits as late effect of cerebrovascular disease  Acute-on-chronic. Ongoing.    Differentials for tophi (related to OA), foreign object imbedded, burn/blister, bite, or trauma.    We note that Anya's cognitive impairment and her age-related debility preclude us from having a full picture of when this started or if there was an injury/cause.    We note that Anya has a recent hx of COVID and zoster, though we note minimal pain with this lesion and no other sxs of eruption.     If a foreign object is present, or if tophi, we will begin warm soaks to soften and help with pain. This may automatically remove any object or break up stagnation in the joint. Nursing to monitor for evolution. There does not appear to be a cellulitis or other soft-tissue infection at this time, and therefore we will treat conservatively.   Follow-up routinely or as needed.    Orders:  1. Warm soaks to left index finger BID x 3 days. Dx: lesion.    Video-Visit Details  Type of service:  Video Visit  Video Start Time: 1104  Video End Time (time video stopped): 1111  Originating Location (pt. Location): Long term Care  Distant Location (provider location):  [facility]   Mode of Communication:  Video Conference.    Electronically signed by:  TONYA Walton CNP DNP            Sincerely,        TONYA Obando CNP

## 2021-01-20 NOTE — PROGRESS NOTES
"[facility] Video Visit  Anya Gaines is a 89 year old female who is being evaluated via a billable video visit due to the restrictions of the COVID19 pandemic.The patient has been notified of following: \"This video visit will be conducted via a call between you and your provider. We have found that certain health care needs can be provided without the need for an in-person physical exam.  This service lets us provide the care you need with a video conversation.  If a prescription is necessary we can send it directly to your pharmacy.  If lab work is needed we can place an order for that and you can then stop by our lab to have the test done at a later time. If during the course of the call the provider feels a video visit is not appropriate, you will not be charged for this service.\"     Proivder has received verbal consent for a Video Visit from the patient? Yes    Patient/facility would like the video invitation sent by: Send to e-mail at: [email or web address]     This visit took place virtually, with the patient located at [facility]   ________________________________________________  Video Start Time: 1104  Anya Gaines complains of    Chief Complaint   Patient presents with     Nursing Home Acute   HPI/Subjective:  Anya seen today on request of nursing as they have noted a strange lesion on her left index finger. She does not know when it showed up. She denies pain unless it is touched/manipulated. She denies fever/chills, she shows  That she has full ROM. She denies trauma, numbness/tingling. She believes this is from \"that vaccine,\" referencing her COVID vax. Chart review shows VS are stable.     History: I have reviewed and updated the patient's Past Medical History, Social History, Family History and Medication List. ALLERGIES: Patient has no known allergies.  MEDICATIONS:  Current Outpatient Medications   Medication Sig Dispense Refill     acetaminophen " (TYLENOL) 325 MG tablet Take 2 tablets (650 mg) by mouth 2 times daily  0     amLODIPine (NORVASC) 5 MG tablet TAKE 1/2 TABLET BY MOUTH DAILY AT BEDTIME DX HYPERTENSION 31 tablet 11     clopidogrel (PLAVIX) 75 MG tablet TAKE 1 TABLET BY MOUTH EVERY DAY DX CEREBROVASCULAR ACCIDENT 30 tablet 11     gabapentin (NEURONTIN) 600 MG tablet Take 0.5 tablets (300 mg) by mouth every morning AND 1 tablet (600 mg) At Bedtime.  0     hydrochlorothiazide (HYDRODIURIL) 25 MG tablet TAKE 1 TABLET BY MOUTH EVERY MORNING DX HYPERTENSION 31 tablet 11     lisinopril (PRINIVIL/ZESTRIL) 40 MG tablet TAKE 1 TABLET BY MOUTH EVERY DAY DX HYPERTENSION 30 tablet 11     loperamide (IMODIUM) 2 MG capsule Take 2 mg by mouth daily as needed       Multiple Vitamins-Minerals (PRESERVISION AREDS) TABS Take 1 tablet by mouth 2 times daily       potassium chloride (KLOR-CON) 20 MEQ packet Take 20 mEq by mouth daily       REVIEW OF SYSTEMS: Limited secondary to cognitive impairment but today pt reports the above and 4 point ROS including Respiratory, CV, GI and , other than that noted in the HPI, is negative.  Objective:  /76   Pulse 66   Temp 97.6  F (36.4  C)   Resp 16   Wt 56.9 kg (125 lb 6.4 oz)   SpO2 97%   BMI 22.21 kg/m    GENERAL APPEARANCE: Alert, in no distress, cooperative.   EYES/ENT: EOM normal on camera, hearing acuity [place].  RESP: Respiratory effort appears unlabored over video screen, no respiratory distress apparent on video, On RA.   CV: Edema appears 0+ BLE via video. Color appears pale.  ABDOMEN: Appears non-distended, flat.  SKIN: Skin appears baseline w/ video inspection.   Left index finger:    NEURO: CN II-XII at patient's baseline, TORRES at baseline on video. Sensation baseline PPS.  PSYCH: Insight, judgement, and memory are baseline impaired, affect and mood are happy/engaged.    Laboratory/Diagnostics: Reviewed in Epic by provider today.     Assessment/Plan:  Finger lesion  Primary osteoarthritis involving multiple  joints  History of 2019 novel coronavirus disease (COVID-19)  Age-related physical debility  Cognitive deficits as late effect of cerebrovascular disease  Acute-on-chronic. Ongoing.    Differentials for tophi (related to OA), foreign object imbedded, burn/blister, bite, or trauma.    We note that Anya's cognitive impairment and her age-related debility preclude us from having a full picture of when this started or if there was an injury/cause.    We note that Anya has a recent hx of COVID and zoster, though we note minimal pain with this lesion and no other sxs of eruption.     If a foreign object is present, or if tophi, we will begin warm soaks to soften and help with pain. This may automatically remove any object or break up stagnation in the joint. Nursing to monitor for evolution. There does not appear to be a cellulitis or other soft-tissue infection at this time, and therefore we will treat conservatively.   Follow-up routinely or as needed.    Orders:  1. Warm soaks to left index finger BID x 3 days. Dx: lesion.    Video-Visit Details  Type of service:  Video Visit  Video Start Time: 1104  Video End Time (time video stopped): 1111  Originating Location (pt. Location): Long term Care  Distant Location (provider location):  [facility]   Mode of Communication:  Video Conference.    Electronically signed by:  TONYA Walton CNP DNP

## 2021-03-01 NOTE — PROGRESS NOTES
"[place] Regulatory  Chief Complaint   Patient presents with     long term Regulatory   [facility] MRN: [id number]. Place of Service where encounter took place:  [facility] () [[id number]] HPI: Anya Gaines  is 89 year old (7/4/1920), who is being seen today for a federally mandated E/M visit.  HPI information obtained from: facility chart records, facility staff, patient report, [facility] chart review, and Care Everywhere [facility] chart review. Today's concerns are:    Anya seen today per government mandate.  She denies pain, shortness of breath, or trouble with her vision (she is carrying around sunglasses).  She states she always has a good appetite.  She believes that she is in a restaurant right now, and tells me to take a seat because \"working in this place will Kill ya\"  she states that she is over 89 years old, so she does not remember a lot of things.  Chart review shows vital signs have been stable.    ALLERGIES:Patient has no known allergies.PAST MEDICAL HISTORY:   has a past medical history of Cerebral artery occlusion with cerebral infarction (H), Cerebral infarction (H), Diabetes mellitus, type 2 (H), Hypertension, and Seizures (H). PAST SURGICAL HISTORY:   has a past surgical history that includes surgical history of - .FAMILY HISTORY: family history is not on file.SOCIAL HISTORY:  reports that she has never smoked. She has never used smokeless tobacco. She reports that she does not drink alcohol or use drugs.  MEDICATIONS:  Current Outpatient Medications   Medication Sig Dispense Refill     acetaminophen (TYLENOL) 325 MG tablet Take 2 tablets (650 mg) by mouth 2 times daily  0     amLODIPine (NORVASC) 5 MG tablet TAKE 1/2 TABLET BY MOUTH DAILY AT BEDTIME DX HYPERTENSION 31 tablet 11     clopidogrel (PLAVIX) 75 MG tablet TAKE 1 TABLET BY MOUTH EVERY DAY DX CEREBROVASCULAR ACCIDENT 30 tablet 11     gabapentin (NEURONTIN) 600 MG tablet Take 0.5 tablets (300 mg) by mouth every " "morning AND 1 tablet (600 mg) At Bedtime.  0     hydrochlorothiazide (HYDRODIURIL) 25 MG tablet TAKE 1 TABLET BY MOUTH EVERY MORNING DX HYPERTENSION 31 tablet 11     lisinopril (PRINIVIL/ZESTRIL) 40 MG tablet TAKE 1 TABLET BY MOUTH EVERY DAY DX HYPERTENSION 30 tablet 11     loperamide (IMODIUM) 2 MG capsule Take 2 mg by mouth daily as needed       Multiple Vitamins-Minerals (PRESERVISION AREDS) TABS Take 1 tablet by mouth 2 times daily       potassium chloride (KLOR-CON) 20 MEQ packet Take 20 mEq by mouth daily       Case Management:  I have reviewed the care plan and MDS and do agree with the plan. Patient's desire to return to the community is not assessible due to cognitive impairment. Information reviewed:  Medications, vital signs, orders, and nursing notes.    ROS: Limited secondary to cognitive impairment but today pt reports the above and 4 point ROS including Respiratory, CV, GI and , other than that noted in the HPI, is negative.    Vitals: BP (!) 145/47   Pulse 72   Temp 97.7  F (36.5  C)   Resp 16   Ht 1.6 m (5' 3\")   Wt 57 kg (125 lb 9.6 oz)   SpO2 97%   BMI 22.25 kg/m    Exam:  GENERAL APPEARANCE: Alert, in no distress, cooperative.   ENT: Mouth/posterior oropharynx intact w/ moist mucous membranes, hearing acuity [place].  EYES: EOM, conjunctivae, lids, pupils and irises normal, PERRL2.   RESP: Respiratory effort good, no respiratory distress, Lung sounds clear. On RA.   CV: Auscultation of heart reveals S1, S2, rate and rhythm regular, no murmur, no rub or gallop, Edema 0+ BLE. Peripheral pulses are 2+.  ABDOMEN: Normal bowel sounds, soft, non-tender abdomen, and no masses palpated.  SKIN: Inspection/Palpation of skin and subcutaneous tissue baseline w/ fragility. No wounds/rashes noted.   NEURO: CN II-XII at patient's baseline, sensation baseline PPS.  PSYCH: Insight, judgement, and memory are impaired at baseline, affect and mood are happy/engaged.    Lab/Diagnostic data:   Recent labs in [facility] " reviewed by me today.     ASSESSMENT/PLAN  Primary osteoarthritis involving multiple joints  Age-related physical debility  Type 2 diabetes mellitus without complication, without long-term current use of insulin (H)  Benign essential hypertension  History of 2019 novel coronavirus disease (COVID-19)  Frailty  Chronic. Ongoing.     Anya is recovering well, and most certainly back to baseline status post infection with COVID-19.    We note that her blood pressure and OA is well controlled.    We note that around the time she contracted COVID-19, her A1c level was high at 8.6%.  Granted, given her age we would prefer this level to be higher, it is appropriate to recheck now that she is back to her baseline self.  We will order testing as noted below.  Follow-up routinely or as needed.    Orders:  1. Recheck a1c x1 on 3/4. Dx: DM II.     Electronically signed by:  Dr. Lilian Streeter, APRN CNP DNP

## 2021-03-02 NOTE — LETTER
"    3/2/2021        RE: Anya Gaines  C/o Kimi Choe  [address]  [address]  [place] [address]        [place] [facility]  Chief Complaint   Patient presents with     [place] [facility]   [place] MRN: [id number]. Place of Service where encounter took place:  [facility] () [[id number]] HPI: Anya Gaines  is 89 year old (7/4/1920), who is being seen today for a federally mandated E/M visit.  HPI information obtained from: facility chart records, facility staff, patient report, [facility] chart review, and Care Everywhere [facility] chart review. Today's concerns are:    Anya seen today per government mandate.  She denies pain, shortness of breath, or trouble with her vision (she is carrying around sunglasses).  She states she always has a good appetite.  She believes that she is in a restaurant right now, and tells me to take a seat because \"working in this place will Kill ya\"  she states that she is over 89 years old, so she does not remember a lot of things.  Chart review shows vital signs have been stable.    ALLERGIES:Patient has no known allergies.PAST MEDICAL HISTORY:   has a past medical history of Cerebral artery occlusion with cerebral infarction (H), Cerebral infarction (H), Diabetes mellitus, type 2 (H), Hypertension, and Seizures (H). PAST SURGICAL HISTORY:   has a past surgical history that includes surgical history of - .FAMILY HISTORY: family history is not on file.SOCIAL HISTORY:  reports that she has never smoked. She has never used smokeless tobacco. She reports that she does not drink alcohol or use drugs.  MEDICATIONS:  Current Outpatient Medications   Medication Sig Dispense Refill     acetaminophen (TYLENOL) 325 MG tablet Take 2 tablets (650 mg) by mouth 2 times daily  0     amLODIPine (NORVASC) 5 MG tablet TAKE 1/2 TABLET BY MOUTH DAILY AT BEDTIME DX HYPERTENSION 31 tablet 11     clopidogrel (PLAVIX) 75 MG tablet TAKE 1 TABLET BY MOUTH EVERY DAY DX " "CEREBROVASCULAR ACCIDENT 30 tablet 11     gabapentin (NEURONTIN) 600 MG tablet Take 0.5 tablets (300 mg) by mouth every morning AND 1 tablet (600 mg) At Bedtime.  0     hydrochlorothiazide (HYDRODIURIL) 25 MG tablet TAKE 1 TABLET BY MOUTH EVERY MORNING DX HYPERTENSION 31 tablet 11     lisinopril (PRINIVIL/ZESTRIL) 40 MG tablet TAKE 1 TABLET BY MOUTH EVERY DAY DX HYPERTENSION 30 tablet 11     loperamide (IMODIUM) 2 MG capsule Take 2 mg by mouth daily as needed       Multiple Vitamins-Minerals (PRESERVISION AREDS) TABS Take 1 tablet by mouth 2 times daily       potassium chloride (KLOR-CON) 20 MEQ packet Take 20 mEq by mouth daily       Case Management:  I have reviewed the care plan and MDS and do agree with the plan. Patient's desire to return to the community is not assessible due to cognitive impairment. Information reviewed:  Medications, vital signs, orders, and nursing notes.    ROS: Limited secondary to cognitive impairment but today pt reports the above and 4 point ROS including Respiratory, CV, GI and , other than that noted in the HPI, is negative.    Vitals: BP (!) 145/47   Pulse 72   Temp 97.7  F (36.5  C)   Resp 16   Ht 1.6 m (5' 3\")   Wt 57 kg (125 lb 9.6 oz)   SpO2 97%   BMI 22.25 kg/m    Exam:  GENERAL APPEARANCE: Alert, in no distress, cooperative.   ENT: Mouth/posterior oropharynx intact w/ moist mucous membranes, hearing acuity [place].  EYES: EOM, conjunctivae, lids, pupils and irises normal, PERRL2.   RESP: Respiratory effort good, no respiratory distress, Lung sounds clear. On RA.   CV: Auscultation of heart reveals S1, S2, rate and rhythm regular, no murmur, no rub or gallop, Edema 0+ BLE. Peripheral pulses are 2+.  ABDOMEN: Normal bowel sounds, soft, non-tender abdomen, and no masses palpated.  SKIN: Inspection/Palpation of skin and subcutaneous tissue baseline w/ fragility. No wounds/rashes noted.   NEURO: CN II-XII at patient's baseline, sensation baseline PPS.  PSYCH: Insight, " judgement, and memory are impaired at baseline, affect and mood are happy/engaged.    Lab/Diagnostic data:   Recent labs in EPIC reviewed by me today.     ASSESSMENT/PLAN  Primary osteoarthritis involving multiple joints  Age-related physical debility  Type 2 diabetes mellitus without complication, without long-term current use of insulin (H)  Benign essential hypertension  History of 2019 novel coronavirus disease (COVID-19)  Frailty  Chronic. Ongoing.     Anya is recovering well, and most certainly back to baseline status post infection with COVID-19.    We note that her blood pressure and OA is well controlled.    We note that around the time she contracted COVID-19, her A1c level was high at 8.6%.  Granted, given her age we would prefer this level to be higher, it is appropriate to recheck now that she is back to her baseline self.  We will order testing as noted below.  Follow-up routinely or as needed.    Orders:  1. Recheck a1c x1 on 3/4. Dx: DM II.     Electronically signed by:  TONYA Walton CNP DNP        Sincerely,        TONYA Obando CNP

## 2021-03-18 NOTE — PROGRESS NOTES
This patient's medication list and chart were reviewed as part of the service provided by [facility].    Assessment/Recommendations:    No recommendations for changes at this time.  Most recent a1c not available to me.    Est CrCl ([place]) = 41ml/min (based on Scr 0.58 & IBW 52.4kg)    Kaleigh Camacho, Pharm.D.,[facility] [place] [facility]  Board Certified Geriatric Pharmacist  Medication Therapy Management Pharmacist  [phone]

## 2021-03-22 NOTE — TELEPHONE ENCOUNTER
[facility] Nurse Triage Telephone Note    Provider:   Lilian Streeter NP  Facility:  [facility]   Facility Type:  [facility]    Caller: Pennie  Call Back Number: [phone]    Allergies:  No Known Allergies     Reason for call: Elevated fasting BG this am 326.  Pt not on any insulin or Diabetic oral agents. We check a FBS weekly on her & they have been elevated.  Last HgbA1C 3/4/21 10.9    Verbal Order/Direction given by Provider: Accuchecks TID & start facility standing order sliding scale insulin TID. Follow up with NP when she returns.    Provider giving Order:  Dr. Yoan Camp MD    Verbal Order given to: Pennie Gusman RN

## 2021-03-23 NOTE — LETTER
3/23/2021        RE: Anya Gaines  C/o Kimi Choe  [address]  [address]  [place] [address]        [facility]  Episodic/Acute/Follow-Up  [place] MRN: [id number]. Place of Service where encounter took place:  [facility] () [[id number]]   Chief Complaint   Patient presents with     RECHECK   HPI: Anya Gaines  is a 89 year old (7/4/1920), who is being seen today for an episodic care visit.  HPI information obtained from: facility chart records, facility staff, patient report, [facility] chart review and Care Everywhere [facility] chart review. Today's concern is:    Anya seen today after a sliding scale of insulin was instituted for her for blood sugars in the 300s.  The order was given based off of the standing house sliding scale which is conservative, but probably not conservative enough for a 89-year-old.  Wellness last A1c was checked a couple of weeks ago, and was 10.6%.  Prior, Anya was on hospice and her Metformin was slowly reduced and discontinued secondary to loose stools.  She has not required this for about a year.    Today, Anya states that she has a very good appetite, she denies nausea, shortness of breath, headache, dizziness, diaphoresis, shortness of breath, upset stomach, constipation/diarrhea, or fever.  Noting her baseline cognitive impairment may cloud HPI.  Review of blood sugars as noted below.  BGs:      Past Medical and Surgical History reviewed in Epic today.  MEDICATIONS:  Current Outpatient Medications   Medication Sig Dispense Refill     metFORMIN (GLUCOPHAGE-XR) 500 MG 24 hr tablet Take 500 mg by mouth daily       acetaminophen (TYLENOL) 325 MG tablet Take 2 tablets (650 mg) by mouth 2 times daily  0     amLODIPine (NORVASC) 5 MG tablet TAKE 1/2 TABLET BY MOUTH DAILY AT BEDTIME DX HYPERTENSION 31 tablet 11     clopidogrel (PLAVIX) 75 MG tablet TAKE 1 TABLET BY MOUTH EVERY DAY DX CEREBROVASCULAR ACCIDENT 30 tablet 11      "gabapentin (NEURONTIN) 600 MG tablet Take 0.5 tablets (300 mg) by mouth every morning AND 1 tablet (600 mg) At Bedtime.  0     hydrochlorothiazide (HYDRODIURIL) 25 MG tablet TAKE 1 TABLET BY MOUTH EVERY MORNING DX HYPERTENSION 31 tablet 11     lisinopril (PRINIVIL/ZESTRIL) 40 MG tablet TAKE 1 TABLET BY MOUTH EVERY DAY DX HYPERTENSION 30 tablet 11     loperamide (IMODIUM) 2 MG capsule Take 2 mg by mouth daily as needed       Multiple Vitamins-Minerals (PRESERVISION AREDS) TABS Take 1 tablet by mouth 2 times daily       potassium chloride (KLOR-CON) 20 MEQ packet Take 20 mEq by mouth daily       REVIEW OF SYSTEMS: Limited secondary to cognitive impairment but today pt reports the above and 4 point ROS including Respiratory, CV, GI and , other than that noted in the HPI, is negative.    Objective: /70   Pulse 97   Temp (!) 88  F (31.1  C)   Resp 16   Ht 1.6 m (5' 3\")   Wt 56.6 kg (124 lb 12.8 oz)   SpO2 94%   BMI 22.11 kg/m    Exam:  GENERAL APPEARANCE: Alert, in no distress, cooperative.   ENT: Mouth/posterior oropharynx intact w/ moist mucous membranes, hearing acuity [place].  EYES: EOM, conjunctivae, lids, pupils and irises normal, PERRL2.   RESP: Respiratory effort unlabored, no respiratory distress, Lung sounds clear. On RA.   CV: Auscultation of heart reveals S1, S2, rate and rhythm regular, no murmur, no rub or gallop, Edema 0+ BLE. Peripheral pulses are 2+.  ABDOMEN: Normal bowel sounds, soft, non-tender abdomen, and no masses palpated.  SKIN: Inspection/Palpation of skin and subcutaneous tissue baseline w/ fragility. No wounds/rashes noted.   NEURO: CN II-XII at patient's baseline, sensation baseline PPS.  PSYCH: Insight, judgement, and memory are impaired at baseline, affect and mood are happy/emgaged.    Labs: Labs done in facility are in EPIC. Please refer to them using [place]/Care Everywhere.    ASSESSMENT/PLAN:  Type 2 diabetes mellitus without complication, without long-term current use of " insulin (H)  History of 2019 novel coronavirus disease (COVID-19)  Mild protein-calorie malnutrition (H)  Loose stools  Age-related physical debility  Acute-on-chronic. Ongoing.    Anya was taken off of Metformin secondary to diarrhea and her hospice status.  Her A1c has been trended for this reason.    Sliding scale insulin is not safe long-term for use in older adults, especially the ultra elder population.  Will discontinue sliding scale.    Will change Accu-Cheks to every morning, nursing noted that Anya eats at all sorts of different times, and is not predictable enough for her to take insulin with each meal.    Will restart Metformin XL at her prior dosing, and may increase this if she tolerates.  Should blood glucose rise ongoing, may consider Lantus addition.    Will give this therapy some time, then consider an A1c and renal check at follow-up.  Follow-up w/in 2 weeks or as needed.    Orders written by provider at facility and transcribed by : Paulette Pelaez  1. Discontinue insulin sliding scale  2. BG checks Q AM - dx: DM2  3. Metformin  mg PO every day  - dx: DM2  FYI: Consider a1c, BMP w/ next visit.    Electronically signed by:  TONYA Walton CNP DNP        Sincerely,        TONYA Obando CNP

## 2021-03-23 NOTE — PROGRESS NOTES
[facility]  Episodic/Acute/Follow-Up  [place] MRN: [id number]. Place of Service where encounter took place:  [facility] () [[id number]]   Chief Complaint   Patient presents with     RECHECK   HPI: Anya Gaines  is a 89 year old (7/4/1920), who is being seen today for an episodic care visit.  HPI information obtained from: facility chart records, facility staff, patient report, [facility] chart review and Care Everywhere [facility] chart review. Today's concern is:    Anya seen today after a sliding scale of insulin was instituted for her for blood sugars in the 300s.  The order was given based off of the standing house sliding scale which is conservative, but probably not conservative enough for a 89-year-old.  Wellness last A1c was checked a couple of weeks ago, and was 10.6%.  Prior, Anya was on hospice and her Metformin was slowly reduced and discontinued secondary to loose stools.  She has not required this for about a year.    Today, Anya states that she has a very good appetite, she denies nausea, shortness of breath, headache, dizziness, diaphoresis, shortness of breath, upset stomach, constipation/diarrhea, or fever.  Noting her baseline cognitive impairment may cloud HPI.  Review of blood sugars as noted below.  BGs:      Past Medical and Surgical History reviewed in Epic today.  MEDICATIONS:  Current Outpatient Medications   Medication Sig Dispense Refill     metFORMIN (GLUCOPHAGE-XR) 500 MG 24 hr tablet Take 500 mg by mouth daily       acetaminophen (TYLENOL) 325 MG tablet Take 2 tablets (650 mg) by mouth 2 times daily  0     amLODIPine (NORVASC) 5 MG tablet TAKE 1/2 TABLET BY MOUTH DAILY AT BEDTIME DX HYPERTENSION 31 tablet 11     clopidogrel (PLAVIX) 75 MG tablet TAKE 1 TABLET BY MOUTH EVERY DAY DX CEREBROVASCULAR ACCIDENT 30 tablet 11     gabapentin (NEURONTIN) 600 MG tablet Take 0.5 tablets (300 mg) by mouth every morning AND 1 tablet (600 mg) At Bedtime.  0  "    hydrochlorothiazide (HYDRODIURIL) 25 MG tablet TAKE 1 TABLET BY MOUTH EVERY MORNING DX HYPERTENSION 31 tablet 11     lisinopril (PRINIVIL/ZESTRIL) 40 MG tablet TAKE 1 TABLET BY MOUTH EVERY DAY DX HYPERTENSION 30 tablet 11     loperamide (IMODIUM) 2 MG capsule Take 2 mg by mouth daily as needed       Multiple Vitamins-Minerals (PRESERVISION AREDS) TABS Take 1 tablet by mouth 2 times daily       potassium chloride (KLOR-CON) 20 MEQ packet Take 20 mEq by mouth daily       REVIEW OF SYSTEMS: Limited secondary to cognitive impairment but today pt reports the above and 4 point ROS including Respiratory, CV, GI and , other than that noted in the HPI, is negative.    Objective: /70   Pulse 97   Temp (!) 88  F (31.1  C)   Resp 16   Ht 1.6 m (5' 3\")   Wt 56.6 kg (124 lb 12.8 oz)   SpO2 94%   BMI 22.11 kg/m    Exam:  GENERAL APPEARANCE: Alert, in no distress, cooperative.   ENT: Mouth/posterior oropharynx intact w/ moist mucous membranes, hearing acuity [place].  EYES: EOM, conjunctivae, lids, pupils and irises normal, PERRL2.   RESP: Respiratory effort unlabored, no respiratory distress, Lung sounds clear. On RA.   CV: Auscultation of heart reveals S1, S2, rate and rhythm regular, no murmur, no rub or gallop, Edema 0+ BLE. Peripheral pulses are 2+.  ABDOMEN: Normal bowel sounds, soft, non-tender abdomen, and no masses palpated.  SKIN: Inspection/Palpation of skin and subcutaneous tissue baseline w/ fragility. No wounds/rashes noted.   NEURO: CN II-XII at patient's baseline, sensation baseline PPS.  PSYCH: Insight, judgement, and memory are impaired at baseline, affect and mood are happy/emgaged.    Labs: Labs done in facility are in EPIC. Please refer to them using [facility]/Care Everywhere.    ASSESSMENT/PLAN:  Type 2 diabetes mellitus without complication, without long-term current use of insulin (H)  History of 2019 novel coronavirus disease (COVID-19)  Mild protein-calorie malnutrition (H)  Loose " stools  Age-related physical debility  Acute-on-chronic. Ongoing.    Anya was taken off of Metformin secondary to diarrhea and her hospice status.  Her A1c has been trended for this reason.    Sliding scale insulin is not safe long-term for use in older adults, especially the ultra elder population.  Will discontinue sliding scale.    Will change Accu-Cheks to every morning, nursing noted that Anya eats at all sorts of different times, and is not predictable enough for her to take insulin with each meal.    Will restart Metformin XL at her prior dosing, and may increase this if she tolerates.  Should blood glucose rise ongoing, may consider Lantus addition.    Will give this therapy some time, then consider an A1c and renal check at follow-up.  Follow-up w/in 2 weeks or as needed.    Orders written by provider at facility and transcribed by : Paulette Pelaez  1. Discontinue insulin sliding scale  2. BG checks Q AM - dx: DM2  3. Metformin  mg PO every day  - dx: DM2  FYI: Consider a1c, BMP w/ next visit.    Electronically signed by:  Dr. Lilian Streeter, APRN CNP DNP

## 2021-04-15 NOTE — LETTER
4/15/2021        RE: Anya Gaines  C/o Kimi Choe  [address]  [address]  [place] [address]        [facility]  Episodic/Acute/Follow-Up  [place] MRN: [id number]. Place of Service where encounter took place:  [facility] () [[id number]]   Chief Complaint   Patient presents with     RECHECK   HPI: Anya Gaines  is a 89 year old (7/4/1920), who is being seen today for an episodic care visit.  HPI information obtained from: facility chart records, facility staff, patient report, [facility] chart review and Care Everywhere [facility] chart review. Today's concern is:    Anya seen today on pharmacist recommendation.  It was noted that she has some lower blood pressures at times, and is on multiple blood pressure medications.  Provider also following up today after restarting low-dose Metformin.  Today, Anya says her appetite is very good, she denies heartburn, nausea, constipation/diarrhea.  She states that she is sleeping well, and she denies headache/dizziness/shortness of breath/chest pain.  Her cognitive impairment likely affects accuracy of HPI/ROS.  Blood sugars and blood pressures are as noted below.  BGs:    BPs:      Past Medical and Surgical History reviewed in Epic today.  MEDICATIONS:  Current Outpatient Medications   Medication Sig Dispense Refill     acetaminophen (TYLENOL) 325 MG tablet Take 2 tablets (650 mg) by mouth 2 times daily  0     clopidogrel (PLAVIX) 75 MG tablet TAKE 1 TABLET BY MOUTH EVERY DAY DX CEREBROVASCULAR ACCIDENT 30 tablet 11     gabapentin (NEURONTIN) 600 MG tablet Take 0.5 tablets (300 mg) by mouth every morning AND 1 tablet (600 mg) At Bedtime.  0     hydrochlorothiazide (HYDRODIURIL) 25 MG tablet TAKE 1 TABLET BY MOUTH EVERY MORNING DX HYPERTENSION 31 tablet 11     lisinopril (PRINIVIL/ZESTRIL) 40 MG tablet TAKE 1 TABLET BY MOUTH EVERY DAY DX HYPERTENSION 30 tablet 11     loperamide (IMODIUM) 2 MG capsule Take 2 mg by mouth  "daily as needed       metFORMIN (GLUCOPHAGE-XR) 500 MG 24 hr tablet Take 1,000 mg by mouth daily        Multiple Vitamins-Minerals (PRESERVISION AREDS) TABS Take 1 tablet by mouth 2 times daily       potassium chloride (KLOR-CON) 20 MEQ packet Take 20 mEq by mouth daily       REVIEW OF SYSTEMS: Limited secondary to cognitive impairment but today pt reports the above and 4 point ROS including Respiratory, CV, GI and , other than that noted in the HPI, is negative.    Objective: BP (!) 149/93   Pulse 81   Temp 97.5  F (36.4  C)   Resp 16   Ht 1.6 m (5' 3\")   Wt 57.9 kg (127 lb 9.6 oz)   SpO2 95%   BMI 22.60 kg/m    Exam:  GENERAL APPEARANCE: Alert, in no distress, cooperative.   ENT: Mouth/posterior oropharynx intact w/ moist mucous membranes, hearing acuity [place].  EYES: EOM, conjunctivae, lids, pupils and irises normal, PERRL2.   RESP: Respiratory effort unlabored, no respiratory distress, Lung sounds clear. On RA.   CV: Auscultation of heart reveals S1, S2, rate and rhythm regular, no murmur, no rub or gallop, Edema 0+ BLE. Peripheral pulses are 2+.  ABDOMEN: Normal bowel sounds, soft, non-tender abdomen, and no masses palpated.  SKIN: Inspection/Palpation of skin and subcutaneous tissue baseline w/ fragility. No wounds/rashes noted.   NEURO: CN II-XII at patient's baseline, sensation baseline PPS.  PSYCH: Insight, judgement, and memory are impaired at baseline, affect and mood are happy/engaged.    Labs: Labs done in facility are in EPIC. Please refer to them using [facility]/Care Everywhere.    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 Ongoing.    Noting Campo's history of CVA, and fluctuating hypertension.  Agree with pharmacist " recommendation to decrease one of her medications.  Lowest dose is Norvasc at 2.5 mg, will discontinue this and monitor.    Noting some fluctuating blood sugars ever since Anya experienced her COVID-19 infection several months ago.  Her Metformin had been discontinued for some time, but since her COVID-19 infection she has needed more support here.  Noting daily blood sugars ranging from 200-300.  Will increase Metformin as noted below.    Will consider checking A1c and renal function at follow-up visit.  Follow-up routinely or as needed.    Orders written by provider at facility and transcribed by : Paulette Pelaez  1. Discontinue Norvasc  2. Increase Metformin XR to 1000 mg PO every day - dx: DM2    Electronically signed by:  TONYA Walton CNP DNP        Sincerely,        TONYA Obando CNP

## 2021-04-15 NOTE — PROGRESS NOTES
[facility]  Episodic/Acute/Follow-Up  [place] MRN: [id number]. Place of Service where encounter took place:  [facility] () [[id number]]   Chief Complaint   Patient presents with     RECHECK   HPI: Anya Gaines  is a 89 year old (7/4/1920), who is being seen today for an episodic care visit.  HPI information obtained from: facility chart records, facility staff, patient report, [facility] chart review and Care Everywhere [facility] chart review. Today's concern is:    Anya seen today on pharmacist recommendation.  It was noted that she has some lower blood pressures at times, and is on multiple blood pressure medications.  Provider also following up today after restarting low-dose Metformin.  Today, Anya says her appetite is very good, she denies heartburn, nausea, constipation/diarrhea.  She states that she is sleeping well, and she denies headache/dizziness/shortness of breath/chest pain.  Her cognitive impairment likely affects accuracy of HPI/ROS.  Blood sugars and blood pressures are as noted below.  BGs:    BPs:      Past Medical and Surgical History reviewed in Epic today.  MEDICATIONS:  Current Outpatient Medications   Medication Sig Dispense Refill     acetaminophen (TYLENOL) 325 MG tablet Take 2 tablets (650 mg) by mouth 2 times daily  0     clopidogrel (PLAVIX) 75 MG tablet TAKE 1 TABLET BY MOUTH EVERY DAY DX CEREBROVASCULAR ACCIDENT 30 tablet 11     gabapentin (NEURONTIN) 600 MG tablet Take 0.5 tablets (300 mg) by mouth every morning AND 1 tablet (600 mg) At Bedtime.  0     hydrochlorothiazide (HYDRODIURIL) 25 MG tablet TAKE 1 TABLET BY MOUTH EVERY MORNING DX HYPERTENSION 31 tablet 11     lisinopril (PRINIVIL/ZESTRIL) 40 MG tablet TAKE 1 TABLET BY MOUTH EVERY DAY DX HYPERTENSION 30 tablet 11     loperamide (IMODIUM) 2 MG capsule Take 2 mg by mouth daily as needed       metFORMIN (GLUCOPHAGE-XR) 500 MG 24 hr tablet Take 1,000 mg by mouth daily        Multiple  "Vitamins-Minerals (PRESERVISION AREDS) TABS Take 1 tablet by mouth 2 times daily       potassium chloride (KLOR-CON) 20 MEQ packet Take 20 mEq by mouth daily       REVIEW OF SYSTEMS: Limited secondary to cognitive impairment but today pt reports the above and 4 point ROS including Respiratory, CV, GI and , other than that noted in the HPI, is negative.    Objective: BP (!) 149/93   Pulse 81   Temp 97.5  F (36.4  C)   Resp 16   Ht 1.6 m (5' 3\")   Wt 57.9 kg (127 lb 9.6 oz)   SpO2 95%   BMI 22.60 kg/m    Exam:  GENERAL APPEARANCE: Alert, in no distress, cooperative.   ENT: Mouth/posterior oropharynx intact w/ moist mucous membranes, hearing acuity [place].  EYES: EOM, conjunctivae, lids, pupils and irises normal, PERRL2.   RESP: Respiratory effort unlabored, no respiratory distress, Lung sounds clear. On RA.   CV: Auscultation of heart reveals S1, S2, rate and rhythm regular, no murmur, no rub or gallop, Edema 0+ BLE. Peripheral pulses are 2+.  ABDOMEN: Normal bowel sounds, soft, non-tender abdomen, and no masses palpated.  SKIN: Inspection/Palpation of skin and subcutaneous tissue baseline w/ fragility. No wounds/rashes noted.   NEURO: CN II-XII at patient's baseline, sensation baseline PPS.  PSYCH: Insight, judgement, and memory are impaired at baseline, affect and mood are happy/engaged.    Labs: Labs done in facility are in EPIC. Please refer to them using [employer]/Care Everywhere.    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 Ongoing.    Noting Anya's history of CVA, and fluctuating hypertension.  Agree with pharmacist recommendation to decrease one of her medications.  Lowest dose is Norvasc at 2.5 mg, will discontinue this and " monitor.    Noting some fluctuating blood sugars ever since Anya experienced her COVID-19 infection several months ago.  Her Metformin had been discontinued for some time, but since her COVID-19 infection she has needed more support here.  Noting daily blood sugars ranging from 200-300.  Will increase Metformin as noted below.    Will consider checking A1c and renal function at follow-up visit.  Follow-up routinely or as needed.    Orders written by provider at facility and transcribed by : Paulette Pelaez  1. Discontinue Norvasc  2. Increase Metformin XR to 1000 mg PO every day - dx: DM2    Electronically signed by:  Dr. Lilian Streeter, APRN CNP DNP

## 2021-04-26 NOTE — LETTER
4/26/2021        RE: Anya Gaines  C/o Kimi Choe  [address]  [address]  [place] [address]        [facility]  Episodic/Acute/Follow-Up  [place] MRN: [id number]. Place of Service where encounter took place:  [facility] () [[id number]]   Chief Complaint   Patient presents with     RECHECK   HPI: Anya Gaines  is a 89 year old (7/4/1920), who is being seen today for an episodic care visit.  HPI information obtained from: facility chart records, facility staff, patient report, [facility] chart review and Care Everywhere [facility] chart review. Today's concern is:    Anya seen on routine follow-up today, as provider increased her Metformin and stopped her Norvasc dosing at last visit.  Today, Anya notes no new changes in her appetite, no shortness of breath or chest pain.  She denies nausea, heartburn, constipation/diarrhea, she denies aches or pains.  Chart review shows her blood sugars are as noted below.  Blood pressures are stable, and still somewhat soft.  BGs:    BPs:        Past Medical and Surgical History reviewed in Epic today.  MEDICATIONS:  Current Outpatient Medications   Medication Sig Dispense Refill     hydrochlorothiazide (HYDRODIURIL) 12.5 MG tablet Take 12.5 mg by mouth daily       acetaminophen (TYLENOL) 325 MG tablet Take 2 tablets (650 mg) by mouth 2 times daily  0     clopidogrel (PLAVIX) 75 MG tablet TAKE 1 TABLET BY MOUTH EVERY DAY DX CEREBROVASCULAR ACCIDENT 30 tablet 11     gabapentin (NEURONTIN) 600 MG tablet Take 0.5 tablets (300 mg) by mouth every morning AND 1 tablet (600 mg) At Bedtime.  0     hydrochlorothiazide (HYDRODIURIL) 25 MG tablet TAKE 1 TABLET BY MOUTH EVERY MORNING DX HYPERTENSION (Patient not taking: Reported on 4/26/2021) 31 tablet 11     lisinopril (PRINIVIL/ZESTRIL) 40 MG tablet TAKE 1 TABLET BY MOUTH EVERY DAY DX HYPERTENSION 30 tablet 11     loperamide (IMODIUM) 2 MG capsule Take 2 mg by mouth daily as needed    "    metFORMIN (GLUCOPHAGE-XR) 500 MG 24 hr tablet Take 1,500 mg by mouth daily        Multiple Vitamins-Minerals (PRESERVISION AREDS) TABS Take 1 tablet by mouth 2 times daily       potassium chloride (KLOR-CON) 20 MEQ packet Take 20 mEq by mouth daily       REVIEW OF SYSTEMS: Limited secondary to cognitive impairment but today pt reports the above and 4 point ROS including Respiratory, CV, GI and , other than that noted in the HPI, is negative.    Objective: /66   Pulse 93   Temp 97.6  F (36.4  C)   Resp 18   Ht 1.575 m (5' 2\")   Wt 58.4 kg (128 lb 12.8 oz)   SpO2 95%   BMI 23.56 kg/m    Exam:  GENERAL APPEARANCE: Alert, in no distress, cooperative.   ENT: Mouth/posterior oropharynx intact w/ moist mucous membranes, hearing acuity [place].  EYES: EOM, conjunctivae, lids, pupils and irises normal, PERRL2.   RESP: Respiratory effort unlabored, no respiratory distress, Lung sounds clear. On RA.   CV: Auscultation of heart reveals S1, S2, rate and rhythm regular, no murmur, no rub or gallop, Edema 0+ BLE. Peripheral pulses are 2+.  ABDOMEN: Normal bowel sounds, soft, non-tender abdomen, and no masses palpated.  SKIN: Inspection/Palpation of skin and subcutaneous tissue baseline w/ fragility. No wounds/rashes noted.   NEURO: CN II-XII at patient's baseline, sensation baseline PPS.  PSYCH: Insight, judgement, and memory are impaired at baseline, affect and mood are happy/engaged.    Labs: Labs done in facility are in EPIC. Please refer to them using [employer]/Care Everywhere.    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 Ongoing.    We will slightly increase Metformin once again as noted below and recheck A1c.    Noting some BPs are " still soft, will slightly decrease HCTZ.    Given both the changes noted above, and Anya's overall frailty given her age and her status post Covid infection, will trend her BMP.  Follow-up w/in 2 weeks or as needed.    Orders written by provider at facility and transcribed by : Paulette Pelaez  1. Increase Metformin XR to 1500 mg PO every day - dx: DM2  2. Recheck a1c x 1 on 4/29 - dx: DM2  3. Decrease Hydrochlorothiazide to 12.5 mg PO every day - dx: HTN  4. Recheck BMP x 1 on 4/29 - dx: HTN    Electronically signed by:  Dr. Lilian Streeter, APRN CNP DNP        Sincerely,        TONYA Obando CNP

## 2021-04-26 NOTE — PROGRESS NOTES
[facility] [place] [facility]  Episodic/Acute/Follow-Up  [place] MRN: [id number]. Place of Service where encounter took place:  [facility] () [[id number]]   Chief Complaint   Patient presents with     RECHECK   HPI: Anya Gaines  is a 89 year old (7/4/1920), who is being seen today for an episodic care visit.  HPI information obtained from: facility chart records, facility staff, patient report, [facility] chart review and Care Everywhere [facility] chart review. Today's concern is:    Anya seen on routine follow-up today, as provider increased her Metformin and stopped her Norvasc dosing at last visit.  Today, Anya notes no new changes in her appetite, no shortness of breath or chest pain.  She denies nausea, heartburn, constipation/diarrhea, she denies aches or pains.  Chart review shows her blood sugars are as noted below.  Blood pressures are stable, and still somewhat soft.  BGs:    BPs:        Past Medical and Surgical History reviewed in Epic today.  MEDICATIONS:  Current Outpatient Medications   Medication Sig Dispense Refill     hydrochlorothiazide (HYDRODIURIL) 12.5 MG tablet Take 12.5 mg by mouth daily       acetaminophen (TYLENOL) 325 MG tablet Take 2 tablets (650 mg) by mouth 2 times daily  0     clopidogrel (PLAVIX) 75 MG tablet TAKE 1 TABLET BY MOUTH EVERY DAY DX CEREBROVASCULAR ACCIDENT 30 tablet 11     gabapentin (NEURONTIN) 600 MG tablet Take 0.5 tablets (300 mg) by mouth every morning AND 1 tablet (600 mg) At Bedtime.  0     hydrochlorothiazide (HYDRODIURIL) 25 MG tablet TAKE 1 TABLET BY MOUTH EVERY MORNING DX HYPERTENSION (Patient not taking: Reported on 4/26/2021) 31 tablet 11     lisinopril (PRINIVIL/ZESTRIL) 40 MG tablet TAKE 1 TABLET BY MOUTH EVERY DAY DX HYPERTENSION 30 tablet 11     loperamide (IMODIUM) 2 MG capsule Take 2 mg by mouth daily as needed       metFORMIN (GLUCOPHAGE-XR) 500 MG 24 hr tablet Take 1,500 mg by mouth daily        Multiple Vitamins-Minerals  "(PRESERVISION AREDS) TABS Take 1 tablet by mouth 2 times daily       potassium chloride (KLOR-CON) 20 MEQ packet Take 20 mEq by mouth daily       REVIEW OF SYSTEMS: Limited secondary to cognitive impairment but today pt reports the above and 4 point ROS including Respiratory, CV, GI and , other than that noted in the HPI, is negative.    Objective: /66   Pulse 93   Temp 97.6  F (36.4  C)   Resp 18   Ht 1.575 m (5' 2\")   Wt 58.4 kg (128 lb 12.8 oz)   SpO2 95%   BMI 23.56 kg/m    Exam:  GENERAL APPEARANCE: Alert, in no distress, cooperative.   ENT: Mouth/posterior oropharynx intact w/ moist mucous membranes, hearing acuity [place].  EYES: EOM, conjunctivae, lids, pupils and irises normal, PERRL2.   RESP: Respiratory effort unlabored, no respiratory distress, Lung sounds clear. On RA.   CV: Auscultation of heart reveals S1, S2, rate and rhythm regular, no murmur, no rub or gallop, Edema 0+ BLE. Peripheral pulses are 2+.  ABDOMEN: Normal bowel sounds, soft, non-tender abdomen, and no masses palpated.  SKIN: Inspection/Palpation of skin and subcutaneous tissue baseline w/ fragility. No wounds/rashes noted.   NEURO: CN II-XII at patient's baseline, sensation baseline PPS.  PSYCH: Insight, judgement, and memory are impaired at baseline, affect and mood are happy/engaged.    Labs: Labs done in facility are in EPIC. Please refer to them using [employer]/Care Everywhere.    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 Ongoing.    We will slightly increase Metformin once again as noted below and recheck A1c.    Noting some BPs are still soft, will slightly decrease HCTZ.    Given both the changes noted above, and [place]'s overall frailty given " her age and her status post Covid infection, will trend her BMP.  Follow-up w/in 2 weeks or as needed.    Orders written by provider at facility and transcribed by : Paulette Pelaez  1. Increase Metformin XR to 1500 mg PO every day - dx: DM2  2. Recheck a1c x 1 on 4/29 - dx: DM2  3. Decrease Hydrochlorothiazide to 12.5 mg PO every day - dx: HTN  4. Recheck BMP x 1 on 4/29 - dx: HTN    Electronically signed by:  Dr. Lilian Streeter, APRN CNP DNP

## 2021-04-30 NOTE — PROGRESS NOTES
[place] [facility]  Chief Complaint   Patient presents with     care home Regulatory     [place] Medical Record Number:  [id number]  Place of Service where encounter took place:  [facility] () [[id number]]    HPI:    Anya Gaines  is 89 year old (7/4/1920), who is being seen today for a federally mandated E/M visit.  HPI information obtained from: facility staff, patient report and [facility] chart review.     Today's concerns are:   - Resident seen and examined. Resident denies pain. Denies chest pain.   - RN  And DNP report no concern  --------------------------------  - - Past Medical, social, family histories, medications, and allergies reviewed and updated  - Medications reviewed: in the chart and EHR.   - Case Management:   I have reviewed the care plan and MDS and do agree with the plan. Patient's desire to return to the community is not present.  Information reviewed:  Medications, vital signs, orders, and nursing notes.    MEDICATIONS:  Current Outpatient Medications   Medication Sig Dispense Refill     acetaminophen (TYLENOL) 325 MG tablet Take 2 tablets (650 mg) by mouth 2 times daily  0     clopidogrel (PLAVIX) 75 MG tablet TAKE 1 TABLET BY MOUTH EVERY DAY DX CEREBROVASCULAR ACCIDENT 30 tablet 11     gabapentin (NEURONTIN) 600 MG tablet Take 0.5 tablets (300 mg) by mouth every morning AND 1 tablet (600 mg) At Bedtime.  0     hydrochlorothiazide (HYDRODIURIL) 12.5 MG tablet Take 12.5 mg by mouth daily       lisinopril (PRINIVIL/ZESTRIL) 40 MG tablet TAKE 1 TABLET BY MOUTH EVERY DAY DX HYPERTENSION 30 tablet 11     loperamide (IMODIUM) 2 MG capsule Take 2 mg by mouth daily as needed       metFORMIN (GLUCOPHAGE-XR) 500 MG 24 hr tablet Take 1,500 mg by mouth daily        Multiple Vitamins-Minerals (PRESERVISION AREDS) TABS Take 1 tablet by mouth 2 times daily       potassium chloride (KLOR-CON) 20 MEQ packet Take 20 mEq by mouth daily       ROS: very limited secondary to  "cognitive impairment but today pt reports     Vitals:  BP 93/64   Pulse 84   Temp 97.8  F (36.6  C)   Resp 16   Ht 1.575 m (5' 2\")   Wt 56.8 kg (125 lb 3.2 oz)   SpO2 94%   BMI 22.90 kg/m    Body mass index is 22.9 kg/m .  Exam:  GENERAL APPEARANCE:  in no distress, cooperative  RESP:  lungs clear to auscultation   CV:  S1S2 audible, regular HR, diastolic murmur appreciated. No edema.   ABDOMEN:  soft, NT/ND, BS audible. no mass appreciated on palpation.   M/S:   Right hand contracture  SKIN: no rash.   NEURO:   No NFD appreciated on observation. Hand  5/5 b/l  PSYCH:  AAOx name only. Verbal, answers, affect and mood normal      Lab/Diagnostic data: Reviewed in the chart and EHR.        ASSESSMENT/PLAN  --------------------------------  Type 2 diabetes mellitus without complication, without long-term current use of insulin (H):  - HbA1C 10.9% (April 2020) from 8.6% (Nov 2020). Uncontrolled. Was off meds, now on metformin XR 1500 mg.   -  In this frail elderly adult with a limited life expectancy, the goal of  the management is to address the hyperglycemia sx if any,  rather than the  BGs numbers per se.       Essential HTN:  - ongoing BP on the soft side, HCTZ 25 mg reduced to 12.5 mg (4/26)  - continued to be on the soft side, will stop HCTZ  BP Readings from Last 3 Encounters:   05/03/21 93/64   04/26/21 106/66   04/15/21 (!) 149/93       Mild PCM (H): - Body mass index is 22.9 kg/m . from 22.07, stable.  On supplement    Hx of Seizures d/o; not on meds    Cognitive Impairment, query vascular dementia/vs AD (H)  - Continue to anticipate needs. Chronic condition, ongoing decline expected. Continue to provide redirection and reassurance as needed. Maintain safe living situation with goals focused on comfort      Hyperkinetic disorder  Debility  History of CVA (cerebrovascular accident)  - at baseline.   - Significant  Deficits requiring NH placement. Requiring extensive assistance from nursing. Up for " meals only o/w spends the day resting in bed      Order: See above, otherwise, continue the rest of the current POC.     Electronically signed by:  Yoan Camp MD

## 2021-05-03 NOTE — LETTER
5/3/2021        RE: Anya Gaines  C/o Kimi Choe  [address]  [address]  [place] [address]        [place] [facility] SERVICES  Chief Complaint   Patient presents with     custodial Regulatory     [place] Medical Record Number:  [id number]  Place of Service where encounter took place:  [facility] () [[id number]]    HPI:    Anya Gaines  is 89 year old (7/4/1920), who is being seen today for a federally mandated E/M visit.  HPI information obtained from: facility staff, patient report and [facility] chart review.     Today's concerns are:   - Resident seen and examined. Resident denies pain. Denies chest pain.   - RN  And DNP report no concern  --------------------------------  - - Past Medical, social, family histories, medications, and allergies reviewed and updated  - Medications reviewed: in the chart and EHR.   - Case Management:   I have reviewed the care plan and MDS and do agree with the plan. Patient's desire to return to the community is not present.  Information reviewed:  Medications, vital signs, orders, and nursing notes.    MEDICATIONS:  Current Outpatient Medications   Medication Sig Dispense Refill     acetaminophen (TYLENOL) 325 MG tablet Take 2 tablets (650 mg) by mouth 2 times daily  0     clopidogrel (PLAVIX) 75 MG tablet TAKE 1 TABLET BY MOUTH EVERY DAY DX CEREBROVASCULAR ACCIDENT 30 tablet 11     gabapentin (NEURONTIN) 600 MG tablet Take 0.5 tablets (300 mg) by mouth every morning AND 1 tablet (600 mg) At Bedtime.  0     hydrochlorothiazide (HYDRODIURIL) 12.5 MG tablet Take 12.5 mg by mouth daily       lisinopril (PRINIVIL/ZESTRIL) 40 MG tablet TAKE 1 TABLET BY MOUTH EVERY DAY DX HYPERTENSION 30 tablet 11     loperamide (IMODIUM) 2 MG capsule Take 2 mg by mouth daily as needed       metFORMIN (GLUCOPHAGE-XR) 500 MG 24 hr tablet Take 1,500 mg by mouth daily        Multiple Vitamins-Minerals (PRESERVISION AREDS) TABS Take 1 tablet by mouth 2 times daily    "    potassium chloride (KLOR-CON) 20 MEQ packet Take 20 mEq by mouth daily       ROS: very limited secondary to cognitive impairment but today pt reports     Vitals:  BP 93/64   Pulse 84   Temp 97.8  F (36.6  C)   Resp 16   Ht 1.575 m (5' 2\")   Wt 56.8 kg (125 lb 3.2 oz)   SpO2 94%   BMI 22.90 kg/m    Body mass index is 22.9 kg/m .  Exam:  GENERAL APPEARANCE:  in no distress, cooperative  RESP:  lungs clear to auscultation   CV:  S1S2 audible, regular HR, diastolic murmur appreciated. No edema.   ABDOMEN:  soft, NT/ND, BS audible. no mass appreciated on palpation.   M/S:   Right hand contracture  SKIN:  erythema over face, satellite macular rash  With dandruff  NEURO:   No NFD appreciated on observation. Hand  5/5 b/l  PSYCH:  AAOx name only. Verbal, answers, affect and mood normal      Lab/Diagnostic data: Reviewed in the chart and EHR.        ASSESSMENT/PLAN  --------------------------------  Type 2 diabetes mellitus without complication, without long-term current use of insulin (H):  - HbA1C 10.9% (April 2020) from 8.6% (Nov 2020). Uncontrolled. Was off meds, now on metformin XR 1500 mg.   -  In this frail elderly adult with a limited life expectancy, the goal of  the management is to address the hyperglycemia sx if any,  rather than the  BGs numbers per se.       Essential HTN:  - ongoing BP on the soft side, HCTZ 25 mg reduced to 12.5 mg (4/26)  - continued to be on the soft side, will stop HCTZ  BP Readings from Last 3 Encounters:   05/03/21 93/64   04/26/21 106/66   04/15/21 (!) 149/93       Mild PCM (H): - Body mass index is 22.9 kg/m . from 22.07, stable.  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     Cognitive Impairment, query vascular dementia/vs AD (H)  - Continue to anticipate needs. Chronic condition, ongoing decline expected. Continue to provide redirection and reassurance as needed. Maintain safe living situation with goals " focused on comfort      Hyperkinetic disorder  Debility  History of CVA (cerebrovascular accident)  - at baseline.   - Significant  Deficits requiring NH placement. Requiring extensive assistance from nursing. Up for meals only o/w spends the day resting in bed      Order: See above, otherwise, continue the rest of the current POC.     Electronically signed by:  Yoan Camp MD                Sincerely,        Yoan Camp MD

## 2021-05-10 NOTE — PROGRESS NOTES
[facility] [place] [facility]  Episodic/Acute/Follow-Up  [place] MRN: [id number]. Place of Service where encounter took place:  [facility] () [[id number]]   Chief Complaint   Patient presents with     RECHECK   HPI: Anya Gaines  is a 89 year old (7/4/1920), who is being seen today for an episodic care visit.  HPI information obtained from: facility chart records, facility staff, patient report, [facility] chart review and Care Everywhere [facility] chart review. Today's concern is:    Anya seen today on follow-up to assess her BPs and BGs. She has been eating cookies and candy in her room, and nursing reported high BG from yesterday. Today, Anya in her room eating shortbread cookies. She states her appetite is good and she denies any headache, dizziness, nausea, constipation/diarrhea.   BGs:    BPs:      Past Medical and Surgical History reviewed in Epic today.  MEDICATIONS:  Current Outpatient Medications   Medication Sig Dispense Refill     acetaminophen (TYLENOL) 325 MG tablet Take 2 tablets (650 mg) by mouth 2 times daily  0     clopidogrel (PLAVIX) 75 MG tablet TAKE 1 TABLET BY MOUTH EVERY DAY DX CEREBROVASCULAR ACCIDENT 30 tablet 11     gabapentin (NEURONTIN) 600 MG tablet Take 0.5 tablets (300 mg) by mouth every morning AND 1 tablet (600 mg) At Bedtime.  0     lisinopril (PRINIVIL/ZESTRIL) 40 MG tablet TAKE 1 TABLET BY MOUTH EVERY DAY DX HYPERTENSION 30 tablet 11     loperamide (IMODIUM) 2 MG capsule Take 2 mg by mouth daily as needed       metFORMIN (GLUCOPHAGE-XR) 500 MG 24 hr tablet Take 2,000 mg by mouth every morning        Multiple Vitamins-Minerals (PRESERVISION AREDS) TABS Take 1 tablet by mouth 2 times daily       potassium chloride (KLOR-CON) 20 MEQ packet Take 20 mEq by mouth daily       REVIEW OF SYSTEMS: Limited secondary to cognitive impairment but today pt reports the above and 4 point ROS including Respiratory, CV, GI and , other than that noted in the HPI, is  "negative.    Objective: /58   Pulse 79   Temp 97.3  F (36.3  C)   Resp 16   Ht 1.575 m (5' 2\")   Wt 57.4 kg (126 lb 9.6 oz)   SpO2 97%   BMI 23.16 kg/m    Exam:  GENERAL APPEARANCE: Alert, in no distress, cooperative.   ENT: Mouth/posterior oropharynx intact w/ moist mucous membranes, hearing acuity [place].  EYES: EOM, conjunctivae, lids, pupils and irises normal, PERRL2.   RESP: Respiratory effort unlabored, no respiratory distress, Lung sounds clear. On RA.   CV: Auscultation of heart reveals S1, S2, rate and rhythm regular, no murmur, no rub or gallop, Edema 0+ BLE. Peripheral pulses are 2+.  ABDOMEN: Normal bowel sounds, soft, non-tender abdomen, and no masses palpated.  SKIN: Inspection/Palpation of skin and subcutaneous tissue baseline w/ fragility. No wounds/rashes noted.   NEURO: CN II-XII at patient's baseline, sensation baseline PPS.  PSYCH: Insight, judgement, and memory are impaired at baseline, affect and mood are happy/engaged.    Labs: Labs done in facility are in EPIC. Please refer to them using [facility]/Care Everywhere.    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 Ongoing.    Noting BPs are overall controlled today.    Anya has a good appetite and weights are semi-stable.     BGs could be better controlled, especially given recent a1c. Will increase Metformin XR as noted below.     Given Anya's dementia, and personal preference, and goals-of-care, it is unlikely that a dietary change can be adhered to. Will shoot for a1c goal of 9% given these items mentioned.   Follow-up w/in 2 weeks or as needed.    Orders written by provider at facility and transcribed by : Paulette Pelaez  1. Increase " Metformin XR to 2000mg PO every day. Dx: DM II.     Electronically signed by:  Dr. Lilian Streeter, APRN CNP DNP

## 2021-05-11 NOTE — LETTER
5/11/2021        RE: Anya Gaines  C/o Kimi Choe  [address]  [address]  [place] [address]        [facility]  Episodic/Acute/Follow-Up  [place] MRN: [id number]. Place of Service where encounter took place:  [facility] () [[id number]]   Chief Complaint   Patient presents with     RECHECK   HPI: Anya Gaines  is a 89 year old (7/4/1920), who is being seen today for an episodic care visit.  HPI information obtained from: facility chart records, facility staff, patient report, [facility] chart review and Care Everywhere [facility] chart review. Today's concern is:    Anya seen today on follow-up to assess her BPs and BGs. She has been eating cookies and candy in her room, and nursing reported high BG from yesterday. Today, Anya in her room eating shortbread cookies. She states her appetite is good and she denies any headache, dizziness, nausea, constipation/diarrhea.   BGs:    BPs:      Past Medical and Surgical History reviewed in Epic today.  MEDICATIONS:  Current Outpatient Medications   Medication Sig Dispense Refill     acetaminophen (TYLENOL) 325 MG tablet Take 2 tablets (650 mg) by mouth 2 times daily  0     clopidogrel (PLAVIX) 75 MG tablet TAKE 1 TABLET BY MOUTH EVERY DAY DX CEREBROVASCULAR ACCIDENT 30 tablet 11     gabapentin (NEURONTIN) 600 MG tablet Take 0.5 tablets (300 mg) by mouth every morning AND 1 tablet (600 mg) At Bedtime.  0     lisinopril (PRINIVIL/ZESTRIL) 40 MG tablet TAKE 1 TABLET BY MOUTH EVERY DAY DX HYPERTENSION 30 tablet 11     loperamide (IMODIUM) 2 MG capsule Take 2 mg by mouth daily as needed       metFORMIN (GLUCOPHAGE-XR) 500 MG 24 hr tablet Take 2,000 mg by mouth every morning        Multiple Vitamins-Minerals (PRESERVISION AREDS) TABS Take 1 tablet by mouth 2 times daily       potassium chloride (KLOR-CON) 20 MEQ packet Take 20 mEq by mouth daily       REVIEW OF SYSTEMS: Limited secondary to cognitive impairment but today pt  "reports the above and 4 point ROS including Respiratory, CV, GI and , other than that noted in the HPI, is negative.    Objective: /58   Pulse 79   Temp 97.3  F (36.3  C)   Resp 16   Ht 1.575 m (5' 2\")   Wt 57.4 kg (126 lb 9.6 oz)   SpO2 97%   BMI 23.16 kg/m    Exam:  GENERAL APPEARANCE: Alert, in no distress, cooperative.   ENT: Mouth/posterior oropharynx intact w/ moist mucous membranes, hearing acuity [place].  EYES: EOM, conjunctivae, lids, pupils and irises normal, PERRL2.   RESP: Respiratory effort unlabored, no respiratory distress, Lung sounds clear. On RA.   CV: Auscultation of heart reveals S1, S2, rate and rhythm regular, no murmur, no rub or gallop, Edema 0+ BLE. Peripheral pulses are 2+.  ABDOMEN: Normal bowel sounds, soft, non-tender abdomen, and no masses palpated.  SKIN: Inspection/Palpation of skin and subcutaneous tissue baseline w/ fragility. No wounds/rashes noted.   NEURO: CN II-XII at patient's baseline, sensation baseline PPS.  PSYCH: Insight, judgement, and memory are impaired at baseline, affect and mood are happy/engaged.    Labs: Labs done in facility are in EPIC. Please refer to them using [employer]/Care Everywhere.    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 Ongoing.    Noting BPs are overall controlled today.    Anya has a good appetite and weights are semi-stable.     BGs could be better controlled, especially given recent a1c. Will increase Metformin XR as noted below.     Given Anya's dementia, and personal preference, and goals-of-care, it is unlikely that a dietary change can be adhered to. Will shoot for a1c goal of 9% given these items mentioned.   Follow-up w/in 2 weeks or as " needed.    Orders written by provider at facility and transcribed by : Paulette Pelaez  1. Increase Metformin XR to 2000mg PO every day. Dx: DM II.     Electronically signed by:  TONYA Walton CNP DNP        Sincerely,        TONYA Obando CNP

## 2021-06-21 NOTE — PROGRESS NOTES
[facility]IATRIC SERV[facility]  Episodic/Acute/Follow-Up  [place] MRN: [id number]. Place of Service where encounter took place:  [facility] () [[id number]]   Chief Complaint   Patient presents with     RECHECK   HPI: Anya Gaines  is a 89 year old (7/4/1920), who is being seen today for an episodic care visit.  HPI information obtained from: facility chart records, facility staff, patient report, [facility] chart review and Care Everywhere [facility] chart review. Today's concern is:    Anya seen today after we increased her Metformin to max last month in effort for better BG control. At the time, nursing was also concerned about BPs (which were marginally high). Given Anya's age, we chose to conservatively watch her BPs vs risk hypotension or orthostasis.    Today, Anya states she has a good appetite and denies any nausea/heartburn, constipation/diarrhea. She denies any CP, SOB, headache, dizziness. Nursing has no new concerns. BPs/BGs are as noted below. Weights are stable.   BGs:      BPs:      Past Medical and Surgical History reviewed in Epic today.  MEDICATIONS:  Current Outpatient Medications   Medication Sig Dispense Refill     acetaminophen (TYLENOL) 325 MG tablet Take 2 tablets (650 mg) by mouth 2 times daily  0     clopidogrel (PLAVIX) 75 MG tablet TAKE 1 TABLET BY MOUTH EVERY DAY DX CEREBROVASCULAR ACCIDENT 30 tablet 11     gabapentin (NEURONTIN) 600 MG tablet Take 0.5 tablets (300 mg) by mouth every morning AND 1 tablet (600 mg) At Bedtime.  0     lisinopril (PRINIVIL/ZESTRIL) 40 MG tablet TAKE 1 TABLET BY MOUTH EVERY DAY DX HYPERTENSION 30 tablet 11     loperamide (IMODIUM) 2 MG capsule Take 2 mg by mouth daily as needed       metFORMIN (GLUCOPHAGE-XR) 500 MG 24 hr tablet Take 2,000 mg by mouth every morning        Multiple Vitamins-Minerals (PRESERVISION AREDS) TABS Take 1 tablet by mouth 2 times daily       potassium chloride (KLOR-CON) 20 MEQ packet Take 20 mEq by mouth daily   "     REVIEW OF SYSTEMS: Limited secondary to cognitive impairment but today pt reports the above and 4 point ROS including Respiratory, CV, GI and , other than that noted in the HPI, is negative.    Objective: /69   Pulse 92   Temp 96.9  F (36.1  C)   Resp 16   Ht 1.575 m (5' 2\")   Wt 55.2 kg (121 lb 12.8 oz)   SpO2 92%   BMI 22.28 kg/m    Exam:  GENERAL APPEARANCE: Alert, in no distress, cooperative.   RESP: Respiratory effort unlabored, no respiratory distress, Lung sounds clear. On RA.   CV: Auscultation of heart reveals S1, S2, rate and rhythm regular, no murmur, no rub or gallop, Edema 0+ BLE. Peripheral pulses are 2+.  ABDOMEN: Normal bowel sounds, soft, non-tender abdomen, and no masses palpated.  SKIN: Inspection/Palpation of skin and subcutaneous tissue baseline w/ fragility. No wounds/rashes noted.   NEURO: CN II-XII at patient's baseline, sensation baseline PPS.  PSYCH: Insight, judgement, and memory are impaired at baseline, affect and mood are happy/engaged.    Labs: Labs done in facility are in EPIC. Please refer to them using [employer]/Care Everywhere.    ASSESSMENT/PLAN:  Benign essential hypertension  Type 2 diabetes mellitus without complication, without long-term current use of insulin (H)  Mild protein-calorie malnutrition (H)  Age-related physical debility  History of CVA (cerebrovascular accident)  Frailty  Chronic. Ongoing.    BG is better controlled. Will consider a1c at future visit.    HTN is well-controlled for age. Yadi et al (2018) found that sBPs greater than 170 had improved mortality and improved cognitive retention over sBPs under 140 in patients 85 years and older. Will continue current plan-of-care.    Appetite is good and Anya's weight has remained stable.     Noting significant debility and frailty, as Anya will turn 89 years old in less than 2 weeks. Changes to her regimen should be done cautiously.   Follow-up routinely or as needed.    Orders:  No new " orders.    Electronically signed by:  Dr. Lilian Streeter, APRN CNP DNP

## 2021-06-22 NOTE — LETTER
6/22/2021        RE: Anya Gaines  C/o Kimi Choe  [address]  [address]  [place] [address]        [facility] GERIATRIC SERVICE  Episodic/Acute/Follow-Up  [place] MRN: [id number]. Place of Service where encounter took place:  [facility] () [[id number]]   Chief Complaint   Patient presents with     RECHECK   HPI: Anya Gaines  is a 89 year old (7/4/1920), who is being seen today for an episodic care visit.  HPI information obtained from: facility chart records, facility staff, patient report, [facility] chart review and Care Everywhere [facility] chart review. Today's concern is:    Anya seen today after we increased her Metformin to max last month in effort for better BG control. At the time, nursing was also concerned about BPs (which were marginally high). Given Anya's age, we chose to conservatively watch her BPs vs risk hypotension or orthostasis.    Today, Anya states she has a good appetite and denies any nausea/heartburn, constipation/diarrhea. She denies any CP, SOB, headache, dizziness. Nursing has no new concerns. BPs/BGs are as noted below. Weights are stable.   BGs:      BPs:      Past Medical and Surgical History reviewed in Epic today.  MEDICATIONS:  Current Outpatient Medications   Medication Sig Dispense Refill     acetaminophen (TYLENOL) 325 MG tablet Take 2 tablets (650 mg) by mouth 2 times daily  0     clopidogrel (PLAVIX) 75 MG tablet TAKE 1 TABLET BY MOUTH EVERY DAY DX CEREBROVASCULAR ACCIDENT 30 tablet 11     gabapentin (NEURONTIN) 600 MG tablet Take 0.5 tablets (300 mg) by mouth every morning AND 1 tablet (600 mg) At Bedtime.  0     lisinopril (PRINIVIL/ZESTRIL) 40 MG tablet TAKE 1 TABLET BY MOUTH EVERY DAY DX HYPERTENSION 30 tablet 11     loperamide (IMODIUM) 2 MG capsule Take 2 mg by mouth daily as needed       metFORMIN (GLUCOPHAGE-XR) 500 MG 24 hr tablet Take 2,000 mg by mouth every morning        Multiple Vitamins-Minerals (PRESERVISION AREDS) TABS  "Take 1 tablet by mouth 2 times daily       potassium chloride (KLOR-CON) 20 MEQ packet Take 20 mEq by mouth daily       REVIEW OF SYSTEMS: Limited secondary to cognitive impairment but today pt reports the above and 4 point ROS including Respiratory, CV, GI and , other than that noted in the HPI, is negative.    Objective: /69   Pulse 92   Temp 96.9  F (36.1  C)   Resp 16   Ht 1.575 m (5' 2\")   Wt 55.2 kg (121 lb 12.8 oz)   SpO2 92%   BMI 22.28 kg/m    Exam:  GENERAL APPEARANCE: Alert, in no distress, cooperative.   RESP: Respiratory effort unlabored, no respiratory distress, Lung sounds clear. On RA.   CV: Auscultation of heart reveals S1, S2, rate and rhythm regular, no murmur, no rub or gallop, Edema 0+ BLE. Peripheral pulses are 2+.  ABDOMEN: Normal bowel sounds, soft, non-tender abdomen, and no masses palpated.  SKIN: Inspection/Palpation of skin and subcutaneous tissue baseline w/ fragility. No wounds/rashes noted.   NEURO: CN II-XII at patient's baseline, sensation baseline PPS.  PSYCH: Insight, judgement, and memory are impaired at baseline, affect and mood are happy/engaged.    Labs: Labs done in facility are in EPIC. Please refer to them using [employer]/Care Everywhere.    ASSESSMENT/PLAN:  Benign essential hypertension  Type 2 diabetes mellitus without complication, without long-term current use of insulin (H)  Mild protein-calorie malnutrition (H)  Age-related physical debility  History of CVA (cerebrovascular accident)  Frailty  Chronic. Ongoing.    BG is better controlled. Will consider a1c at future visit.    HTN is well-controlled for age. Yadi et al (2018) found that sBPs greater than 170 had improved mortality and improved cognitive retention over sBPs under 140 in patients 85 years and older. Will continue current plan-of-care.    Appetite is good and Anya's weight has remained stable.     Noting significant debility and frailty, as Anya will turn 89 years old in less than 2 weeks. " Changes to her regimen should be done cautiously.   Follow-up routinely or as needed.    Orders:  No new orders.    Electronically signed by:  TONYA Walton CNP DNP        Sincerely,        TONYA Obando CNP

## 2021-07-21 NOTE — PROGRESS NOTES
[facility] [place] Annual Physical  Chief Complaint   Patient presents with     P Care Coordination - Home Visit-Annual Assessment    [place] MRN: [id number] Place of Service where encounter took place:  [facility] () [[id number]] HPI: Anya Gaines  is a 89 year old  (7/4/1920), who is being seen today for an annual comprehensive visit. HPI information obtained from: facility chart records, facility staff, patient report, [facility] chart review and Care Everywhere [facility] chart review.  Today's concerns are:    Anya seen today for an annual physical exam.  She overall denies pain, but says she has some arthritis in her hands, but this is helped with Tylenol.  She states her appetite is very good, and she does not have any trouble sleeping.  She otherwise denies headache, dizziness, chest pain, shortness of breath, cough, fever, bowel/bladder changes.  PHQ9: 1/27  BIMS: 5/15  BPs:      ALLERGIES: Patient has no known allergies.PAST MEDICAL HISTORY:  has a past medical history of Cerebral artery occlusion with cerebral infarction (H), Cerebral infarction (H), Diabetes mellitus, type 2 (H), Hypertension, and Seizures (H).PAST SURGICAL HISTORY:  has a past surgical history that includes surgical history of - .  IMMUNIZATIONS:  Immunization History   Administered Date(s) Administered     COVID-19,PF,Moderna 12/31/2020, 02/02/2021     FLU 6-35 months 01/26/2010, 10/20/2011     Influenza (High Dose) 3 valent vaccine 10/01/2014, 10/12/2015, 10/06/2017, 09/20/2018, 10/08/2020     Influenza (IIV3) PF 10/30/2006, 11/07/2007, 10/22/2008, 01/26/2010, 10/20/2011, 10/05/2012, 11/05/2012, 11/25/2013     Influenza (intradermal) 09/20/2018     Influenza Quad, Recombinant, p-free (RIV4) 10/08/2019     Pneumo Conj 13-V (2010&after) 10/12/2015     Pneumococcal 23 valent 12/22/2007     TDAP Vaccine (Boostrix) 11/09/2016    Above immunizations pulled from [facility]. [facility] and facility records also reconciled.  Outstanding information sent to  to update [facility].  Future immunizations are not needed at this point as all recommended immunizations are up to date.   Current Outpatient Medications   Medication Sig Dispense Refill     acetaminophen (TYLENOL) 325 MG tablet Take 2 tablets (650 mg) by mouth 2 times daily  0     clopidogrel (PLAVIX) 75 MG tablet TAKE 1 TABLET BY MOUTH EVERY DAY DX CEREBROVASCULAR ACCIDENT 30 tablet 11     gabapentin (NEURONTIN) 600 MG tablet Take 0.5 tablets (300 mg) by mouth every morning AND 1 tablet (600 mg) At Bedtime.  0     lisinopril (PRINIVIL/ZESTRIL) 40 MG tablet TAKE 1 TABLET BY MOUTH EVERY DAY DX HYPERTENSION 30 tablet 11     loperamide (IMODIUM) 2 MG capsule Take 2 mg by mouth daily as needed       metFORMIN (GLUCOPHAGE-XR) 500 MG 24 hr tablet Take 2,000 mg by mouth every morning        Multiple Vitamins-Minerals (PRESERVISION AREDS) TABS Take 1 tablet by mouth 2 times daily       potassium chloride (KLOR-CON) 20 MEQ packet Take 20 mEq by mouth daily       Case Management: I have reviewed the facility/SNF care plan/MDS, including the falls risk, nutrition and pain screening. I also reviewed the current immunizations, and preventive care. .Future cancer screening is not clinically indicated secondary to age/goals of care Patient's desire to return to the community is not assessible due to cognitive impairment. Current Level of Care is appropriate.    Advance Directive Discussion: I reviewed the current advanced directives as reflected in EPIC, the POLST and the facility chart, and verified the congruency of orders. I contacted the first party Kimi (daughter) and discussed the plan of Care.  I did not due to cognitive impairment review the advance directives with the resident.     Team Discussion: I communicated with the appropriate disciplines involved with the Plan of Care: Nursing  .Patient's goal is pain control and comfort.Information reviewed: Medications,  "vital signs, orders, and nursing notes.    ROS: Limited secondary to cognitive impairment but today pt reports the above and 10 point ROS of systems including Constitutional, Eyes, Respiratory, Cardiovascular, Gastroenterology, Genitourinary, Integumentary, Musculoskeletal, Psychiatric were all negative except for pertinent positives noted in my HPI.    Vitals: /68   Pulse 81   Temp 97.5  F (36.4  C)   Resp 16   Ht 1.575 m (5' 2\")   Wt 55.2 kg (121 lb 9.6 oz)   SpO2 97%   BMI 22.24 kg/m       BGs:    Exam:  GENERAL APPEARANCE: Alert, in no distress, cooperative.   ENT: Mouth/posterior oropharynx intact w/ moist mucous membranes, hearing acuity [place].  EYES: EOM, conjunctivae, lids, pupils and irises normal, PERRL2.   RESP: Respiratory effort unlabored, no respiratory distress, Lung sounds clear. On RA.   CV: Auscultation of heart reveals S1, S2, rate and rhythm regular, no murmur, no rub or gallop, Edema 0+ BLE. Peripheral pulses are 2+.  ABDOMEN: Normal bowel sounds, soft, non-tender abdomen, and no masses palpated.  SKIN: Inspection/Palpation of skin and subcutaneous tissue baseline w/ fragility. No wounds/rashes noted.   NEURO: CN II-XII at patient's baseline, sensation baseline PPS.  PSYCH: Insight, judgement, and memory are baseline impaired, affect and mood are happy/engaged.    Lab/Diagnostic data: Recent labs in [facility] reviewed by me today.     ASSESSMENT/PLAN  History of CVA (cerebrovascular accident)  Type 2 diabetes mellitus without complication, without long-term current use of insulin (H)  Vascular dementia without behavioral disturbance (H)  Benign essential hypertension  Mild protein-calorie malnutrition (H)  Age-related physical debility  Frailty  Hx of COVID19  Chronic. Ongoing.    Given age and goals of care, will not pursue cancer screenings.  Confirmed this with patient's daughter Kimi who was agreeable.    Will renew patient's DNR/DNI after conversation with next of kin, and daughter " Kimi who is decision-maker.    Blood glucose readings appear better controlled with Metformin XL, and notes frailty and Anya's advanced age, will recheck A1c.    Anya's hypertension appears well controlled, but noting her significant year being over 89 years old, and surviving COVID-19.  Likely this has furthered her vascular dementia, her debility, and frailty.    Malnutrition is still ongoing problem, though Anya has a very good appetite.  Will assess further with electrolytes, renal function, liver function.  We will also assess for anemia and cholesterol.  Follow-up routinely or as needed.    Orders:  1. CBC, CMP, TSH, a1c, lipids x1 on 7/23.     Electronically signed by:  Dr. Lilian Streeter, APRN CNP DNP

## 2021-07-22 NOTE — LETTER
7/22/2021        RE: Anya Gaines  C/o Kimi Choe  [address] [facility]  [address]  [facility] [address]        [facility] Annual Physical  Chief Complaint   Patient presents with     FVP Care Coordination - Home Visit-Annual Assessment    [place] MRN: [id number] Place of Service where encounter took place:  [facility] () [[id number]] HPI: Anya Gaines  is a 89 year old  (7/4/1920), who is being seen today for an annual comprehensive visit. HPI information obtained from: facility chart records, facility staff, patient report, [facility] chart review and Care Everywhere [facility] chart review.  Today's concerns are:    Anya seen today for an annual physical exam.  She overall denies pain, but says she has some arthritis in her hands, but this is helped with Tylenol.  She states her appetite is very good, and she does not have any trouble sleeping.  She otherwise denies headache, dizziness, chest pain, shortness of breath, cough, fever, bowel/bladder changes.  PHQ9: 1/27  BIMS: 5/15  BPs:      ALLERGIES: Patient has no known allergies.PAST MEDICAL HISTORY:  has a past medical history of Cerebral artery occlusion with cerebral infarction (H), Cerebral infarction (H), Diabetes mellitus, type 2 (H), Hypertension, and Seizures (H).PAST SURGICAL HISTORY:  has a past surgical history that includes surgical history of - .  IMMUNIZATIONS:  Immunization History   Administered Date(s) Administered     COVID-19,PF,Moderna 12/31/2020, 02/02/2021     FLU 6-35 months 01/26/2010, 10/20/2011     Influenza (High Dose) 3 valent vaccine 10/01/2014, 10/12/2015, 10/06/2017, 09/20/2018, 10/08/2020     Influenza (IIV3) PF 10/30/2006, 11/07/2007, 10/22/2008, 01/26/2010, 10/20/2011, 10/05/2012, 11/05/2012, 11/25/2013     Influenza (intradermal) 09/20/2018     Influenza Quad, Recombinant, p-free (RIV4) 10/08/2019     Pneumo Conj 13-V (2010&after) 10/12/2015     Pneumococcal 23 valent 12/22/2007     TDAP Vaccine  (Boostrix) 11/09/2016    Above immunizations pulled from [facility]. [facility] and facility records also reconciled. Outstanding information sent to  to update [facility].  Future immunizations are not needed at this point as all recommended immunizations are up to date.   Current Outpatient Medications   Medication Sig Dispense Refill     acetaminophen (TYLENOL) 325 MG tablet Take 2 tablets (650 mg) by mouth 2 times daily  0     clopidogrel (PLAVIX) 75 MG tablet TAKE 1 TABLET BY MOUTH EVERY DAY DX CEREBROVASCULAR ACCIDENT 30 tablet 11     gabapentin (NEURONTIN) 600 MG tablet Take 0.5 tablets (300 mg) by mouth every morning AND 1 tablet (600 mg) At Bedtime.  0     lisinopril (PRINIVIL/ZESTRIL) 40 MG tablet TAKE 1 TABLET BY MOUTH EVERY DAY DX HYPERTENSION 30 tablet 11     loperamide (IMODIUM) 2 MG capsule Take 2 mg by mouth daily as needed       metFORMIN (GLUCOPHAGE-XR) 500 MG 24 hr tablet Take 2,000 mg by mouth every morning        Multiple Vitamins-Minerals (PRESERVISION AREDS) TABS Take 1 tablet by mouth 2 times daily       potassium chloride (KLOR-CON) 20 MEQ packet Take 20 mEq by mouth daily       Case Management: I have reviewed the facility/SNF care plan/MDS, including the falls risk, nutrition and pain screening. I also reviewed the current immunizations, and preventive care. .Future cancer screening is not clinically indicated secondary to age/goals of care Patient's desire to return to the community is not assessible due to cognitive impairment. Current Level of Care is appropriate.    Advance Directive Discussion: I reviewed the current advanced directives as reflected in EPIC, the POLST and the facility chart, and verified the congruency of orders. I contacted the first party Kimi (daughter) and discussed the plan of Care.  I did not due to cognitive impairment review the advance directives with the resident.     Team Discussion: I communicated with the appropriate disciplines involved  "with the Plan of Care: Nursing  .Patient's goal is pain control and comfort.Information reviewed: Medications, vital signs, orders, and nursing notes.    ROS: Limited secondary to cognitive impairment but today pt reports the above and 10 point ROS of systems including Constitutional, Eyes, Respiratory, Cardiovascular, Gastroenterology, Genitourinary, Integumentary, Musculoskeletal, Psychiatric were all negative except for pertinent positives noted in my HPI.    Vitals: /68   Pulse 81   Temp 97.5  F (36.4  C)   Resp 16   Ht 1.575 m (5' 2\")   Wt 55.2 kg (121 lb 9.6 oz)   SpO2 97%   BMI 22.24 kg/m       BGs:    Exam:  GENERAL APPEARANCE: Alert, in no distress, cooperative.   ENT: Mouth/posterior oropharynx intact w/ moist mucous membranes, hearing acuity [place].  EYES: EOM, conjunctivae, lids, pupils and irises normal, PERRL2.   RESP: Respiratory effort unlabored, no respiratory distress, Lung sounds clear. On RA.   CV: Auscultation of heart reveals S1, S2, rate and rhythm regular, no murmur, no rub or gallop, Edema 0+ BLE. Peripheral pulses are 2+.  ABDOMEN: Normal bowel sounds, soft, non-tender abdomen, and no masses palpated.  SKIN: Inspection/Palpation of skin and subcutaneous tissue baseline w/ fragility. No wounds/rashes noted.   NEURO: CN II-XII at patient's baseline, sensation baseline PPS.  PSYCH: Insight, judgement, and memory are baseline impaired, affect and mood are happy/engaged.    Lab/Diagnostic data: Recent labs in [facility] reviewed by me today.     ASSESSMENT/PLAN  History of CVA (cerebrovascular accident)  Type 2 diabetes mellitus without complication, without long-term current use of insulin (H)  Vascular dementia without behavioral disturbance (H)  Benign essential hypertension  Mild protein-calorie malnutrition (H)  Age-related physical debility  Frailty  Hx of COVID19  Chronic. Ongoing.    Given age and goals of care, will not pursue cancer screenings.  Confirmed this with patient's " daughter Kimi who was agreeable.    Will renew patient's DNR/DNI after conversation with next of kin, and daughter Kimi who is decision-maker.    Blood glucose readings appear better controlled with Metformin XL, and notes frailty and Anya's advanced age, will recheck A1c.    Anya's hypertension appears well controlled, but noting her significant year being over 89 years old, and surviving COVID-19.  Likely this has furthered her vascular dementia, her debility, and frailty.    Malnutrition is still ongoing problem, though Anya has a very good appetite.  Will assess further with electrolytes, renal function, liver function.  We will also assess for anemia and cholesterol.  Follow-up routinely or as needed.    Orders:  1. CBC, CMP, TSH, a1c, lipids x1 on 7/23.     Electronically signed by:  TONYA Walton CNP DNP            Sincerely,        TONYA Obando CNP

## 2021-09-03 NOTE — PROGRESS NOTES
[place] [facility]  Chief Complaint   Patient presents with     alf Regulatory     [place] Medical Record Number:  [id number]  Place of Service where encounter took place:  [facility] () [[id number]]    HPI:    Anya Gaines  is 89 year old (7/4/1920), who is being seen today for a federally mandated E/M visit.  HPI information obtained from: facility staff, and [facility] chart review. Patient has advanced dementia     Today's concerns are:   - Resident seen and examined. RN  And DNP report no concern  --------------------------------  - - Past Medical, social, family histories, medications, and allergies reviewed and updated  - Medications reviewed: in the chart and EHR.   - Case Management:   I have reviewed the care plan and MDS and do agree with the plan. Patient's desire to return to the community is not present.  Information reviewed:  Medications, vital signs, orders, and nursing notes.    MEDICATIONS:  Current Outpatient Medications   Medication Sig Dispense Refill     acetaminophen (TYLENOL) 325 MG tablet Take 2 tablets (650 mg) by mouth 2 times daily  0     clopidogrel (PLAVIX) 75 MG tablet TAKE 1 TABLET BY MOUTH EVERY DAY DX CEREBROVASCULAR ACCIDENT 30 tablet 11     gabapentin (NEURONTIN) 600 MG tablet Take 0.5 tablets (300 mg) by mouth every morning AND 1 tablet (600 mg) At Bedtime.  0     lisinopril (PRINIVIL/ZESTRIL) 40 MG tablet TAKE 1 TABLET BY MOUTH EVERY DAY DX HYPERTENSION 30 tablet 11     loperamide (IMODIUM) 2 MG capsule Take 2 mg by mouth daily as needed       metFORMIN (GLUCOPHAGE-XR) 500 MG 24 hr tablet Take 2,000 mg by mouth every morning        Multiple Vitamins-Minerals (PRESERVISION AREDS) TABS Take 1 tablet by mouth 2 times daily       potassium chloride (KLOR-CON) 20 MEQ packet Take 20 mEq by mouth daily       ROS: unobtainable 2/2  cognitive impairment but today pt reports     Vitals:  BP (!) 146/80   Pulse 74   Temp (!) 96.7  F (35.9  C)    "Resp 16   Ht 1.575 m (5' 2\")   Wt 54 kg (119 lb)   SpO2 98%   BMI 21.77 kg/m    Body mass index is 21.77 kg/m .  Exam:  GENERAL APPEARANCE:  in no distress, cooperative  RESP:  lungs clear to auscultation   CV:  S1S2 audible, regular HR, diastolic murmur appreciated. No edema.   ABDOMEN:  soft, NT/ND, BS audible. no mass appreciated on palpation.   M/S:   Right hand contracture  SKIN: no rash.   NEURO:   No NFD appreciated on observation. Hand  5/5 b/l  PSYCH:  pleasantly confused, verbal.     Lab/Diagnostic data: Reviewed in the chart and EHR.        ASSESSMENT/PLAN  --------------------------------  Type 2 diabetes mellitus without complication, without long-term current use of insulin (H):   A1C 7.6 07/23/2021    A1C 11.8 04/29/2021   - controlled.  On metformin.   -  In this frail elderly adult with a limited life expectancy, the goal of  the management is to address the hyperglycemia sx if any,  rather than the  BGs numbers per se.     Mild PCM (H):  Body mass index is 21.77 kg/m . from 22.07, this increases mortality rate in frail elderly. -  Encourage more frequent meals, finger food items, assistance with feeding, adding some spice / flavor to the food to help with possible dysgeusia.     Hx of Seizures d/o; not on meds    Cognitive Impairment, query vascular dementia/vs AD (H)  - Continue to anticipate needs. Chronic condition, ongoing decline expected. Continue to provide redirection and reassurance as needed. Maintain safe living situation with goals focused on comfort    Essential HTN:  Controlled. On lisinopril.     Hyperkinetic disorder  Debility  History of CVA (cerebrovascular accident)  - at baseline.   - Significant  Deficits requiring NH placement. Requiring extensive assistance from nursing. Up for meals only o/w spends the day resting in bed      Order: See above, otherwise, continue the rest of the current POC.     Electronically signed by:  Yoan Camp MD          "

## 2021-09-06 NOTE — LETTER
9/6/2021        RE: Anya Gaines  C/o Kimi Choe  [address] [facility]  [address]  [facility] [address]        [place] [facility]  Chief Complaint   Patient presents with     longterm Regulatory     [place] Medical Record Number:  [id number]  Place of Service where encounter took place:  [facility] () [[id number]]    HPI:    Anya Gaines  is 89 year old (7/4/1920), who is being seen today for a federally mandated E/M visit.  HPI information obtained from: facility staff, and [facility] chart review. Patient has advanced dementia     Today's concerns are:   - Resident seen and examined. RN  And DNP report no concern  --------------------------------  - - Past Medical, social, family histories, medications, and allergies reviewed and updated  - Medications reviewed: in the chart and EHR.   - Case Management:   I have reviewed the care plan and MDS and do agree with the plan. Patient's desire to return to the community is not present.  Information reviewed:  Medications, vital signs, orders, and nursing notes.    MEDICATIONS:  Current Outpatient Medications   Medication Sig Dispense Refill     acetaminophen (TYLENOL) 325 MG tablet Take 2 tablets (650 mg) by mouth 2 times daily  0     clopidogrel (PLAVIX) 75 MG tablet TAKE 1 TABLET BY MOUTH EVERY DAY DX CEREBROVASCULAR ACCIDENT 30 tablet 11     gabapentin (NEURONTIN) 600 MG tablet Take 0.5 tablets (300 mg) by mouth every morning AND 1 tablet (600 mg) At Bedtime.  0     lisinopril (PRINIVIL/ZESTRIL) 40 MG tablet TAKE 1 TABLET BY MOUTH EVERY DAY DX HYPERTENSION 30 tablet 11     loperamide (IMODIUM) 2 MG capsule Take 2 mg by mouth daily as needed       metFORMIN (GLUCOPHAGE-XR) 500 MG 24 hr tablet Take 2,000 mg by mouth every morning        Multiple Vitamins-Minerals (PRESERVISION AREDS) TABS Take 1 tablet by mouth 2 times daily       potassium chloride (KLOR-CON) 20 MEQ packet Take 20 mEq by mouth daily       ROS: unobtainable 2/2   "cognitive impairment but today pt reports     Vitals:  BP (!) 146/80   Pulse 74   Temp (!) 96.7  F (35.9  C)   Resp 16   Ht 1.575 m (5' 2\")   Wt 54 kg (119 lb)   SpO2 98%   BMI 21.77 kg/m    Body mass index is 21.77 kg/m .  Exam:  GENERAL APPEARANCE:  in no distress, cooperative  RESP:  lungs clear to auscultation   CV:  S1S2 audible, regular HR, diastolic murmur appreciated. No edema.   ABDOMEN:  soft, NT/ND, BS audible. no mass appreciated on palpation.   M/S:   Right hand contracture  SKIN: no rash.   NEURO:   No NFD appreciated on observation. Hand  5/5 b/l  PSYCH:  pleasantly confused, verbal.     Lab/Diagnostic data: Reviewed in the chart and EHR.        ASSESSMENT/PLAN  --------------------------------  Type 2 diabetes mellitus without complication, without long-term current use of insulin (H):   A1C 7.6 07/23/2021    A1C 11.8 04/29/2021   - controlled.  On metformin.   -  In this frail elderly adult with a limited life expectancy, the goal of  the management is to address the hyperglycemia sx if any,  rather than the  BGs numbers per se.     Mild PCM (H):  Body mass index is 21.77 kg/m . from 22.07, this increases mortality rate in frail elderly. -  Encourage more frequent meals, finger food items, assistance with feeding, adding some spice / flavor to the food to help with possible dysgeusia.     Hx of Seizures d/o; not on meds    Cognitive Impairment, query vascular dementia/vs AD (H)  - Continue to anticipate needs. Chronic condition, ongoing decline expected. Continue to provide redirection and reassurance as needed. Maintain safe living situation with goals focused on comfort    Essential HTN:  Controlled. On lisinopril.     Hyperkinetic disorder  Debility  History of CVA (cerebrovascular accident)  - at baseline.   - Significant  Deficits requiring NH placement. Requiring extensive assistance from nursing. Up for meals only o/w spends the day resting in bed      Order: See above, otherwise, " continue the rest of the current POC.     Electronically signed by:  Yoan Camp MD                Sincerely,        Yoan Camp MD

## 2021-10-28 NOTE — PROGRESS NOTES
[facility] [place] [facility]  Episodic/Acute/Follow-Up  [place] MRN: [id number]. Place of Service where encounter took place:  [facility] () [[id number]]   Chief Complaint   Patient presents with     RECHECK    HPI: Anya Gaines  is a 89 year old (7/4/1920), who is being seen today for an episodic care visit.  HPI information obtained from: facility chart records, facility staff, patient report and [facility] chart review. Today's concern is:    Anya seen today, after nursing reported acute illness.  Nursing states that Sasha has not eaten well for a while now, but it has been worse in the last few days.  The states she has also had diarrhea.  Today, Anya says that she did have stomach pain, and that she even vomited.  She states that she was having diarrhea, but that she feels better now that it is all out.  She thinks that she is eating fine, and otherwise denies fever, shortness of breath, chest pain, stomachache, heartburn.    Past Medical and Surgical History reviewed in Epic today.  MEDICATIONS:  Current Outpatient Medications   Medication Sig Dispense Refill     acetaminophen (TYLENOL) 325 MG tablet Take 2 tablets (650 mg) by mouth 2 times daily  0     clopidogrel (PLAVIX) 75 MG tablet TAKE 1 TABLET BY MOUTH EVERY DAY DX CEREBROVASCULAR ACCIDENT 30 tablet 11     gabapentin (NEURONTIN) 600 MG tablet Take 0.5 tablets (300 mg) by mouth every morning AND 1 tablet (600 mg) At Bedtime.  0     lisinopril (PRINIVIL/ZESTRIL) 40 MG tablet TAKE 1 TABLET BY MOUTH EVERY DAY DX HYPERTENSION 30 tablet 11     loperamide (IMODIUM) 2 MG capsule Take 2 mg by mouth daily as needed       metFORMIN (GLUCOPHAGE-XR) 500 MG 24 hr tablet Take 2,000 mg by mouth every morning        Multiple Vitamins-Minerals (PRESERVISION AREDS) TABS Take 1 tablet by mouth 2 times daily       potassium chloride (KLOR-CON) 20 MEQ packet Take 20 mEq by mouth daily       REVIEW OF SYSTEMS: Limited secondary to cognitive  "impairment but today pt reports the above and 4 point ROS including Respiratory, CV, GI and , other than that noted in the HPI, is negative.    Objective: /79   Pulse 101   Temp 97.4  F (36.3  C)   Resp 16   Ht 1.575 m (5' 2\")   Wt 56.6 kg (124 lb 12.8 oz)   SpO2 95%   BMI 22.83 kg/m    Exam:  GENERAL APPEARANCE: Alert, in no distress, cooperative.   ENT: Mouth/posterior oropharynx intact w/ moist mucous membranes, hearing acuity [place].  EYES: EOM, conjunctivae, lids, pupils and irises normal, PERRL2.   RESP: Respiratory effort unlabored, no respiratory distress, On RA.   CV: Edema 0+ BLE. Peripheral pulses are 2+.  ABDOMEN: soft, non-tender abdomen, and no masses palpated.  SKIN: Inspection/Palpation of skin and subcutaneous tissue baseline w/ fragility. No wounds/rashes noted.   NEURO: CN II-XII at patient's baseline, sensation baseline PPS.  PSYCH: Insight, judgement, and memory are baseline impaired, affect and mood are happy/engaged.    Labs: Labs done in facility are in EPIC. Please refer to them using [facility]/Care Everywhere.    ASSESSMENT/PLAN:  Diarrhea, unspecified type  Mild protein-calorie malnutrition (H)  Vascular dementia without behavioral disturbance (H)  Poor appetite  Acute on chronic. Ongoing.    Unclear exact time of onset, but suspect acute diarrhea started in the last 72 hours.  No recent antibiotic use, but nursing describes mucus and watery stool with foul odor.    Given fragility in age and condition, will obtain blood work to assess for leukocytosis, dehydration, electrolyte imbalance.    Given quality of stools reported, will obtain C. difficile testing.    If all testing unremarkable, will consider this to be a viral gastroenteritis.  If sustained malnutrition/anorexia, may also consider hospice enrollment.  Follow up w/in 1 week or as needed.    Orders:  1. CBC, CMP x1 on 10/29. Dx: Diarrhea.   2. CDIFF stool test x1. Dx: diarrhea.     Electronically signed by:  Dr. Quintana " TONYA Streeter CNP DNP

## 2021-10-28 NOTE — LETTER
10/28/2021        RE: Anya Gaines  C/o Kimi Choe  [address]  [address]  [facility] [address]        [facility] [place] [facility]  Episodic/Acute/Follow-Up  [place] MRN: [id number]. Place of Service where encounter took place:  [facility] () [[id number]]   Chief Complaint   Patient presents with     RECHECK    HPI: Anya Gaines  is a 89 year old (7/4/1920), who is being seen today for an episodic care visit.  HPI information obtained from: facility chart records, facility staff, patient report and [facility] chart review. Today's concern is:    Anya seen today, after nursing reported acute illness.  Nursing states that Sasha has not eaten well for a while now, but it has been worse in the last few days.  The states she has also had diarrhea.  Today, Anya says that she did have stomach pain, and that she even vomited.  She states that she was having diarrhea, but that she feels better now that it is all out.  She thinks that she is eating fine, and otherwise denies fever, shortness of breath, chest pain, stomachache, heartburn.    Past Medical and Surgical History reviewed in Epic today.  MEDICATIONS:  Current Outpatient Medications   Medication Sig Dispense Refill     acetaminophen (TYLENOL) 325 MG tablet Take 2 tablets (650 mg) by mouth 2 times daily  0     clopidogrel (PLAVIX) 75 MG tablet TAKE 1 TABLET BY MOUTH EVERY DAY DX CEREBROVASCULAR ACCIDENT 30 tablet 11     gabapentin (NEURONTIN) 600 MG tablet Take 0.5 tablets (300 mg) by mouth every morning AND 1 tablet (600 mg) At Bedtime.  0     lisinopril (PRINIVIL/ZESTRIL) 40 MG tablet TAKE 1 TABLET BY MOUTH EVERY DAY DX HYPERTENSION 30 tablet 11     loperamide (IMODIUM) 2 MG capsule Take 2 mg by mouth daily as needed       metFORMIN (GLUCOPHAGE-XR) 500 MG 24 hr tablet Take 2,000 mg by mouth every morning        Multiple Vitamins-Minerals (PRESERVISION AREDS) TABS Take 1 tablet by mouth 2 times daily       potassium  "chloride (KLOR-CON) 20 MEQ packet Take 20 mEq by mouth daily       REVIEW OF SYSTEMS: Limited secondary to cognitive impairment but today pt reports the above and 4 point ROS including Respiratory, CV, GI and , other than that noted in the HPI, is negative.    Objective: /79   Pulse 101   Temp 97.4  F (36.3  C)   Resp 16   Ht 1.575 m (5' 2\")   Wt 56.6 kg (124 lb 12.8 oz)   SpO2 95%   BMI 22.83 kg/m    Exam:  GENERAL APPEARANCE: Alert, in no distress, cooperative.   ENT: Mouth/posterior oropharynx intact w/ moist mucous membranes, hearing acuity [place].  EYES: EOM, conjunctivae, lids, pupils and irises normal, PERRL2.   RESP: Respiratory effort unlabored, no respiratory distress, On RA.   CV: Edema 0+ BLE. Peripheral pulses are 2+.  ABDOMEN: soft, non-tender abdomen, and no masses palpated.  SKIN: Inspection/Palpation of skin and subcutaneous tissue baseline w/ fragility. No wounds/rashes noted.   NEURO: CN II-XII at patient's baseline, sensation baseline PPS.  PSYCH: Insight, judgement, and memory are baseline impaired, affect and mood are happy/engaged.    Labs: Labs done in facility are in EPIC. Please refer to them using [facility]/Care Everywhere.    ASSESSMENT/PLAN:  Diarrhea, unspecified type  Mild protein-calorie malnutrition (H)  Vascular dementia without behavioral disturbance (H)  Poor appetite  Acute on chronic. Ongoing.    Unclear exact time of onset, but suspect acute diarrhea started in the last 72 hours.  No recent antibiotic use, but nursing describes mucus and watery stool with foul odor.    Given fragility in age and condition, will obtain blood work to assess for leukocytosis, dehydration, electrolyte imbalance.    Given quality of stools reported, will obtain C. difficile testing.    If all testing unremarkable, will consider this to be a viral gastroenteritis.  If sustained malnutrition/anorexia, may also consider hospice enrollment.  Follow up w/in 1 week or as needed.    Orders:  1. CBC, " CMP x1 on 10/29. Dx: Diarrhea.   2. CDIFF stool test x1. Dx: diarrhea.     Electronically signed by:  Dr. Lilian Streeter, TONYA CNP DNP          Sincerely,        TONYA Obando CNP

## 2021-10-29 NOTE — TELEPHONE ENCOUNTER
[facility] Nurse Triage Telephone Note    Provider: TONYA Benson CNP, DNP  Facility: [facility]  Facility Type:  [facility]    Caller: Radha   Call Back Number: [phone]    Allergies:  No Known Allergies     Reason for call: CBC- WNL  CMP- comparable and stable  VS stable, afebrile, no pain or burning on urination      Verbal Order/Direction given by Provider: Wait for UC results     Provider giving Order:  Yoan Camp MD    Verbal Order given to: Ina Maloney RN

## 2021-10-31 NOTE — TELEPHONE ENCOUNTER
Culture >100,000 CFU/mL Escherichia coliAbnormal             Resulting Agency: [email or web address]       Susceptibility     Escherichia coli     GEORGE     Ampicillin <=4 ug/mL Susceptible     Cefazolin 2 ug/mL Susceptible     Cefepime <=1 ug/mL Susceptible     Ceftriaxone <=1 ug/mL Susceptible     Ciprofloxacin <=0.25 ug/mL Susceptible     Gentamicin <=2 ug/mL Susceptible     Levofloxacin <=0.5 ug/mL Susceptible     Meropenem <=0.5 ug/mL Susceptible     Nitrofurantoin <=16 ug/mL Susceptible     Tetracycline <=2 ug/mL Susceptible     Tobramycin <=2 ug/mL Susceptible     Trimethoprim/Sulfamethoxazole <=0.5 ug/mL Susceptible         Not on coumadin and was not started on anything    Orders:  Bactrim DS 1 tab po BID for 7 days for UTI    Electronically signed by Tiffanie Morris RN, CNP

## 2021-11-01 NOTE — LETTER
11/1/2021        RE: Anya Gaines  C/o Kimi Choe  [address]  [address]  [place] [address]        [facility]  Chief Complaint   Patient presents with     skilled nursing Regulatory   [place] MRN: [id number] Place of Service where encounter took place:  [facility] () [[id number]] HPI: Anya Gaines  is 89 year old (7/4/1920), who is being seen today for a federally mandated E/M visit.  HPI information obtained from: facility chart records, facility staff, patient report, [facility] chart review, and Care Everywhere [facility] chart review. Today's concerns are:    Anya seen today per federal mandate, and on follow up after diarrhea was reported last week. UA/UC showed UTI as noted below and on call started Bactrim. Nursing reports her appetite is much better, and diarrhea has subsided. Anya states she has a little bit of pain when she goes to the bathroom, but she continues to deny fever, chills, nausea, upset stomach.     ALLERGIES:Patient has no known allergies.PAST MEDICAL HISTORY:   has a past medical history of Cerebral artery occlusion with cerebral infarction (H), Cerebral infarction (H), Diabetes mellitus, type 2 (H), Hypertension, and Seizures (H). PAST SURGICAL HISTORY:   has a past surgical history that includes surgical history of - .FAMILY HISTORY: family history is not on file.SOCIAL HISTORY:  reports that she has never smoked. She has never used smokeless tobacco. She reports that she does not drink alcohol and does not use drugs.  MEDICATIONS:  Current Outpatient Medications   Medication Sig Dispense Refill     acetaminophen (TYLENOL) 325 MG tablet Take 2 tablets (650 mg) by mouth 2 times daily  0     clopidogrel (PLAVIX) 75 MG tablet TAKE 1 TABLET BY MOUTH EVERY DAY DX CEREBROVASCULAR ACCIDENT 30 tablet 11     gabapentin (NEURONTIN) 600 MG tablet Take 0.5 tablets (300 mg) by mouth every morning AND 1 tablet (600 mg) At Bedtime.  0     lisinopril  "(PRINIVIL/ZESTRIL) 40 MG tablet TAKE 1 TABLET BY MOUTH EVERY DAY DX HYPERTENSION 30 tablet 11     loperamide (IMODIUM) 2 MG capsule Take 2 mg by mouth daily as needed       metFORMIN (GLUCOPHAGE-XR) 500 MG 24 hr tablet Take 2,000 mg by mouth every morning        Multiple Vitamins-Minerals (PRESERVISION AREDS) TABS Take 1 tablet by mouth 2 times daily       potassium chloride (KLOR-CON) 20 MEQ packet Take 20 mEq by mouth daily       Case Management:  I have reviewed the care plan and MDS and do agree with the plan. Patient's desire to return to the community is not assessible due to cognitive impairment. Information reviewed:  Medications, vital signs, orders, and nursing notes.    ROS: Limited secondary to cognitive impairment but today pt reports the above and 4 point ROS including Respiratory, CV, GI and , other than that noted in the HPI, is negative.    Vitals: BP (!) 139/93   Pulse 89   Temp 97.9  F (36.6  C)   Resp 18   Ht 1.575 m (5' 2\")   Wt 56.6 kg (124 lb 12.8 oz)   SpO2 91%   BMI 22.83 kg/m    Exam:  GENERAL APPEARANCE: Alert, in no distress, cooperative.   ENT: Mouth/posterior oropharynx intact w/ moist mucous membranes, hearing acuity [place].  EYES: EOM, conjunctivae, lids, pupils and irises normal, PERRL2.   RESP: Respiratory effort unlabored, no respiratory distress, On RA.   CV: Edema 0+ BLE. Peripheral pulses are 2+.  ABDOMEN: Normal bowel sounds, soft, non-tender abdomen, and no masses palpated.  SKIN: Inspection/Palpation of skin and subcutaneous tissue baseline w/ fragility. No wounds/rashes noted.   NEURO: CN II-XII at patient's baseline, sensation baseline PPS.  PSYCH: Insight, judgement, and memory are impaired at baseline, affect and mood are happy/engaged.    Lab/Diagnostic data:   Recent labs in [facility] reviewed by me today.       ASSESSMENT/PLAN  Acute cystitis with hematuria  Mild protein-calorie malnutrition (H)  Vascular dementia without behavioral disturbance (H)  Poor " appetite  Frailty  Acute on chronic. Ongoing.    Diarrhea and dehydration may have led to UTI.    UTI being treated w/ Bactrim.     Nutrition intake improving, but Anya is very frail. Nursing to continue to monitor.   Follow up routinely or as needed.    Orders:  No new orders.    Electronically signed by:  TONYA Walton CNP DNP          Sincerely,        TONYA Obando CNP

## 2021-11-01 NOTE — PROGRESS NOTES
[facility]  Chief Complaint   Patient presents with     intermediate Regulatory   [place] MRN: [id number] Place of Service where encounter took place:  [facility] () [[id number]] HPI: Anya Gaines  is 89 year old (7/4/1920), who is being seen today for a federally mandated E/M visit.  HPI information obtained from: facility chart records, facility staff, patient report, [facility] chart review, and Care Everywhere [facility] chart review. Today's concerns are:    Anya seen today per federal mandate, and on follow up after diarrhea was reported last week. UA/UC showed UTI as noted below and on call started Bactrim. Nursing reports her appetite is much better, and diarrhea has subsided. Anya states she has a little bit of pain when she goes to the bathroom, but she continues to deny fever, chills, nausea, upset stomach.     ALLERGIES:Patient has no known allergies.PAST MEDICAL HISTORY:   has a past medical history of Cerebral artery occlusion with cerebral infarction (H), Cerebral infarction (H), Diabetes mellitus, type 2 (H), Hypertension, and Seizures (H). PAST SURGICAL HISTORY:   has a past surgical history that includes surgical history of - .FAMILY HISTORY: family history is not on file.SOCIAL HISTORY:  reports that she has never smoked. She has never used smokeless tobacco. She reports that she does not drink alcohol and does not use drugs.  MEDICATIONS:  Current Outpatient Medications   Medication Sig Dispense Refill     acetaminophen (TYLENOL) 325 MG tablet Take 2 tablets (650 mg) by mouth 2 times daily  0     clopidogrel (PLAVIX) 75 MG tablet TAKE 1 TABLET BY MOUTH EVERY DAY DX CEREBROVASCULAR ACCIDENT 30 tablet 11     gabapentin (NEURONTIN) 600 MG tablet Take 0.5 tablets (300 mg) by mouth every morning AND 1 tablet (600 mg) At Bedtime.  0     lisinopril (PRINIVIL/ZESTRIL) 40 MG tablet TAKE 1 TABLET BY MOUTH EVERY DAY DX HYPERTENSION 30 tablet 11     loperamide (IMODIUM) 2  "MG capsule Take 2 mg by mouth daily as needed       metFORMIN (GLUCOPHAGE-XR) 500 MG 24 hr tablet Take 2,000 mg by mouth every morning        Multiple Vitamins-Minerals (PRESERVISION AREDS) TABS Take 1 tablet by mouth 2 times daily       potassium chloride (KLOR-CON) 20 MEQ packet Take 20 mEq by mouth daily       Case Management:  I have reviewed the care plan and MDS and do agree with the plan. Patient's desire to return to the community is not assessible due to cognitive impairment. Information reviewed:  Medications, vital signs, orders, and nursing notes.    ROS: Limited secondary to cognitive impairment but today pt reports the above and 4 point ROS including Respiratory, CV, GI and , other than that noted in the HPI, is negative.    Vitals: BP (!) 139/93   Pulse 89   Temp 97.9  F (36.6  C)   Resp 18   Ht 1.575 m (5' 2\")   Wt 56.6 kg (124 lb 12.8 oz)   SpO2 91%   BMI 22.83 kg/m    Exam:  GENERAL APPEARANCE: Alert, in no distress, cooperative.   ENT: Mouth/posterior oropharynx intact w/ moist mucous membranes, hearing acuity [place].  EYES: EOM, conjunctivae, lids, pupils and irises normal, PERRL2.   RESP: Respiratory effort unlabored, no respiratory distress, On RA.   CV: Edema 0+ BLE. Peripheral pulses are 2+.  ABDOMEN: Normal bowel sounds, soft, non-tender abdomen, and no masses palpated.  SKIN: Inspection/Palpation of skin and subcutaneous tissue baseline w/ fragility. No wounds/rashes noted.   NEURO: CN II-XII at patient's baseline, sensation baseline PPS.  PSYCH: Insight, judgement, and memory are impaired at baseline, affect and mood are happy/engaged.    Lab/Diagnostic data:   Recent labs in [facility] reviewed by me today.       ASSESSMENT/PLAN  Acute cystitis with hematuria  Mild protein-calorie malnutrition (H)  Vascular dementia without behavioral disturbance (H)  Poor appetite  Frailty  Acute on chronic. Ongoing.    Diarrhea and dehydration may have led to UTI.    UTI being treated w/ Bactrim. "     Nutrition intake improving, but Anya is very frail. Nursing to continue to monitor.   Follow up routinely or as needed.    Orders:  No new orders.    Electronically signed by:  TONYA Walton CNP DNP

## 2022-01-01 ENCOUNTER — NURSING HOME VISIT (OUTPATIENT)
Dept: GERIATRICS | Facility: CLINIC | Age: 87
End: 2022-01-01
Payer: MEDICARE

## 2022-01-01 VITALS
WEIGHT: 108.6 LBS | SYSTOLIC BLOOD PRESSURE: 124 MMHG | BODY MASS INDEX: 19.98 KG/M2 | TEMPERATURE: 97.6 F | RESPIRATION RATE: 16 BRPM | DIASTOLIC BLOOD PRESSURE: 77 MMHG | HEART RATE: 63 BPM | HEIGHT: 62 IN | OXYGEN SATURATION: 98 %

## 2022-01-01 DIAGNOSIS — E43 SEVERE PROTEIN-CALORIE MALNUTRITION (H): ICD-10-CM

## 2022-01-01 DIAGNOSIS — E11.9 TYPE 2 DIABETES MELLITUS WITHOUT COMPLICATION, WITHOUT LONG-TERM CURRENT USE OF INSULIN (H): Primary | ICD-10-CM

## 2022-01-01 DIAGNOSIS — F90.9 HYPERKINETIC DISORDER: ICD-10-CM

## 2022-01-01 DIAGNOSIS — I10 BENIGN ESSENTIAL HYPERTENSION: ICD-10-CM

## 2022-01-01 DIAGNOSIS — F01.50 VASCULAR DEMENTIA WITHOUT BEHAVIORAL DISTURBANCE (H): ICD-10-CM

## 2022-01-01 DIAGNOSIS — Z86.73 HISTORY OF CVA (CEREBROVASCULAR ACCIDENT): ICD-10-CM

## 2022-01-01 DIAGNOSIS — R56.9 SEIZURES (H): ICD-10-CM

## 2022-01-01 PROCEDURE — 99309 SBSQ NF CARE MODERATE MDM 30: CPT | Mod: GV | Performed by: FAMILY MEDICINE

## 2022-01-01 ASSESSMENT — MIFFLIN-ST. JEOR: SCORE: 810.86

## 2022-01-03 NOTE — LETTER
1/3/2022        RE: Anya Gaines  C/o Kimi Choe  [address]  [address]  [place] [address]        [place] [facility]  Chief Complaint   Patient presents with     prison Regulatory     [place] Medical Record Number:  [id number]  Place of Service where encounter took place:  [facility] () [[id number]]    HPI:    Anya Gaines  is 89 year old (7/4/1920), who is being seen today for a federally mandated E/M visit.  HPI information obtained from: facility staff, and [facility] chart review. Patient has advanced dementia     Today's concerns are:   - Resident seen and examined. RN reports Resident enrolled in hospice care and is declining, diminished po intake, in the bed most of the time.   - RN reports patient now has stage 2 pressure injury over coccyx. Does not seem to be in pain.   --------------------------------  - - Past Medical, social, family histories, medications, and allergies reviewed and updated  - Medications reviewed: in the chart and EHR.   - Case Management:   I have reviewed the care plan and MDS and do agree with the plan. Patient's desire to return to the community is not present.  Information reviewed:  Medications, vital signs, orders, and nursing notes.    MEDICATIONS:  Current Outpatient Medications   Medication Sig Dispense Refill     acetaminophen (TYLENOL) 325 MG tablet Take 2 tablets (650 mg) by mouth 2 times daily  0     clopidogrel (PLAVIX) 75 MG tablet TAKE 1 TABLET BY MOUTH EVERY DAY DX CEREBROVASCULAR ACCIDENT 30 tablet 11     gabapentin (NEURONTIN) 600 MG tablet Take 0.5 tablets (300 mg) by mouth every morning AND 1 tablet (600 mg) At Bedtime.  0     lisinopril (PRINIVIL/ZESTRIL) 40 MG tablet TAKE 1 TABLET BY MOUTH EVERY DAY DX HYPERTENSION 30 tablet 11     loperamide (IMODIUM) 2 MG capsule Take 2 mg by mouth daily as needed       metFORMIN (GLUCOPHAGE-XR) 500 MG 24 hr tablet Take 2,000 mg by mouth every morning        Multiple  "Vitamins-Minerals (PRESERVISION AREDS) TABS Take 1 tablet by mouth 2 times daily       potassium chloride (KLOR-CON) 20 MEQ packet Take 20 mEq by mouth daily       ROS: unobtainable 2/2  cognitive impairment but today pt reports     Vitals:  /77   Pulse 63   Temp 97.6  F (36.4  C)   Resp 16   Ht 1.575 m (5' 2\")   Wt 49.3 kg (108 lb 9.6 oz)   SpO2 98%   BMI 19.86 kg/m    Body mass index is 19.86 kg/m .  Exam:  GENERAL APPEARANCE:  in no distress, cooperative  RESP:  lungs clear to auscultation   CV:  S1S2 audible, regular HR, diastolic murmur appreciated. No edema.   ABDOMEN:  soft, NT/ND, BS audible. no mass appreciated on palpation.   M/S:   Right hand contracture  SKIN: no rash.   NEURO:   No NFD appreciated on observation.   PSYCH:  pleasantly confused, verbal.     Lab/Diagnostic data: Reviewed in the chart and EHR.        ASSESSMENT/PLAN  --------------------------------  Type 2 diabetes mellitus without complication, without long-term current use of insulin (H):   A1C 7.6 07/23/2021    A1C 11.8 04/29/2021   - On metformin diminished po intake. recommend stopping metformin.       Severe PCM (H):  Body mass index is 19.86 kg/m . from 22.07, this increases mortality rate in frail elderly. -     Hx of Seizures d/o; not on meds    Cognitive Impairment, query vascular dementia/vs AD (H)  - Continue to anticipate needs. Chronic condition, ongoing decline expected. Continue to provide redirection and reassurance as needed. Maintain safe living situation with goals focused on comfort    Essential HTN:  Consider reducing lisinopril 40 mg to 20 mg.  Hypokalemia: on KCL 20 meq. Consider stopping.     Hyperkinetic disorder  Debility  History of CVA (cerebrovascular accident)  - on Plavix. Consider stopping given progressive decline and limited life expectancy  - at baseline.   - Significant  Deficits requiring NH placement. Requiring extensive assistance from nursing. Up for meals only o/w spends the day " resting in bed      Order: See above, otherwise, continue the rest of the current POC.     Electronically signed by:  Yoan Camp MD                Sincerely,        Yoan Camp MD

## 2022-01-03 NOTE — PROGRESS NOTES
[place] [facility]  Chief Complaint   Patient presents with     custodial Regulatory     [place] Medical Record Number:  [id number]  Place of Service where encounter took place:  [facility] () [[id number]]    HPI:    Anya Gaines  is 89 year old (7/4/1920), who is being seen today for a federally mandated E/M visit.  HPI information obtained from: facility staff, and [facility] chart review. Patient has advanced dementia     Today's concerns are:   - Resident seen and examined. RN reports Resident enrolled in hospice care and is declining, diminished po intake, in the bed most of the time.   - RN reports patient now has stage 2 pressure injury over coccyx. Does not seem to be in pain.   --------------------------------  - - Past Medical, social, family histories, medications, and allergies reviewed and updated  - Medications reviewed: in the chart and EHR.   - Case Management:   I have reviewed the care plan and MDS and do agree with the plan. Patient's desire to return to the community is not present.  Information reviewed:  Medications, vital signs, orders, and nursing notes.    MEDICATIONS:  Current Outpatient Medications   Medication Sig Dispense Refill     acetaminophen (TYLENOL) 325 MG tablet Take 2 tablets (650 mg) by mouth 2 times daily  0     clopidogrel (PLAVIX) 75 MG tablet TAKE 1 TABLET BY MOUTH EVERY DAY DX CEREBROVASCULAR ACCIDENT 30 tablet 11     gabapentin (NEURONTIN) 600 MG tablet Take 0.5 tablets (300 mg) by mouth every morning AND 1 tablet (600 mg) At Bedtime.  0     lisinopril (PRINIVIL/ZESTRIL) 40 MG tablet TAKE 1 TABLET BY MOUTH EVERY DAY DX HYPERTENSION 30 tablet 11     loperamide (IMODIUM) 2 MG capsule Take 2 mg by mouth daily as needed       metFORMIN (GLUCOPHAGE-XR) 500 MG 24 hr tablet Take 2,000 mg by mouth every morning        Multiple Vitamins-Minerals (PRESERVISION AREDS) TABS Take 1 tablet by mouth 2 times daily       potassium chloride (KLOR-CON) 20  "MEQ packet Take 20 mEq by mouth daily       ROS: unobtainable 2/2  cognitive impairment but today pt reports     Vitals:  /77   Pulse 63   Temp 97.6  F (36.4  C)   Resp 16   Ht 1.575 m (5' 2\")   Wt 49.3 kg (108 lb 9.6 oz)   SpO2 98%   BMI 19.86 kg/m    Body mass index is 19.86 kg/m .  Exam:  GENERAL APPEARANCE:  in no distress, cooperative  RESP:  lungs clear to auscultation   CV:  S1S2 audible, regular HR, diastolic murmur appreciated. No edema.   ABDOMEN:  soft, NT/ND, BS audible. no mass appreciated on palpation.   M/S:   Right hand contracture  SKIN: no rash.   NEURO:   No NFD appreciated on observation.   PSYCH:  pleasantly confused, verbal.     Lab/Diagnostic data: Reviewed in the chart and EHR.        ASSESSMENT/PLAN  --------------------------------  Type 2 diabetes mellitus without complication, without long-term current use of insulin (H):   A1C 7.6 07/23/2021    A1C 11.8 04/29/2021   - On metformin diminished po intake. recommend stopping metformin.       Severe PCM (H):  Body mass index is 19.86 kg/m . from 22.07, this increases mortality rate in frail elderly. -     Hx of Seizures d/o; not on meds    Cognitive Impairment, query vascular dementia/vs AD (H)  - Continue to anticipate needs. Chronic condition, ongoing decline expected. Continue to provide redirection and reassurance as needed. Maintain safe living situation with goals focused on comfort    Essential HTN:  Consider reducing lisinopril 40 mg to 20 mg.  Hypokalemia: on KCL 20 meq. Consider stopping.     Hyperkinetic disorder  Debility  History of CVA (cerebrovascular accident)  - on Plavix. Consider stopping given progressive decline and limited life expectancy  - at baseline.   - Significant  Deficits requiring NH placement. Requiring extensive assistance from nursing. Up for meals only o/w spends the day resting in bed      Order: See above, otherwise, continue the rest of the current POC.     Electronically signed by:  Yoan " MD Zoë

## 2022-01-09 PROBLEM — F01.50 VASCULAR DEMENTIA WITHOUT BEHAVIORAL DISTURBANCE (H): Status: ACTIVE | Noted: 2022-01-09

## 2025-05-12 NOTE — IP AVS SNAPSHOT
"    [facility]: [phone]                                              INTERAGENCY TRANSFER FORM - PHYSICIAN ORDERS   2/3/2017                    Hospital Admission Date: 2/3/2017  TAMAR ELLINGTON   : 1920  Sex: Female        Attending Provider: Leonel Odell MD     Allergies:  No Known Allergies    Infection:  None   Service:  TRAUMA    Ht:  1.499 m (4' 11\")   Wt:  55.7 kg (122 lb 12.7 oz)   Admission Wt:  57 kg (125 lb 10.6 oz)    BMI:  24.79 kg/m 2   BSA:  1.52 m 2            Patient PCP Information     Provider PCP Type    TONYA Tran [facility]      ED Clinical Impression     Diagnosis Description Comment Added By Time Added    Subdural hemorrhage (H) [I62.00] Subdural hemorrhage (H) [I62.00]  Hermelindo Rivero MD 2/3/2017  9:31 PM      Hospital Problems as of 2017              Priority Class Noted POA    Subdural hematoma caused by concussion, with loss of consciousness of any duration with death due to brain injury prior to regaining consciousness, sequela Medium  2017 Yes      Non-Hospital Problems as of 2017              Priority Class Noted    Fall at home Medium  3/2/2016    Type 2 diabetes mellitus without complication, without long-term current use of insulin (H) Medium  3/7/2016    UTI (urinary tract infection) Medium  3/7/2016    Hyperkinetic Medium  3/7/2016    Age-related osteoporosis without current pathological fracture Medium  3/7/2016    History of TIA (transient ischemic attack) and stroke Medium  3/7/2016    Advance care planning Medium  3/7/2016    Macular degeneration (senile) of retina Medium  3/7/2016    Health Care Home   2016    Dizziness Medium  2016    Essential hypertension with goal blood pressure less than 140/90 Medium  2016    Seizures (H) Medium  2017      Code Status History     Date Active Date Inactive Code Status Order ID Comments User Context    2017 12:03 AM 2017 11:29 AM Full Code [id number]  " Tiburcio Espinosa MD ED    3/2/2016  9:34 PM 3/5/2016  4:39 PM Full Code [id number]  Usman Mosley MD Inpatient         Medication Review      CONTINUE these medications which may have CHANGED, or have new prescriptions. If we are uncertain of the size of tablets/capsules you have at home, strength may be listed as something that might have changed.        Dose / Directions Comments    acetaminophen 325 MG tablet   Commonly known as:  TYLENOL   This may have changed:    - medication strength  - how much to take  - when to take this  - reasons to take this   Used for:  Subdural hemorrhage (H), Fall at home, initial encounter        Dose:  1000 mg   Take 3 tablets (975 mg) by mouth every 6 hours as needed   Quantity:  100 tablet   Refills:  0    For pain       IMODIUM A-D 2 MG tablet   Indication:  Diarrhea   This may have changed:  Another medication with the same name was removed. Continue taking this medication, and follow the directions you see here.   Generic drug:  loperamide        Dose:  2 mg   Take 2 mg by mouth 4 times daily as needed for diarrhea   Refills:  0        Menthol (Topical Analgesic) 4 % Gel   Commonly known as:  BIOFREEZE   This may have changed:  additional instructions   Used for:  Subdural hemorrhage (H)        Externally apply topically 3 times daily as needed For muscle aches   Refills:  0          CONTINUE these medications which have NOT CHANGED        Dose / Directions Comments    amLODIPine 2.5 MG tablet   Commonly known as:  NORVASC   Used for:  Benign essential hypertension        Dose:  2.5 mg   Take 1 tablet (2.5 mg) by mouth daily   Quantity:  30 tablet   Refills:  0    For high blood pressure       atorvastatin 10 MG tablet   Commonly known as:  LIPITOR   Used for:  Mixed hyperlipidemia        Dose:  10 mg   Take 1 tablet (10 mg) by mouth daily   Quantity:  30 tablet   Refills:  1    For high cholesterol       calcium carbonate 600 MG tablet   Commonly known as:   OS- mg [place]. Ca   Indication:  Osteoporosis   Used for:  Osteoporosis        Dose:  1 tablet   Take 1 tablet (600 mg) by mouth daily   Quantity:  60 tablet   Refills:  5    For heartburn       gabapentin 100 MG capsule   Commonly known as:  NEURONTIN   Used for:  Abnormal involuntary movement        Dose:  400 mg   Take 4 capsules (400 mg) by mouth 3 times daily   Quantity:  90 capsule   Refills:  5    For neuropathic pain       hydrochlorothiazide 25 MG tablet   Commonly known as:  HYDRODIURIL   Used for:  Benign essential hypertension        Dose:  25 mg   Take 1 tablet (25 mg) by mouth daily   Quantity:  90 tablet   Refills:  3    For high blood pressure       hypromellose-dextran 0.3-0.1% opthalmic solution   Used for:  Subdural hemorrhage (H)        Dose:  1 drop   Place 1 drop into both eyes 3 times daily   Quantity:  15 mL   Refills:  0    For dry eyes       lisinopril 40 MG tablet   Commonly known as:  PRINIVIL/ZESTRIL   Used for:  Essential hypertension with goal blood pressure less than 140/90        Dose:  40 mg   Take 1 tablet (40 mg) by mouth daily   Quantity:  30 tablet   Refills:  5    For high blood pressure       metFORMIN 500 MG 24 hr tablet   Commonly known as:  GLUCOPHAGE-XR   Indication:  Type 2 Diabetes   Used for:  Type 2 diabetes mellitus without complication, without long-term current use of insulin (H)        Dose:  2000 mg   Take 4 tablets (2,000 mg) by mouth daily (with dinner)   Quantity:  30 tablet   Refills:  0    For diabetes mellitus type II       multivitamin  with lutein Caps per capsule   Used for:  Glaucoma of both eyes, unspecified glaucoma        Dose:  1 capsule   Take 1 capsule by mouth daily   Quantity:  30 capsule   Refills:  5    Nutritional supplement         STOP taking     clopidogrel 75 MG tablet   Commonly known as:  PLAVIX                   Summary of Visit     Reason for your hospital stay       You fell which caused a bleed around the surface of your brain.              After Care     Activity - Up with assistive device           Advance Diet as Tolerated       Follow this diet upon discharge: Orders Placed This Encounter  Regular Diet Adult       Fall precautions           General info for SNF       Length of Stay Estimate: Long Term Care  Condition at Discharge: Improving  Level of care:skilled   Rehabilitation Potential: Good  Admission H&P remains valid and up-to-date: Yes  Recent Chemotherapy: N/A  Use Nursing Home Standing Orders: Yes       Glucose monitor nursing POCT       Before meals and at bedtime       Mantoux instructions       Give two-step Mantoux (PPD) Per Facility Policy Yes             Referrals     MED THERAPY MANAGE REFERRAL       Patient has diagnosis of Diabetes, Heart Failure, COPD, or AMI and is on more than 5 medications       NEUROLOGY ADULT REFERRAL       Please schedule follow-up with Dr. Katerine King in 1 month.    Reason for Referral: Consult    Please be aware that coverage of these services is subject to the terms and limitations of your health insurance plan.  Call member services at your health plan with any benefit or coverage questions.      Please bring the following with you to your appointment:    (1) Any X-Rays, CTs or MRIs which have been performed.  Contact the facility where they were done to arrange for  prior to your scheduled appointment.    (2) List of current medications  (3) This referral request   (4) Any documents/labs given to you for this referral       Occupational Therapy Adult Consult       Evaluate and treat as clinically indicated.    Reason:  Subdural Hemorrhage       Physical Therapy Adult Consult       Evaluate and treat as clinically indicated.    Reason:  Subdural Hemorrhage       Speech Language Path Adult Consult       Evaluate and treat as clinically indicated.    Reason:  Intermittent aphasia 2/2 subdural hemorrhage             Follow-Up Appointment Instructions     Future Labs/Procedures    Follow  Visited patient in room, pt able to answer questions. Breathing is improving with O2, has productive cough with pneumonia. presents with fair/good appetite/po intake, consuming 75% of meals on average per flowsheet. Denies n/v/d/c, stooling normally. No reported difficulty swallowing, does have dentures and needs foods cut up soft for chewing difficulty. NKFA. Current adm weight 49.1kg, weight appears to be stable, will continue to monitor weight trends as able.    Pertinent labs/meds reviewed: receiving miralax    Education provided at this time on increased calorie requirements for COPD. Pt declines ensures at this time. Encouraged intake of small frequent meals with protein. Pt understood DASH restrictions at this time. Communicated to kitchen that pt needs foods very soft and vegetables steamed, chopped up small. RD to remain available per protocol.  Up and recommended labs and tests     Comments:    Follow up with primary care provider, Carlene Serrano, within 7 days for hospital follow- up.  The following labs/tests are recommended: Head CT without contrast in 1 month (~3/6).  Stop plavix until then.      Follow-Up Appointment Instructions     Follow Up and recommended labs and tests       Follow up with primary care provider, Carlene Serrano, within 7 days for hospital follow- up.  The following labs/tests are recommended: Head CT without contrast in 1 month (~3/6).  Stop plavix until then.             Statement of Approval     Ordered          02/07/17 1506  I have reviewed and agree with all the recommendations and orders detailed in this document.   EFFECTIVE NOW     Approved and electronically signed by:  Leonel Odell MD